# Patient Record
Sex: MALE | Race: WHITE | NOT HISPANIC OR LATINO | Employment: OTHER | URBAN - METROPOLITAN AREA
[De-identification: names, ages, dates, MRNs, and addresses within clinical notes are randomized per-mention and may not be internally consistent; named-entity substitution may affect disease eponyms.]

---

## 2017-01-11 DIAGNOSIS — K21.9 GASTROESOPHAGEAL REFLUX DISEASE WITHOUT ESOPHAGITIS: ICD-10-CM

## 2017-01-12 RX ORDER — RANITIDINE 300 MG/1
300 TABLET ORAL DAILY
Qty: 30 TAB | Refills: 3 | Status: SHIPPED | OUTPATIENT
Start: 2017-01-12 | End: 2017-02-13

## 2017-02-13 ENCOUNTER — APPOINTMENT (OUTPATIENT)
Dept: RADIOLOGY | Facility: MEDICAL CENTER | Age: 62
End: 2017-02-13
Attending: EMERGENCY MEDICINE
Payer: MEDICAID

## 2017-02-13 ENCOUNTER — RESOLUTE PROFESSIONAL BILLING HOSPITAL PROF FEE (OUTPATIENT)
Dept: HOSPITALIST | Facility: MEDICAL CENTER | Age: 62
End: 2017-02-13
Payer: MEDICAID

## 2017-02-13 ENCOUNTER — HOSPITAL ENCOUNTER (OUTPATIENT)
Facility: MEDICAL CENTER | Age: 62
End: 2017-02-15
Attending: EMERGENCY MEDICINE | Admitting: HOSPITALIST
Payer: MEDICAID

## 2017-02-13 DIAGNOSIS — R41.0 DELIRIUM: ICD-10-CM

## 2017-02-13 PROBLEM — R41.82 ALTERED MENTAL STATUS: Status: ACTIVE | Noted: 2017-02-13

## 2017-02-13 PROBLEM — E87.6 HYPOKALEMIA: Status: ACTIVE | Noted: 2017-02-13

## 2017-02-13 LAB
ALBUMIN SERPL BCP-MCNC: 4.3 G/DL (ref 3.2–4.9)
ALBUMIN/GLOB SERPL: 1.7 G/DL
ALP SERPL-CCNC: 68 U/L (ref 30–99)
ALT SERPL-CCNC: 28 U/L (ref 2–50)
AMPHET UR QL SCN: NEGATIVE
ANION GAP SERPL CALC-SCNC: 9 MMOL/L (ref 0–11.9)
APPEARANCE UR: CLEAR
AST SERPL-CCNC: 20 U/L (ref 12–45)
BARBITURATES UR QL SCN: NEGATIVE
BASOPHILS # BLD AUTO: 0.9 % (ref 0–1.8)
BASOPHILS # BLD: 0.06 K/UL (ref 0–0.12)
BENZODIAZ UR QL SCN: POSITIVE
BILIRUB SERPL-MCNC: 0.5 MG/DL (ref 0.1–1.5)
BILIRUB UR QL STRIP.AUTO: NEGATIVE
BUN SERPL-MCNC: 12 MG/DL (ref 8–22)
BZE UR QL SCN: NEGATIVE
CALCIUM SERPL-MCNC: 9.2 MG/DL (ref 8.5–10.5)
CANNABINOIDS UR QL SCN: NEGATIVE
CHLORIDE SERPL-SCNC: 107 MMOL/L (ref 96–112)
CO2 SERPL-SCNC: 25 MMOL/L (ref 20–33)
COLOR UR: YELLOW
CREAT SERPL-MCNC: 0.98 MG/DL (ref 0.5–1.4)
EOSINOPHIL # BLD AUTO: 0.13 K/UL (ref 0–0.51)
EOSINOPHIL NFR BLD: 1.9 % (ref 0–6.9)
ERYTHROCYTE [DISTWIDTH] IN BLOOD BY AUTOMATED COUNT: 38.6 FL (ref 35.9–50)
ETHANOL BLD-MCNC: 0 G/DL
GFR SERPL CREATININE-BSD FRML MDRD: >60 ML/MIN/1.73 M 2
GLOBULIN SER CALC-MCNC: 2.6 G/DL (ref 1.9–3.5)
GLUCOSE BLD-MCNC: 172 MG/DL (ref 65–99)
GLUCOSE BLD-MCNC: 216 MG/DL (ref 65–99)
GLUCOSE BLD-MCNC: 217 MG/DL (ref 65–99)
GLUCOSE SERPL-MCNC: 173 MG/DL (ref 65–99)
GLUCOSE UR STRIP.AUTO-MCNC: NEGATIVE MG/DL
HCT VFR BLD AUTO: 41.7 % (ref 42–52)
HGB BLD-MCNC: 14.2 G/DL (ref 14–18)
IMM GRANULOCYTES # BLD AUTO: 0.05 K/UL (ref 0–0.11)
IMM GRANULOCYTES NFR BLD AUTO: 0.7 % (ref 0–0.9)
INR PPP: 0.94 (ref 0.87–1.13)
KETONES UR STRIP.AUTO-MCNC: NEGATIVE MG/DL
LEUKOCYTE ESTERASE UR QL STRIP.AUTO: NEGATIVE
LIPASE SERPL-CCNC: 29 U/L (ref 11–82)
LYMPHOCYTES # BLD AUTO: 1.79 K/UL (ref 1–4.8)
LYMPHOCYTES NFR BLD: 26.2 % (ref 22–41)
MCH RBC QN AUTO: 28.1 PG (ref 27–33)
MCHC RBC AUTO-ENTMCNC: 34.1 G/DL (ref 33.7–35.3)
MCV RBC AUTO: 82.6 FL (ref 81.4–97.8)
MDMA UR QL SCN: NEGATIVE
METHADONE UR QL SCN: NEGATIVE
MICRO URNS: NORMAL
MONOCYTES # BLD AUTO: 0.44 K/UL (ref 0–0.85)
MONOCYTES NFR BLD AUTO: 6.4 % (ref 0–13.4)
NEUTROPHILS # BLD AUTO: 4.37 K/UL (ref 1.82–7.42)
NEUTROPHILS NFR BLD: 63.9 % (ref 44–72)
NITRITE UR QL STRIP.AUTO: NEGATIVE
NRBC # BLD AUTO: 0 K/UL
NRBC BLD AUTO-RTO: 0 /100 WBC
OPIATES UR QL SCN: NEGATIVE
OXYCODONE UR QL SCN: NEGATIVE
PCP UR QL SCN: NEGATIVE
PH UR STRIP.AUTO: 6.5 [PH]
PLATELET # BLD AUTO: 232 K/UL (ref 164–446)
PMV BLD AUTO: 10.2 FL (ref 9–12.9)
POTASSIUM SERPL-SCNC: 3.4 MMOL/L (ref 3.6–5.5)
PROPOXYPH UR QL SCN: NEGATIVE
PROT SERPL-MCNC: 6.9 G/DL (ref 6–8.2)
PROT UR QL STRIP: NEGATIVE MG/DL
PROTHROMBIN TIME: 12.9 SEC (ref 12–14.6)
RBC # BLD AUTO: 5.05 M/UL (ref 4.7–6.1)
RBC UR QL AUTO: NEGATIVE
SODIUM SERPL-SCNC: 141 MMOL/L (ref 135–145)
SP GR UR STRIP.AUTO: 1.02
WBC # BLD AUTO: 6.8 K/UL (ref 4.8–10.8)

## 2017-02-13 PROCEDURE — 700111 HCHG RX REV CODE 636 W/ 250 OVERRIDE (IP): Performed by: HOSPITALIST

## 2017-02-13 PROCEDURE — 94760 N-INVAS EAR/PLS OXIMETRY 1: CPT

## 2017-02-13 PROCEDURE — 70450 CT HEAD/BRAIN W/O DYE: CPT

## 2017-02-13 PROCEDURE — A9270 NON-COVERED ITEM OR SERVICE: HCPCS | Performed by: HOSPITALIST

## 2017-02-13 PROCEDURE — 99285 EMERGENCY DEPT VISIT HI MDM: CPT

## 2017-02-13 PROCEDURE — 302172 SOFT BED BELT: Performed by: EMERGENCY MEDICINE

## 2017-02-13 PROCEDURE — G0378 HOSPITAL OBSERVATION PER HR: HCPCS

## 2017-02-13 PROCEDURE — 83690 ASSAY OF LIPASE: CPT

## 2017-02-13 PROCEDURE — 71010 DX-CHEST-PORTABLE (1 VIEW): CPT

## 2017-02-13 PROCEDURE — 80053 COMPREHEN METABOLIC PANEL: CPT

## 2017-02-13 PROCEDURE — 82962 GLUCOSE BLOOD TEST: CPT

## 2017-02-13 PROCEDURE — 96374 THER/PROPH/DIAG INJ IV PUSH: CPT

## 2017-02-13 PROCEDURE — 81003 URINALYSIS AUTO W/O SCOPE: CPT

## 2017-02-13 PROCEDURE — 99220 PR INITIAL OBSERVATION CARE,LEVL III: CPT | Performed by: HOSPITALIST

## 2017-02-13 PROCEDURE — 85025 COMPLETE CBC W/AUTO DIFF WBC: CPT

## 2017-02-13 PROCEDURE — 700111 HCHG RX REV CODE 636 W/ 250 OVERRIDE (IP): Performed by: EMERGENCY MEDICINE

## 2017-02-13 PROCEDURE — 700102 HCHG RX REV CODE 250 W/ 637 OVERRIDE(OP): Performed by: HOSPITALIST

## 2017-02-13 PROCEDURE — 85610 PROTHROMBIN TIME: CPT

## 2017-02-13 PROCEDURE — 93005 ELECTROCARDIOGRAM TRACING: CPT | Performed by: EMERGENCY MEDICINE

## 2017-02-13 PROCEDURE — 36415 COLL VENOUS BLD VENIPUNCTURE: CPT

## 2017-02-13 PROCEDURE — 80307 DRUG TEST PRSMV CHEM ANLYZR: CPT

## 2017-02-13 RX ORDER — DOCUSATE SODIUM 100 MG/1
100 CAPSULE, LIQUID FILLED ORAL 2 TIMES DAILY
Status: DISCONTINUED | OUTPATIENT
Start: 2017-02-13 | End: 2017-02-15 | Stop reason: HOSPADM

## 2017-02-13 RX ORDER — LORAZEPAM 2 MG/ML
0.5 INJECTION INTRAMUSCULAR ONCE
Status: COMPLETED | OUTPATIENT
Start: 2017-02-13 | End: 2017-02-13

## 2017-02-13 RX ORDER — RANITIDINE 150 MG/1
300 TABLET ORAL 2 TIMES DAILY
Status: CANCELLED | OUTPATIENT
Start: 2017-02-13

## 2017-02-13 RX ORDER — RANITIDINE 150 MG/1
300 TABLET ORAL 2 TIMES DAILY
COMMUNITY
End: 2017-02-21 | Stop reason: SDUPTHER

## 2017-02-13 RX ORDER — ATORVASTATIN CALCIUM 40 MG/1
40 TABLET, FILM COATED ORAL DAILY
COMMUNITY
End: 2017-04-28

## 2017-02-13 RX ORDER — AMOXICILLIN 250 MG
1 CAPSULE ORAL NIGHTLY
Status: DISCONTINUED | OUTPATIENT
Start: 2017-02-13 | End: 2017-02-15 | Stop reason: HOSPADM

## 2017-02-13 RX ORDER — FAMOTIDINE 20 MG/1
20 TABLET, FILM COATED ORAL 2 TIMES DAILY
Status: DISCONTINUED | OUTPATIENT
Start: 2017-02-13 | End: 2017-02-15 | Stop reason: HOSPADM

## 2017-02-13 RX ORDER — ALBUTEROL SULFATE 90 UG/1
2 AEROSOL, METERED RESPIRATORY (INHALATION) EVERY 4 HOURS PRN
Status: DISCONTINUED | OUTPATIENT
Start: 2017-02-13 | End: 2017-02-15 | Stop reason: HOSPADM

## 2017-02-13 RX ORDER — AMOXICILLIN 250 MG
1 CAPSULE ORAL
Status: DISCONTINUED | OUTPATIENT
Start: 2017-02-13 | End: 2017-02-15 | Stop reason: HOSPADM

## 2017-02-13 RX ORDER — FLUTICASONE PROPIONATE 110 UG/1
2 AEROSOL, METERED RESPIRATORY (INHALATION) 2 TIMES DAILY
COMMUNITY
End: 2019-12-31

## 2017-02-13 RX ORDER — TIOTROPIUM BROMIDE 18 UG/1
1 CAPSULE ORAL; RESPIRATORY (INHALATION) DAILY
Status: DISCONTINUED | OUTPATIENT
Start: 2017-02-13 | End: 2017-02-15 | Stop reason: HOSPADM

## 2017-02-13 RX ORDER — TRIHEXYPHENIDYL HYDROCHLORIDE 2 MG/1
1 TABLET ORAL 3 TIMES DAILY
Status: DISCONTINUED | OUTPATIENT
Start: 2017-02-13 | End: 2017-02-13

## 2017-02-13 RX ORDER — TIOTROPIUM BROMIDE 18 UG/1
18 CAPSULE ORAL; RESPIRATORY (INHALATION) DAILY
COMMUNITY
End: 2019-12-31

## 2017-02-13 RX ORDER — ENEMA 19; 7 G/133ML; G/133ML
1 ENEMA RECTAL
Status: DISCONTINUED | OUTPATIENT
Start: 2017-02-13 | End: 2017-02-15 | Stop reason: HOSPADM

## 2017-02-13 RX ORDER — HEPARIN SODIUM 5000 [USP'U]/ML
5000 INJECTION, SOLUTION INTRAVENOUS; SUBCUTANEOUS EVERY 8 HOURS
Status: DISCONTINUED | OUTPATIENT
Start: 2017-02-13 | End: 2017-02-15 | Stop reason: HOSPADM

## 2017-02-13 RX ORDER — POTASSIUM CHLORIDE 750 MG/1
40 TABLET, FILM COATED, EXTENDED RELEASE ORAL ONCE
Status: COMPLETED | OUTPATIENT
Start: 2017-02-13 | End: 2017-02-13

## 2017-02-13 RX ORDER — ACETAMINOPHEN 160 MG
2000 TABLET,DISINTEGRATING ORAL DAILY
COMMUNITY
End: 2017-04-28

## 2017-02-13 RX ORDER — BISACODYL 10 MG
10 SUPPOSITORY, RECTAL RECTAL
Status: DISCONTINUED | OUTPATIENT
Start: 2017-02-13 | End: 2017-02-15 | Stop reason: HOSPADM

## 2017-02-13 RX ORDER — ATORVASTATIN CALCIUM 20 MG/1
20 TABLET, FILM COATED ORAL DAILY
COMMUNITY
End: 2017-04-28

## 2017-02-13 RX ORDER — KETOCONAZOLE 20 MG/ML
5 SHAMPOO TOPICAL
COMMUNITY
End: 2018-05-22 | Stop reason: SDUPTHER

## 2017-02-13 RX ORDER — BENZTROPINE MESYLATE 2 MG/1
2 TABLET ORAL 3 TIMES DAILY
Status: DISCONTINUED | OUTPATIENT
Start: 2017-02-13 | End: 2017-02-15 | Stop reason: HOSPADM

## 2017-02-13 RX ORDER — LACTULOSE 20 G/30ML
30 SOLUTION ORAL
Status: DISCONTINUED | OUTPATIENT
Start: 2017-02-13 | End: 2017-02-15 | Stop reason: HOSPADM

## 2017-02-13 RX ORDER — BENZTROPINE MESYLATE 2 MG/1
2 TABLET ORAL 3 TIMES DAILY
COMMUNITY
End: 2017-07-20

## 2017-02-13 RX ORDER — ACETAMINOPHEN 325 MG/1
650 TABLET ORAL EVERY 6 HOURS PRN
Status: DISCONTINUED | OUTPATIENT
Start: 2017-02-13 | End: 2017-02-15 | Stop reason: HOSPADM

## 2017-02-13 RX ORDER — SERTRALINE HYDROCHLORIDE 100 MG/1
100 TABLET, FILM COATED ORAL DAILY
Status: DISCONTINUED | OUTPATIENT
Start: 2017-02-13 | End: 2017-02-15 | Stop reason: HOSPADM

## 2017-02-13 RX ORDER — ARIPIPRAZOLE 20 MG/1
20 TABLET ORAL DAILY
Status: ON HOLD | COMMUNITY
End: 2017-02-15

## 2017-02-13 RX ORDER — ATORVASTATIN CALCIUM 40 MG/1
60 TABLET, FILM COATED ORAL DAILY
Status: DISCONTINUED | OUTPATIENT
Start: 2017-02-13 | End: 2017-02-15 | Stop reason: HOSPADM

## 2017-02-13 RX ORDER — LISINOPRIL 20 MG/1
20 TABLET ORAL DAILY
Status: DISCONTINUED | OUTPATIENT
Start: 2017-02-13 | End: 2017-02-15 | Stop reason: HOSPADM

## 2017-02-13 RX ORDER — AMLODIPINE BESYLATE 5 MG/1
10 TABLET ORAL DAILY
Status: DISCONTINUED | OUTPATIENT
Start: 2017-02-13 | End: 2017-02-15 | Stop reason: HOSPADM

## 2017-02-13 RX ORDER — SERTRALINE HYDROCHLORIDE 100 MG/1
100 TABLET, FILM COATED ORAL DAILY
COMMUNITY
End: 2017-07-20

## 2017-02-13 RX ORDER — QUETIAPINE FUMARATE 100 MG/1
200 TABLET, FILM COATED ORAL EVERY EVENING
Status: DISCONTINUED | OUTPATIENT
Start: 2017-02-13 | End: 2017-02-15 | Stop reason: HOSPADM

## 2017-02-13 RX ORDER — FLUTICASONE PROPIONATE 110 UG/1
2 AEROSOL, METERED RESPIRATORY (INHALATION) EVERY 12 HOURS
Status: DISCONTINUED | OUTPATIENT
Start: 2017-02-13 | End: 2017-02-15 | Stop reason: HOSPADM

## 2017-02-13 RX ORDER — TEMAZEPAM 15 MG/1
30 CAPSULE ORAL
Status: ON HOLD | COMMUNITY
End: 2017-02-15

## 2017-02-13 RX ADMIN — LISINOPRIL 20 MG: 20 TABLET ORAL at 11:43

## 2017-02-13 RX ADMIN — FAMOTIDINE 20 MG: 20 TABLET, FILM COATED ORAL at 20:30

## 2017-02-13 RX ADMIN — ACETAMINOPHEN 650 MG: 325 TABLET, FILM COATED ORAL at 11:49

## 2017-02-13 RX ADMIN — LORAZEPAM 0.5 MG: 2 INJECTION INTRAMUSCULAR; INTRAVENOUS at 03:52

## 2017-02-13 RX ADMIN — ASPIRIN 81 MG: 81 TABLET ORAL at 11:43

## 2017-02-13 RX ADMIN — QUETIAPINE FUMARATE 200 MG: 100 TABLET, FILM COATED ORAL at 20:29

## 2017-02-13 RX ADMIN — POTASSIUM CHLORIDE 40 MEQ: 750 TABLET, FILM COATED, EXTENDED RELEASE ORAL at 11:49

## 2017-02-13 RX ADMIN — SERTRALINE 100 MG: 100 TABLET, FILM COATED ORAL at 11:43

## 2017-02-13 RX ADMIN — HEPARIN SODIUM 5000 UNITS: 5000 INJECTION, SOLUTION INTRAVENOUS; SUBCUTANEOUS at 20:30

## 2017-02-13 RX ADMIN — FLUTICASONE PROPIONATE 220 MCG: 110 AEROSOL, METERED RESPIRATORY (INHALATION) at 21:00

## 2017-02-13 RX ADMIN — AMLODIPINE BESYLATE 10 MG: 5 TABLET ORAL at 11:45

## 2017-02-13 RX ADMIN — METFORMIN HYDROCHLORIDE 850 MG: 850 TABLET, FILM COATED ORAL at 17:32

## 2017-02-13 RX ADMIN — BENZTROPINE MESYLATE 2 MG: 2 TABLET ORAL at 20:30

## 2017-02-13 RX ADMIN — HEPARIN SODIUM 5000 UNITS: 5000 INJECTION, SOLUTION INTRAVENOUS; SUBCUTANEOUS at 15:43

## 2017-02-13 RX ADMIN — ATORVASTATIN CALCIUM 60 MG: 40 TABLET, FILM COATED ORAL at 11:44

## 2017-02-13 RX ADMIN — BENZTROPINE MESYLATE 2 MG: 2 TABLET ORAL at 15:47

## 2017-02-13 RX ADMIN — FAMOTIDINE 20 MG: 20 TABLET, FILM COATED ORAL at 11:44

## 2017-02-13 ASSESSMENT — PAIN SCALES - GENERAL
PAINLEVEL_OUTOF10: 3
PAINLEVEL_OUTOF10: 0
PAINLEVEL_OUTOF10: 2

## 2017-02-13 ASSESSMENT — COPD QUESTIONNAIRES
DURING THE PAST 4 WEEKS HOW MUCH DID YOU FEEL SHORT OF BREATH: NONE/LITTLE OF THE TIME
DO YOU EVER COUGH UP ANY MUCUS OR PHLEGM?: NO/ONLY WITH OCCASIONAL COLDS OR INFECTIONS
HAVE YOU SMOKED AT LEAST 100 CIGARETTES IN YOUR ENTIRE LIFE: YES
COPD SCREENING SCORE: 5

## 2017-02-13 ASSESSMENT — LIFESTYLE VARIABLES
EVER_SMOKED: YES
DO YOU DRINK ALCOHOL: NO

## 2017-02-13 NOTE — ED NOTES
"Pt found walking in the hallway, stating \"I thought it was time to go\".  Pt redirected back to rm w/o issue.  Pt remains A&Ox3 w/ intermittent aloc.  Report called, awaiting transport.   "

## 2017-02-13 NOTE — IP AVS SNAPSHOT
ThinkVine Access Code: Activation code not generated  Current ThinkVine Status: Active    Eachbabyhart  A secure, online tool to manage your health information     [employer]’s ThinkVine® is a secure, online tool that connects you to your personalized health information from the privacy of your home -- day or night - making it very easy for you to manage your healthcare. Once the activation process is completed, you can even access your medical information using the ThinkVine claritza, which is available for free in the Apple Claritza store or Google Play store.     ThinkVine provides the following levels of access (as shown below):   My Chart Features   [facility] Primary Care Doctor [facility]  Specialists [facility]  Urgent  Care Non-[facility]  Primary Care  Doctor   Email your healthcare team securely and privately 24/7 X X X X   Manage appointments: schedule your next appointment; view details of past/upcoming appointments X      Request prescription refills. X      View recent personal medical records, including lab and immunizations X X X X   View health record, including health history, allergies, medications X X X X   Read reports about your outpatient visits, procedures, consult and ER notes X X X X   See your discharge summary, which is a recap of your hospital and/or ER visit that includes your diagnosis, lab results, and care plan. X X       How to register for ThinkVine:  1. Go to  [email or web address].  2. Click on the Sign Up Now box, which takes you to the New Member Sign Up page. You will need to provide the following information:  a. Enter your ThinkVine Access Code exactly as it appears at the top of this page. (You will not need to use this code after you’ve completed the sign-up process. If you do not sign up before the expiration date, you must request a new code.)   b. Enter your date of birth.   c. Enter your home email address.   d. Click Submit, and follow the next screen’s instructions.  3. Create a ThinkVine ID. This will  be your Directworks login ID and cannot be changed, so think of one that is secure and easy to remember.  4. Create a Directworks password. You can change your password at any time.  5. Enter your Password Reset Question and Answer. This can be used at a later time if you forget your password.   6. Enter your e-mail address. This allows you to receive e-mail notifications when new information is available in Directworks.  7. Click Sign Up. You can now view your health information.    For assistance activating your Directworks account, call [phone]

## 2017-02-13 NOTE — ED NOTES
"Med rec updated and complete  Allergies reviewed  Pt states \"No antibiotics in the last 30 days\".  Pt had medication list at bedside, went over list and returned list back to pt.  "

## 2017-02-13 NOTE — ED NOTES
Patient to ED via EMS for alerted LOC. Found by security at [facility] stumbling and confused. Upon EMS arrival patient was oriented to self and location only. He had some difficulty following direction and seemed to have loss of balance.     On arrival to ED patient was oriented to self and date, but not situation. He cannot tell RN why he was transport. He told EMS he arrived to [facility] at 1 pm and 4 pm and told RN 7 pm. He does not know time of day or how long he was in the [facility]. Sometimes has inappropriate answers to RN questions Denies ETOH or drugs. States he lives in a motel and has no family.   PERRL, ONEAL,  equal, no facial droop or arm drift. Some slurred speech - pt has not teeth and states this is his normal voice. Denies ETOH - no smell of alcohol. Hx of diabetes FSBS 200.

## 2017-02-13 NOTE — IP AVS SNAPSHOT
" <p align=\"LEFT\"><IMG SRC=\"//[email or web address]$/[email or web address]\" alt=\"Image\" WIDTH=\"50%\" HEIGHT=\"200\" BORDER=\"\"></p>                   Name:Nikolas Rico  Medical Record Number:[id number]  CSN: [id number]    YOB: 1955   Age: 61 y.o.  Sex: male  HT:1.727 m (5' 8\") WT: 88.3 kg (194 lb 10.7 oz)          Admit Date: 2/13/2017     Discharge Date:   Today's Date: 2/15/2017  Attending Doctor:  Franco Peter M.D.                  Allergies:  Nkda          Your appointments     Feb 21, 2017 10:30 AM   Established Patient with Chepe Rod M.D.   [facility]    [address] [place] [id number]   [phone]           You will be receiving a confirmation call a few days before your appointment from our automated call confirmation system.                 Medication List      Take these Medications        Instructions    amlodipine 10 MG Tabs   Commonly known as:  NORVASC    Take 1 Tab by mouth every day.   Dose:  10 mg       ASPIRIN LOW DOSE 81 MG EC tablet   Generic drug:  aspirin    TAKE 1 TABLET ORALLY ONCE DAILY       * atorvastatin 20 MG Tabs   Commonly known as:  LIPITOR    Take 20 mg by mouth every day. Pt also has an RX for 40MG Total of 60MG QDAY   Dose:  20 mg       * atorvastatin 40 MG Tabs   Commonly known as:  LIPITOR    Take 40 mg by mouth every day. Pt also has an RX for 20MG Total of 60MG QDAY   Dose:  40 mg       benztropine 2 MG Tabs   Commonly known as:  COGENTIN    Take 2 mg by mouth 3 times a day.   Dose:  2 mg       fluticasone 110 MCG/ACT Aero   Commonly known as:  FLOVENT HFA    Inhale 2 Puffs by mouth 2 times a day.   Dose:  2 Puff       ketoconazole 2 % shampoo   Commonly known as:  NIZORAL    Apply 5 mL to affected area(s) 1 time daily as needed for Itching.   Dose:  5 mL       lisinopril 20 MG Tabs   Commonly known as:  PRINIVIL    Take 1 Tab by mouth every day.   Dose:  20 mg       metformin 850 MG Tabs   Commonly known as:  GLUCOPHAGE    Take 1 " Tab by mouth 2 times a day, with meals.   Dose:  850 mg       NOVOLOG MIX 70/30 (70-30) 100 UNIT/ML injection   Generic drug:  insulin aspart protamine-insulin aspart    INJECT 35 UNITS SUBCUTANEOUSLY AS DIRECTED BEFORE MEALS. INREASE 2 UNITS SUBCUTANEOUSLY AS DIRECTED FOR GOAL . STOP IF BLOOD SUGAR <60       quetiapine 100 MG Tabs   Commonly known as:  SEROQUEL    Take 2 Tabs by mouth every evening.   Dose:  200 mg       ranitidine 150 MG Tabs   Commonly known as:  ZANTAC    Take 300 mg by mouth 2 times a day.   Dose:  300 mg       sertraline 100 MG Tabs   Commonly known as:  ZOLOFT    Take 100 mg by mouth every day.   Dose:  100 mg       tiotropium 18 MCG Caps   Commonly known as:  SPIRIVA    Inhale 18 mcg by mouth every day.   Dose:  18 mcg       VENTOLIN  (90 BASE) MCG/ACT Aers inhalation aerosol   Generic drug:  albuterol    INHALE 2 PUFFS BY MOUTH EVERY 8 HOURS ASNEEDED FOR SHORTNESS OF BREATH       Vitamin D3 2000 UNIT Caps    Take 2,000 Units by mouth every day.   Dose:  2000 Units       * Notice:  This list has 2 medication(s) that are the same as other medications prescribed for you. Read the directions carefully, and ask your doctor or other care provider to review them with you.

## 2017-02-13 NOTE — ED NOTES
Patient has not made multiple unsafe attempts at ambulation. Soft bed belt applied. Pt demonstrated ability to remove belt.  Call light inreach.

## 2017-02-13 NOTE — H&P
HOSPITAL MEDICINE HISTORY/ PHYSICAL    Date & Time note created:    2/13/2017   7:36 AM       Patient ID:   Name: Nikolas Rico. YOB: 1955. Age: 61 y.o. male. MRN: [id number]    Admitting Attending:  Fidel Baugh     PCP : Chepe Rod M.D.        Chief Complaint:       Altered mental status    History of Present Illness:    Jacky is a 61 y.o. male w/h/o hepatitis C, bipolar, diabetes, hypertension, COPD, GERD, CKD 3 who presents with altered mental status. Patient was found wandering in [facility]. He was brought to the ER and then became fully alert and oriented. Then soon afterwards he became altered and was found wandering around in his ER room. He then became alert and oriented again and has been waxing and waning. He says sometimes he sleeps walks. He says this usually happens when he takes his Ambien but he last took his Ambien one and a half months ago. He has since discontinued this.  He does have a history of bipolar but he says he has been taking all his medications. His mother wonders if he took any drugs and has been wondering this for about the past 3 days.    Review of Systems:    Has right heel pain, occasional lightheadedness  Please see HPI, all other systems were reviewed and are negative (AMA/CMS criteria)              Past Medical/ Family / Social history (PFSH):   Past Medical History   Diagnosis Date   • Hepatitis C    • Psychiatric disorder      bipolar   • Diabetes    • Hypertension    • Hyperlipidemia    • COPD    • GERD (gastroesophageal reflux disease)    • CKD (chronic kidney disease) stage 3, GFR 30-59 ml/min      Past Surgical History   Procedure Laterality Date   • Other orthopedic surgery     • Other abdominal surgery       Current Outpatient Medications:  No current facility-administered medications on file prior to encounter.     Current Outpatient Prescriptions on File Prior to Encounter   Medication Sig Dispense Refill   • lisinopril  "(PRINIVIL) 20 MG Tab Take 1 Tab by mouth every day. 30 Tab 11   • ketoconazole (NIZORAL) 2 % shampoo Apply  as per directions 1 Bottle 5   • ASPIRIN LOW DOSE 81 MG EC tablet TAKE 1 TABLET ORALLY ONCE DAILY 30 Tab 5   • atorvastatin (LIPITOR) 20 MG Tab TAKE 1 TABLET ORALLY ONCE DAILY 30 Tab 5   • VENTOLIN  (90 BASE) MCG/ACT Aero Soln inhalation aerosol INHALE 2 PUFFS BY MOUTH EVERY 8 HOURS ASNEEDED FOR SHORTNESS OF BREATH 1 Inhaler 7   • TRUETEST TEST strip TEST BLOOD GLUCOSE 3 TIMES DAILY 100 Strip 11   • ULTICARE INSULIN SYRINGE 29G X 1/2\" 0.5 ML Misc USE AS DIRECTED BY PHYSICIAN 90 Each 5   • amlodipine (NORVASC) 10 MG Tab Take 1 Tab by mouth every day. 30 Tab 11   • NOVOLOG MIX 70/30 (70-30) 100 UNIT/ML injection INJECT 35 UNITS SUBCUTANEOUSLY AS DIRECTED BEFORE MEALS. INREASE 2 UNITS SUBCUTANEOUSLY AS DIRECTED FOR GOAL . STOP IF BLOOD SUGAR <60 20 mL 11   • atorvastatin (LIPITOR) 40 MG Tab TAKE 1 TABLET ORALLY ONCE DAILY (TAKE WITH 20MG TABLET FOR 60MG) 30 Tab 11   • FLOVENT  MCG/ACT Aerosol INHALE 2 PUFFS TWICE DAILY 1 Inhaler 11   • glucose blood (TRUETEST TEST) strip TEST BLOOD GLUCOSE 3 TIMES DAILY 100 Strip 8   • metformin (GLUCOPHAGE) 850 MG Tab Take 1 Tab by mouth 2 times a day, with meals. 60 Tab 5   • tiotropium (SPIRIVA HANDIHALER) 18 MCG Cap INHALE CONTENTS OF 1 CAPSULE BY MOUTHDAILY 30 Cap 6   • TechLite Lancets MISC Use as directed 100 Each 3   • tiotropium (SPIRIVA) 18 MCG CAPS Inhale 1 Cap by mouth every day. 1 Cap 3   • clotrimazole (LOTRIMIN) 1 % CREA Apply Bid to area Between Toes x 2 weeks 1 Tube 2   • Lancets MISC Lancets order: Lancets for { meter. Sig: use once Daily and prn ssx high or low sugar. 100 Each 3   • glucose blood (PRECISION XTRA TEST STRIPS) strip 1 Strip by Other route as needed. Test tid 100 Strip 3   • Misc. Devices MISC TrueTrack Glucometer, Dx : DM 1 Each 0   • aspirin (ASA) 81 MG CHEW Take 1 Tab by mouth every day. 30 Each 3   • trihexyphenidyl " "(ARTANE) 2 MG TABS Take 1 mg by mouth 3 times a day.     • quetiapine (SEROQUEL) 100 MG TABS Take 2 Tabs by mouth every evening. 60 Each 5     Medication Allergy/Sensitivities:  Allergies   Allergen Reactions   • Nkda [No Known Drug Allergy]      Family History:  Family History   Problem Relation Age of Onset   • Cancer Father      tee garcia      Social History:  Social History   Substance Use Topics   • Smoking status: Former Smoker     Types: Cigarettes     Quit date: 03/06/2013   • Smokeless tobacco: Never Used   • Alcohol Use: 0.0 oz/week     0 Standard drinks or equivalent per week      Comment: sober since HCV diagnosis     #################################################################  Physical Exam:   Vitals/ General Appearance:   Weight/BMI: Body mass index is 30.29 kg/(m^2).  Blood pressure 123/81, pulse 88, temperature 36 °C (96.8 °F), resp. rate 21, height 1.651 m (5' 5\"), weight 82.555 kg (182 lb), SpO2 97 %.   Filed Vitals:    02/13/17 0355 02/13/17 0431 02/13/17 0456 02/13/17 0501   BP:       Pulse: 86  88    Temp:       Resp: 24 21     Height:       Weight:       SpO2: 90%  92% 97%    Oxygen Therapy:  Pulse Oximetry: 97 %, O2 Delivery: None (Room Air)    Constitutional:  well developed, well nourished  HENMT: Normocephalic, atraumatic, b/l ears normal, nose normal  Eyes:  EOMI, conjunctiva normal, no discharge  Neck: no tracheal deviation, supple  Cardiovascular: normal heart rate, normal rhythm, no murmurs, no rubs or gallops; no cyanosis, clubbing or edema  Lungs: Respiratory effort is normal, normal breath sounds, breath sounds clear to auscultation b/l, no rales, rhonchi or wheezing  Abdomen: soft, non-tender, no guarding or rebound  Skin: warm, dry, no erythema, no rash  Neurologic: Alert and mostly oriented,   MSK: Slight right heel tenderness to palpation  Psychiatric: Some anxiety or depression    #################################################################  Lab Data Review:  "   Objective  Recent Results (from the past 24 hour(s))   Blood Alcohol    Collection Time: 02/13/17  2:36 AM   Result Value Ref Range    Diagnostic Alcohol 0.00 0.00 g/dL   CBC WITH DIFFERENTIAL    Collection Time: 02/13/17  2:36 AM   Result Value Ref Range    WBC 6.8 4.8 - 10.8 K/uL    RBC 5.05 4.70 - 6.10 M/uL    Hemoglobin 14.2 14.0 - 18.0 g/dL    Hematocrit 41.7 (L) 42.0 - 52.0 %    MCV 82.6 81.4 - 97.8 fL    MCH 28.1 27.0 - 33.0 pg    MCHC 34.1 33.7 - 35.3 g/dL    RDW 38.6 35.9 - 50.0 fL    Platelet Count 232 164 - 446 K/uL    MPV 10.2 9.0 - 12.9 fL    Neutrophils-Polys 63.90 44.00 - 72.00 %    Lymphocytes 26.20 22.00 - 41.00 %    Monocytes 6.40 0.00 - 13.40 %    Eosinophils 1.90 0.00 - 6.90 %    Basophils 0.90 0.00 - 1.80 %    Immature Granulocytes 0.70 0.00 - 0.90 %    Nucleated RBC 0.00 /100 WBC    Neutrophils (Absolute) 4.37 1.82 - 7.42 K/uL    Lymphs (Absolute) 1.79 1.00 - 4.80 K/uL    Monos (Absolute) 0.44 0.00 - 0.85 K/uL    Eos (Absolute) 0.13 0.00 - 0.51 K/uL    Baso (Absolute) 0.06 0.00 - 0.12 K/uL    Immature Granulocytes (abs) 0.05 0.00 - 0.11 K/uL    NRBC (Absolute) 0.00 K/uL   COMP METABOLIC PANEL    Collection Time: 02/13/17  2:36 AM   Result Value Ref Range    Sodium 141 135 - 145 mmol/L    Potassium 3.4 (L) 3.6 - 5.5 mmol/L    Chloride 107 96 - 112 mmol/L    Co2 25 20 - 33 mmol/L    Anion Gap 9.0 0.0 - 11.9    Glucose 173 (H) 65 - 99 mg/dL    Bun 12 8 - 22 mg/dL    Creatinine 0.98 0.50 - 1.40 mg/dL    Calcium 9.2 8.5 - 10.5 mg/dL    AST(SGOT) 20 12 - 45 U/L    ALT(SGPT) 28 2 - 50 U/L    Alkaline Phosphatase 68 30 - 99 U/L    Total Bilirubin 0.5 0.1 - 1.5 mg/dL    Albumin 4.3 3.2 - 4.9 g/dL    Total Protein 6.9 6.0 - 8.2 g/dL    Globulin 2.6 1.9 - 3.5 g/dL    A-G Ratio 1.7 g/dL   LIPASE    Collection Time: 02/13/17  2:36 AM   Result Value Ref Range    Lipase 29 11 - 82 U/L   PROTHROMBIN TIME    Collection Time: 02/13/17  2:36 AM   Result Value Ref Range    PT 12.9 12.0 - 14.6 sec    INR 0.94  0.87 - 1.13   ESTIMATED GFR    Collection Time: 17  2:36 AM   Result Value Ref Range    GFR If African American >60 >60 mL/min/1.73 m 2    GFR If Non African American >60 >60 mL/min/1.73 m 2   ACCU-CHEK GLUCOSE    Collection Time: 17  3:09 AM   Result Value Ref Range    Glucose - Accu-Ck 172 (H) 65 - 99 mg/dL   EKG (NOW)    Collection Time: 17  3:32 AM   Result Value Ref Range    Report       [facility]    Test Date:  2017  Pt Name:    AKUA DEXTER            Department: ER  MRN:        [id number]                      Room:       [facility]  Gender:     M                            Technician: [id number]  :        1955                   Requested By:VENESSA MUSE  Order #:    [id number]                    Reading MD:    Measurements  Intervals                                Axis  Rate:       87                           P:          62  NJ:         172                          QRS:        112  QRSD:       80                           T:          20  QT:         360  QTc:        433    Interpretive Statements  SINUS RHYTHM  CONSIDER RIGHT VENTRICULAR HYPERTROPHY  BASELINE WANDER IN LEAD(S) V3  Compared to ECG 2016 12:22:21  Left anterior fascicular block no longer present     Urinalysis    Collection Time: 17  6:44 AM   Result Value Ref Range    Color Yellow     Character Clear     Specific Gravity 1.016 <1.035    Ph 6.5 5.0-8.0    Glucose Negative Negative mg/dL    Ketones Negative Negative mg/dL    Protein Negative Negative mg/dL    Bilirubin Negative Negative    Nitrite Negative Negative    Leukocyte Esterase Negative Negative    Occult Blood Negative Negative    Micro Urine Req see below        (click the triangle to expand results)  My interpretation of lab results:   Potassium 3.4  Imaging/Procedures Review:    CT-HEAD W/O   Final Result      1.  No acute intracranial abnormality.   2.  Moderate atrophy.               INTERPRETING  LOCATION:  [address] [place] [address] [place], [address]      DX-CHEST-PORTABLE (1 VIEW)   Final Result      No acute cardiopulmonary abnormality.        EKG:   per my independant read:  QTc:433, HR: 87, Normal Sinus Rhythm, no ST/T changes    Assessment and Plan:      1. Delirium/altered mental status  - Waxing and waning  - We'll monitor carefully in the hospital  - Hold temazepam  - Patient blames sleepwalking  - Patient has not been taking Ambien for the past one and half months  2. Bipolar disorder  - Patient says he has been taking of his medications  - Continue benztropine, sertraline, trihexyphenidyl, quetiapine  3. Diabetes type 2 with hyperglycemia  - Continue oral metformin  4. Suspicion of drug use  - Patient's mother suspects  - U tox pending  5. Right heel pain  - PTOT  6. Prophylaxis: sc heparin  7. Code: DNR per patient

## 2017-02-13 NOTE — IP AVS SNAPSHOT
2/15/2017          Nikolas Rico  [address]  [place] [address]    Dear Nikolas:    [facility] wants to ensure your discharge home is safe and you or your loved ones have had all your questions answered regarding your care after you leave the hospital.    You may receive a telephone call within two days of your discharge.  This call is to make certain you understand your discharge instructions as well as ensure we provided you with the best care possible during your stay with us.     The call will only last approximately 3-5 minutes and will be done by a nurse.    Once again, we want to ensure your discharge home is safe and that you have a clear understanding of any next steps in your care.  If you have any questions or concerns, please do not hesitate to contact us, we are here for you.  Thank you for choosing [facility] for your healthcare needs.    Sincerely,    Jethro Mendoza    [facility]

## 2017-02-13 NOTE — IP AVS SNAPSHOT
" Home Care Instructions                                                                                                                  Name:Nikolas Rico  Medical Record Number:[id number]  CSN: [id number]    YOB: 1955   Age: 61 y.o.  Sex: male  HT:1.727 m (5' 8\") WT: 88.3 kg (194 lb 10.7 oz)          Admit Date: 2/13/2017     Discharge Date:   Today's Date: 2/15/2017  Attending Doctor:  Franco Peter M.D.                  Allergies:  Nkda            Discharge Instructions       Discharge Instructions    Discharged to home by car with self. Discharged via walking, hospital escort: Refused.  Special equipment needed: Not Applicable    Be sure to schedule a follow-up appointment with your primary care doctor or any specialists as instructed.     Discharge Plan:   Influenza Vaccine Indication: Not indicated: Previously immunized this influenza season and > 8 years of age    I understand that a diet low in cholesterol, fat, and sodium is recommended for good health. Unless I have been given specific instructions below for another diet, I accept this instruction as my diet prescription.   Other diet: Diabetic diet    Special Instructions: None    · Is patient discharged on Warfarin / Coumadin?   No     · Is patient Post Blood Transfusion?  No        Your appointments     Feb 21, 2017 10:30 AM   Established Patient with Chepe Rod M.D.   [facility] ([facility])    [address] [place] [id number]   [phone]           You will be receiving a confirmation call a few days before your appointment from our automated call confirmation system.                 Discharge Medication Instructions:    Below are the medications your physician expects you to take upon discharge:    Review all your home medications and newly ordered medications with your doctor and/or pharmacist. Follow medication instructions as directed by your doctor and/or pharmacist.    Please keep your medication " list with you and share with your physician.               Medication List      CONTINUE taking these medications        Instructions    amlodipine 10 MG Tabs   Last time this was given:  10 mg on 2/15/2017  8:06 AM   Commonly known as:  NORVASC    Take 1 Tab by mouth every day.   Dose:  10 mg       ASPIRIN LOW DOSE 81 MG EC tablet   Last time this was given:  81 mg on 2/15/2017  8:05 AM   Generic drug:  aspirin    TAKE 1 TABLET ORALLY ONCE DAILY       * atorvastatin 20 MG Tabs   Last time this was given:  60 mg on 2/15/2017  8:05 AM   Commonly known as:  LIPITOR    Take 20 mg by mouth every day. Pt also has an RX for 40MG Total of 60MG QDAY   Dose:  20 mg       * atorvastatin 40 MG Tabs   Last time this was given:  60 mg on 2/15/2017  8:05 AM   Commonly known as:  LIPITOR    Take 40 mg by mouth every day. Pt also has an RX for 20MG Total of 60MG QDAY   Dose:  40 mg       benztropine 2 MG Tabs   Last time this was given:  2 mg on 2/15/2017  1:18 PM   Commonly known as:  COGENTIN    Take 2 mg by mouth 3 times a day.   Dose:  2 mg       fluticasone 110 MCG/ACT Aero   Last time this was given:  220 mcg on 2/15/2017  8:05 AM   Commonly known as:  FLOVENT HFA    Inhale 2 Puffs by mouth 2 times a day.   Dose:  2 Puff       ketoconazole 2 % shampoo   Commonly known as:  NIZORAL    Apply 5 mL to affected area(s) 1 time daily as needed for Itching.   Dose:  5 mL       lisinopril 20 MG Tabs   Last time this was given:  20 mg on 2/15/2017  8:05 AM   Commonly known as:  PRINIVIL    Take 1 Tab by mouth every day.   Dose:  20 mg       metformin 850 MG Tabs   Last time this was given:  850 mg on 2/15/2017  8:05 AM   Commonly known as:  GLUCOPHAGE    Take 1 Tab by mouth 2 times a day, with meals.   Dose:  850 mg       NOVOLOG MIX 70/30 (70-30) 100 UNIT/ML injection   Generic drug:  insulin aspart protamine-insulin aspart    INJECT 35 UNITS SUBCUTANEOUSLY AS DIRECTED BEFORE MEALS. INREASE 2 UNITS SUBCUTANEOUSLY AS DIRECTED FOR GOAL  . STOP IF BLOOD SUGAR <60       quetiapine 100 MG Tabs   Last time this was given:  200 mg on 2/14/2017  8:17 PM   Commonly known as:  SEROQUEL    Take 2 Tabs by mouth every evening.   Dose:  200 mg       ranitidine 150 MG Tabs   Commonly known as:  ZANTAC    Take 300 mg by mouth 2 times a day.   Dose:  300 mg       sertraline 100 MG Tabs   Last time this was given:  100 mg on 2/15/2017  8:05 AM   Commonly known as:  ZOLOFT    Take 100 mg by mouth every day.   Dose:  100 mg       tiotropium 18 MCG Caps   Last time this was given:  1 Cap on 2/15/2017  8:05 AM   Commonly known as:  SPIRIVA    Inhale 18 mcg by mouth every day.   Dose:  18 mcg       VENTOLIN  (90 BASE) MCG/ACT Aers inhalation aerosol   Generic drug:  albuterol    INHALE 2 PUFFS BY MOUTH EVERY 8 HOURS ASNEEDED FOR SHORTNESS OF BREATH       Vitamin D3 2000 UNIT Caps    Take 2,000 Units by mouth every day.   Dose:  2000 Units       * Notice:  This list has 2 medication(s) that are the same as other medications prescribed for you. Read the directions carefully, and ask your doctor or other care provider to review them with you.      STOP taking these medications     aripiprazole 20 MG tablet   Commonly known as:  ABILIFY       temazepam 15 MG Caps   Commonly known as:  RESTORIL               Instructions           Diet / Nutrition:    Follow any diet instructions given to you by your doctor or the dietician, including how much salt (sodium) you are allowed each day.    If you are overweight, talk to your doctor about a weight reduction plan.    Activity:    Remain physically active following your doctor's instructions about exercise and activity.    Rest often.     Any time you become even a little tired or short of breath, SIT DOWN and rest.    Worsening Symptoms:    Report any of the following signs and symptoms to the doctor's office immediately:    *Pain of jaw, arm, or neck  *Chest pain not relieved by medication                                *Dizziness or loss of consciousness  *Difficulty breathing even when at rest   *More tired than usual                                       *Bleeding drainage or swelling of surgical site  *Swelling of feet, ankles, legs or stomach                 *Fever (>100ºF)  *Pink or blood tinged sputum  *Weight gain (3lbs/day or 5lbs /week)           *Shock from internal defibrillator (if applicable)  *Palpitations or irregular heartbeats                *Cool and/or numb extremities    Stroke Awareness    Common Risk Factors for Stroke include:    Age  Atrial Fibrillation  Carotid Artery Stenosis  Diabetes Mellitus  Excessive alcohol consumption  High blood pressure  Overweight   Physical inactivity  Smoking    Warning signs and symptoms of a stroke include:    *Sudden numbness or weakness of the face, arm or leg (especially on one side of the body).  *Sudden confusion, trouble speaking or understanding.  *Sudden trouble seeing in one or both eyes.  *Sudden trouble walking, dizziness, loss of balance or coordination.Sudden severe headache with no known cause.    It is very important to get treatment quickly when a stroke occurs. If you experience any of the above warning signs, call 911 immediately.                   Disclaimer         Quit Smoking / Tobacco Use:    I understand the use of any tobacco products increases my chance of suffering from future heart disease or stroke and could cause other illnesses which may shorten my life. Quitting the use of tobacco products is the single most important thing I can do to improve my health. For further information on smoking / tobacco cessation call a Toll Free Quit Line at 1-837.163.3240 (*[employer] [place]) or 1-638.583.6396 (American [employer] Association) or you can access the web based program at www.lungusa.org.    [place] Tobacco Users Help Line:  (375) 959-6753       Toll Free: 1-470.814.7421  Quit Tobacco Program [facility]  ([phone] 809-4131    Crisis Hotline:    [place] Crisis Hotline:  2-838-VEGDRTV or 1-426.562.8915    [place] Crisis Hotline:    1-823.712.2933 or 352-076-4446    Discharge Survey:   Thank you for choosing [facility]. We hope we did everything we could to make your hospital stay a pleasant one. You may be receiving a phone survey and we would appreciate your time and participation in answering the questions. Your input is very valuable to us in our efforts to improve our service to our patients and their families.        My signature on this form indicates that:    1. I have reviewed and understand the above information.  2. My questions regarding this information have been answered to my satisfaction.  3. I have formulated a plan with my discharge nurse to obtain my prescribed medications for home.                  Disclaimer         __________________________________                     __________       ________                       Patient Signature                                                 Date                    Time

## 2017-02-13 NOTE — ED NOTES
"Spoke to Patients mother. She has been updated.   She states patient does have hx of bipolar. She does not know if he has been taking medications. She states he has hx of drug abuse - she does not know if he has any recent use. He has told her that his remains sober. He states over last 3 to 4 days she noticed he seem \"off\" and it was difficulty to converse with him.  "

## 2017-02-13 NOTE — ED NOTES
Report received, assuming care.  Pt noted standing up in rm, stating that he needed to put in underwear on.  Pt sat on chair in rm, put on his underwear and amb back to bed, stand by assist. Pt easily redirected.  Pt A&Ox3.  Pt awaiting a bed assignment, pt updated.

## 2017-02-13 NOTE — PROGRESS NOTES
Pt admitted to hospital for delirium, aaox3, pleasant, diminished ST memory Lungs CTA, medicated for rt foot and back pain. VSS, family aware of admission. Will continue to monitor.

## 2017-02-13 NOTE — ED PROVIDER NOTES
"ED Provider Note    Scribed for Richi Cavanaugh M.D. by Nyla Delacruz. 2/13/2017  3:11 AM    Primary care provider: Chepe Rod M.D.  Means of arrival: EMS  History obtained from: patient   History limited by: ALOC    CHIEF COMPLAINT  Chief Complaint   Patient presents with   • ALOC     HPI  Nikolas Rico is a 61 y.o. male who presents to the Emergency Department by EMS for altered level of consciousness. Per EMS, the patient was found wandering around in the [facility] earlier this evening. The patient believes he may have been sleep walking because he took a sleeping pill earlier.  The patient is oriented to place and date.  He denies any alcohol or drug use.  The patient denies falling or losing consciousness. He denies any headache, nausea, or vomiting.  Further HPI  is unobtainable as noted above.    REVIEW OF SYSTEMS   Pertinent negatives include no falling, lose of consciousness, headache, nausea or vomiting.  See HPI for further details. Further review of systems is unobtainable as noted above. C     PAST MEDICAL HISTORY   has a past medical history of Hepatitis C; Psychiatric disorder; Diabetes; Hypertension; Hyperlipidemia; COPD; GERD (gastroesophageal reflux disease); and CKD (chronic kidney disease) stage 3, GFR 30-59 ml/min.    SURGICAL HISTORY   has past surgical history that includes other orthopedic surgery and other abdominal surgery.    SOCIAL HISTORY  Social History   Substance Use Topics   • Smoking status: Former Smoker     Types: Cigarettes     Quit date: 03/06/2013   • Smokeless tobacco: Never Used   • Alcohol Use: 0.0 oz/week     0 Standard drinks or equivalent per week      Comment: sober since HCV diagnosis      History   Drug Use   • Yes   • Special: Methamphetamines     Comment: meth use.  Last use \"a couple weeks ago\"     FAMILY HISTORY  Family History   Problem Relation Age of Onset   • Cancer Father      blader ca     CURRENT " "MEDICATIONS  No current facility-administered medications on file prior to encounter.     Current Outpatient Prescriptions on File Prior to Encounter   Medication Sig Dispense Refill   • lisinopril (PRINIVIL) 20 MG Tab Take 1 Tab by mouth every day. 30 Tab 11   • ketoconazole (NIZORAL) 2 % shampoo Apply  as per directions 1 Bottle 5   • ASPIRIN LOW DOSE 81 MG EC tablet TAKE 1 TABLET ORALLY ONCE DAILY 30 Tab 5   • atorvastatin (LIPITOR) 20 MG Tab TAKE 1 TABLET ORALLY ONCE DAILY 30 Tab 5   • VENTOLIN  (90 BASE) MCG/ACT Aero Soln inhalation aerosol INHALE 2 PUFFS BY MOUTH EVERY 8 HOURS ASNEEDED FOR SHORTNESS OF BREATH 1 Inhaler 7   • TRUETEST TEST strip TEST BLOOD GLUCOSE 3 TIMES DAILY 100 Strip 11   • ULTICARE INSULIN SYRINGE 29G X 1/2\" 0.5 ML Misc USE AS DIRECTED BY PHYSICIAN 90 Each 5   • amlodipine (NORVASC) 10 MG Tab Take 1 Tab by mouth every day. 30 Tab 11   • NOVOLOG MIX 70/30 (70-30) 100 UNIT/ML injection INJECT 35 UNITS SUBCUTANEOUSLY AS DIRECTED BEFORE MEALS. INREASE 2 UNITS SUBCUTANEOUSLY AS DIRECTED FOR GOAL . STOP IF BLOOD SUGAR <60 20 mL 11   • atorvastatin (LIPITOR) 40 MG Tab TAKE 1 TABLET ORALLY ONCE DAILY (TAKE WITH 20MG TABLET FOR 60MG) 30 Tab 11   • FLOVENT  MCG/ACT Aerosol INHALE 2 PUFFS TWICE DAILY 1 Inhaler 11   • glucose blood (TRUETEST TEST) strip TEST BLOOD GLUCOSE 3 TIMES DAILY 100 Strip 8   • metformin (GLUCOPHAGE) 850 MG Tab Take 1 Tab by mouth 2 times a day, with meals. 60 Tab 5   • tiotropium (SPIRIVA HANDIHALER) 18 MCG Cap INHALE CONTENTS OF 1 CAPSULE BY MOUTHDAILY 30 Cap 6   • TechLite Lancets MISC Use as directed 100 Each 3   • tiotropium (SPIRIVA) 18 MCG CAPS Inhale 1 Cap by mouth every day. 1 Cap 3   • clotrimazole (LOTRIMIN) 1 % CREA Apply Bid to area Between Toes x 2 weeks 1 Tube 2   • Lancets MISC Lancets order: Lancets for { meter. Sig: use once Daily and prn ssx high or low sugar. 100 Each 3   • glucose blood (PRECISION XTRA TEST STRIPS) strip 1 Strip by Other " "route as needed. Test tid 100 Strip 3   • Misc. Devices MISC TrueTrack Glucometer, Dx : DM 1 Each 0   • aspirin (ASA) 81 MG CHEW Take 1 Tab by mouth every day. 30 Each 3   • trihexyphenidyl (ARTANE) 2 MG TABS Take 1 mg by mouth 3 times a day.     • quetiapine (SEROQUEL) 100 MG TABS Take 2 Tabs by mouth every evening. 60 Each 5       ALLERGIES  Allergies   Allergen Reactions   • Nkda [No Known Drug Allergy]      PHYSICAL EXAM  VITAL SIGNS: /81 mmHg  Pulse 92  Temp(Src) 36 °C (96.8 °F)  Resp 16  Ht 1.651 m (5' 5\")  Wt 82.555 kg (182 lb)  BMI 30.29 kg/m2    Constitutional: Well developed, Well nourished, no distress, Non-toxic appearance.   HENT: Normocephalic, Atraumatic, Bilateral external ears normal, Oropharynx moist, No oral exudates.   Eyes: PERRLA, EOMI, Conjunctiva normal, No discharge.   Neck: No tenderness, Supple, No stridor.   Lymphatic: No lymphadenopathy noted.   Cardiovascular: Normal heart rate, Normal rhythm.   Thorax & Lungs: Clear to auscultation bilaterally, No respiratory distress, No wheezing, No crackles.   Abdomen: Soft, No tenderness, No masses, No pulsatile masses.   Skin: Warm, Dry, No erythema, No rash.  Old dermatitis on scalp   Extremities:, No edema No cyanosis.   Musculoskeletal: No tenderness to palpation or major deformities noted.  Intact distal pulses  Neurologic: Awake, alert. Moves all extremities spontaneously.  Psychiatric: Affect normal, Judgment normal, Mood normal.     LABS  Labs Reviewed   CBC WITH DIFFERENTIAL - Abnormal; Notable for the following:     Hematocrit 41.7 (*)     All other components within normal limits    Narrative:     Indicate which anticoagulants the patient is on:->NONE   COMP METABOLIC PANEL - Abnormal; Notable for the following:     Potassium 3.4 (*)     Glucose 173 (*)     All other components within normal limits    Narrative:     Indicate which anticoagulants the patient is on:->NONE   ACCU-CHEK GLUCOSE - Abnormal; Notable for the " "following:     Glucose - Accu-Ck 172 (*)     All other components within normal limits   DIAGNOSTIC ALCOHOL    Narrative:     Indicate which anticoagulants the patient is on:->NONE   LIPASE    Narrative:     Indicate which anticoagulants the patient is on:->NONE   PROTHROMBIN TIME    Narrative:     Indicate which anticoagulants the patient is on:->NONE   ESTIMATED GFR    Narrative:     Indicate which anticoagulants the patient is on:->NONE   URINE DRUG SCREEN   URINALYSIS   All labs reviewed by me.    EKG  12 lead EKG interpreted by me to show normal sinus rhythm at a rate of 87. Normal P waves, Normal QRS, Normal ST-T waves,  Normal axis. Normal intervals.  Overall clinical impression: Non specific flat T waves inferiorly otherwise nonspecific     RADIOLOGY  CT-HEAD W/O   Final Result      1.  No acute intracranial abnormality.   2.  Moderate atrophy.               INTERPRETING LOCATION:  [address] [place] [address], [facility], [id number]      DX-CHEST-PORTABLE (1 VIEW)   Final Result      No acute cardiopulmonary abnormality.        The radiologist's interpretation of all radiological studies have been reviewed by me.    COURSE & MEDICAL DECISION MAKING  Pertinent Labs & Imaging studies reviewed. (See chart for details)    I reviewed the patient's medical records    3:11 AM - Patient seen and examined at bedside. Ordered chest xray, CT head, urine drug screen, blood alcohol, CBC with differential, CMP, lipase, urinalysis, PTT, EKG to evaluate his symptoms. The differential diagnoses include but are not limited to: dementia, delirium, medication, alcohol, stroke    3:47 AM Patient was found trying to \"order food\" from the an ultrasound machine in the hallway.  The patient is places in restraints.     Decision Making:  Patient comes with altered mental status, waxing and waning mental status consistent with delirium. Workup here is unremarkable, I do not know if it's any medication or sleeping pill, discussed the case with the " hospitalist for admission to hospital.    DISPOSITION:  Patient will be admitted to [place] in guarded condition.      FINAL IMPRESSION  1. Delirium          I, Nyla Delacruz (Scribe), am scribing for, and in the presence of, Richi Cavanaugh M.D..    Electronically signed by: Nyla Delacruz (Scribe), 2/13/2017    I, Richi Cavanaugh M.D. personally performed the services described in this documentation, as scribed by Nyla Delacruz in my presence, and it is both accurate and complete.    The note accurately reflects work and decisions made by me.  Richi Cavanaugh  2/13/2017  5:56 AM

## 2017-02-13 NOTE — ED NOTES
Patient not able to void after several attempted. Straight cath for 200 ml urine - sample sent to Lab

## 2017-02-13 NOTE — ED NOTES
PT repeatedly sits up up and attempt to move to end of bed. He is easily redirected. Bed belt remains in place and pt can demonstrate how to remove device.

## 2017-02-14 ENCOUNTER — APPOINTMENT (OUTPATIENT)
Dept: RADIOLOGY | Facility: MEDICAL CENTER | Age: 62
End: 2017-02-14
Attending: INTERNAL MEDICINE
Payer: MEDICAID

## 2017-02-14 PROBLEM — G93.40 ENCEPHALOPATHY ACUTE: Status: ACTIVE | Noted: 2017-02-13

## 2017-02-14 LAB
ALBUMIN SERPL BCP-MCNC: 4 G/DL (ref 3.2–4.9)
BASOPHILS # BLD AUTO: 1.2 % (ref 0–1.8)
BASOPHILS # BLD: 0.07 K/UL (ref 0–0.12)
BUN SERPL-MCNC: 19 MG/DL (ref 8–22)
CALCIUM SERPL-MCNC: 9.3 MG/DL (ref 8.5–10.5)
CHLORIDE SERPL-SCNC: 109 MMOL/L (ref 96–112)
CO2 SERPL-SCNC: 23 MMOL/L (ref 20–33)
CREAT SERPL-MCNC: 1.05 MG/DL (ref 0.5–1.4)
EOSINOPHIL # BLD AUTO: 0.18 K/UL (ref 0–0.51)
EOSINOPHIL NFR BLD: 3.1 % (ref 0–6.9)
ERYTHROCYTE [DISTWIDTH] IN BLOOD BY AUTOMATED COUNT: 39.7 FL (ref 35.9–50)
GFR SERPL CREATININE-BSD FRML MDRD: >60 ML/MIN/1.73 M 2
GLUCOSE BLD-MCNC: 136 MG/DL (ref 65–99)
GLUCOSE SERPL-MCNC: 152 MG/DL (ref 65–99)
HCT VFR BLD AUTO: 39.2 % (ref 42–52)
HGB BLD-MCNC: 13.7 G/DL (ref 14–18)
IMM GRANULOCYTES # BLD AUTO: 0.04 K/UL (ref 0–0.11)
IMM GRANULOCYTES NFR BLD AUTO: 0.7 % (ref 0–0.9)
LYMPHOCYTES # BLD AUTO: 2 K/UL (ref 1–4.8)
LYMPHOCYTES NFR BLD: 35 % (ref 22–41)
MCH RBC QN AUTO: 28.9 PG (ref 27–33)
MCHC RBC AUTO-ENTMCNC: 34.9 G/DL (ref 33.7–35.3)
MCV RBC AUTO: 82.7 FL (ref 81.4–97.8)
MONOCYTES # BLD AUTO: 0.36 K/UL (ref 0–0.85)
MONOCYTES NFR BLD AUTO: 6.3 % (ref 0–13.4)
NEUTROPHILS # BLD AUTO: 3.07 K/UL (ref 1.82–7.42)
NEUTROPHILS NFR BLD: 53.7 % (ref 44–72)
NRBC # BLD AUTO: 0 K/UL
NRBC BLD AUTO-RTO: 0 /100 WBC
PHOSPHATE SERPL-MCNC: 3.3 MG/DL (ref 2.5–4.5)
PLATELET # BLD AUTO: 218 K/UL (ref 164–446)
PMV BLD AUTO: 9.4 FL (ref 9–12.9)
POTASSIUM SERPL-SCNC: 3.6 MMOL/L (ref 3.6–5.5)
RBC # BLD AUTO: 4.74 M/UL (ref 4.7–6.1)
SODIUM SERPL-SCNC: 139 MMOL/L (ref 135–145)
WBC # BLD AUTO: 5.7 K/UL (ref 4.8–10.8)

## 2017-02-14 PROCEDURE — 700102 HCHG RX REV CODE 250 W/ 637 OVERRIDE(OP): Performed by: INTERNAL MEDICINE

## 2017-02-14 PROCEDURE — 95951 EEG: CPT | Mod: 52

## 2017-02-14 PROCEDURE — A9270 NON-COVERED ITEM OR SERVICE: HCPCS | Performed by: HOSPITALIST

## 2017-02-14 PROCEDURE — 82962 GLUCOSE BLOOD TEST: CPT

## 2017-02-14 PROCEDURE — 700111 HCHG RX REV CODE 636 W/ 250 OVERRIDE (IP): Performed by: HOSPITALIST

## 2017-02-14 PROCEDURE — 80069 RENAL FUNCTION PANEL: CPT

## 2017-02-14 PROCEDURE — G0378 HOSPITAL OBSERVATION PER HR: HCPCS

## 2017-02-14 PROCEDURE — 99226 PR SUBSEQUENT OBSERVATION CARE,LEVEL III: CPT | Performed by: INTERNAL MEDICINE

## 2017-02-14 PROCEDURE — 36415 COLL VENOUS BLD VENIPUNCTURE: CPT

## 2017-02-14 PROCEDURE — 70551 MRI BRAIN STEM W/O DYE: CPT

## 2017-02-14 PROCEDURE — 85025 COMPLETE CBC W/AUTO DIFF WBC: CPT

## 2017-02-14 PROCEDURE — 700102 HCHG RX REV CODE 250 W/ 637 OVERRIDE(OP): Performed by: HOSPITALIST

## 2017-02-14 PROCEDURE — 700112 HCHG RX REV CODE 229: Performed by: HOSPITALIST

## 2017-02-14 RX ORDER — DEXTROSE MONOHYDRATE 25 G/50ML
25 INJECTION, SOLUTION INTRAVENOUS
Status: DISCONTINUED | OUTPATIENT
Start: 2017-02-14 | End: 2017-02-15 | Stop reason: HOSPADM

## 2017-02-14 RX ADMIN — AMLODIPINE BESYLATE 10 MG: 5 TABLET ORAL at 08:40

## 2017-02-14 RX ADMIN — BENZTROPINE MESYLATE 2 MG: 2 TABLET ORAL at 14:13

## 2017-02-14 RX ADMIN — INSULIN HUMAN 35 UNITS: 100 INJECTION, SUSPENSION SUBCUTANEOUS at 18:45

## 2017-02-14 RX ADMIN — HEPARIN SODIUM 5000 UNITS: 5000 INJECTION, SOLUTION INTRAVENOUS; SUBCUTANEOUS at 14:13

## 2017-02-14 RX ADMIN — ATORVASTATIN CALCIUM 60 MG: 40 TABLET, FILM COATED ORAL at 08:40

## 2017-02-14 RX ADMIN — HEPARIN SODIUM 5000 UNITS: 5000 INJECTION, SOLUTION INTRAVENOUS; SUBCUTANEOUS at 05:20

## 2017-02-14 RX ADMIN — SERTRALINE 100 MG: 100 TABLET, FILM COATED ORAL at 08:39

## 2017-02-14 RX ADMIN — METFORMIN HYDROCHLORIDE 850 MG: 850 TABLET, FILM COATED ORAL at 07:30

## 2017-02-14 RX ADMIN — ASPIRIN 81 MG: 81 TABLET ORAL at 08:40

## 2017-02-14 RX ADMIN — FAMOTIDINE 20 MG: 20 TABLET, FILM COATED ORAL at 20:18

## 2017-02-14 RX ADMIN — FLUTICASONE PROPIONATE 220 MCG: 110 AEROSOL, METERED RESPIRATORY (INHALATION) at 20:18

## 2017-02-14 RX ADMIN — FAMOTIDINE 20 MG: 20 TABLET, FILM COATED ORAL at 08:39

## 2017-02-14 RX ADMIN — BENZTROPINE MESYLATE 2 MG: 2 TABLET ORAL at 05:18

## 2017-02-14 RX ADMIN — STANDARDIZED SENNA CONCENTRATE AND DOCUSATE SODIUM 1 TABLET: 8.6; 5 TABLET, FILM COATED ORAL at 20:18

## 2017-02-14 RX ADMIN — METFORMIN HYDROCHLORIDE 850 MG: 850 TABLET, FILM COATED ORAL at 18:02

## 2017-02-14 RX ADMIN — FLUTICASONE PROPIONATE 220 MCG: 110 AEROSOL, METERED RESPIRATORY (INHALATION) at 08:39

## 2017-02-14 RX ADMIN — BENZTROPINE MESYLATE 2 MG: 2 TABLET ORAL at 20:17

## 2017-02-14 RX ADMIN — TIOTROPIUM BROMIDE 1 CAPSULE: 18 CAPSULE ORAL; RESPIRATORY (INHALATION) at 08:37

## 2017-02-14 RX ADMIN — QUETIAPINE FUMARATE 200 MG: 100 TABLET, FILM COATED ORAL at 20:17

## 2017-02-14 RX ADMIN — LISINOPRIL 20 MG: 20 TABLET ORAL at 08:40

## 2017-02-14 RX ADMIN — HEPARIN SODIUM 5000 UNITS: 5000 INJECTION, SOLUTION INTRAVENOUS; SUBCUTANEOUS at 20:18

## 2017-02-14 RX ADMIN — DOCUSATE SODIUM 100 MG: 100 CAPSULE ORAL at 20:18

## 2017-02-14 ASSESSMENT — PAIN SCALES - GENERAL
PAINLEVEL_OUTOF10: 0

## 2017-02-14 NOTE — PROGRESS NOTES
Hospital Medicine Progress Note, Adult, Complex               Author: Dian Muñoz Date & Time created: 2/14/2017  2:18 PM     CC: Altered Mental Status    ID: 61 year old  male with history of methamphetamine abuse, DM II, Hepatitis C and HTN admitted 2/13 for altered mental status after found wandering [place] [facility] without recollection of events for 24 hours. Denies EtOH or other substance abuse at the time.     Interval History:  2/14/17: Patient is AAOx3. Denies any pain, numbness or weakness. Eating and voiding well. MRI without contrast was WNL with white matter changes.    Review of Systems:  Review of Systems   Constitutional: Negative for fever, chills and weight loss.   HENT: Positive for congestion.    Eyes: Negative for blurred vision, double vision and photophobia.   Respiratory: Positive for cough. Negative for hemoptysis, shortness of breath and wheezing.    Cardiovascular: Negative for chest pain, palpitations, orthopnea and leg swelling.   Gastrointestinal: Negative for nausea, vomiting, abdominal pain, diarrhea and constipation.   Genitourinary: Negative for dysuria.   Musculoskeletal: Negative for myalgias and falls.   Skin: Negative.    Neurological: Negative for dizziness, tingling, tremors, sensory change, speech change, focal weakness, seizures, loss of consciousness, weakness and headaches.   Psychiatric/Behavioral: Negative for depression and memory loss.       Physical Exam:  Physical Exam   Constitutional: He is oriented to person, place, and time. He is cooperative. No distress.   HENT:   Head: Normocephalic and atraumatic.   Nose: Nose normal.   Mouth/Throat: Oropharynx is clear and moist.   Eyes: Conjunctivae and EOM are normal. Pupils are equal, round, and reactive to light. No scleral icterus.   Neck: Normal range of motion. Neck supple. No tracheal deviation present. No thyromegaly present.   Cardiovascular: Normal rate, regular rhythm, normal heart sounds  and intact distal pulses.  Exam reveals no gallop and no friction rub.    No murmur heard.  Pulmonary/Chest: Effort normal. No respiratory distress. He has rales.   Abdominal: Soft. Normal appearance and bowel sounds are normal. He exhibits no distension. There is no tenderness.   Musculoskeletal: Normal range of motion. He exhibits no edema or tenderness.   Lymphadenopathy:     He has no cervical adenopathy.   Neurological: He is alert and oriented to person, place, and time. He has normal strength and normal reflexes. He displays no tremor and normal reflexes. No cranial nerve deficit. He exhibits normal muscle tone. He displays a negative Romberg sign. He displays no seizure activity. Coordination normal.   Skin: Skin is warm and dry. No cyanosis. Nails show no clubbing.   Psychiatric: He has a normal mood and affect. His behavior is normal. His speech is slurred (lack of dentition).       Labs:        Invalid input(s): [id number]      Recent Labs      17   SODIUM  141  139   POTASSIUM  3.4*  3.6   CHLORIDE  107  109   CO2  25  23   BUN  12  19   CREATININE  0.98  1.05   PHOSPHORUS   --   3.3   CALCIUM  9.2  9.3     Recent Labs      17   ALTSGPT  28   --    ASTSGOT  20   --    ALKPHOSPHAT  68   --    TBILIRUBIN  0.5   --    LIPASE  29   --    GLUCOSE  173*  152*     Recent Labs      17   RBC  5.05  4.74   HEMOGLOBIN  14.2  13.7*   HEMATOCRIT  41.7*  39.2*   PLATELETCT  232  218   PROTHROMBTM  12.9   --    INR  0.94   --      Recent Labs      17   WBC  6.8  5.7   NEUTSPOLYS  63.90  53.70   LYMPHOCYTES  26.20  35.00   MONOCYTES  6.40  6.30   EOSINOPHILS  1.90  3.10   BASOPHILS  0.90  1.20   ASTSGOT  20   --    ALTSGPT  28   --    ALKPHOSPHAT  68   --    TBILIRUBIN  0.5   --            Hemodynamics:  Temp (24hrs), Av.9 °C (98.4 °F), Min:36.6 °C (97.8 °F), Max:37.4 °C (99.3 °F)  Temperature:  36.9 °C (98.5 °F)  Pulse  Av.8  Min: 81  Max: 99   Blood Pressure: 141/83 mmHg     Respiratory:    Respiration: 18, Pulse Oximetry: 94 %           Fluids:    Intake/Output Summary (Last 24 hours) at 17 1418  Last data filed at 17 0130   Gross per 24 hour   Intake    200 ml   Output      4 ml   Net    196 ml        GI/Nutrition:  Orders Placed This Encounter   Procedures   • Diet Order     Standing Status: Standing      Number of Occurrences: 1      Standing Expiration Date:      Order Specific Question:  Diet:     Answer:  Diabetic [3]     Medical Decision Making, by Problem:  Active Hospital Problems    Diagnosis   • Hypokalemia [E87.6]: possibly due to uncontrolled diabetes; insulin can worsen hypokalemia  -K+ levels as of : 3.6, no need to delay insulin   -Continue to monitor potassium levels  -Possible ECG to watch for cardiac arrhythmias if levels worsen   • Delirium [R41.0]: possibly due to medication side effect (Temazepam in elderly) or alcohol withdrawal  -Mini-Mental Exam revealed normal score  -Hold Temazepam  -Urine drug & toxicology screen still pending   • Altered mental status [R41.82]: possibly due to medication side effect (Abilify started 3-4 weeks ago) but would expect persistent AMS; could be due to seizures such as nonconvulsive status epilepticus  -Fluctuating AMS  -MRI of brain w/o contrast WNL  -Future EEG needed to exclude possibility of seizure disorder or confirm diagnosis of metabolic encephalopathies or infectious encephalitis       Core Measures

## 2017-02-14 NOTE — PROGRESS NOTES
Hospital Medicine Progress Note, Adult, Complex               Author: Franco Peter Date & Time created: 2/14/2017  8:53 AM     Interval History:  61/M with h/o hepatitis C, bipolar disorder, diabetes, hypertension, COPD, GERD, CKD 3, admitted with altered mental status after he was found wandering in [facility], but had fluctuating mentation in ED.  BAL 0. CBC unimpressive, with BMP only remarkable for mildly low K of 3.4. Head CT only showed moderate atrophy with NAD. UA clean. Pt blames sleep walking. Tamazepam held, and continued on his bipolar meds.     2/14/2017 - no overnight events. Remains hemodynamically stable and afebrile. Saturating well on RA. Labs unimpressive. UDS positive for BZP.     > Seen and examined. Reports starting Abilify 2-3 wks ago, reports prior incident of similar confusion when starting ambien. No CP, SOB, weakness, numbness.  A&Ox4, Confusion resolved.       Review of Systems:  ROS   Pertinent positives/negatives as mentioned above.     A complete review of systems was done. All other systems were negative.       Physical Exam:  Physical Exam   Constitutional: He appears well-developed. No distress.   HENT:   Head: Normocephalic and atraumatic.   Mouth/Throat: Oropharynx is clear and moist. No oropharyngeal exudate.   Eyes: EOM are normal. Pupils are equal, round, and reactive to light. Right eye exhibits no discharge. Left eye exhibits no discharge. No scleral icterus.   Neck: Normal range of motion. Neck supple. No thyromegaly present.   Cardiovascular: Normal rate and regular rhythm.  Exam reveals no gallop and no friction rub.    No murmur heard.  Pulmonary/Chest: Effort normal and breath sounds normal. He has no wheezes. He has no rales. He exhibits no tenderness.   Abdominal: Soft. Bowel sounds are normal. There is no tenderness. There is no rebound and no guarding.   Musculoskeletal: Normal range of motion. He exhibits no edema or tenderness.   Lymphadenopathy:     He has  no cervical adenopathy.   Neurological: He is alert.   Skin: Skin is warm and dry. No rash noted. He is not diaphoretic. No erythema.   Psychiatric: He has a normal mood and affect. His behavior is normal. Thought content normal.   Vitals reviewed.      Labs:        Invalid input(s): [id number]      Recent Labs      17   SODIUM  141   POTASSIUM  3.4*   CHLORIDE  107   CO2  25   BUN  12   CREATININE  0.98   CALCIUM  9.2     Recent Labs      17   023   ALTSGPT  28   ASTSGOT  20   ALKPHOSPHAT  68   TBILIRUBIN  0.5   LIPASE  29   GLUCOSE  173*     Recent Labs      17   RBC  5.05   HEMOGLOBIN  14.2   HEMATOCRIT  41.7*   PLATELETCT  232   PROTHROMBTM  12.9   INR  0.94     Recent Labs      17   WBC  6.8   NEUTSPOLYS  63.90   LYMPHOCYTES  26.20   MONOCYTES  6.40   EOSINOPHILS  1.90   BASOPHILS  0.90   ASTSGOT  20   ALTSGPT  28   ALKPHOSPHAT  68   TBILIRUBIN  0.5           Hemodynamics:  Temp (24hrs), Av.7 °C (98 °F), Min:36 °C (96.8 °F), Max:36.9 °C (98.4 °F)  Temperature: 36.9 °C (98.4 °F)  Pulse  Av.3  Min: 84  Max: 92Heart Rate (Monitored): 86  Blood Pressure: 152/83 mmHg, NIBP: 128/81 mmHg     Respiratory:    Respiration: 19, Pulse Oximetry: 100 %           Fluids:    Intake/Output Summary (Last 24 hours) at 17  Last data filed at 17 1800   Gross per 24 hour   Intake    240 ml   Output      3 ml   Net    237 ml     Weight: 88.3 kg (194 lb 10.7 oz)  GI/Nutrition:  Orders Placed This Encounter   Procedures   • Diet Order     Standing Status: Standing      Number of Occurrences: 1      Standing Expiration Date:      Order Specific Question:  Diet:     Answer:  Diabetic [3]     Medical Decision Making, by Problem:    Intermittent encephalopathy  - could be from abilify as just started on new medication.   - will get brain MRI to r/o organic brain pathology. EEG to r/o NCS given fluctuating nature of MS changes.   - check UDS.   - hold BZP, abilify  for now. Continue neurochecks. PT/OT eval.     Mild hypokalemia  - replaced.     Chronic hepatitis C    Bipolar disorder    Type 2 diabetes mellitus   - continue metformin, novolog, and SSI. Accuchecks AC and HS.     Hypertension  - continue norvasc.    COPD  - NIAE. Continue home inhalers, spiriva. Continue RT protocol.    GERD  - continue famotidine.     CKD 3  - stable. Monitor.       Dispo - monitor on neurology.     Labs reviewed, Medications reviewed and Radiology images reviewed  Grier catheter: No Grier      DVT Prophylaxis: Heparin    Ulcer prophylaxis: Yes    Assessed for rehab: Patient was assess for and/or received rehabilitation services during this hospitalization

## 2017-02-14 NOTE — PROGRESS NOTES
Assumed care of Pt at 1900. Assessment completed. Pt A&O x 4 with periods of confusion but able to re-orientate easily. Pt has lap belt in place and pt is able to demonstrate how to unclasp belt. Pt denies any pain, numbness, or tingling at this time. Bed alarm on, bed in lowest position. Call light in place. Hourly rounding in practice.

## 2017-02-14 NOTE — RESPIRATORY CARE
COPD EDUCATION by COPD CLINICAL EDUCATOR  2/14/2017 at 6:29 AM by Suzette Kaur     Patient reviewed by COPD education team. Patient does not qualify for COPD program.

## 2017-02-14 NOTE — PROGRESS NOTES
VSS, NAD. Pt is A&O today, reports no pain. Bed in the low and locked position. Bed alarm on. AMS diagnostics today. Lap belt in place, pt is aware of how to unclasp. Bed in the low and locked position, pt denies pain. Will continue to monitor.

## 2017-02-14 NOTE — CARE PLAN
Problem: Safety  Goal: Will remain free from falls  Intervention: Implement fall precautions  Bed alarm on. Bed in lowest position. Call light in place. Hourly rounding in practice.      Problem: Pain Management  Goal: Pain level will decrease to patient’s comfort goal  Intervention: Follow pain managment plan developed in collaboration with patient and Interdisciplinary Team  Pain assessed at beginning of shift. No pain reported at this time.

## 2017-02-15 VITALS
SYSTOLIC BLOOD PRESSURE: 128 MMHG | BODY MASS INDEX: 29.5 KG/M2 | OXYGEN SATURATION: 92 % | DIASTOLIC BLOOD PRESSURE: 80 MMHG | WEIGHT: 194.67 LBS | TEMPERATURE: 98.4 F | RESPIRATION RATE: 16 BRPM | HEART RATE: 88 BPM | HEIGHT: 68 IN

## 2017-02-15 PROBLEM — E87.6 HYPOKALEMIA: Status: RESOLVED | Noted: 2017-02-13 | Resolved: 2017-02-15

## 2017-02-15 PROBLEM — G93.40 ENCEPHALOPATHY ACUTE: Status: RESOLVED | Noted: 2017-02-13 | Resolved: 2017-02-15

## 2017-02-15 LAB
GLUCOSE BLD-MCNC: 88 MG/DL (ref 65–99)
GLUCOSE BLD-MCNC: 94 MG/DL (ref 65–99)
GLUCOSE BLD-MCNC: 96 MG/DL (ref 65–99)

## 2017-02-15 PROCEDURE — G0378 HOSPITAL OBSERVATION PER HR: HCPCS

## 2017-02-15 PROCEDURE — G8987 SELF CARE CURRENT STATUS: HCPCS | Mod: CI

## 2017-02-15 PROCEDURE — A9270 NON-COVERED ITEM OR SERVICE: HCPCS | Performed by: HOSPITALIST

## 2017-02-15 PROCEDURE — 99217 PR OBSERVATION CARE DISCHARGE: CPT | Performed by: INTERNAL MEDICINE

## 2017-02-15 PROCEDURE — 700111 HCHG RX REV CODE 636 W/ 250 OVERRIDE (IP): Performed by: HOSPITALIST

## 2017-02-15 PROCEDURE — 82962 GLUCOSE BLOOD TEST: CPT

## 2017-02-15 PROCEDURE — G8989 SELF CARE D/C STATUS: HCPCS | Mod: CI

## 2017-02-15 PROCEDURE — 94760 N-INVAS EAR/PLS OXIMETRY 1: CPT

## 2017-02-15 PROCEDURE — 700102 HCHG RX REV CODE 250 W/ 637 OVERRIDE(OP): Performed by: HOSPITALIST

## 2017-02-15 PROCEDURE — G8988 SELF CARE GOAL STATUS: HCPCS | Mod: CI

## 2017-02-15 PROCEDURE — 700112 HCHG RX REV CODE 229: Performed by: HOSPITALIST

## 2017-02-15 PROCEDURE — 97165 OT EVAL LOW COMPLEX 30 MIN: CPT

## 2017-02-15 RX ADMIN — LISINOPRIL 20 MG: 20 TABLET ORAL at 08:05

## 2017-02-15 RX ADMIN — TIOTROPIUM BROMIDE 1 CAPSULE: 18 CAPSULE ORAL; RESPIRATORY (INHALATION) at 08:05

## 2017-02-15 RX ADMIN — FAMOTIDINE 20 MG: 20 TABLET, FILM COATED ORAL at 08:05

## 2017-02-15 RX ADMIN — HEPARIN SODIUM 5000 UNITS: 5000 INJECTION, SOLUTION INTRAVENOUS; SUBCUTANEOUS at 06:10

## 2017-02-15 RX ADMIN — METFORMIN HYDROCHLORIDE 850 MG: 850 TABLET, FILM COATED ORAL at 08:05

## 2017-02-15 RX ADMIN — FLUTICASONE PROPIONATE 220 MCG: 110 AEROSOL, METERED RESPIRATORY (INHALATION) at 08:05

## 2017-02-15 RX ADMIN — ASPIRIN 81 MG: 81 TABLET ORAL at 08:05

## 2017-02-15 RX ADMIN — ATORVASTATIN CALCIUM 60 MG: 40 TABLET, FILM COATED ORAL at 08:05

## 2017-02-15 RX ADMIN — AMLODIPINE BESYLATE 10 MG: 5 TABLET ORAL at 08:06

## 2017-02-15 RX ADMIN — DOCUSATE SODIUM 100 MG: 100 CAPSULE ORAL at 08:05

## 2017-02-15 RX ADMIN — SERTRALINE 100 MG: 100 TABLET, FILM COATED ORAL at 08:05

## 2017-02-15 RX ADMIN — BENZTROPINE MESYLATE 2 MG: 2 TABLET ORAL at 13:18

## 2017-02-15 RX ADMIN — BENZTROPINE MESYLATE 2 MG: 2 TABLET ORAL at 06:10

## 2017-02-15 RX ADMIN — HEPARIN SODIUM 5000 UNITS: 5000 INJECTION, SOLUTION INTRAVENOUS; SUBCUTANEOUS at 13:18

## 2017-02-15 ASSESSMENT — COPD QUESTIONNAIRES
COPD SCREENING SCORE: 5
DURING THE PAST 4 WEEKS HOW MUCH DID YOU FEEL SHORT OF BREATH: NONE/LITTLE OF THE TIME
HAVE YOU SMOKED AT LEAST 100 CIGARETTES IN YOUR ENTIRE LIFE: YES
DO YOU EVER COUGH UP ANY MUCUS OR PHLEGM?: NO/ONLY WITH OCCASIONAL COLDS OR INFECTIONS

## 2017-02-15 ASSESSMENT — ACTIVITIES OF DAILY LIVING (ADL): TOILETING: INDEPENDENT

## 2017-02-15 ASSESSMENT — PAIN SCALES - GENERAL
PAINLEVEL_OUTOF10: 0
PAINLEVEL_OUTOF10: 0

## 2017-02-15 ASSESSMENT — LIFESTYLE VARIABLES: EVER_SMOKED: YES

## 2017-02-15 NOTE — PROGRESS NOTES
Assumed care of Pt at 1900. Assessment completed. Pt A&O x4. Pt denies any pain, numbness, or tingling at this time. Lap belt on. Pt demonstrates understanding of how to unclasp lap belt. Pt on diabetic diet. Pt standby assist. Bed alarm on, bed in lowest position. Call light in place. Hourly rounding in practice.

## 2017-02-15 NOTE — EEG PROGRESS NOTE
EEG 02/14/2017 8:16 PM    Mild cortical dysfunction, more over right frontal  Non-specific abnormality    Of note, unremarkable EEG does not completely exclude the diagnosis  of seizures since seizure is an episodic phenomena.  Clinical correlation may help     If clinical suspicion of seizure remains high.  Prolonged outpatient EEG   monitoring may be of help.    Christiano Malik M.D.  NEUROLOGIST  [facility]  8:17 PM02/14/2017

## 2017-02-15 NOTE — DISCHARGE SUMMARY
HOSPITAL MEDICINE DISCHARGE SUMMARY    Name: Nikolas Rico  MRN: [id number]  : 1955  Admit Date: 2017  Discharge Date: 2/15/2017  Attending Provider: Franco Peter M.D.  Primary Care Physician: Chepe Rod M.D.    DISCHARGE DIAGNOSES:   Active Problems:    Bipolar 1 disorder, manic, moderate (CMS-[facility]) POA: Yes    Chronic HCV infection POA: Yes    COPD (chronic obstructive pulmonary disease) (CMS-[facility]) POA: Yes    Dyslipidemia POA: Yes    Diabetes type 2, controlled (CMS-[facility]) POA: Yes    Essential hypertension POA: Yes    CKD (chronic kidney disease) stage 3, GFR 30-59 ml/min (Chronic) POA: Yes  Resolved Problems:    Intermittent encephalopathy, likely medication induced (abilify) POA: Yes    Hypokalemia POA: Yes       SUMMARY OF EVENTS LEADING TO ADMISSION:  This is a 61-year-old male with    history of chronic hepatitis C, bipolar disorder, diabetes mellitus,    hypertension, chronic obstructive pulmonary disease, gastroesophageal reflux    disease, and chronic kidney disease stage III who was admitted with altered    mentation after he was found wandering in the [facility].     For further details of history of present illness, past medical, social,    family histories, allergies and medications, please refer to admission H and P   by Dr. Fidel Baugh on 2017.     HOSPITAL COURSE:  The patient was admitted to the hospitalist service after    being initially evaluated in the emergency department where he was noted to    have fluctuating mentation in the emergency department.  His blood alcohol    level was 0.  His initial blood workup showed unimpressive CBC, and BMP only    remarkable for a mildly low potassium of 3.4, which was replaced.  Head CT    only showed moderate atrophy with no acute pathology noted.  His urinalysis    was clean.  His urine drug screen was only positive for benzodiazepines.     Initially, the patient blamed this on sleep  walking, although he    subsequently admitted that he gets these fluctuating confusions with taking    temazepam.  He was also noted to be started on Abilify 2-3 weeks ago, and    reports prior incident on similar confusion when starting new medications.     His Abilify was held.  He was continued on the rest of his home medications.     Further workup with brain MRI only showed chronic microangiopathic white    matter ischemic changes.  EEG showed nonspecific mild degree of cortical    dysfunction maybe with more emphasis over the right frontal.  His mentation    was monitored, and he maintained his baseline alert state.  He did not have    any seizures here in the hospital.     He remained hemodynamically stable and afebrile.  It was felt that his    encephalopathy was due to the medications, and he was counseled to continue    holding this on discharge.  Follow up appointments were setup for him.  With    his clinical improvement, he was deemed ready to be discharged from the    hospital, as he did not have any further inpatient needs.  Patient felt    comfortable going home.  The discharge plan was discussed with the patient and   he was agreeable to it.     The patient was subsequently sent home in improved and stable condition.     FOLLOW-UP ISSUES:   1. Intermittent encephalopathy - likely due to abilify/restoril. Advised to stop taking   those and follow-up with PCP in 1-2 weeks. He will also follow-up with neurology   for the non-specific findings on EEG.      CHANGES IN MANAGEMENT OF CHRONIC CONDITION: As above.     PENDING TESTS: none    FOLLOW-UP TESTS ORDERED POST DISCHARGE: none    DISCHARGE MEDICATIONS:   Medication Sig   ranitidine (ZANTAC) 150 MG Tab Take 300 mg by mouth 2 times a day.   sertraline (ZOLOFT) 100 MG Tab Take 100 mg by mouth every day.   benztropine (COGENTIN) 2 MG Tab Take 2 mg by mouth 3 times a day.   atorvastatin (LIPITOR) 20 MG Tab Take 20 mg by mouth every day. Pt also has an RX  for 40MG  Total of 60MG QDAY   atorvastatin (LIPITOR) 40 MG Tab Take 40 mg by mouth every day. Pt also has an RX for 20MG  Total of 60MG QDAY   ketoconazole (NIZORAL) 2 % shampoo Apply 5 mL to affected area(s) 1 time daily as needed for Itching.   fluticasone (FLOVENT HFA) 110 MCG/ACT Aerosol Inhale 2 Puffs by mouth 2 times a day.   tiotropium (SPIRIVA) 18 MCG Cap Inhale 18 mcg by mouth every day.   Cholecalciferol (VITAMIN D3) 2000 UNIT Cap Take 2,000 Units by mouth every day.   lisinopril (PRINIVIL) 20 MG Tab Take 1 Tab by mouth every day.   ASPIRIN LOW DOSE 81 MG EC tablet TAKE 1 TABLET ORALLY ONCE DAILY   VENTOLIN  (90 BASE) MCG/ACT Aero Soln inhalation aerosol INHALE 2 PUFFS BY MOUTH EVERY 8 HOURS ASNEEDED FOR SHORTNESS OF BREATH   amlodipine (NORVASC) 10 MG Tab Take 1 Tab by mouth every day.   NOVOLOG MIX 70/30 (70-30) 100 UNIT/ML injection INJECT 35 UNITS SUBCUTANEOUSLY AS DIRECTED BEFORE MEALS. INREASE 2 UNITS SUBCUTANEOUSLY AS DIRECTED FOR GOAL . STOP IF BLOOD SUGAR <60   metformin (GLUCOPHAGE) 850 MG Tab Take 1 Tab by mouth 2 times a day, with meals.   quetiapine (SEROQUEL) 100 MG TABS Take 2 Tabs by mouth every evening.          FOLLOW-UP APPOINTMENTS:   1. PCP in 1-2 weeks.       For further details on discharge medications, patient education, diet, and activity, please   refer to electronic copy of discharge instructions.       TIME SPENT: 40 minutes, with greater than 50% of the time spent on face-to-face encounter,   addressing medical issues, coordination of care, counseling, discharge planning, medication   reconciliation, and documentation.

## 2017-02-15 NOTE — DISCHARGE PLANNING
Pt was discussed during IDT rounds. He is anticipated to DC home today with no needs. SW remains available for any needs that may arise.

## 2017-02-15 NOTE — DISCHARGE INSTRUCTIONS
Discharge Instructions    Discharged to home by car with self. Discharged via walking, hospital escort: Refused.  Special equipment needed: Not Applicable    Be sure to schedule a follow-up appointment with your primary care doctor or any specialists as instructed.     Discharge Plan:   Influenza Vaccine Indication: Not indicated: Previously immunized this influenza season and > 8 years of age    I understand that a diet low in cholesterol, fat, and sodium is recommended for good health. Unless I have been given specific instructions below for another diet, I accept this instruction as my diet prescription.   Other diet: Diabetic diet    Special Instructions: None    · Is patient discharged on Warfarin / Coumadin?   No     · Is patient Post Blood Transfusion?  No

## 2017-02-15 NOTE — PROGRESS NOTES
Hospital Medicine Progress Note, Adult, Complex               Author: Dian Muñoz Date & Time created: 2/15/2017  2:31 PM     CC: Altered Mental Status    ID: 61 year old  male with history of Bipolar disorder, methamphetamine abuse, DM II, Hepatitis C and HTN and COPD admitted 2/13 for altered mental status after found wandering [place] [facility] without recollection of events for 24 hours. Previous episode when of confusion when taking Ambien. Temazepam and Abilify held but bipolar medications continued. Denies EtOH or other substance abuse at the time. MRI of brain w/o contrast WNL. EEG revealed non-specific mild cortical dysfunction.        Interval History:  2/14/17: Patient is AAOx3. Denies any pain, numbness or weakness. Eating and voiding well. MRI without contrast was WNL with white matter changes.  2/15/17: Patient reports doing well today with no changes in mentation or significant changes overnight. Plans for discharge in place. Education about outpatient follow-up with neurologist given the EEG results.    Review of Systems:  Review of Systems   Constitutional: Negative for fever and chills.   HENT: Negative for congestion and sore throat.    Eyes: Negative for blurred vision and double vision.   Respiratory: Negative for cough, hemoptysis, sputum production, shortness of breath, wheezing and stridor.    Cardiovascular: Negative for chest pain, palpitations, claudication and PND.   Gastrointestinal: Negative for nausea, vomiting, abdominal pain and diarrhea.   Genitourinary: Negative for dysuria and urgency.   Musculoskeletal: Negative for myalgias, falls and neck pain.   Skin: Negative.    Neurological: Positive for speech change (lack of dentition). Negative for dizziness, tingling, tremors, sensory change, focal weakness, seizures, loss of consciousness, weakness and headaches.   Psychiatric/Behavioral: Negative for depression, suicidal ideas, hallucinations and memory loss. The  patient is not nervous/anxious.        Physical Exam:  Physical Exam   Constitutional: He is oriented to person, place, and time. He appears well-developed. No distress.   HENT:   Head: Normocephalic and atraumatic.   Right Ear: External ear normal.   Left Ear: External ear normal.   Mouth/Throat: Oropharynx is clear and moist.   Eyes: Conjunctivae and EOM are normal. Pupils are equal, round, and reactive to light. No scleral icterus.   Neck: Normal range of motion. Neck supple. No thyromegaly present.   Cardiovascular: Normal rate, regular rhythm, normal heart sounds and intact distal pulses.  Exam reveals no gallop and no friction rub.    No murmur heard.  Pulmonary/Chest: Effort normal and breath sounds normal. No respiratory distress. He has no wheezes. He has no rales. He exhibits no tenderness.   Abdominal: Soft. Bowel sounds are normal. He exhibits no distension. There is no tenderness.   Musculoskeletal: Normal range of motion. He exhibits no edema or tenderness.   Lymphadenopathy:     He has no cervical adenopathy.   Neurological: He is oriented to person, place, and time. He has normal reflexes. He displays normal reflexes. No cranial nerve deficit. He exhibits normal muscle tone. Coordination normal.   Skin: Skin is warm and dry.   Psychiatric: He has a normal mood and affect. His behavior is normal.       Labs:        Invalid input(s): [id number]      Recent Labs      02/13/17 0236 02/14/17 0219   SODIUM  141  139   POTASSIUM  3.4*  3.6   CHLORIDE  107  109   CO2  25  23   BUN  12  19   CREATININE  0.98  1.05   PHOSPHORUS   --   3.3   CALCIUM  9.2  9.3     Recent Labs      02/13/17 0236 02/14/17 0219   ALTSGPT  28   --    ASTSGOT  20   --    ALKPHOSPHAT  68   --    TBILIRUBIN  0.5   --    LIPASE  29   --    GLUCOSE  173*  152*     Recent Labs      02/13/17 0236 02/14/17 0219   RBC  5.05  4.74   HEMOGLOBIN  14.2  13.7*   HEMATOCRIT  41.7*  39.2*   PLATELETCT  232  218   PROTHROMBTM   12.9   --    INR  0.94   --      Recent Labs      17   0236  17   0219   WBC  6.8  5.7   NEUTSPOLYS  63.90  53.70   LYMPHOCYTES  26.20  35.00   MONOCYTES  6.40  6.30   EOSINOPHILS  1.90  3.10   BASOPHILS  0.90  1.20   ASTSGOT  20   --    ALTSGPT  28   --    ALKPHOSPHAT  68   --    TBILIRUBIN  0.5   --            Hemodynamics:  Temp (24hrs), Av.9 °C (98.4 °F), Min:36.7 °C (98.1 °F), Max:37.1 °C (98.8 °F)  Temperature: 36.9 °C (98.4 °F)  Pulse  Av.9  Min: 81  Max: 99   Blood Pressure: 128/80 mmHg     Respiratory:    Respiration: 16, Pulse Oximetry: 92 %           Fluids:    Intake/Output Summary (Last 24 hours) at 02/15/17 1431  Last data filed at 02/15/17 0000   Gross per 24 hour   Intake    300 ml   Output      0 ml   Net    300 ml        GI/Nutrition:  Orders Placed This Encounter   Procedures   • Diet Order     Standing Status: Standing      Number of Occurrences: 1      Standing Expiration Date:      Order Specific Question:  Diet:     Answer:  Diabetic [3]     Medical Decision Making, by Problem:  Active Hospital Problems    Diagnosis   • Intermittent encephalopathy: possibly due to medication side effect (Abilify started 3-4 weeks ago) but would expect persistent AMS; h/o confusion with Ambien. I think this could also be due to seizures such as nonconvulsive status epilepticus. Would need more EEG monitoring during outpatient F/U.  -Fluctuating AMS now resolved  -UDS significant for benzodiazepines only, no other drugs found  -Hold BZP and Abilify. Continue with bipolar medications  -MRI of brain w/o contrast WNL  -EEG ordered to exclude possibility of seizure disorder or confirm diagnosis of metabolic encephalopathies or infectious encephalitis; it revealed non-specific mild cortical dysfunction  -Plan for discharge and education about outpatient follow-up in 1-2 weeks with neurologist to discuss possible need for EEG monitoring if suspicious about seizure disorder.   • Hypokalemia, mild:  resolved   • Diabetes type 2  -Continue Metformin  -Administer Novolog   -Educate about diet & exercise for glucose control   • Dyslipidemia   -Continue on Lipitor   • COPD (chronic obstructive pulmonary disease)  -No exacerbations during hospital stay.  -Continue inhalers, Spiriva  -Continue RT protocol        Bipolar Disorder  -Continue with Seroquel (quetiapine)   -Patient takes Zoloft which could send him into manic or hypomanic episode if w/o mood stabilizer. Educate about importance of Seroquel medication compliance with Zoloft.               Core Measures

## 2017-02-15 NOTE — PROGRESS NOTES
Pt BS 94. Pt has scheduled insulin 70/30 35 units due. [employer]es. Holding 7 am scheduled insulin.

## 2017-02-15 NOTE — PROGRESS NOTES
Assumed care of pt. Able to make needs known. A/Ox4. PIV in left AC 20g, saline locked and flushing well. Diabetic diet, accu checks AC and HS. Up self, bed alarm in place, instructed to call for assistance. Plan is to discharge home today. Call light in reach, bed in low position, will continue hourly rounding.

## 2017-02-15 NOTE — PROCEDURES
DATE OF SERVICE: 2/14/2017    The patient is a 61-year-old.  The patient is with intermittent change of   mental status, hepatitis C, bipolar, diabetes, hypertension, COPD.2    The EEG was done with a [employer] digital EEG system.    Twenty One electrodes were applied based on the International 10-20 system.    The EEG was reviewed with technique of montage Reformatting including referential and bipolar montage.     The EEG background consists of posterior dominant alpha rhythm 8-9 Hz.  There   are short runs of delta bursts around couple of seconds over the frontal   region, especially in the right frontal, but there are no definite   epileptiform discharges.  These delta bursts are also most of them less than 5   seconds.  Stage I sleep was obtained. Activation procedure did not   produce any new abnormality.    INTERPRETATION:  This EEG denotes of a nonspecific mild degree cortical   dysfunction maybe with more emphasis over the right frontal.       Of note, unremarkable EEG does not completely exclude the diagnosis  of seizures since seizure is an episodic phenomena.  Clinical correlation may help     If clinical suspicion of seizure remains high.  Prolonged outpatient EEG   monitoring may be of help.         ____________________________________     MD MUNDO MEDINA / REENA    DD:  02/14/2017 20:14:52  DT:  02/14/2017 20:33:47    D#:  [id number]  Job#:  [id number]

## 2017-02-15 NOTE — CARE PLAN
Problem: Safety  Goal: Will remain free from falls  Intervention: Implement fall precautions  Bed alarm on. Bed in lowest position. Call light in place. Hourly rounding in practice.      Problem: Medication  Goal: Compliance with prescribed medication will improve  Intervention: Educate patient and significant other/support system medication rationale and regimen  Pt educated on prescribed medications. All questions answered at this time.

## 2017-02-15 NOTE — THERAPY
"Occupational Therapy Evaluation completed.   Functional Status:  Pt appears to be near his baseline functionally, performed ADLs with supervision/MI, noted slight impulsivity likely baseline and slight slureed speech noted again pt verabalized this is his baseline. Pt denies any speech and cognitive deficits at this time. Pt does not demonstrate the need for acute skilled services at this time.   Plan of Care: Patient with no further skilled OT needs in the acute care setting at this time  Discharge Recommendations:  Equipment: No Equipment Needed. Post-acute therapy recommended after discharged home.    See \"Rehab Therapy-Acute\" Patient Summary Report for complete documentation.    "

## 2017-02-15 NOTE — CARE PLAN
Problem: Communication  Goal: The ability to communicate needs accurately and effectively will improve  Intervention: [place] patient and significant other/support system to call light to alert staff of needs   Patient A/Ox4, able to make needs known       Problem: Safety  Goal: Will remain free from falls  Intervention: Assess risk factors for falls  Bed alarm in place, instructed to call for assistance

## 2017-02-16 ENCOUNTER — PATIENT OUTREACH (OUTPATIENT)
Dept: HEALTH INFORMATION MANAGEMENT | Facility: OTHER | Age: 62
End: 2017-02-16

## 2017-02-16 LAB — EKG IMPRESSION: NORMAL

## 2017-02-21 ENCOUNTER — OFFICE VISIT (OUTPATIENT)
Dept: MEDICAL GROUP | Facility: MEDICAL CENTER | Age: 62
End: 2017-02-21
Attending: FAMILY MEDICINE
Payer: MEDICAID

## 2017-02-21 VITALS
HEART RATE: 100 BPM | WEIGHT: 193 LBS | BODY MASS INDEX: 29.25 KG/M2 | RESPIRATION RATE: 20 BRPM | DIASTOLIC BLOOD PRESSURE: 89 MMHG | OXYGEN SATURATION: 94 % | TEMPERATURE: 96.2 F | HEIGHT: 68 IN | SYSTOLIC BLOOD PRESSURE: 145 MMHG

## 2017-02-21 DIAGNOSIS — I10 ESSENTIAL HYPERTENSION: ICD-10-CM

## 2017-02-21 DIAGNOSIS — R41.0 DISORIENTATION: ICD-10-CM

## 2017-02-21 DIAGNOSIS — F31.60 BIPOLAR AFFECTIVE DISORDER, CURRENT EPISODE MIXED, CURRENT EPISODE SEVERITY UNSPECIFIED (HCC): ICD-10-CM

## 2017-02-21 DIAGNOSIS — G47.30 SLEEP APNEA, UNSPECIFIED TYPE: ICD-10-CM

## 2017-02-21 DIAGNOSIS — R00.0 TACHYCARDIA: ICD-10-CM

## 2017-02-21 DIAGNOSIS — Z09 HOSPITAL DISCHARGE FOLLOW-UP: ICD-10-CM

## 2017-02-21 PROCEDURE — 93005 ELECTROCARDIOGRAM TRACING: CPT | Performed by: FAMILY MEDICINE

## 2017-02-21 PROCEDURE — 99214 OFFICE O/P EST MOD 30 MIN: CPT | Performed by: FAMILY MEDICINE

## 2017-02-21 PROCEDURE — 99213 OFFICE O/P EST LOW 20 MIN: CPT | Performed by: FAMILY MEDICINE

## 2017-02-21 RX ORDER — RANITIDINE 150 MG/1
300 TABLET ORAL
Qty: 30 TAB | Refills: 1
Start: 2017-02-21 | End: 2019-12-31

## 2017-02-21 NOTE — PROGRESS NOTES
Chief Complaint:   Chief Complaint   Patient presents with   • Follow-Up       HPI:Established patient   Nikolas Rico is a 61 y.o. male who presents for follow up after hospital discharge   Discussed the following today :     Disorientation  Patient was admitted recently last week for change in mental status and disorientation, patient said he does not remember anything. He only remembers that he was at home and he slept and then he walk up in the emergency room, he was told that he was found wandering in the security called ambulance took him to the hospital. Patient reports that he has been having other different episodes of disorientation and confusion like this, today he seemed very alert and awake and oriented to place, person and time. Reviewed his hospital records, denies any concerns at this time. Has been taking his medication as directed    Hospital discharge follow-up reviewed records and hospital discharge summary, reviewed patient medication and adjusted appropriately, also updated his medication in Epic  Bipolar affective disorder, current episode mixed, current episode severity unspecified (CMS-[facility])   Patient has been taking his medication as directed, denies any depression or uncontrolled symptoms of his bipolar disorder, he said he has an appointment to follow-up with his characteristic in couple of weeks.      Essential hypertension   Blood pressure today is mildly elevated , his blood pressure today is 145/89, he said he has been taking his medication as directed. He is on lisinopril and amlodipine. Denies headache or chest pain or shortness of breath or palpitations.    Sleep apnea, unspecified type  Patient has history of sleep apnea, he is on C Pap machine at nighttime, denies side effects, denies problems with fatigue or increased sleepiness during the daytime    Tachycardia   Today, I noticed that the patient heart rate is around 100, he denies palpitations or chest pain or  dizziness or lightheadedness, it is also noticed possible that patient's pulse rate has been on the high side. Every time he comes to the clinic. EKG of the hospital reviewed was within normal limits. Today, I did an EKG at the office for further evaluation. No recent thyroid function check. Patient denies alcohol use or other illicit drugs. Medications other than his medications on the list     Insulin dependent diabetes mellitus (CMS-HCC)   Continues to take his insulin he is on 35 units twice a day, denies hypoglycemia attacks.          Past medical history, family history, social history and medications reviewed and updated in the record. today  Current medications, problem list and allergies reviewed in EPIC today   Health maintenance topics are reviewed and updated.    Patient Active Problem List    Diagnosis Date Noted   • CKD (chronic kidney disease) stage 3, GFR 30-59 ml/min      Priority: Low   • Essential hypertension 07/21/2016     Priority: Low   • Diabetes type 2, controlled (CMS-HCC) 01/26/2016     Priority: Low   • Dyslipidemia 04/23/2015     Priority: Low   • COPD (chronic obstructive pulmonary disease) (CMS-HCC) 12/19/2014     Priority: Low   • Chronic HCV infection 12/04/2012     Priority: Low   • Bipolar 1 disorder, manic, moderate (CMS-HCC) 11/22/2011     Priority: Low   • Disorientation 02/21/2017   • Left anterior fascicular block 11/10/2016   • Muscle mass of leg 08/11/2016   • Insulin dependent diabetes mellitus (CMS-HCC) 05/24/2016   • Emphysema of lung (CMS-HCC) 02/18/2016   • Type II or unspecified type diabetes mellitus without mention of complication, not stated as uncontrolled 06/25/2015   • Chronic respiratory failure, unspecified whether with hypoxia or hypercapnia (CMS-HCC) 06/25/2015   • RAD (reactive airway disease) 02/05/2015   • Elbow fracture, left 03/12/2013   • Psoriasis 10/23/2012   • Chronic foot pain 09/11/2012   • Lung disease 11/22/2011   • DIABETES MELLITUS 11/22/2011  "  • Ronnie filter in place 11/22/2011   • History of DVT (deep vein thrombosis) 11/22/2011     Family History   Problem Relation Age of Onset   • Cancer Father      blader ca     Social History     Social History   • Marital Status: Single     Spouse Name: N/A   • Number of Children: N/A   • Years of Education: N/A     Occupational History   • Not on file.     Social History Main Topics   • Smoking status: Former Smoker     Types: Cigarettes     Quit date: 03/06/2013   • Smokeless tobacco: Never Used   • Alcohol Use: 0.0 oz/week     0 Standard drinks or equivalent per week      Comment: sober since HCV diagnosis   • Drug Use: Yes     Special: Methamphetamines      Comment: meth use.  Last use \"a couple weeks ago\", occasional marijuana use   • Sexual Activity:     Partners: Female     Other Topics Concern   • Not on file     Social History Narrative     Current Outpatient Prescriptions   Medication Sig Dispense Refill   • ranitidine (ZANTAC) 150 MG Tab Take 2 Tabs by mouth 1 time daily as needed for Heartburn. 30 Tab 1   • sertraline (ZOLOFT) 100 MG Tab Take 100 mg by mouth every day.     • benztropine (COGENTIN) 2 MG Tab Take 2 mg by mouth 3 times a day.     • atorvastatin (LIPITOR) 20 MG Tab Take 20 mg by mouth every day. Pt also has an RX for 40MG  Total of 60MG QDAY     • atorvastatin (LIPITOR) 40 MG Tab Take 40 mg by mouth every day. Pt also has an RX for 20MG  Total of 60MG QDAY     • ketoconazole (NIZORAL) 2 % shampoo Apply 5 mL to affected area(s) 1 time daily as needed for Itching.     • fluticasone (FLOVENT HFA) 110 MCG/ACT Aerosol Inhale 2 Puffs by mouth 2 times a day.     • tiotropium (SPIRIVA) 18 MCG Cap Inhale 18 mcg by mouth every day.     • Cholecalciferol (VITAMIN D3) 2000 UNIT Cap Take 2,000 Units by mouth every day.     • lisinopril (PRINIVIL) 20 MG Tab Take 1 Tab by mouth every day. 30 Tab 11   • ASPIRIN LOW DOSE 81 MG EC tablet TAKE 1 TABLET ORALLY ONCE DAILY 30 Tab 5   • VENTOLIN  (90 " "BASE) MCG/ACT Aero Soln inhalation aerosol INHALE 2 PUFFS BY MOUTH EVERY 8 HOURS ASNEEDED FOR SHORTNESS OF BREATH 1 Inhaler 7   • amlodipine (NORVASC) 10 MG Tab Take 1 Tab by mouth every day. 30 Tab 11   • NOVOLOG MIX 70/30 (70-30) 100 UNIT/ML injection INJECT 35 UNITS SUBCUTANEOUSLY AS DIRECTED BEFORE MEALS. INREASE 2 UNITS SUBCUTANEOUSLY AS DIRECTED FOR GOAL . STOP IF BLOOD SUGAR <60 20 mL 11   • metformin (GLUCOPHAGE) 850 MG Tab Take 1 Tab by mouth 2 times a day, with meals. 60 Tab 5   • quetiapine (SEROQUEL) 100 MG TABS Take 2 Tabs by mouth every evening. 60 Each 5     No current facility-administered medications for this visit.           Review Of Systems  As documented in HPI above  PHYSICAL EXAMINATION:    /89 mmHg  Pulse 100  Temp(Src) 35.7 °C (96.2 °F)  Resp 20  Ht 1.727 m (5' 8\")  Wt 87.544 kg (193 lb)  BMI 29.35 kg/m2  SpO2 94%  Gen.: Well-developed, well-nourished, Obese, no apparent distress, pleasant and cooperative with the examination  HEENT: Normocephalic/atraumatic,   Neck: No JVD or bruits, no adenopathy  Cor: Regular rate and rhythm without murmur gallop or rub  Lungs: Clear to auscultation with equal breath sounds bilaterally. No wheezes, rhonchi.    Extremities: No cyanosis, clubbing or edema  Psychiatry : Patient is pleasant, not depressed, awake, alert, oriented to time, person and place.          ASSESSMENT/Plan:        1. Disorientation     ? Etiology, I will send patient for further evaluation by neurology to rule out possible early dementia. Because of the recurrent repeated episodes of mental status changes and disorientation and confusion. Status post hospital discharge      REFERRAL TO NEUROLOGY   2. Hospital discharge follow-up    Stable clinically, patient advised to continue his medications as directed, reviewed hospital records today      REFERRAL TO NEUROLOGY   3. Bipolar affective disorder, current episode mixed, current episode severity unspecified (CMS-HCC)   " no suicidal ideation, advised to continue all medications as prescribed by psychiatrist except for benzodiazepines to prevent further confusion state. Follow-up with his psychiatrist as directed in 2 weeks    4. Essential hypertension   mild elevation of blood pressure today. Advised to continue medication and come back and follow-up with me in 2 weeks for further assessment. Patient is asymptomatic, no red flags    5. Sleep apnea, unspecified type   Continue C Pap machine, use as directed, condition is well controlled on the use of C Pap machine    6. Tachycardia     Advised to check thyroid function, EKG at the office interpreted by me today, sinus tachycardia. Patient is asymptomatic   I will recheck on the patient. After doing his thyroid function test in 2 weeks, if he still with sinus tachycardia. I will send patient for further evaluation by cardiology  TSH    FREE THYROXINE    TRIIDOTHYRONINE    EKG - Clinic Performed   7. Insulin dependent diabetes mellitus (CMS-HCC)   continue insulin and metformin as directed, diet control. Recheck A1c            MICROALBUMIN CREAT RATIO URINE    HEMOGLOBIN A1C (For A1C Every 6 Months Topic)         Please note that this dictation was created using voice recognition software. I have made every reasonable attempt to correct obvious errors but there may be errors of grammar and content that I may have overlooked prior to finalization of this note.

## 2017-02-21 NOTE — MR AVS SNAPSHOT
"        Nikolas Rico   2017 10:30 AM   Office Visit   MRN: [id number]    [facility]   Dept Phone:  [phone]    Description:  Male : 1955   Provider:  Chepe Rod M.D.           Reason for Visit     Follow-Up           Allergies as of 2017     Allergen Noted Reactions    Nkda [No Known Drug Allergy] 2008         You were diagnosed with     Disorientation   [360552]       Hospital discharge follow-up   [789212]       Bipolar affective disorder, current episode mixed, current episode severity unspecified (CMS-HCC)   [6142620]       Essential hypertension   [2818690]       Sleep apnea, unspecified type   [2982618]       Tachycardia   [547305]       Insulin dependent diabetes mellitus (CMS-HCC)   [711328]         Vital Signs     Blood Pressure Pulse Temperature Respirations Height Weight    145/89 mmHg 100 35.7 °C (96.2 °F) 20 1.727 m (5' 8\") 87.544 kg (193 lb)    Body Mass Index Oxygen Saturation Smoking Status             29.35 kg/m2 94% Former Smoker         Basic Information     Date Of Birth Sex Race Ethnicity Preferred Language    1955 Male White Non- English      Your appointments     Mar 07, 2017  9:50 AM   Established Patient with Chepe Rod M.D.   [facility] ([facility])    [address] [place] [id number] [phone]           You will be receiving a confirmation call a few days before your appointment from our automated call confirmation system.              Problem List              ICD-10-CM Priority Class Noted - Resolved    Lung disease J98.4   2011 - Present    DIABETES MELLITUS    2011 - Present    Bipolar 1 disorder, manic, moderate (CMS-HCC) F31.12 Low  2011 - Present    Ronnie filter in place Z95.828   2011 - Present    History of DVT (deep vein thrombosis) Z86.718   2011 - Present    Chronic foot pain M79.673, G89.29   2012 - Present    Psoriasis L40.9   " 10/23/2012 - Present    Chronic HCV infection B19.20 Low  12/4/2012 - Present    Elbow fracture, left S42.402A   3/12/2013 - Present    COPD (chronic obstructive pulmonary disease) (CMS-HCC) J44.9 Low  12/19/2014 - Present    RAD (reactive airway disease) J45.909   2/5/2015 - Present    Dyslipidemia E78.5 Low  4/23/2015 - Present    Type II or unspecified type diabetes mellitus without mention of complication, not stated as uncontrolled E11.9   6/25/2015 - Present    Chronic respiratory failure, unspecified whether with hypoxia or hypercapnia (CMS-HCC) J96.10   6/25/2015 - Present    Diabetes type 2, controlled (CMS-HCC) E11.9 Low  1/26/2016 - Present    Emphysema of lung (CMS-HCC) J43.9   2/18/2016 - Present    Insulin dependent diabetes mellitus (CMS-HCC) E11.9, Z79.4   5/24/2016 - Present    Essential hypertension I10 Low  7/21/2016 - Present    Muscle mass of leg R29.898   8/11/2016 - Present    Left anterior fascicular block I44.4   11/10/2016 - Present    CKD (chronic kidney disease) stage 3, GFR 30-59 ml/min (Chronic) N18.3 Low  Unknown - Present    Disorientation R41.0   2/21/2017 - Present      Health Maintenance        Date Due Completion Dates    IMM DTaP/Tdap/Td Vaccine (1 - Tdap) 12/17/1973 ---    COLONOSCOPY 12/17/2004 ---    IMM ZOSTER VACCINE 12/17/2014 ---    DIABETES MONOFILAMENT / LE EXAM 12/19/2015 12/19/2014    URINE ACR / MICROALBUMIN 3/16/2016 3/16/2015, 3/5/2013, 10/16/2012    A1C SCREENING 12/8/2016 6/8/2016, 3/17/2016, 9/22/2015, 4/30/2015, 8/26/2014, 4/14/2014, 11/18/2013, 9/13/2013, 5/13/2013, 1/23/2013, 10/16/2012, 4/17/2012, 12/13/2011    FASTING LIPID PROFILE 6/8/2017 6/8/2016, 3/17/2016, 4/30/2015, 8/26/2014, 11/18/2013, 9/13/2013, 10/16/2012, 6/1/2012, 12/13/2011, 1/18/2008    RETINAL SCREENING 6/14/2017 6/14/2016    SERUM CREATININE 2/14/2018 2/14/2017, 2/13/2017, 11/28/2016, 11/3/2016, 8/23/2016, 3/17/2016, 3/17/2016, 5/30/2015, 4/30/2015, 11/5/2014, 8/26/2014, 4/14/2014,  11/18/2013, 5/13/2013, 3/14/2013, 3/12/2013, 3/5/2013, 1/23/2013, 10/25/2012, 10/16/2012, 7/10/2012, 6/1/2012, 1/4/2012, 12/13/2011, 1/17/2008            Current Immunizations     Influenza TIV (IM) 9/10/2016, 10/23/2012    Pneumococcal polysaccharide vaccine (PPSV-23) 12/11/2011      Below and/or attached are the medications your provider expects you to take. Review all of your home medications and newly ordered medications with your provider and/or pharmacist. Follow medication instructions as directed by your provider and/or pharmacist. Please keep your medication list with you and share with your provider. Update the information when medications are discontinued, doses are changed, or new medications (including over-the-counter products) are added; and carry medication information at all times in the event of emergency situations     Allergies:  NKDA - (reactions not documented)               Medications  Valid as of: February 21, 2017 - 11:02 AM    Generic Name Brand Name Tablet Size Instructions for use    Albuterol Sulfate (Aero Soln) VENTOLIN  (90 BASE) MCG/ACT INHALE 2 PUFFS BY MOUTH EVERY 8 HOURS ASNEEDED FOR SHORTNESS OF BREATH        AmLODIPine Besylate (Tab) NORVASC 10 MG Take 1 Tab by mouth every day.        Aspirin (Tablet Delayed Response) ASPIRIN LOW DOSE 81 MG TAKE 1 TABLET ORALLY ONCE DAILY        Atorvastatin Calcium (Tab) LIPITOR 20 MG Take 20 mg by mouth every day. Pt also has an RX for 40MG  Total of 60MG QDAY        Atorvastatin Calcium (Tab) LIPITOR 40 MG Take 40 mg by mouth every day. Pt also has an RX for 20MG  Total of 60MG QDAY        Benztropine Mesylate (Tab) COGENTIN 2 MG Take 2 mg by mouth 3 times a day.        Cholecalciferol (Cap) Vitamin D3 2000 UNIT Take 2,000 Units by mouth every day.        Fluticasone Propionate HFA (Aerosol) FLOVENT  MCG/ACT Inhale 2 Puffs by mouth 2 times a day.        Insulin Aspart Prot & Aspart (Suspension) NOVOLOG MIX 70/30 (70-30) 100  UNIT/ML INJECT 35 UNITS SUBCUTANEOUSLY AS DIRECTED BEFORE MEALS. INREASE 2 UNITS SUBCUTANEOUSLY AS DIRECTED FOR GOAL . STOP IF BLOOD SUGAR <60        Ketoconazole (Shampoo) NIZORAL 2 % Apply 5 mL to affected area(s) 1 time daily as needed for Itching.        Lisinopril (Tab) PRINIVIL 20 MG Take 1 Tab by mouth every day.        MetFORMIN HCl (Tab) GLUCOPHAGE 850 MG Take 1 Tab by mouth 2 times a day, with meals.        QUEtiapine Fumarate (Tab) SEROQUEL 100 MG Take 2 Tabs by mouth every evening.        RaNITidine HCl (Tab) ZANTAC 150 MG Take 2 Tabs by mouth 1 time daily as needed for Heartburn.        Sertraline HCl (Tab) ZOLOFT 100 MG Take 100 mg by mouth every day.        Tiotropium Bromide Monohydrate (Cap) SPIRIVA 18 MCG Inhale 18 mcg by mouth every day.        .                 Medicines prescribed today [facility]:     [facility] [address] [address]    Phone: [phone] Fax: [phone]    Open 24 Hours?: No    [facility] - [address] [address]    Phone: [phone] Fax: [phone]    Open 24 Hours?: No      Medication refill instructions:       If your prescription bottle indicates you have medication refills left, it is not necessary to call your provider’s office. Please contact your pharmacy and they will refill your medication.    If your prescription bottle indicates you do not have any refills left, you may request refills at any time through one of the following ways: The online [employer] system (except Urgent Care), by calling your provider’s office, or by asking your pharmacy to contact your provider’s office with a refill request. Medication refills are processed only during regular business hours and may not be available until the next business day. Your provider may request additional information or to have a follow-up visit with you prior to refilling your medication.   *Please Note:  Medication refills are assigned a new Rx number when refilled electronically. Your pharmacy may indicate that no refills were authorized even though a new prescription for the same medication is available at the pharmacy. Please request the medicine by name with the pharmacy before contacting your provider for a refill.        Your To Do List     Future Labs/Procedures Complete By Expires    FREE THYROXINE  As directed 2/21/2018    HEMOGLOBIN A1C (For A1C Every 6 Months Topic)  As directed 2/21/2018    Comments:    HEMOGLOBIN A1C   [unfilled]    MICROALBUMIN CREAT RATIO URINE  As directed 2/21/2018    TRIIDOTHYRONINE  As directed 2/21/2018    TSH  As directed 2/21/2018      Referral     A referral request has been sent to our patient care coordination department. Please allow 3-5 business days for us to process this request and contact you either by phone or mail. If you do not hear from us by the 5th business day, please call us at [phone].        Other Notes About Your Plan     Fall risk done 6/25/15           [employer] Access Code: Activation code not generated  Current [employer] Status: Active

## 2017-03-04 ENCOUNTER — HOSPITAL ENCOUNTER (OUTPATIENT)
Dept: LAB | Facility: MEDICAL CENTER | Age: 62
End: 2017-03-04
Attending: FAMILY MEDICINE
Payer: MEDICAID

## 2017-03-04 DIAGNOSIS — R00.0 TACHYCARDIA: ICD-10-CM

## 2017-03-04 LAB
CREAT UR-MCNC: 185.8 MG/DL
EST. AVERAGE GLUCOSE BLD GHB EST-MCNC: 148 MG/DL
HBA1C MFR BLD: 6.8 % (ref 0–5.6)
MICROALBUMIN UR-MCNC: 1.9 MG/DL
MICROALBUMIN/CREAT UR: 10 MG/G (ref 0–30)
T3 SERPL-MCNC: 92.2 NG/DL (ref 60–181)
T4 FREE SERPL-MCNC: 0.66 NG/DL (ref 0.53–1.43)
TSH SERPL DL<=0.005 MIU/L-ACNC: 2.07 UIU/ML (ref 0.3–3.7)

## 2017-03-04 PROCEDURE — 84480 ASSAY TRIIODOTHYRONINE (T3): CPT

## 2017-03-04 PROCEDURE — 84443 ASSAY THYROID STIM HORMONE: CPT

## 2017-03-04 PROCEDURE — 83036 HEMOGLOBIN GLYCOSYLATED A1C: CPT

## 2017-03-04 PROCEDURE — 84439 ASSAY OF FREE THYROXINE: CPT

## 2017-03-04 PROCEDURE — 82043 UR ALBUMIN QUANTITATIVE: CPT

## 2017-03-04 PROCEDURE — 36415 COLL VENOUS BLD VENIPUNCTURE: CPT

## 2017-03-04 PROCEDURE — 82570 ASSAY OF URINE CREATININE: CPT

## 2017-03-07 ENCOUNTER — OFFICE VISIT (OUTPATIENT)
Dept: MEDICAL GROUP | Facility: MEDICAL CENTER | Age: 62
End: 2017-03-07
Attending: FAMILY MEDICINE
Payer: MEDICAID

## 2017-03-07 VITALS
DIASTOLIC BLOOD PRESSURE: 80 MMHG | TEMPERATURE: 98 F | WEIGHT: 200.4 LBS | HEART RATE: 112 BPM | HEIGHT: 68 IN | BODY MASS INDEX: 30.37 KG/M2 | OXYGEN SATURATION: 94 % | RESPIRATION RATE: 20 BRPM | SYSTOLIC BLOOD PRESSURE: 141 MMHG

## 2017-03-07 DIAGNOSIS — Z12.11 COLON CANCER SCREENING: ICD-10-CM

## 2017-03-07 DIAGNOSIS — R00.0 TACHYCARDIA: ICD-10-CM

## 2017-03-07 PROCEDURE — 99213 OFFICE O/P EST LOW 20 MIN: CPT | Performed by: FAMILY MEDICINE

## 2017-03-07 PROCEDURE — 99212 OFFICE O/P EST SF 10 MIN: CPT | Performed by: FAMILY MEDICINE

## 2017-03-07 RX ORDER — METOPROLOL SUCCINATE 25 MG/1
25 TABLET, EXTENDED RELEASE ORAL DAILY
Qty: 30 TAB | Refills: 1 | Status: SHIPPED | OUTPATIENT
Start: 2017-03-07 | End: 2017-05-03 | Stop reason: SDUPTHER

## 2017-03-07 NOTE — PROGRESS NOTES
Chief Complaint:   Chief Complaint   Patient presents with   • Follow-Up       HPI:established patient   Nikolas Rico is a 61 y.o. male who presents for follow-up on medical problems as directed.  Discussed the following concerns as follow :  Tachycardia  Patient has this persistent elevation of his pulse rate, with symptoms that the patient describes as sometimes he feels anxious all over his body, mild heart racing without chest pain or shortness of breath. Thyroid function reviewed with the patient today within normal limits. No indication of hyperthyroidism. Patient denies any lightheadedness or dizziness    Insulin dependent diabetes mellitus (CMS-HCC)  Continues to take his insulin and medication as directed. A1c at 6.8. No history of hypoglycemia, low blood sugar level     Colon cancer screening     Patient is due for colonoscopy, he said he is scheduled for June 2017. Denies any change in bowel movements or blood in stool, or unintentional weight loss.          Past medical history, family history, social history and medications reviewed and updated in the record. today  Current medications, problem list and allergies reviewed in EPIC today   Health maintenance topics are reviewed and updated.    Patient Active Problem List    Diagnosis Date Noted   • CKD (chronic kidney disease) stage 3, GFR 30-59 ml/min      Priority: Low   • Essential hypertension 07/21/2016     Priority: Low   • Diabetes type 2, controlled (CMS-HCC) 01/26/2016     Priority: Low   • Dyslipidemia 04/23/2015     Priority: Low   • COPD (chronic obstructive pulmonary disease) (CMS-HCC) 12/19/2014     Priority: Low   • Chronic HCV infection 12/04/2012     Priority: Low   • Bipolar 1 disorder, manic, moderate (CMS-HCC) 11/22/2011     Priority: Low   • Tachycardia 03/07/2017   • Disorientation 02/21/2017   • Left anterior fascicular block 11/10/2016   • Muscle mass of leg 08/11/2016   • Insulin dependent diabetes mellitus (CMS-HCC)  "05/24/2016   • Emphysema of lung (CMS-HCC) 02/18/2016   • Type II or unspecified type diabetes mellitus without mention of complication, not stated as uncontrolled 06/25/2015   • Chronic respiratory failure, unspecified whether with hypoxia or hypercapnia (CMS-HCC) 06/25/2015   • RAD (reactive airway disease) 02/05/2015   • Elbow fracture, left 03/12/2013   • Psoriasis 10/23/2012   • Chronic foot pain 09/11/2012   • Lung disease 11/22/2011   • DIABETES MELLITUS 11/22/2011   • Ronnie filter in place 11/22/2011   • History of DVT (deep vein thrombosis) 11/22/2011     Family History   Problem Relation Age of Onset   • Cancer Father      blader ca     Social History     Social History   • Marital Status: Single     Spouse Name: N/A   • Number of Children: N/A   • Years of Education: N/A     Occupational History   • Not on file.     Social History Main Topics   • Smoking status: Former Smoker     Types: Cigarettes     Quit date: 03/06/2013   • Smokeless tobacco: Never Used   • Alcohol Use: 0.0 oz/week     0 Standard drinks or equivalent per week      Comment: sober since HCV diagnosis   • Drug Use: Yes     Special: Methamphetamines      Comment: meth use.  Last use \"a couple weeks ago\", occasional marijuana use   • Sexual Activity:     Partners: Female     Other Topics Concern   • Not on file     Social History Narrative     Current Outpatient Prescriptions   Medication Sig Dispense Refill   • metoprolol SR (TOPROL XL) 25 MG TABLET SR 24 HR Take 1 Tab by mouth every day. 30 Tab 1   • ranitidine (ZANTAC) 150 MG Tab Take 2 Tabs by mouth 1 time daily as needed for Heartburn. 30 Tab 1   • sertraline (ZOLOFT) 100 MG Tab Take 100 mg by mouth every day.     • benztropine (COGENTIN) 2 MG Tab Take 2 mg by mouth 3 times a day.     • atorvastatin (LIPITOR) 20 MG Tab Take 20 mg by mouth every day. Pt also has an RX for 40MG  Total of 60MG QDAY     • atorvastatin (LIPITOR) 40 MG Tab Take 40 mg by mouth every day. Pt also has an " "RX for 20MG  Total of 60MG QDAY     • ketoconazole (NIZORAL) 2 % shampoo Apply 5 mL to affected area(s) 1 time daily as needed for Itching.     • fluticasone (FLOVENT HFA) 110 MCG/ACT Aerosol Inhale 2 Puffs by mouth 2 times a day.     • tiotropium (SPIRIVA) 18 MCG Cap Inhale 18 mcg by mouth every day.     • Cholecalciferol (VITAMIN D3) 2000 UNIT Cap Take 2,000 Units by mouth every day.     • lisinopril (PRINIVIL) 20 MG Tab Take 1 Tab by mouth every day. 30 Tab 11   • ASPIRIN LOW DOSE 81 MG EC tablet TAKE 1 TABLET ORALLY ONCE DAILY 30 Tab 5   • VENTOLIN  (90 BASE) MCG/ACT Aero Soln inhalation aerosol INHALE 2 PUFFS BY MOUTH EVERY 8 HOURS ASNEEDED FOR SHORTNESS OF BREATH 1 Inhaler 7   • amlodipine (NORVASC) 10 MG Tab Take 1 Tab by mouth every day. 30 Tab 11   • NOVOLOG MIX 70/30 (70-30) 100 UNIT/ML injection INJECT 35 UNITS SUBCUTANEOUSLY AS DIRECTED BEFORE MEALS. INREASE 2 UNITS SUBCUTANEOUSLY AS DIRECTED FOR GOAL . STOP IF BLOOD SUGAR <60 20 mL 11   • metformin (GLUCOPHAGE) 850 MG Tab Take 1 Tab by mouth 2 times a day, with meals. 60 Tab 5   • quetiapine (SEROQUEL) 100 MG TABS Take 2 Tabs by mouth every evening. 60 Each 5     No current facility-administered medications for this visit.           Review Of Systems  As documented in HPI above  PHYSICAL EXAMINATION:    /80 mmHg  Pulse 112  Temp(Src) 36.7 °C (98 °F)  Resp 20  Ht 1.727 m (5' 8\")  Wt 90.901 kg (200 lb 6.4 oz)  BMI 30.48 kg/m2  SpO2 94%  Gen.: Well-developed, well-nourished, no apparent distress, pleasant and cooperative with the examination  HEENT: Normocephalic/atraumatic,  Neck: No JVD or bruits, no adenopathy  Cor: Regular rate and tachycardic without murmur gallop or rub  Lungs: Clear to auscultation with equal breath sounds bilaterally. No wheezes, rhonchi.  Abdomen: Soft nontender without hepatosplenomegaly or masses appreciated, normoactive bowel sounds  Extremities: No cyanosis, clubbing or edema  CT exam:  Monofilament " testing with a 10 gram force: sensation intact: intact bilaterally  Visual Inspection: Feet without maceration, ulcers, fissures.  Pedal pulses: intact bilaterally    ASSESSMENT/Plan:      1. Tachycardia    ? Etiology, I will start patient on metoprolol 25 mg daily and advised to follow-up with cardiology for further assistance, advised to do an echocardiogram. Thyroid function test within normal limits, may be related to anxiety      No red flags at this time. Patient advised if he developed any dizziness or seizure, palpitations or fainting or syncope to report to emergency room            metoprolol SR (TOPROL XL) 25 MG TABLET SR 24 HR    REFERRAL TO CARDIOLOGY    ECHOCARDIOGRAM COMP W/O CONT   2. Insulin dependent diabetes mellitus (CMS-[facility])   A1c at goal 6.8, advised to continue same regimen. No side effects              Diabetic Monofilament Lower Extremity Exam   3. Colon cancer screening  OCCULT BLOOD FECES IMMUNOASSAY         Please note that this dictation was created using voice recognition software. I have made every reasonable attempt to correct obvious errors but there may be errors of grammar and content that I may have overlooked prior to finalization of this note.

## 2017-03-07 NOTE — MR AVS SNAPSHOT
"        Nikolas Rico   3/7/2017 9:50 AM   Office Visit   MRN: [id number]    [facility]   Dept Phone:  [phone]    Description:  Male : 1955   Provider:  Chepe Rod M.D.           Reason for Visit     Follow-Up           Allergies as of 3/7/2017     Allergen Noted Reactions    Nkda [No Known Drug Allergy] 2008         You were diagnosed with     Tachycardia   [236401]       Insulin dependent diabetes mellitus (CMS-[facility])   [149423]       Colon cancer screening   [698520]         Vital Signs     Blood Pressure Pulse Temperature Respirations Height Weight    141/80 mmHg 112 36.7 °C (98 °F) 20 1.727 m (5' 8\") 90.901 kg (200 lb 6.4 oz)    Body Mass Index Oxygen Saturation Smoking Status             30.48 kg/m2 94% Former Smoker         Basic Information     Date Of Birth Sex Race Ethnicity Preferred Language    1955 Male White Non- English      Your appointments     Mar 10, 2017  9:00 AM   US ABDOMEN FASTING (30 MINUTES) with [address] US 2   [facility] - ULTR[facility]OUND - [address])    [address]  [place] [address]   [phone]           NPO 8 hours.  For  Abdomen, NPO 2 hours, schedule Abdomen Complete and include \" Abdomen\" in Appt Notes.              Problem List              ICD-10-CM Priority Class Noted - Resolved    Lung disease J98.4   2011 - Present    DIABETES MELLITUS    2011 - Present    Bipolar 1 disorder, manic, moderate (CMS-HCC) F31.12 Low  2011 - Present    Ronnie filter in place Z95.828   2011 - Present    History of DVT (deep vein thrombosis) Z86.718   2011 - Present    Chronic foot pain M79.673, G89.29   2012 - Present    Psoriasis L40.9   10/23/2012 - Present    Chronic HCV infection B19.20 Low  2012 - Present    Elbow fracture, left S42.402A   3/12/2013 - Present    COPD (chronic obstructive pulmonary disease) (CMS-HCC) J44.9 Low  2014 - Present   " RAD (reactive airway disease) J45.909   2/5/2015 - Present    Dyslipidemia E78.5 Low  4/23/2015 - Present    Type II or unspecified type diabetes mellitus without mention of complication, not stated as uncontrolled E11.9   6/25/2015 - Present    Chronic respiratory failure, unspecified whether with hypoxia or hypercapnia (CMS-HCC) J96.10   6/25/2015 - Present    Diabetes type 2, controlled (CMS-HCC) E11.9 Low  1/26/2016 - Present    Emphysema of lung (CMS-HCC) J43.9   2/18/2016 - Present    Insulin dependent diabetes mellitus (CMS-HCC) E11.9, Z79.4   5/24/2016 - Present    Essential hypertension I10 Low  7/21/2016 - Present    Muscle mass of leg R29.898   8/11/2016 - Present    Left anterior fascicular block I44.4   11/10/2016 - Present    CKD (chronic kidney disease) stage 3, GFR 30-59 ml/min (Chronic) N18.3 Low  Unknown - Present    Disorientation R41.0   2/21/2017 - Present    Tachycardia R00.0   3/7/2017 - Present      Health Maintenance        Date Due Completion Dates    IMM DTaP/Tdap/Td Vaccine (1 - Tdap) 12/17/1973 ---    COLONOSCOPY 12/17/2004 ---    IMM ZOSTER VACCINE 12/17/2014 ---    DIABETES MONOFILAMENT / LE EXAM 12/19/2015 12/19/2014    FASTING LIPID PROFILE 6/8/2017 6/8/2016, 3/17/2016, 4/30/2015, 8/26/2014, 11/18/2013, 9/13/2013, 10/16/2012, 6/1/2012, 12/13/2011, 1/18/2008    RETINAL SCREENING 6/14/2017 6/14/2016    A1C SCREENING 9/4/2017 3/4/2017, 6/8/2016, 3/17/2016, 9/22/2015, 4/30/2015, 8/26/2014, 4/14/2014, 11/18/2013, 9/13/2013, 5/13/2013, 1/23/2013, 10/16/2012, 4/17/2012, 12/13/2011    SERUM CREATININE 2/14/2018 2/14/2017, 2/13/2017, 11/28/2016, 11/3/2016, 8/23/2016, 3/17/2016, 3/17/2016, 5/30/2015, 4/30/2015, 11/5/2014, 8/26/2014, 4/14/2014, 11/18/2013, 5/13/2013, 3/14/2013, 3/12/2013, 3/5/2013, 1/23/2013, 10/25/2012, 10/16/2012, 7/10/2012, 6/1/2012, 1/4/2012, 12/13/2011, 1/17/2008    URINE ACR / MICROALBUMIN 3/4/2018 3/4/2017, 3/16/2015, 3/5/2013, 10/16/2012            Current Immunizations      Influenza TIV (IM) 9/10/2016, 10/23/2012    Pneumococcal polysaccharide vaccine (PPSV-23) 12/11/2011      Below and/or attached are the medications your provider expects you to take. Review all of your home medications and newly ordered medications with your provider and/or pharmacist. Follow medication instructions as directed by your provider and/or pharmacist. Please keep your medication list with you and share with your provider. Update the information when medications are discontinued, doses are changed, or new medications (including over-the-counter products) are added; and carry medication information at all times in the event of emergency situations     Allergies:  NKDA - (reactions not documented)               Medications  Valid as of: March 07, 2017 - 10:21 AM    Generic Name Brand Name Tablet Size Instructions for use    Albuterol Sulfate (Aero Soln) VENTOLIN  (90 BASE) MCG/ACT INHALE 2 PUFFS BY MOUTH EVERY 8 HOURS ASNEEDED FOR SHORTNESS OF BREATH        AmLODIPine Besylate (Tab) NORVASC 10 MG Take 1 Tab by mouth every day.        Aspirin (Tablet Delayed Response) ASPIRIN LOW DOSE 81 MG TAKE 1 TABLET ORALLY ONCE DAILY        Atorvastatin Calcium (Tab) LIPITOR 20 MG Take 20 mg by mouth every day. Pt also has an RX for 40MG  Total of 60MG QDAY        Atorvastatin Calcium (Tab) LIPITOR 40 MG Take 40 mg by mouth every day. Pt also has an RX for 20MG  Total of 60MG QDAY        Benztropine Mesylate (Tab) COGENTIN 2 MG Take 2 mg by mouth 3 times a day.        Cholecalciferol (Cap) Vitamin D3 2000 UNIT Take 2,000 Units by mouth every day.        Fluticasone Propionate HFA (Aerosol) FLOVENT  MCG/ACT Inhale 2 Puffs by mouth 2 times a day.        Insulin Aspart Prot & Aspart (Suspension) NOVOLOG MIX 70/30 (70-30) 100 UNIT/ML INJECT 35 UNITS SUBCUTANEOUSLY AS DIRECTED BEFORE MEALS. INREASE 2 UNITS SUBCUTANEOUSLY AS DIRECTED FOR GOAL . STOP IF BLOOD SUGAR <60        Ketoconazole (Shampoo)  NIZORAL 2 % Apply 5 mL to affected area(s) 1 time daily as needed for Itching.        Lisinopril (Tab) PRINIVIL 20 MG Take 1 Tab by mouth every day.        MetFORMIN HCl (Tab) GLUCOPHAGE 850 MG Take 1 Tab by mouth 2 times a day, with meals.        Metoprolol Succinate (TABLET SR 24 HR) TOPROL XL 25 MG Take 1 Tab by mouth every day.        QUEtiapine Fumarate (Tab) SEROQUEL 100 MG Take 2 Tabs by mouth every evening.        RaNITidine HCl (Tab) ZANTAC 150 MG Take 2 Tabs by mouth 1 time daily as needed for Heartburn.        Sertraline HCl (Tab) ZOLOFT 100 MG Take 100 mg by mouth every day.        Tiotropium Bromide Monohydrate (Cap) SPIRIVA 18 MCG Inhale 18 mcg by mouth every day.        .                 Medicines prescribed today were sent to:     [id number] [address]    Phone: [phone] Fax: [phone]    Open 24 Hours?: [facility] - [address] [address]    Phone: [phone] Fax: [phone]    Open 24 Hours?: No      Medication refill instructions:       If your prescription bottle indicates you have medication refills left, it is not necessary to call your provider’s office. Please contact your pharmacy and they will refill your medication.    If your prescription bottle indicates you do not have any refills left, you may request refills at any time through one of the following ways: The online [employer] system (except Urgent Care), by calling your provider’s office, or by asking your pharmacy to contact your provider’s office with a refill request. Medication refills are processed only during regular business hours and may not be available until the next business day. Your provider may request additional information or to have a follow-up visit with you prior to refilling your medication.   *Please Note: Medication refills are assigned a new Rx number when refilled electronically. Your  pharmacy may indicate that no refills were authorized even though a new prescription for the same medication is available at the pharmacy. Please request the medicine by name with the pharmacy before contacting your provider for a refill.        Your To Do List     Future Labs/Procedures Complete By Expires    ECHOCARDIOGRAM COMP W/O CONT  As directed 3/7/2018    Scheduling Instructions:    Tachycardia, for further assessment of cardiac function    OCCULT BLOOD FECES IMMUNOASSAY  As directed 3/7/2018      Referral     A referral request has been sent to our patient care coordination department. Please allow 3-5 business days for us to process this request and contact you either by phone or mail. If you do not hear from us by the 5th business day, please call us at [phone].        Other Notes About Your Plan     Fall risk done 6/25/15           [employer] Access Code: [email or web address]  Expires: 4/6/2017 10:21 AM    [employer]  A secure, online tool to manage your health information     [employer]® is a secure, online tool that connects you to your personalized health information from the privacy of your home -- day or night - making it very easy for you to manage your healthcare. Once the activation process is completed, you can even access your medical information using the Fook[employer]Z claritza, which is available for free in the Apple Claritza store or Google Play store.     [employer] provides the following levels of access (as shown below):   My Chart Features   [facility] Primary [facility] Doctor [facility]  Specialists [facility]  Urgent  Care Non-[facility]  Primary Care  Doctor   Email your healthcare team securely and privately 24/7 X X X    Manage appointments: schedule your next appointment; view details of past/upcoming appointments X      Request prescription refills. X      View recent personal medical records, including lab and immunizations X X X X   View health record, including health history, allergies, medications X X X  X   Read reports about your outpatient visits, procedures, consult and ER notes X X X X   See your discharge summary, which is a recap of your hospital and/or ER visit that includes your diagnosis, lab results, and care plan. X X       How to register for [employer]:  1. Go to  [email or web address].  2. Click on the Sign Up Now box, which takes you to the New Member Sign Up page. You will need to provide the following information:  a. Enter your [employer] Access Code exactly as it appears at the top of this page. (You will not need to use this code after you’ve completed the sign-up process. If you do not sign up before the expiration date, you must request a new code.)   b. Enter your date of birth.   c. Enter your home email address.   d. Click Submit, and follow the next screen’s instructions.  3. Create a [employer] ID. This will be your [employer] login ID and cannot be changed, so think of one that is secure and easy to remember.  4. Create a [employer] password. You can change your password at any time.  5. Enter your Password Reset Question and Answer. This can be used at a later time if you forget your password.   6. Enter your e-mail address. This allows you to receive e-mail notifications when new information is available in [employer].  7. Click Sign Up. You can now view your health information.    For assistance activating your [employer] account, call [phone]

## 2017-03-10 ENCOUNTER — APPOINTMENT (OUTPATIENT)
Dept: RADIOLOGY | Facility: MEDICAL CENTER | Age: 62
End: 2017-03-10
Attending: PHYSICIAN ASSISTANT
Payer: MEDICAID

## 2017-03-19 ENCOUNTER — HOSPITAL ENCOUNTER (OUTPATIENT)
Facility: MEDICAL CENTER | Age: 62
End: 2017-03-19
Attending: FAMILY MEDICINE
Payer: MEDICAID

## 2017-03-19 PROCEDURE — 82274 ASSAY TEST FOR BLOOD FECAL: CPT

## 2017-03-22 DIAGNOSIS — Z12.11 COLON CANCER SCREENING: ICD-10-CM

## 2017-03-23 LAB — HEMOCCULT STL QL IA: NEGATIVE

## 2017-03-24 ENCOUNTER — TELEPHONE (OUTPATIENT)
Dept: MEDICAL GROUP | Facility: MEDICAL CENTER | Age: 62
End: 2017-03-24

## 2017-03-24 NOTE — TELEPHONE ENCOUNTER
----- Message from Chepe Rod M.D. sent at 3/24/2017  1:55 PM PDT -----  Please let patient know the stool occult blood result is negative .  thanks

## 2017-03-31 ENCOUNTER — HOSPITAL ENCOUNTER (OUTPATIENT)
Dept: CARDIOLOGY | Facility: MEDICAL CENTER | Age: 62
End: 2017-03-31
Attending: FAMILY MEDICINE
Payer: MEDICAID

## 2017-03-31 ENCOUNTER — OFFICE VISIT (OUTPATIENT)
Dept: MEDICAL GROUP | Facility: MEDICAL CENTER | Age: 62
End: 2017-03-31
Attending: FAMILY MEDICINE
Payer: MEDICAID

## 2017-03-31 VITALS
TEMPERATURE: 97.2 F | OXYGEN SATURATION: 97 % | HEIGHT: 66 IN | RESPIRATION RATE: 22 BRPM | WEIGHT: 199 LBS | DIASTOLIC BLOOD PRESSURE: 90 MMHG | BODY MASS INDEX: 31.98 KG/M2 | SYSTOLIC BLOOD PRESSURE: 160 MMHG | HEART RATE: 100 BPM

## 2017-03-31 DIAGNOSIS — F41.9 ANXIETY: ICD-10-CM

## 2017-03-31 DIAGNOSIS — R00.0 TACHYCARDIA: ICD-10-CM

## 2017-03-31 DIAGNOSIS — I10 ESSENTIAL HYPERTENSION: ICD-10-CM

## 2017-03-31 LAB
LV EJECT FRACT  99904: 70
LV EJECT FRACT MOD 2C 99903: 63.58
LV EJECT FRACT MOD 4C 99902: 70.14
LV EJECT FRACT MOD BP 99901: 68.49

## 2017-03-31 PROCEDURE — 99213 OFFICE O/P EST LOW 20 MIN: CPT | Performed by: FAMILY MEDICINE

## 2017-03-31 PROCEDURE — 93306 TTE W/DOPPLER COMPLETE: CPT

## 2017-03-31 PROCEDURE — 93306 TTE W/DOPPLER COMPLETE: CPT | Mod: 26 | Performed by: INTERNAL MEDICINE

## 2017-03-31 RX ORDER — TEMAZEPAM 30 MG/1
CAPSULE ORAL
COMMUNITY
Start: 2017-03-08 | End: 2021-02-01

## 2017-03-31 RX ORDER — QUETIAPINE FUMARATE 200 MG/1
TABLET, FILM COATED ORAL
COMMUNITY
Start: 2017-03-11 | End: 2017-07-20

## 2017-03-31 RX ORDER — DIVALPROEX SODIUM 500 MG/1
TABLET, DELAYED RELEASE ORAL
COMMUNITY
Start: 2017-03-27 | End: 2017-07-20

## 2017-03-31 RX ORDER — DIVALPROEX SODIUM 500 MG/1
TABLET, DELAYED RELEASE ORAL
COMMUNITY
Start: 2017-03-27 | End: 2017-04-28

## 2017-03-31 RX ORDER — CLONIDINE HYDROCHLORIDE 0.1 MG/1
0.1 TABLET ORAL 2 TIMES DAILY
Qty: 60 TAB | Refills: 1 | Status: SHIPPED | OUTPATIENT
Start: 2017-03-31 | End: 2017-10-02 | Stop reason: SDUPTHER

## 2017-03-31 NOTE — PROGRESS NOTES
Chief Complaint:   Chief Complaint   Patient presents with   • Anxiety       HPI: Established patient  Nikolas Rico is a 61 y.o.male who presents for evaluation of his anxiety     Anxiety  Patient is here today to report that for the past 2 weeks and a half. He has been feeling more anxious, fidgety and irritable and unable to stay in one place for a long time because of his anxiety, he had recent adjustment of his medications by psychiatrist, patient continues to take Seroquel, Zoloft, Contigen 10, and recently was started by neurology on Depakote for mood stabilization and possible seizure disorder, he is also on temazepam 30 mg at nighttime. Denies any intentions to hurt self or others or any soaks of risky behavior. Today, his blood pressure was a little bit on the high side and his pulse rate is chronically elevated. Denies palpitations or chest pain or discomfort. Patient said he has an appointment to follow-up with his psychiatrist next week.     Essential hypertension   Blood pressure today is around 160/90, with mild elevation of his respiratory rate and pulse rate at 100, denies chest pain or headache or shortness of breath or lower extremity edema. Patient is anxious today    Reviewed records from neurology for evaluation of possible seizure disorders and confusion episodes.      Past medical history, family history, social history and medications reviewed and updated in the record. Today  Current medications, problem list and allergies reviewed in EPIC today  Health maintenance topics are reviewed and updated.    Patient Active Problem List    Diagnosis Date Noted   • CKD (chronic kidney disease) stage 3, GFR 30-59 ml/min      Priority: Low   • Essential hypertension 07/21/2016     Priority: Low   • Diabetes type 2, controlled (CMS-HCC) 01/26/2016     Priority: Low   • Dyslipidemia 04/23/2015     Priority: Low   • COPD (chronic obstructive pulmonary disease) (CMS-HCC) 12/19/2014      "Priority: Low   • Chronic HCV infection 12/04/2012     Priority: Low   • Bipolar 1 disorder, manic, moderate (CMS-HCC) 11/22/2011     Priority: Low   • Tachycardia 03/07/2017   • Disorientation 02/21/2017   • Left anterior fascicular block 11/10/2016   • Muscle mass of leg 08/11/2016   • Insulin dependent diabetes mellitus (CMS-HCC) 05/24/2016   • Emphysema of lung (CMS-HCC) 02/18/2016   • Type II or unspecified type diabetes mellitus without mention of complication, not stated as uncontrolled 06/25/2015   • Chronic respiratory failure, unspecified whether with hypoxia or hypercapnia (CMS-HCC) 06/25/2015   • RAD (reactive airway disease) 02/05/2015   • Elbow fracture, left 03/12/2013   • Psoriasis 10/23/2012   • Chronic foot pain 09/11/2012   • Lung disease 11/22/2011   • DIABETES MELLITUS 11/22/2011   • Ronnie filter in place 11/22/2011   • History of DVT (deep vein thrombosis) 11/22/2011     Family History   Problem Relation Age of Onset   • Cancer Father      blader ca     Social History     Social History   • Marital Status: Single     Spouse Name: N/A   • Number of Children: N/A   • Years of Education: N/A     Occupational History   • Not on file.     Social History Main Topics   • Smoking status: Former Smoker     Types: Cigarettes     Quit date: 03/06/2013   • Smokeless tobacco: Never Used   • Alcohol Use: 0.0 oz/week     0 Standard drinks or equivalent per week      Comment: sober since HCV diagnosis   • Drug Use: Yes     Special: Methamphetamines      Comment: meth use.  Last use \"a couple weeks ago\", occasional marijuana use   • Sexual Activity:     Partners: Female     Other Topics Concern   • Not on file     Social History Narrative       Current Outpatient Prescriptions   Medication Sig Dispense Refill   • divalproex (DEPAKOTE) 500 MG Tablet Delayed Response Take  by mouth.     • clonidine (CATAPRES) 0.1 MG Tab Take 1 Tab by mouth 2 times a day. 60 Tab 1   • temazepam (RESTORIL) 30 MG capsule      • " "quetiapine (SEROQUEL) 200 MG Tab      • divalproex (DEPAKOTE) 500 MG Tablet Delayed Response      • ULTICARE INSULIN SYRINGE 29G X 1/2\" 0.5 ML Misc USE AS DIRECTED BY PHYSICIAN 90 Each 5   • metoprolol SR (TOPROL XL) 25 MG TABLET SR 24 HR Take 1 Tab by mouth every day. 30 Tab 1   • ranitidine (ZANTAC) 150 MG Tab Take 2 Tabs by mouth 1 time daily as needed for Heartburn. 30 Tab 1   • sertraline (ZOLOFT) 100 MG Tab Take 100 mg by mouth every day.     • benztropine (COGENTIN) 2 MG Tab Take 2 mg by mouth 3 times a day.     • atorvastatin (LIPITOR) 20 MG Tab Take 20 mg by mouth every day. Pt also has an RX for 40MG  Total of 60MG QDAY     • atorvastatin (LIPITOR) 40 MG Tab Take 40 mg by mouth every day. Pt also has an RX for 20MG  Total of 60MG QDAY     • ketoconazole (NIZORAL) 2 % shampoo Apply 5 mL to affected area(s) 1 time daily as needed for Itching.     • fluticasone (FLOVENT HFA) 110 MCG/ACT Aerosol Inhale 2 Puffs by mouth 2 times a day.     • tiotropium (SPIRIVA) 18 MCG Cap Inhale 18 mcg by mouth every day.     • Cholecalciferol (VITAMIN D3) 2000 UNIT Cap Take 2,000 Units by mouth every day.     • lisinopril (PRINIVIL) 20 MG Tab Take 1 Tab by mouth every day. 30 Tab 11   • ASPIRIN LOW DOSE 81 MG EC tablet TAKE 1 TABLET ORALLY ONCE DAILY 30 Tab 5   • VENTOLIN  (90 BASE) MCG/ACT Aero Soln inhalation aerosol INHALE 2 PUFFS BY MOUTH EVERY 8 HOURS ASNEEDED FOR SHORTNESS OF BREATH 1 Inhaler 7   • amlodipine (NORVASC) 10 MG Tab Take 1 Tab by mouth every day. 30 Tab 11   • NOVOLOG MIX 70/30 (70-30) 100 UNIT/ML injection INJECT 35 UNITS SUBCUTANEOUSLY AS DIRECTED BEFORE MEALS. INREASE 2 UNITS SUBCUTANEOUSLY AS DIRECTED FOR GOAL . STOP IF BLOOD SUGAR <60 20 mL 11   • metformin (GLUCOPHAGE) 850 MG Tab Take 1 Tab by mouth 2 times a day, with meals. 60 Tab 5   • quetiapine (SEROQUEL) 100 MG TABS Take 2 Tabs by mouth every evening. 60 Each 5     No current facility-administered medications for this visit. " "        Review Of Systems  As documented in HPI above  PHYSICAL EXAMINATION:    /90 mmHg  Pulse 100  Temp(Src) 36.2 °C (97.2 °F)  Resp 22  Ht 1.676 m (5' 6\")  Wt 90.266 kg (199 lb)  BMI 32.13 kg/m2  SpO2 97%  Gen.: Well-developed, well-nourished, no apparent distress, pleasant and cooperative with the examination  HEENT: Normocephalic/atraumatic,   Neck: No JVD or bruits, no adenopathy  Cor: Regular rate and rhythm without murmur gallop or rub  Lungs: Clear to auscultation with equal breath sounds bilaterally. No wheezes, rhonchi.    Extremities: No cyanosis, clubbing or edema          ASSESSMENT/Plan:       1. Anxiety     Patient is on multiple medications for treatment of his anxiety, I advised him to continue to take the Seroquel and temazepam as directed, which can help to control his anxiety symptoms also to continue the Zoloft. Advised him to follow-up with a psychiatrist soon, no red flags at this time. I will also add clonidine to control his blood pressure and can help with his anxiety symptoms too  clonidine (CATAPRES) 0.1 MG Tab   2. Essential hypertension   elevated blood pressure today, no red flags, advised to start clonidine 0.1 mg twice a day along with his other medications for blood pressure and to follow-up with me in one week to recheck his blood pressure. Red flag symptoms. Discussed with the patient advised if he develops any of them to report to emergency room, voices understanding.    clonidine (CATAPRES) 0.1 MG Tab         Please note that this dictation was created using voice recognition software. I have made every reasonable attempt to correct obvious errors but there may be errors of grammar and content that I may have overlooked prior to finalization of this note.       "

## 2017-03-31 NOTE — MR AVS SNAPSHOT
"        Nikolas Rico   3/31/2017 12:50 PM   Office Visit   MRN: [id number]    [facility]   Dept Phone:  [phone]    Description:  Male : 1955   Provider:  Chepe Rod M.D.           Reason for Visit     Anxiety           Allergies as of 3/31/2017     Allergen Noted Reactions    Nkda [No Known Drug Allergy] 2008         You were diagnosed with     Anxiety   [911204]       Essential hypertension   [5330010]         Vital Signs     Blood Pressure Pulse Temperature Respirations Height Weight    160/90 mmHg 100 36.2 °C (97.2 °F) 22 1.676 m (5' 6\") 90.266 kg (199 lb)    Body Mass Index Oxygen Saturation Smoking Status             32.13 kg/m2 97% Former Smoker         Basic Information     Date Of Birth Sex Race Ethnicity Preferred Language    1955 Male White Non- English      Your appointments     Mar 31, 2017  3:15 PM   ECHO with ECHO [facility] [place] EXAM 9   ECHOCARDIOLOGY [facility])    [address] [place] [id number] [phone]           No prep            2017 10:30 AM   Established Patient with Chepe Rod M.D.   [facility] ([facility])    [address] [place] [id number] [phone]           You will be receiving a confirmation call a few days before your appointment from our automated call confirmation system.              Problem List              ICD-10-CM Priority Class Noted - Resolved    Lung disease J98.4   2011 - Present    DIABETES MELLITUS    2011 - Present    Bipolar 1 disorder, manic, moderate (CMS-HCC) F31.12 Low  2011 - Present    Ronnie filter in place Z95.828   2011 - Present    History of DVT (deep vein thrombosis) Z86.718   2011 - Present    Chronic foot pain M79.673, G89.29   2012 - Present    Psoriasis L40.9   10/23/2012 - Present    Chronic HCV infection B19.20 Low  2012 - Present    Elbow fracture, left S42.402A   3/12/2013 - Present    COPD " (chronic obstructive pulmonary disease) (CMS-HCC) J44.9 Low  12/19/2014 - Present    RAD (reactive airway disease) J45.909   2/5/2015 - Present    Dyslipidemia E78.5 Low  4/23/2015 - Present    Type II or unspecified type diabetes mellitus without mention of complication, not stated as uncontrolled E11.9   6/25/2015 - Present    Chronic respiratory failure, unspecified whether with hypoxia or hypercapnia (CMS-HCC) J96.10   6/25/2015 - Present    Diabetes type 2, controlled (CMS-HCC) E11.9 Low  1/26/2016 - Present    Emphysema of lung (CMS-HCC) J43.9   2/18/2016 - Present    Insulin dependent diabetes mellitus (CMS-HCC) E11.9, Z79.4   5/24/2016 - Present    Essential hypertension I10 Low  7/21/2016 - Present    Muscle mass of leg R29.898   8/11/2016 - Present    Left anterior fascicular block I44.4   11/10/2016 - Present    CKD (chronic kidney disease) stage 3, GFR 30-59 ml/min (Chronic) N18.3 Low  Unknown - Present    Disorientation R41.0   2/21/2017 - Present    Tachycardia R00.0   3/7/2017 - Present      Health Maintenance        Date Due Completion Dates    IMM DTaP/Tdap/Td Vaccine (1 - Tdap) 12/17/1973 ---    COLONOSCOPY 12/17/2004 ---    IMM ZOSTER VACCINE 12/17/2014 ---    FASTING LIPID PROFILE 6/8/2017 6/8/2016, 3/17/2016, 4/30/2015, 8/26/2014, 11/18/2013, 9/13/2013, 10/16/2012, 6/1/2012, 12/13/2011, 1/18/2008    RETINAL SCREENING 6/14/2017 6/14/2016    A1C SCREENING 9/4/2017 3/4/2017, 6/8/2016, 3/17/2016, 9/22/2015, 4/30/2015, 8/26/2014, 4/14/2014, 11/18/2013, 9/13/2013, 5/13/2013, 1/23/2013, 10/16/2012, 4/17/2012, 12/13/2011    SERUM CREATININE 2/14/2018 2/14/2017, 2/13/2017, 11/28/2016, 11/3/2016, 8/23/2016, 3/17/2016, 3/17/2016, 5/30/2015, 4/30/2015, 11/5/2014, 8/26/2014, 4/14/2014, 11/18/2013, 5/13/2013, 3/14/2013, 3/12/2013, 3/5/2013, 1/23/2013, 10/25/2012, 10/16/2012, 7/10/2012, 6/1/2012, 1/4/2012, 12/13/2011, 1/17/2008    URINE ACR / MICROALBUMIN 3/4/2018 3/4/2017, 3/16/2015, 3/5/2013, 10/16/2012     DIABETES MONOFILAMENT / LE EXAM 3/7/2018 3/7/2017, 12/19/2014            Current Immunizations     Influenza TIV (IM) 9/10/2016, 10/23/2012    Pneumococcal polysaccharide vaccine (PPSV-23) 12/11/2011      Below and/or attached are the medications your provider expects you to take. Review all of your home medications and newly ordered medications with your provider and/or pharmacist. Follow medication instructions as directed by your provider and/or pharmacist. Please keep your medication list with you and share with your provider. Update the information when medications are discontinued, doses are changed, or new medications (including over-the-counter products) are added; and carry medication information at all times in the event of emergency situations     Allergies:  NKDA - (reactions not documented)               Medications  Valid as of: March 31, 2017 -  1:10 PM    Generic Name Brand Name Tablet Size Instructions for use    Albuterol Sulfate (Aero Soln) VENTOLIN  (90 BASE) MCG/ACT INHALE 2 PUFFS BY MOUTH EVERY 8 HOURS ASNEEDED FOR SHORTNESS OF BREATH        AmLODIPine Besylate (Tab) NORVASC 10 MG Take 1 Tab by mouth every day.        Aspirin (Tablet Delayed Response) ASPIRIN LOW DOSE 81 MG TAKE 1 TABLET ORALLY ONCE DAILY        Atorvastatin Calcium (Tab) LIPITOR 20 MG Take 20 mg by mouth every day. Pt also has an RX for 40MG  Total of 60MG QDAY        Atorvastatin Calcium (Tab) LIPITOR 40 MG Take 40 mg by mouth every day. Pt also has an RX for 20MG  Total of 60MG QDAY        Benztropine Mesylate (Tab) COGENTIN 2 MG Take 2 mg by mouth 3 times a day.        Cholecalciferol (Cap) Vitamin D3 2000 UNIT Take 2,000 Units by mouth every day.        CloNIDine HCl (Tab) CATAPRES 0.1 MG Take 1 Tab by mouth 2 times a day.        Divalproex Sodium (Tablet Delayed Response) DEPAKOTE 500 MG         Divalproex Sodium (Tablet Delayed Response) DEPAKOTE 500 MG Take  by mouth.        Fluticasone Propionate HFA (Aerosol)  "FLOVENT  MCG/ACT Inhale 2 Puffs by mouth 2 times a day.        Insulin Aspart Prot & Aspart (Suspension) NOVOLOG MIX 70/30 (70-30) 100 UNIT/ML INJECT 35 UNITS SUBCUTANEOUSLY AS DIRECTED BEFORE MEALS. INREASE 2 UNITS SUBCUTANEOUSLY AS DIRECTED FOR GOAL . STOP IF BLOOD SUGAR <60        Insulin Syringe-Needle U-100 (Misc) ULTICARE INSULIN SYRINGE 29G X 1/2\" 0.5 ML USE AS DIRECTED BY PHYSICIAN        Ketoconazole (Shampoo) NIZORAL 2 % Apply 5 mL to affected area(s) 1 time daily as needed for Itching.        Lisinopril (Tab) PRINIVIL 20 MG Take 1 Tab by mouth every day.        MetFORMIN HCl (Tab) GLUCOPHAGE 850 MG Take 1 Tab by mouth 2 times a day, with meals.        Metoprolol Succinate (TABLET SR 24 HR) TOPROL XL 25 MG Take 1 Tab by mouth every day.        QUEtiapine Fumarate (Tab) SEROQUEL 100 MG Take 2 Tabs by mouth every evening.        QUEtiapine Fumarate (Tab) SEROQUEL 200 MG         RaNITidine HCl (Tab) ZANTAC 150 MG Take 2 Tabs by mouth 1 time daily as needed for Heartburn.        Sertraline HCl (Tab) ZOLOFT 100 MG Take 100 mg by mouth every day.        Temazepam (Cap) RESTORIL 30 MG         Tiotropium Bromide Monohydrate (Cap) SPIRIVA 18 MCG Inhale 18 mcg by mouth every day.        .                 Medicines prescribed today were sent to:     [id number] - [address] [address]    Phone: [phone] Fax: [phone]    Open 24 Hours?: No    [facility] - [address]    [address] [place] [id number]    Phone: [phone] Fax: [phone]    Open 24 Hours?: No      Medication refill instructions:       If your prescription bottle indicates you have medication refills left, it is not necessary to call your provider’s office. Please contact your pharmacy and they will refill your medication.    If your prescription bottle indicates you do not have any refills left, you may request refills at any time through one of the " following ways: The online AlwaysFashion system (except Urgent Care), by calling your provider’s office, or by asking your pharmacy to contact your provider’s office with a refill request. Medication refills are processed only during regular business hours and may not be available until the next business day. Your provider may request additional information or to have a follow-up visit with you prior to refilling your medication.   *Please Note: Medication refills are assigned a new Rx number when refilled electronically. Your pharmacy may indicate that no refills were authorized even though a new prescription for the same medication is available at the pharmacy. Please request the medicine by name with the pharmacy before contacting your provider for a refill.        Other Notes About Your Plan     Fall risk done 6/25/15           AlwaysFashion Access Code: [email or web address]  Expires: 4/6/2017 11:21 AM    AlwaysFashion  A secure, online tool to manage your health information     [employer]’s AlwaysFashion® is a secure, online tool that connects you to your personalized health information from the privacy of your home -- day or night - making it very easy for you to manage your healthcare. Once the activation process is completed, you can even access your medical information using the AlwaysFashion claritza, which is available for free in the [employer] Claritza store or Google Play store.     AlwaysFashion provides the following levels of access (as shown below):   My Chart Features   [facility] Doctor [facility]  Urgent  Care Non-[facility]  Primary Care  Doctor   Email your healthcare team securely and privately 24/7 X X X    Manage appointments: schedule your next appointment; view details of past/upcoming appointments X      Request prescription refills. X      View recent personal medical records, including lab and immunizations X X X X   View health record, including health history, allergies, medications X X X X   Read reports about  your outpatient visits, procedures, consult and ER notes X X X X   See your discharge summary, which is a recap of your hospital and/or ER visit that includes your diagnosis, lab results, and care plan. X X       How to register for Meet You:  1. Go to  [email or web address].  2. Click on the Sign Up Now box, which takes you to the New Member Sign Up page. You will need to provide the following information:  a. Enter your Meet You Access Code exactly as it appears at the top of this page. (You will not need to use this code after you’ve completed the sign-up process. If you do not sign up before the expiration date, you must request a new code.)   b. Enter your date of birth.   c. Enter your home email address.   d. Click Submit, and follow the next screen’s instructions.  3. Create a Meet You ID. This will be your Meet You login ID and cannot be changed, so think of one that is secure and easy to remember.  4. Create a Meet You password. You can change your password at any time.  5. Enter your Password Reset Question and Answer. This can be used at a later time if you forget your password.   6. Enter your e-mail address. This allows you to receive e-mail notifications when new information is available in Meet You.  7. Click Sign Up. You can now view your health information.    For assistance activating your Meet You account, call [phone]

## 2017-04-12 ENCOUNTER — HOSPITAL ENCOUNTER (OUTPATIENT)
Dept: LAB | Facility: MEDICAL CENTER | Age: 62
End: 2017-04-12
Attending: PHYSICIAN ASSISTANT
Payer: MEDICAID

## 2017-04-12 LAB
ALBUMIN SERPL BCP-MCNC: 4.1 G/DL (ref 3.2–4.9)
ALBUMIN/GLOB SERPL: 1.3 G/DL
ALP SERPL-CCNC: 77 U/L (ref 30–99)
ALT SERPL-CCNC: 25 U/L (ref 2–50)
ANION GAP SERPL CALC-SCNC: 8 MMOL/L (ref 0–11.9)
AST SERPL-CCNC: 19 U/L (ref 12–45)
BASOPHILS # BLD AUTO: 1.1 % (ref 0–1.8)
BASOPHILS # BLD: 0.08 K/UL (ref 0–0.12)
BILIRUB SERPL-MCNC: 0.4 MG/DL (ref 0.1–1.5)
BUN SERPL-MCNC: 25 MG/DL (ref 8–22)
CALCIUM SERPL-MCNC: 9.3 MG/DL (ref 8.5–10.5)
CHLORIDE SERPL-SCNC: 105 MMOL/L (ref 96–112)
CO2 SERPL-SCNC: 28 MMOL/L (ref 20–33)
CREAT SERPL-MCNC: 1.29 MG/DL (ref 0.5–1.4)
EOSINOPHIL # BLD AUTO: 0.21 K/UL (ref 0–0.51)
EOSINOPHIL NFR BLD: 2.9 % (ref 0–6.9)
ERYTHROCYTE [DISTWIDTH] IN BLOOD BY AUTOMATED COUNT: 40.9 FL (ref 35.9–50)
GFR SERPL CREATININE-BSD FRML MDRD: 56 ML/MIN/1.73 M 2
GLOBULIN SER CALC-MCNC: 3.1 G/DL (ref 1.9–3.5)
GLUCOSE SERPL-MCNC: 132 MG/DL (ref 65–99)
HCT VFR BLD AUTO: 45.8 % (ref 42–52)
HGB BLD-MCNC: 15.1 G/DL (ref 14–18)
IMM GRANULOCYTES # BLD AUTO: 0.14 K/UL (ref 0–0.11)
IMM GRANULOCYTES NFR BLD AUTO: 1.9 % (ref 0–0.9)
LYMPHOCYTES # BLD AUTO: 2.08 K/UL (ref 1–4.8)
LYMPHOCYTES NFR BLD: 28.6 % (ref 22–41)
MCH RBC QN AUTO: 28.6 PG (ref 27–33)
MCHC RBC AUTO-ENTMCNC: 33 G/DL (ref 33.7–35.3)
MCV RBC AUTO: 86.7 FL (ref 81.4–97.8)
MONOCYTES # BLD AUTO: 0.57 K/UL (ref 0–0.85)
MONOCYTES NFR BLD AUTO: 7.8 % (ref 0–13.4)
NEUTROPHILS # BLD AUTO: 4.2 K/UL (ref 1.82–7.42)
NEUTROPHILS NFR BLD: 57.7 % (ref 44–72)
NRBC # BLD AUTO: 0 K/UL
NRBC BLD AUTO-RTO: 0 /100 WBC
PLATELET # BLD AUTO: 293 K/UL (ref 164–446)
PMV BLD AUTO: 11.1 FL (ref 9–12.9)
POTASSIUM SERPL-SCNC: 3.3 MMOL/L (ref 3.6–5.5)
PROT SERPL-MCNC: 7.2 G/DL (ref 6–8.2)
RBC # BLD AUTO: 5.28 M/UL (ref 4.7–6.1)
SODIUM SERPL-SCNC: 141 MMOL/L (ref 135–145)
WBC # BLD AUTO: 7.3 K/UL (ref 4.8–10.8)

## 2017-04-12 PROCEDURE — 84450 TRANSFERASE (AST) (SGOT): CPT

## 2017-04-12 PROCEDURE — 82977 ASSAY OF GGT: CPT

## 2017-04-12 PROCEDURE — 85025 COMPLETE CBC W/AUTO DIFF WBC: CPT

## 2017-04-12 PROCEDURE — 84460 ALANINE AMINO (ALT) (SGPT): CPT | Mod: 59

## 2017-04-12 PROCEDURE — 84520 ASSAY OF UREA NITROGEN: CPT

## 2017-04-12 PROCEDURE — 80053 COMPREHEN METABOLIC PANEL: CPT

## 2017-04-12 PROCEDURE — 83883 ASSAY NEPHELOMETRY NOT SPEC: CPT

## 2017-04-12 PROCEDURE — 36415 COLL VENOUS BLD VENIPUNCTURE: CPT

## 2017-04-12 PROCEDURE — 82105 ALPHA-FETOPROTEIN SERUM: CPT

## 2017-04-12 PROCEDURE — 80307 DRUG TEST PRSMV CHEM ANLYZR: CPT

## 2017-04-13 LAB
AFP-TM SERPL-MCNC: 4 NG/ML (ref 0–9)
AMPHETAMINES UR QL: NEGATIVE NG/ML
BARBITURATES UR QL: NEGATIVE NG/ML
BENZODIAZ UR QL: POSITIVE NG/ML
CANNABINOIDS UR QL SCN: NEGATIVE NG/ML
COCAINE UR QL: NEGATIVE NG/ML
DRUG SCREEN COMMENT UR-IMP: NORMAL
MDMA CTO UR SCN-MCNC: NEGATIVE NG/ML
METHADONE UR QL: NEGATIVE NG/ML
OPIATES UR QL: NEGATIVE NG/ML
OXYCODONE CTO UR SCN-MCNC: NEGATIVE NG/ML
PCP UR QL SCN: NEGATIVE NG/ML
PROPOXYPH UR QL: NEGATIVE NG/ML

## 2017-04-14 ENCOUNTER — OFFICE VISIT (OUTPATIENT)
Dept: MEDICAL GROUP | Facility: MEDICAL CENTER | Age: 62
End: 2017-04-14
Attending: FAMILY MEDICINE
Payer: MEDICAID

## 2017-04-14 VITALS
HEART RATE: 100 BPM | OXYGEN SATURATION: 94 % | RESPIRATION RATE: 16 BRPM | HEIGHT: 66 IN | TEMPERATURE: 97.5 F | SYSTOLIC BLOOD PRESSURE: 150 MMHG | BODY MASS INDEX: 32.62 KG/M2 | WEIGHT: 203 LBS | DIASTOLIC BLOOD PRESSURE: 98 MMHG

## 2017-04-14 DIAGNOSIS — F25.9 SCHIZOAFFECTIVE DISORDER, UNSPECIFIED TYPE (HCC): ICD-10-CM

## 2017-04-14 DIAGNOSIS — I10 ESSENTIAL HYPERTENSION: ICD-10-CM

## 2017-04-14 PROCEDURE — 99213 OFFICE O/P EST LOW 20 MIN: CPT | Performed by: FAMILY MEDICINE

## 2017-04-14 RX ORDER — LISINOPRIL 40 MG/1
40 TABLET ORAL DAILY
Qty: 30 TAB | Refills: 6 | Status: SHIPPED | OUTPATIENT
Start: 2017-04-14 | End: 2017-12-27 | Stop reason: SDUPTHER

## 2017-04-14 ASSESSMENT — PAIN SCALES - GENERAL: PAINLEVEL: 2=MINIMAL-SLIGHT

## 2017-04-14 NOTE — MR AVS SNAPSHOT
"        Nikolas Rico   2017 10:30 AM   Office Visit   MRN: [id number]    Department:  [facility]   Dept Phone:  [phone]    Description:  Male : 1955   Provider:  Chepe Rod M.D.           Reason for Visit     Follow-Up           Allergies as of 2017     Allergen Noted Reactions    Nkda [No Known Drug Allergy] 2008         You were diagnosed with     Essential hypertension   [4996061]       Schizoaffective disorder, unspecified type (CMS-[facility])   [0003602]         Vital Signs     Blood Pressure Pulse Temperature Respirations Height Weight    150/98 mmHg 100 36.4 °C (97.5 °F) 16 1.676 m (5' 5.98\") 92.08 kg (203 lb)    Body Mass Index Oxygen Saturation Smoking Status             32.78 kg/m2 94% Former Smoker         Basic Information     Date Of Birth Sex Race Ethnicity Preferred Language    1955 Male White Non- English      Your appointments     2017  8:00 AM   US ABDOMEN FASTING (30 MINUTES) with [address]   [facility] ULTR[facility] - [address]    [address]  [place] [id number] [phone]           NPO 8 hours.  For  Abdomen, NPO 2 hours, schedule Abdomen Complete and include \" Abdomen\" in Appt Notes.            2017  9:50 AM   Established Patient with Chepe Rod M.D.   [facility])    [address] [place] [id number] [phone]           You will be receiving a confirmation call a few days before your appointment from our automated call confirmation system.              Problem List              ICD-10-CM Priority Class Noted - Resolved    Bipolar 1 disorder, manic, moderate (CMS-[facility]) F31.12 Low  2011 - Present    Ronnie filter in place Z95.828   2011 - Present    History of DVT (deep vein thrombosis) Z86.718   2011 - Present    Chronic foot pain M79.673, G89.29   2012 - Present    Psoriasis L40.9   10/23/2012 - Present   " Chronic HCV infection B19.20 Medium  12/4/2012 - Present    Elbow fracture, left S42.402A   3/12/2013 - Present    COPD (chronic obstructive pulmonary disease) (CMS-HCC) J44.9 Medium  12/19/2014 - Present    Dyslipidemia E78.5 Low  4/23/2015 - Present    Type II or unspecified type diabetes mellitus without mention of complication, not stated as uncontrolled E11.9   6/25/2015 - Present    Chronic respiratory failure, unspecified whether with hypoxia or hypercapnia (CMS-HCC) J96.10   6/25/2015 - Present    Diabetes type 2, controlled (CMS-HCC) E11.9 Medium  1/26/2016 - Present    Emphysema of lung (CMS-HCC) J43.9   2/18/2016 - Present    Insulin dependent diabetes mellitus (CMS-HCC) E11.9, Z79.4   5/24/2016 - Present    Essential hypertension I10 High  7/21/2016 - Present    Muscle mass of leg R29.898   8/11/2016 - Present    Left anterior fascicular block I44.4 High  11/10/2016 - Present    CKD (chronic kidney disease) stage 3, GFR 30-59 ml/min (Chronic) N18.3 Low  Unknown - Present    Disorientation R41.0   2/21/2017 - Present    Tachycardia R00.0 High  3/7/2017 - Present    Schizoaffective disorder (CMS-HCC) F25.9 High  4/14/2017 - Present      Health Maintenance        Date Due Completion Dates    IMM DTaP/Tdap/Td Vaccine (1 - Tdap) 12/17/1973 ---    COLONOSCOPY 12/17/2004 ---    IMM ZOSTER VACCINE 12/17/2014 ---    FASTING LIPID PROFILE 6/8/2017 6/8/2016, 3/17/2016, 4/30/2015, 8/26/2014, 11/18/2013, 9/13/2013, 10/16/2012, 6/1/2012, 12/13/2011, 1/18/2008    RETINAL SCREENING 6/14/2017 6/14/2016    A1C SCREENING 9/4/2017 3/4/2017, 6/8/2016, 3/17/2016, 9/22/2015, 4/30/2015, 8/26/2014, 4/14/2014, 11/18/2013, 9/13/2013, 5/13/2013, 1/23/2013, 10/16/2012, 4/17/2012, 12/13/2011    URINE ACR / MICROALBUMIN 3/4/2018 3/4/2017, 3/16/2015, 3/5/2013, 10/16/2012    DIABETES MONOFILAMENT / LE EXAM 3/7/2018 3/7/2017, 12/19/2014    SERUM CREATININE 4/12/2018 4/12/2017, 2/14/2017, 2/13/2017, 11/28/2016, 11/3/2016, 8/23/2016,  3/17/2016, 3/17/2016, 5/30/2015, 4/30/2015, 11/5/2014, 8/26/2014, 4/14/2014, 11/18/2013, 5/13/2013, 3/14/2013, 3/12/2013, 3/5/2013, 1/23/2013, 10/25/2012, 10/16/2012, 7/10/2012, 6/1/2012, 1/4/2012, 12/13/2011, 1/17/2008            Current Immunizations     Influenza TIV (IM) 9/10/2016, 10/23/2012    Pneumococcal polysaccharide vaccine (PPSV-23) 12/11/2011      Below and/or attached are the medications your provider expects you to take. Review all of your home medications and newly ordered medications with your provider and/or pharmacist. Follow medication instructions as directed by your provider and/or pharmacist. Please keep your medication list with you and share with your provider. Update the information when medications are discontinued, doses are changed, or new medications (including over-the-counter products) are added; and carry medication information at all times in the event of emergency situations     Allergies:  NKDA - (reactions not documented)               Medications  Valid as of: April 14, 2017 - 10:59 AM    Generic Name Brand Name Tablet Size Instructions for use    Albuterol Sulfate (Aero Soln) VENTOLIN  (90 BASE) MCG/ACT INHALE 2 PUFFS BY MOUTH EVERY 8 HOURS ASNEEDED FOR SHORTNESS OF BREATH        AmLODIPine Besylate (Tab) NORVASC 10 MG Take 1 Tab by mouth every day.        Aspirin (Tablet Delayed Response) ASPIRIN LOW DOSE 81 MG TAKE 1 TABLET ORALLY ONCE DAILY        Atorvastatin Calcium (Tab) LIPITOR 20 MG Take 20 mg by mouth every day. Pt also has an RX for 40MG  Total of 60MG QDAY        Atorvastatin Calcium (Tab) LIPITOR 40 MG Take 40 mg by mouth every day. Pt also has an RX for 20MG  Total of 60MG QDAY        Atorvastatin Calcium (Tab) LIPITOR 40 MG TAKE 1 TABLET ORALLY ONCE DAILY (TAKE WITH 20MG TABLET FOR 60MG)        Atorvastatin Calcium (Tab) LIPITOR 20 MG TAKE 1 TABLET ORALLY ONCE DAILY        Benztropine Mesylate (Tab) COGENTIN 2 MG Take 2 mg by mouth 3 times a day.         "Cholecalciferol (Cap) Vitamin D3 2000 UNIT Take 2,000 Units by mouth every day.        Cholecalciferol (Cap) D3 SUPER STRENGTH 2000 UNITS TAKE 1 CAPSULE ORALLY ONCE DAILY        CloNIDine HCl (Tab) CATAPRES 0.1 MG Take 1 Tab by mouth 2 times a day.        Divalproex Sodium (Tablet Delayed Response) DEPAKOTE 500 MG         Divalproex Sodium (Tablet Delayed Response) DEPAKOTE 500 MG Take  by mouth.        Fluticasone Propionate HFA (Aerosol) FLOVENT  MCG/ACT Inhale 2 Puffs by mouth 2 times a day.        Insulin Aspart Prot & Aspart (Suspension) NOVOLOG MIX 70/30 (70-30) 100 UNIT/ML INJECT 35 UNITS SUBCUTANEOUSLY AS DIRECTED BEFORE MEALS .FOR GOAL . STOP IF BLOOD SUGAR <60        Insulin Syringe-Needle U-100 (Misc) ULTICARE INSULIN SYRINGE 29G X 1/2\" 0.5 ML USE AS DIRECTED BY PHYSICIAN        Ketoconazole (Shampoo) NIZORAL 2 % Apply 5 mL to affected area(s) 1 time daily as needed for Itching.        Lisinopril (Tab) PRINIVIL, ZESTRIL 40 MG Take 1 Tab by mouth every day.        MetFORMIN HCl (Tab) GLUCOPHAGE 850 MG Take 1 Tab by mouth 2 times a day, with meals.        Metoprolol Succinate (TABLET SR 24 HR) TOPROL XL 25 MG Take 1 Tab by mouth every day.        QUEtiapine Fumarate (Tab) SEROQUEL 100 MG Take 2 Tabs by mouth every evening.        QUEtiapine Fumarate (Tab) SEROQUEL 200 MG         RaNITidine HCl (Tab) ZANTAC 150 MG Take 2 Tabs by mouth 1 time daily as needed for Heartburn.        Sertraline HCl (Tab) ZOLOFT 100 MG Take 100 mg by mouth every day.        Temazepam (Cap) RESTORIL 30 MG         Tiotropium Bromide Monohydrate (Cap) SPIRIVA 18 MCG Inhale 18 mcg by mouth every day.        .                 Medicines prescribed today were sent to:     [id number] [place] - [address] [address]    Phone: [phone] Fax: [phone]    Open 24 Hours?: [facility] [place] [address]    [address] [place] [address]    Phone: [phone] " Fax: [phone]    Open 24 Hours?: No      Medication refill instructions:       If your prescription bottle indicates you have medication refills left, it is not necessary to call your provider’s office. Please contact your pharmacy and they will refill your medication.    If your prescription bottle indicates you do not have any refills left, you may request refills at any time through one of the following ways: The online [facility] system (except Urgent Care), by calling your provider’s office, or by asking your pharmacy to contact your provider’s office with a refill request. Medication refills are processed only during regular business hours and may not be available until the next business day. Your provider may request additional information or to have a follow-up visit with you prior to refilling your medication.   *Please Note: Medication refills are assigned a new Rx number when refilled electronically. Your pharmacy may indicate that no refills were authorized even though a new prescription for the same medication is available at the pharmacy. Please request the medicine by name with the pharmacy before contacting your provider for a refill.        Other Notes About Your Plan     Fall risk done 6/25/15           [facility] Access Code: Activation code not generated  Current [facility] Status: Active

## 2017-04-14 NOTE — PROGRESS NOTES
Chief Complaint:   Chief Complaint   Patient presents with   • Follow-Up       HPI established patient  Nikolas Rico is a 61 y.o. male who presents for follow-up on blood pressure and anxiety         Essential hypertension   Blood pressure today still elevated 150/98, denies headache or chest pain or shortness of breath or lower extremity swelling, has been taking all his medications as directed. As per patient, on clonidine, amlodipine, lisinopril and metoprolol.    Schizoaffective disorder, unspecified type (CMS-HCC)   Patient said his anxiety, still not well controlled, recently, so his psychiatry after my last visit. Medications were adjusted, he said he was given a new diagnosis of schizoaffective affective disorder. No suicidal ideation or intentions to hurt self or others. Patient reports that he has been taking his medications as directed.        Past medical history, family history, social history and medications reviewed and updated in the record. Today   Current medications, problem list and allergies reviewed in EPIC today   Health maintenance topics are reviewed and updated.    Patient Active Problem List    Diagnosis Date Noted   • Schizoaffective disorder (CMS-HCC) 04/14/2017     Priority: High   • Tachycardia 03/07/2017     Priority: High   • Left anterior fascicular block 11/10/2016     Priority: High   • Essential hypertension 07/21/2016     Priority: High   • Diabetes type 2, controlled (CMS-HCC) 01/26/2016     Priority: Medium   • COPD (chronic obstructive pulmonary disease) (CMS-HCC) 12/19/2014     Priority: Medium   • Chronic HCV infection 12/04/2012     Priority: Medium   • CKD (chronic kidney disease) stage 3, GFR 30-59 ml/min      Priority: Low   • Dyslipidemia 04/23/2015     Priority: Low   • Bipolar 1 disorder, manic, moderate (CMS-HCC) 11/22/2011     Priority: Low   • Disorientation 02/21/2017   • Muscle mass of leg 08/11/2016   • Insulin dependent diabetes mellitus (CMS-HCC)  "05/24/2016   • Emphysema of lung (CMS-HCC) 02/18/2016   • Type II or unspecified type diabetes mellitus without mention of complication, not stated as uncontrolled 06/25/2015   • Chronic respiratory failure, unspecified whether with hypoxia or hypercapnia (CMS-HCC) 06/25/2015   • Elbow fracture, left 03/12/2013   • Psoriasis 10/23/2012   • Chronic foot pain 09/11/2012   • Brew[place] filter in place 11/22/2011   • History of DVT (deep vein thrombosis) 11/22/2011     Family History   Problem Relation Age of Onset   • Cancer Father      blader ca     Social History     Social History   • Marital Status: Single     Spouse Name: N/A   • Number of Children: N/A   • Years of Education: N/A     Occupational History   • Not on file.     Social History Main Topics   • Smoking status: Former Smoker     Types: Cigarettes     Quit date: 03/06/2013   • Smokeless tobacco: Never Used   • Alcohol Use: 0.0 oz/week     0 Standard drinks or equivalent per week      Comment: sober since HCV diagnosis   • Drug Use: Yes     Special: Methamphetamines      Comment: meth use.  Last use \"a couple weeks ago\", occasional marijuana use   • Sexual Activity:     Partners: Female     Other Topics Concern   • Not on file     Social History Narrative       Current Outpatient Prescriptions   Medication Sig Dispense Refill   • lisinopril (PRINIVIL, ZESTRIL) 40 MG tablet Take 1 Tab by mouth every day. 30 Tab 6   • atorvastatin (LIPITOR) 40 MG Tab TAKE 1 TABLET ORALLY ONCE DAILY (TAKE WITH 20MG TABLET FOR 60MG) 30 Tab 5   • D3 SUPER STRENGTH 2000 UNITS Cap TAKE 1 CAPSULE ORALLY ONCE DAILY 30 Cap 5   • atorvastatin (LIPITOR) 20 MG Tab TAKE 1 TABLET ORALLY ONCE DAILY 30 Tab 5   • NOVOLOG MIX 70/30 (70-30) 100 UNIT/ML injection INJECT 35 UNITS SUBCUTANEOUSLY AS DIRECTED BEFORE MEALS .FOR GOAL . STOP IF BLOOD SUGAR <60 20 mL 5   • ASPIRIN LOW DOSE 81 MG EC tablet TAKE 1 TABLET ORALLY ONCE DAILY 30 Tab 5   • temazepam (RESTORIL) 30 MG capsule      • " "quetiapine (SEROQUEL) 200 MG Tab      • divalproex (DEPAKOTE) 500 MG Tablet Delayed Response      • divalproex (DEPAKOTE) 500 MG Tablet Delayed Response Take  by mouth.     • clonidine (CATAPRES) 0.1 MG Tab Take 1 Tab by mouth 2 times a day. 60 Tab 1   • ULTICARE INSULIN SYRINGE 29G X 1/2\" 0.5 ML Misc USE AS DIRECTED BY PHYSICIAN 90 Each 5   • metoprolol SR (TOPROL XL) 25 MG TABLET SR 24 HR Take 1 Tab by mouth every day. 30 Tab 1   • ranitidine (ZANTAC) 150 MG Tab Take 2 Tabs by mouth 1 time daily as needed for Heartburn. 30 Tab 1   • sertraline (ZOLOFT) 100 MG Tab Take 100 mg by mouth every day.     • benztropine (COGENTIN) 2 MG Tab Take 2 mg by mouth 3 times a day.     • atorvastatin (LIPITOR) 20 MG Tab Take 20 mg by mouth every day. Pt also has an RX for 40MG  Total of 60MG QDAY     • atorvastatin (LIPITOR) 40 MG Tab Take 40 mg by mouth every day. Pt also has an RX for 20MG  Total of 60MG QDAY     • ketoconazole (NIZORAL) 2 % shampoo Apply 5 mL to affected area(s) 1 time daily as needed for Itching.     • fluticasone (FLOVENT HFA) 110 MCG/ACT Aerosol Inhale 2 Puffs by mouth 2 times a day.     • tiotropium (SPIRIVA) 18 MCG Cap Inhale 18 mcg by mouth every day.     • Cholecalciferol (VITAMIN D3) 2000 UNIT Cap Take 2,000 Units by mouth every day.     • VENTOLIN  (90 BASE) MCG/ACT Aero Soln inhalation aerosol INHALE 2 PUFFS BY MOUTH EVERY 8 HOURS ASNEEDED FOR SHORTNESS OF BREATH 1 Inhaler 7   • amlodipine (NORVASC) 10 MG Tab Take 1 Tab by mouth every day. 30 Tab 11   • metformin (GLUCOPHAGE) 850 MG Tab Take 1 Tab by mouth 2 times a day, with meals. 60 Tab 5   • quetiapine (SEROQUEL) 100 MG TABS Take 2 Tabs by mouth every evening. 60 Each 5     No current facility-administered medications for this visit.         Review Of Systems  As documented in HPI above  PHYSICAL EXAMINATION:    /98 mmHg  Pulse 100  Temp(Src) 36.4 °C (97.5 °F)  Resp 16  Ht 1.676 m (5' 5.98\")  Wt 92.08 kg (203 lb)  BMI 32.78 " kg/m2  SpO2 94%       Gen.: Well-developed, well-nourished, no apparent distress, pleasant and cooperative with the examination  HEENT: Normocephalic/atraumatic,  Neck: No JVD or bruits, no adenopathy  Cor: Regular rate and rhythm without murmur gallop or rub  Lungs: Clear to auscultation with equal breath sounds bilaterally. No wheezes, rhonchi.    Extremities: No cyanosis, clubbing or edema          ASSESSMENT/Plan:      1. Essential hypertension  , still uncontrolled blood pressure. I will increase his lisinopril to 40 mg daily, patient to continue his other blood pressure medications. The metoprolol, amlodipine and clonidine, follow-up with me in 2 weeks to recheck blood pressure. No red flags at this time    lisinopril (PRINIVIL, ZESTRIL) 40 MG tablet   2. Schizoaffective disorder, unspecified type (CMS-HCC)   follow-up with psychiatry as directed for better control of his anxiety symptoms, continue medications as directed per psychiatry, no suicidal ideation at this time.          Please note that this dictation was created using voice recognition software. I have made every reasonable attempt to correct obvious errors but there may be errors of grammar and content that I may have overlooked prior to finalization of this note.

## 2017-04-16 LAB
A2 MACROGLOB SERPL-MCNC: 124 MG/DL (ref 131–293)
ALT SERPL-CCNC: 29 U/L (ref 5–50)
ANNOTATION COMMENT IMP: ABNORMAL
AST SERPL-CCNC: 24 U/L (ref 9–50)
BUN SERPL-SCNC: 25 MG/DL (ref 7–20)
CIRRHOMETER PT SCORE Q4850: 0
FIBROSIS STAGE SERPL QL: ABNORMAL
GGT SERPL-CCNC: 23 U/L (ref 7–51)
INFLAMETER META CLASS Q4853: ABNORMAL
INFLAMETER PT SCORE Q4852: 0.16
LIVER FIBR SCORE SERPL CALC.FIBROMETER: 0.2
PATHOLOGY STUDY: ABNORMAL
PROTHROM ACT/NOR PPP: 97 % (ref 90–120)

## 2017-04-18 DIAGNOSIS — Z79.4 TYPE 2 DIABETES MELLITUS WITHOUT COMPLICATION, WITH LONG-TERM CURRENT USE OF INSULIN (HCC): ICD-10-CM

## 2017-04-18 DIAGNOSIS — E11.9 TYPE 2 DIABETES MELLITUS WITHOUT COMPLICATION, WITH LONG-TERM CURRENT USE OF INSULIN (HCC): ICD-10-CM

## 2017-04-24 ENCOUNTER — HOSPITAL ENCOUNTER (OUTPATIENT)
Dept: RADIOLOGY | Facility: MEDICAL CENTER | Age: 62
End: 2017-04-24
Attending: PHYSICIAN ASSISTANT
Payer: MEDICAID

## 2017-04-24 DIAGNOSIS — B18.2 CHRONIC HEPATITIS C WITH HEPATIC COMA (HCC): ICD-10-CM

## 2017-04-24 PROCEDURE — 76705 ECHO EXAM OF ABDOMEN: CPT

## 2017-04-28 ENCOUNTER — OFFICE VISIT (OUTPATIENT)
Dept: MEDICAL GROUP | Facility: MEDICAL CENTER | Age: 62
End: 2017-04-28
Attending: FAMILY MEDICINE
Payer: MEDICAID

## 2017-04-28 VITALS
HEIGHT: 66 IN | DIASTOLIC BLOOD PRESSURE: 70 MMHG | OXYGEN SATURATION: 91 % | SYSTOLIC BLOOD PRESSURE: 126 MMHG | HEART RATE: 84 BPM | BODY MASS INDEX: 31.98 KG/M2 | RESPIRATION RATE: 20 BRPM | TEMPERATURE: 97.3 F | WEIGHT: 199 LBS

## 2017-04-28 DIAGNOSIS — G47.30 SLEEP APNEA, UNSPECIFIED TYPE: ICD-10-CM

## 2017-04-28 DIAGNOSIS — I10 ESSENTIAL HYPERTENSION: ICD-10-CM

## 2017-04-28 DIAGNOSIS — R41.0 EPISODIC CONFUSION: ICD-10-CM

## 2017-04-28 PROCEDURE — 99214 OFFICE O/P EST MOD 30 MIN: CPT | Performed by: FAMILY MEDICINE

## 2017-04-28 RX ORDER — INSULIN ASPART 100 [IU]/ML
10 INJECTION, SUSPENSION SUBCUTANEOUS 2 TIMES DAILY
Qty: 20 ML | Refills: 5 | Status: SHIPPED | OUTPATIENT
Start: 2017-04-28 | End: 2018-05-31 | Stop reason: SDUPTHER

## 2017-04-28 ASSESSMENT — PAIN SCALES - GENERAL: PAINLEVEL: 4=SLIGHT-MODERATE PAIN

## 2017-04-28 NOTE — MR AVS SNAPSHOT
"        Nikolas Rico   2017 9:50 AM   Office Visit   MRN: [id number]    [facility]   Dept Phone:  [phone]    Description:  Male : 1955   Provider:  Chepe Rod M.D.           Reason for Visit     Follow-Up           Allergies as of 2017     Allergen Noted Reactions    Nkda [No Known Drug Allergy] 2008         You were diagnosed with     Essential hypertension   [8714982]       Insulin dependent diabetes mellitus (CMS-HCC)   [052189]       Episodic confusion   [452659]       Sleep apnea, unspecified type   [8774718]         Vital Signs     Blood Pressure Pulse Temperature Respirations Height Weight    126/70 mmHg 84 36.3 °C (97.3 °F) 20 1.676 m (5' 5.98\") 90.266 kg (199 lb)    Body Mass Index Oxygen Saturation Smoking Status             32.13 kg/m2 91% Former Smoker         Basic Information     Date Of Birth Sex Race Ethnicity Preferred Language    1955 Male White Non- English      Problem List              ICD-10-CM Priority Class Noted - Resolved    Bipolar 1 disorder, manic, moderate (CMS-HCC) F31.12 Low  2011 - Present    Napoleon filter in place Z95.828   2011 - Present    History of DVT (deep vein thrombosis) Z86.718   2011 - Present    Chronic foot pain M79.673, G89.29   2012 - Present    Psoriasis L40.9   10/23/2012 - Present    Chronic HCV infection B19.20 Medium  2012 - Present    Elbow fracture, left S42.402A   3/12/2013 - Present    COPD (chronic obstructive pulmonary disease) (CMS-HCC) J44.9 Medium  2014 - Present    Dyslipidemia E78.5 Low  2015 - Present    Type II or unspecified type diabetes mellitus without mention of complication, not stated as uncontrolled E11.9   2015 - Present    Chronic respiratory failure, unspecified whether with hypoxia or hypercapnia (CMS-HCC) J96.10   2015 - Present    Diabetes type 2, controlled (CMS-HCC) E11.9 Medium  2016 - Present   " Emphysema of lung (CMS-HCC) J43.9   2/18/2016 - Present    Insulin dependent diabetes mellitus (CMS-HCC) E11.9, Z79.4   5/24/2016 - Present    Essential hypertension I10 High  7/21/2016 - Present    Muscle mass of leg R29.898   8/11/2016 - Present    Left anterior fascicular block I44.4 High  11/10/2016 - Present    CKD (chronic kidney disease) stage 3, GFR 30-59 ml/min (Chronic) N18.3 Low  Unknown - Present    Disorientation R41.0   2/21/2017 - Present    Tachycardia R00.0 High  3/7/2017 - Present    Schizoaffective disorder (CMS-HCC) F25.9 High  4/14/2017 - Present    Episodic confusion R41.0   4/28/2017 - Present    Sleep apnea G47.30   4/28/2017 - Present      Health Maintenance        Date Due Completion Dates    IMM DTaP/Tdap/Td Vaccine (1 - Tdap) 12/17/1973 ---    COLONOSCOPY 12/17/2004 ---    IMM ZOSTER VACCINE 12/17/2014 ---    FASTING LIPID PROFILE 6/8/2017 6/8/2016, 3/17/2016, 4/30/2015, 8/26/2014, 11/18/2013, 9/13/2013, 10/16/2012, 6/1/2012, 12/13/2011, 1/18/2008    RETINAL SCREENING 6/14/2017 6/14/2016    A1C SCREENING 9/4/2017 3/4/2017, 6/8/2016, 3/17/2016, 9/22/2015, 4/30/2015, 8/26/2014, 4/14/2014, 11/18/2013, 9/13/2013, 5/13/2013, 1/23/2013, 10/16/2012, 4/17/2012, 12/13/2011    URINE ACR / MICROALBUMIN 3/4/2018 3/4/2017, 3/16/2015, 3/5/2013, 10/16/2012    DIABETES MONOFILAMENT / LE EXAM 3/7/2018 3/7/2017, 12/19/2014    SERUM CREATININE 4/12/2018 4/12/2017, 2/14/2017, 2/13/2017, 11/28/2016, 11/3/2016, 8/23/2016, 3/17/2016, 3/17/2016, 5/30/2015, 4/30/2015, 11/5/2014, 8/26/2014, 4/14/2014, 11/18/2013, 5/13/2013, 3/14/2013, 3/12/2013, 3/5/2013, 1/23/2013, 10/25/2012, 10/16/2012, 7/10/2012, 6/1/2012, 1/4/2012, 12/13/2011, 1/17/2008            Current Immunizations     Influenza TIV (IM) 9/10/2016, 10/23/2012    Pneumococcal polysaccharide vaccine (PPSV-23) 12/11/2011      Below and/or attached are the medications your provider expects you to take. Review all of your home medications and newly ordered  "medications with your provider and/or pharmacist. Follow medication instructions as directed by your provider and/or pharmacist. Please keep your medication list with you and share with your provider. Update the information when medications are discontinued, doses are changed, or new medications (including over-the-counter products) are added; and carry medication information at all times in the event of emergency situations     Allergies:  NKDA - (reactions not documented)               Medications  Valid as of: April 28, 2017 - 10:09 AM    Generic Name Brand Name Tablet Size Instructions for use    Albuterol Sulfate (Aero Soln) VENTOLIN  (90 BASE) MCG/ACT INHALE 2 PUFFS BY MOUTH EVERY 8 HOURS ASNEEDED FOR SHORTNESS OF BREATH        AmLODIPine Besylate (Tab) NORVASC 10 MG Take 1 Tab by mouth every day.        Aspirin (Tablet Delayed Response) ASPIRIN LOW DOSE 81 MG TAKE 1 TABLET ORALLY ONCE DAILY        Atorvastatin Calcium (Tab) LIPITOR 40 MG TAKE 1 TABLET ORALLY ONCE DAILY (TAKE WITH 20MG TABLET FOR 60MG)        Atorvastatin Calcium (Tab) LIPITOR 20 MG TAKE 1 TABLET ORALLY ONCE DAILY        Benztropine Mesylate (Tab) COGENTIN 2 MG Take 2 mg by mouth 3 times a day.        Cholecalciferol (Cap) D3 SUPER STRENGTH 2000 UNITS TAKE 1 CAPSULE ORALLY ONCE DAILY        CloNIDine HCl (Tab) CATAPRES 0.1 MG Take 1 Tab by mouth 2 times a day.        Divalproex Sodium (Tablet Delayed Response) DEPAKOTE 500 MG         Fluticasone Propionate HFA (Aerosol) FLOVENT  MCG/ACT Inhale 2 Puffs by mouth 2 times a day.        Insulin Aspart Prot & Aspart (Suspension) NOVOLOG 70/30 (70-30) 100 UNIT/ML Inject 10 Units as instructed 2 times a day.        Insulin Syringe-Needle U-100 (Misc) ULTICARE INSULIN SYRINGE 29G X 1/2\" 0.5 ML USE AS DIRECTED BY PHYSICIAN        Ketoconazole (Shampoo) NIZORAL 2 % Apply 5 mL to affected area(s) 1 time daily as needed for Itching.        Lisinopril (Tab) PRINIVIL, ZESTRIL 40 MG Take 1 Tab " by mouth every day.        MetFORMIN HCl (Tab) GLUCOPHAGE 850 MG Take 1 Tab by mouth 2 times a day, with meals.        Metoprolol Succinate (TABLET SR 24 HR) TOPROL XL 25 MG Take 1 Tab by mouth every day.        QUEtiapine Fumarate (Tab) SEROQUEL 200 MG         RaNITidine HCl (Tab) ZANTAC 150 MG Take 2 Tabs by mouth 1 time daily as needed for Heartburn.        Sertraline HCl (Tab) ZOLOFT 100 MG Take 100 mg by mouth every day.        Temazepam (Cap) RESTORIL 30 MG         Tiotropium Bromide Monohydrate (Cap) SPIRIVA 18 MCG Inhale 18 mcg by mouth every day.        .                 Medicines prescribed today [facility]     [facility] [address] [address]    Phone: [phone] Fax: [phone]    Open 24 Hours?: [facility] - [address] [address]    Phone: [phone] Fax: [phone]    Open 24 Hours?: No      Medication refill instructions:       If your prescription bottle indicates you have medication refills left, it is not necessary to call your provider’s office. Please contact your pharmacy and they will refill your medication.    If your prescription bottle indicates you do not have any refills left, you may request refills at any time through one of the following ways: The online [employer] system (except Urgent Care), by calling your provider’s office, or by asking your pharmacy to contact your provider’s office with a refill request. Medication refills are processed only during regular business hours and may not be available until the next business day. Your provider may request additional information or to have a follow-up visit with you prior to refilling your medication.   *Please Note: Medication refills are assigned a new Rx number when refilled electronically. Your pharmacy may indicate that no refills were authorized even though a new prescription for the same medication is available at the  pharmacy. Please request the medicine by name with the pharmacy before contacting your provider for a refill.        Other Notes About Your Plan     Fall risk done 6/25/15           MyChart Access Code: Activation code not generated  Current MyChart Status: Active

## 2017-04-28 NOTE — PROGRESS NOTES
Chief Complaint:   Chief Complaint   Patient presents with   • Follow-Up       HPI:Established patient   Nikolas Rico is a 61 y.o. male who presents for follow-up on his blood pressure after seeing him last week and adjusting of his medications. Discussed following concerns is following today:         Essential hypertension   Patient medication were adjusted last week by increasing his lisinopril to 40 mg daily along with the Norvasc 10 mg daily and metoprolol, blood pressure today within normal limits, 126/70, with a pulse rate at 84, patient said he feels definitely much better, denies cough, headache or chest pain or shortness of breath or lower extremity swelling. Denies palpitations.     Insulin dependent diabetes mellitus (CMS-[facility])   Continues to take insulin 30 units as per patient history, as needed, today explained to the patient that the type of insulin he is taking should be on standing dose twice a day. He is on the mixed 70/30 insulin, he also takes metformin 850 mg twice a day, A1c at 6.8. Patient denies hypoglycemia attacks. Discussed with the patient. Adjustment of medication to 10 units twice a day instead of 30 units as needed.     Episodic confusion   Patient denies any recent episodes of confusion and altered mental status. He established care with  neurology has an appointment to follow-up with his neurologist in June, continues to take Depakote as per recommendation from neurologist, 500 mg    Sleep apnea, unspecified type   Patient with history of sleep apnea on C Pap machine at night, denies any symptoms suggestive of uncontrollable sleep apnea, he said he feels fine and everything is okay. He has an appointment to follow-up with pulmonology in July. No concerns at this time      Past medical history, family history, social history and medications reviewed and updated in the record. Today   Current medications, problem list and allergies reviewed in EPIC today   Health  maintenance topics are reviewed and updated.    Patient Active Problem List    Diagnosis Date Noted   • Schizoaffective disorder (CMS-HCC) 04/14/2017     Priority: High   • Tachycardia 03/07/2017     Priority: High   • Left anterior fascicular block 11/10/2016     Priority: High   • Essential hypertension 07/21/2016     Priority: High   • Diabetes type 2, controlled (CMS-HCC) 01/26/2016     Priority: Medium   • COPD (chronic obstructive pulmonary disease) (CMS-HCC) 12/19/2014     Priority: Medium   • Chronic HCV infection 12/04/2012     Priority: Medium   • CKD (chronic kidney disease) stage 3, GFR 30-59 ml/min      Priority: Low   • Dyslipidemia 04/23/2015     Priority: Low   • Bipolar 1 disorder, manic, moderate (CMS-HCC) 11/22/2011     Priority: Low   • Episodic confusion 04/28/2017   • Sleep apnea 04/28/2017   • Disorientation 02/21/2017   • Muscle mass of leg 08/11/2016   • Insulin dependent diabetes mellitus (CMS-HCC) 05/24/2016   • Emphysema of lung (CMS-HCC) 02/18/2016   • Type II or unspecified type diabetes mellitus without mention of complication, not stated as uncontrolled 06/25/2015   • Chronic respiratory failure, unspecified whether with hypoxia or hypercapnia (CMS-HCC) 06/25/2015   • Elbow fracture, left 03/12/2013   • Psoriasis 10/23/2012   • Chronic foot pain 09/11/2012   • [place] filter in place 11/22/2011   • History of DVT (deep vein thrombosis) 11/22/2011     Family History   Problem Relation Age of Onset   • Cancer Father      blader ca     Social History     Social History   • Marital Status: Single     Spouse Name: N/A   • Number of Children: N/A   • Years of Education: N/A     Occupational History   • Not on file.     Social History Main Topics   • Smoking status: Former Smoker     Types: Cigarettes     Quit date: 03/06/2013   • Smokeless tobacco: Never Used   • Alcohol Use: 0.0 oz/week     0 Standard drinks or equivalent per week      Comment: sober since HCV diagnosis   • Drug Use:  "Yes     Special: Methamphetamines      Comment: meth use.  Last use \"a couple weeks ago\", occasional marijuana use   • Sexual Activity:     Partners: Female     Other Topics Concern   • Not on file     Social History Narrative     Current Outpatient Prescriptions   Medication Sig Dispense Refill   • insulin aspart protamine-insulin aspart (NOVOLOG MIX 70/30) (70-30) 100 UNIT/ML injection Inject 10 Units as instructed 2 times a day. 20 mL 5   • metformin (GLUCOPHAGE) 850 MG Tab Take 1 Tab by mouth 2 times a day, with meals. 60 Tab 5   • lisinopril (PRINIVIL, ZESTRIL) 40 MG tablet Take 1 Tab by mouth every day. 30 Tab 6   • atorvastatin (LIPITOR) 40 MG Tab TAKE 1 TABLET ORALLY ONCE DAILY (TAKE WITH 20MG TABLET FOR 60MG) 30 Tab 5   • D3 SUPER STRENGTH 2000 UNITS Cap TAKE 1 CAPSULE ORALLY ONCE DAILY 30 Cap 5   • atorvastatin (LIPITOR) 20 MG Tab TAKE 1 TABLET ORALLY ONCE DAILY 30 Tab 5   • ASPIRIN LOW DOSE 81 MG EC tablet TAKE 1 TABLET ORALLY ONCE DAILY 30 Tab 5   • temazepam (RESTORIL) 30 MG capsule      • quetiapine (SEROQUEL) 200 MG Tab      • divalproex (DEPAKOTE) 500 MG Tablet Delayed Response      • clonidine (CATAPRES) 0.1 MG Tab Take 1 Tab by mouth 2 times a day. 60 Tab 1   • ULTICARE INSULIN SYRINGE 29G X 1/2\" 0.5 ML Misc USE AS DIRECTED BY PHYSICIAN 90 Each 5   • metoprolol SR (TOPROL XL) 25 MG TABLET SR 24 HR Take 1 Tab by mouth every day. 30 Tab 1   • ranitidine (ZANTAC) 150 MG Tab Take 2 Tabs by mouth 1 time daily as needed for Heartburn. 30 Tab 1   • sertraline (ZOLOFT) 100 MG Tab Take 100 mg by mouth every day.     • benztropine (COGENTIN) 2 MG Tab Take 2 mg by mouth 3 times a day.     • ketoconazole (NIZORAL) 2 % shampoo Apply 5 mL to affected area(s) 1 time daily as needed for Itching.     • fluticasone (FLOVENT HFA) 110 MCG/ACT Aerosol Inhale 2 Puffs by mouth 2 times a day.     • tiotropium (SPIRIVA) 18 MCG Cap Inhale 18 mcg by mouth every day.     • VENTOLIN  (90 BASE) MCG/ACT Aero Soln " "inhalation aerosol INHALE 2 PUFFS BY MOUTH EVERY 8 HOURS ASNEEDED FOR SHORTNESS OF BREATH 1 Inhaler 7   • amlodipine (NORVASC) 10 MG Tab Take 1 Tab by mouth every day. 30 Tab 11     No current facility-administered medications for this visit.           Review Of Systems  As documented in HPI above  PHYSICAL EXAMINATION:    /70 mmHg  Pulse 84  Temp(Src) 36.3 °C (97.3 °F)  Resp 20  Ht 1.676 m (5' 5.98\")  Wt 90.266 kg (199 lb)  BMI 32.13 kg/m2  SpO2 91%  Gen.: Well-developed, well-nourished, no apparent distress, pleasant and cooperative with the examination  HEENT: Normocephalic/atraumatic, sinuses nontender with palpation, TMs clear, nares patent with pink mucosa and clear rhinorrhea, oropharynx clear  Neck: No JVD or bruits, no adenopathy      Extremities: No cyanosis, clubbing or edema          ASSESSMENT/Plan:    1. Essential hypertension  , well-controlled on medications now. Advised to continue Norvasc, lisinopril and metoprolol as directed    2. Insulin dependent diabetes mellitus (CMS-[facility])  . Advised to take 10 units of his insulin twice a day along with metformin, diet control and weight loss and healthy lifestyle modifications discussed  \  insulin aspart protamine-insulin aspart (NOVOLOG MIX 70/30) (70-30) 100 UNIT/ML injection   3. Episodic confusion  , well-controlled on Depakote, advised to follow-up with neurology as directed    4. Sleep apnea, unspecified type   patient is on Z-Moisés at night, symptoms are well-controlled on C Pap. Continue Z-Moisés as directed and follow-up with pulmonology in July.        Please note that this dictation was created using voice recognition software. I have made every reasonable attempt to correct obvious errors but there may be errors of grammar and content that I may have overlooked prior to finalization of this note.       Follow-up in 3-4 months or as needed  "

## 2017-05-03 DIAGNOSIS — R00.0 TACHYCARDIA: ICD-10-CM

## 2017-05-04 RX ORDER — METOPROLOL SUCCINATE 25 MG/1
25 TABLET, EXTENDED RELEASE ORAL DAILY
Qty: 30 TAB | Refills: 1 | Status: SHIPPED | OUTPATIENT
Start: 2017-05-04 | End: 2017-07-20 | Stop reason: SDUPTHER

## 2017-05-13 ENCOUNTER — HOSPITAL ENCOUNTER (OUTPATIENT)
Dept: LAB | Facility: MEDICAL CENTER | Age: 62
End: 2017-05-13
Attending: PHYSICIAN ASSISTANT
Payer: MEDICAID

## 2017-05-13 PROCEDURE — 87522 HEPATITIS C REVRS TRNSCRPJ: CPT

## 2017-05-13 PROCEDURE — 36415 COLL VENOUS BLD VENIPUNCTURE: CPT

## 2017-05-18 LAB
HCV RNA SERPL NAA+PROBE-ACNC: NORMAL IU/ML
HCV RNA SERPL NAA+PROBE-LOG IU: 6.72 LOG10 IU/ML
TEST INFO  93644: NORMAL

## 2017-05-25 ENCOUNTER — HOSPITAL ENCOUNTER (OUTPATIENT)
Dept: LAB | Facility: MEDICAL CENTER | Age: 62
End: 2017-05-25
Attending: PHYSICIAN ASSISTANT
Payer: MEDICAID

## 2017-05-25 PROCEDURE — 36415 COLL VENOUS BLD VENIPUNCTURE: CPT

## 2017-05-25 PROCEDURE — 87902 NFCT AGT GNTYP ALYS HEP C: CPT | Mod: 91

## 2017-06-01 LAB — MISCELLANEOUS LAB RESULT MISCLAB: NORMAL

## 2017-06-09 DIAGNOSIS — Z79.4 TYPE 2 DIABETES MELLITUS WITHOUT COMPLICATION, WITH LONG-TERM CURRENT USE OF INSULIN (HCC): ICD-10-CM

## 2017-06-09 DIAGNOSIS — E11.9 TYPE 2 DIABETES MELLITUS WITHOUT COMPLICATION, WITH LONG-TERM CURRENT USE OF INSULIN (HCC): ICD-10-CM

## 2017-07-20 ENCOUNTER — OFFICE VISIT (OUTPATIENT)
Dept: MEDICAL GROUP | Facility: MEDICAL CENTER | Age: 62
End: 2017-07-20
Attending: FAMILY MEDICINE
Payer: MEDICAID

## 2017-07-20 VITALS
RESPIRATION RATE: 18 BRPM | HEIGHT: 66 IN | WEIGHT: 195 LBS | DIASTOLIC BLOOD PRESSURE: 79 MMHG | BODY MASS INDEX: 31.34 KG/M2 | OXYGEN SATURATION: 96 % | HEART RATE: 88 BPM | TEMPERATURE: 97.7 F | SYSTOLIC BLOOD PRESSURE: 125 MMHG

## 2017-07-20 DIAGNOSIS — R79.89 ABNORMAL THYROID BLOOD TEST: ICD-10-CM

## 2017-07-20 DIAGNOSIS — R00.0 TACHYCARDIA: ICD-10-CM

## 2017-07-20 DIAGNOSIS — I10 ESSENTIAL HYPERTENSION: ICD-10-CM

## 2017-07-20 DIAGNOSIS — F25.9 SCHIZOAFFECTIVE DISORDER, UNSPECIFIED TYPE (HCC): ICD-10-CM

## 2017-07-20 DIAGNOSIS — E78.5 DYSLIPIDEMIA: ICD-10-CM

## 2017-07-20 PROCEDURE — 99212 OFFICE O/P EST SF 10 MIN: CPT | Performed by: FAMILY MEDICINE

## 2017-07-20 PROCEDURE — 99214 OFFICE O/P EST MOD 30 MIN: CPT | Performed by: FAMILY MEDICINE

## 2017-07-20 RX ORDER — ELBASVIR AND GRAZOPREVIR 50; 100 MG/1; MG/1
TABLET, FILM COATED ORAL
COMMUNITY
Start: 2017-07-19 | End: 2019-12-31

## 2017-07-20 RX ORDER — ZIPRASIDONE HYDROCHLORIDE 80 MG/1
80 CAPSULE ORAL 2 TIMES DAILY
Qty: 60 CAP | Refills: 0 | Status: SHIPPED | DISCHARGE
Start: 2017-07-20 | End: 2019-12-31

## 2017-07-20 RX ORDER — METOPROLOL SUCCINATE 25 MG/1
25 TABLET, EXTENDED RELEASE ORAL DAILY
Qty: 30 TAB | Refills: 1 | Status: SHIPPED | OUTPATIENT
Start: 2017-07-20 | End: 2017-10-05 | Stop reason: SDUPTHER

## 2017-07-20 NOTE — MR AVS SNAPSHOT
"        Nikolas Rico   2017 12:50 PM   Office Visit   MRN: [id number]    [facility]   Dept Phone:  [phone]    Description:  Male : 1955   Provider:  Chepe Rod M.D.           Reason for Visit     Follow-Up           Allergies as of 2017     Allergen Noted Reactions    Nkda [No Known Drug Allergy] 2008         You were diagnosed with     Abnormal thyroid blood test   [896470]       Schizoaffective disorder, unspecified type   [7297938]       Essential hypertension   [8157445]       Dyslipidemia   [232573]         Vital Signs     Blood Pressure Pulse Temperature Respirations Height Weight    125/79 mmHg 88 36.5 °C (97.7 °F) 18 1.676 m (5' 6\") 88.451 kg (195 lb)    Body Mass Index Oxygen Saturation Smoking Status             31.49 kg/m2 96% Former Smoker         Basic Information     Date Of Birth Sex Race Ethnicity Preferred Language    1955 Male White Non- English      Problem List              ICD-10-CM Priority Class Noted - Resolved    Bipolar 1 disorder, manic, moderate (CMS-Former[facility]) F31.12 Low  2011 - Present    Ronnie filter in place Z95.828   2011 - Present    History of DVT (deep vein thrombosis) Z86.718   2011 - Present    Chronic foot pain M79.673, G89.29   2012 - Present    Psoriasis L40.9   10/23/2012 - Present    Chronic HCV infection B19.20 Medium  2012 - Present    Elbow fracture, left S42.402A   3/12/2013 - Present    COPD (chronic obstructive pulmonary disease) (CMS-Former[facility]) J44.9 Medium  2014 - Present    Dyslipidemia E78.5 Low  2015 - Present    Type II or unspecified type diabetes mellitus without mention of complication, not stated as uncontrolled E11.9   2015 - Present    Chronic respiratory failure, unspecified whether with hypoxia or hypercapnia (CMS-[facility]) J96.10   2015 - Present    Diabetes type 2, controlled (CMS-[facility]) E11.9 Medium  2016 - Present    Emphysema of " lung (CMS-HCC) J43.9   2/18/2016 - Present    Insulin dependent diabetes mellitus (CMS-HCC) E11.9, Z79.4   5/24/2016 - Present    Essential hypertension I10 High  7/21/2016 - Present    Muscle mass of leg R29.898   8/11/2016 - Present    Left anterior fascicular block I44.4 High  11/10/2016 - Present    CKD (chronic kidney disease) stage 3, GFR 30-59 ml/min (Chronic) N18.3 Low  Unknown - Present    Disorientation R41.0   2/21/2017 - Present    Tachycardia R00.0 High  3/7/2017 - Present    Schizoaffective disorder (CMS-HCC) F25.9 High  4/14/2017 - Present    Episodic confusion R41.0   4/28/2017 - Present    Sleep apnea G47.30   4/28/2017 - Present      Health Maintenance        Date Due Completion Dates    IMM DTaP/Tdap/Td Vaccine (1 - Tdap) 12/17/1973 ---    IMM ZOSTER VACCINE 12/17/2014 ---    FASTING LIPID PROFILE 6/8/2017 6/8/2016, 3/17/2016, 4/30/2015, 8/26/2014, 11/18/2013, 9/13/2013, 10/16/2012, 6/1/2012, 12/13/2011, 1/18/2008    RETINAL SCREENING 6/14/2017 6/14/2016    IMM INFLUENZA (1) 9/1/2017 9/10/2016, 10/23/2012    A1C SCREENING 9/4/2017 3/4/2017, 6/8/2016, 3/17/2016, 9/22/2015, 4/30/2015, 8/26/2014, 4/14/2014, 11/18/2013, 9/13/2013, 5/13/2013, 1/23/2013, 10/16/2012, 4/17/2012, 12/13/2011    URINE ACR / MICROALBUMIN 3/4/2018 3/4/2017, 3/16/2015, 3/5/2013, 10/16/2012    DIABETES MONOFILAMENT / LE EXAM 3/7/2018 3/7/2017, 12/19/2014    COLON CANCER SCREENING ANNUAL FIT 3/19/2018 3/19/2017, 2/28/2016, 2/1/2016, 10/25/2012    SERUM CREATININE 4/12/2018 4/12/2017, 2/14/2017, 2/13/2017, 11/28/2016, 11/3/2016, 8/23/2016, 3/17/2016, 3/17/2016, 5/30/2015, 4/30/2015, 11/5/2014, 8/26/2014, 4/14/2014, 11/18/2013, 5/13/2013, 3/14/2013, 3/12/2013, 3/5/2013, 1/23/2013, 10/25/2012, 10/16/2012, 7/10/2012, 6/1/2012, 1/4/2012, 12/13/2011, 1/17/2008            Current Immunizations     Influenza TIV (IM) 9/10/2016, 10/23/2012    Pneumococcal polysaccharide vaccine (PPSV-23) 12/11/2011      Below and/or attached are the  "medications your provider expects you to take. Review all of your home medications and newly ordered medications with your provider and/or pharmacist. Follow medication instructions as directed by your provider and/or pharmacist. Please keep your medication list with you and share with your provider. Update the information when medications are discontinued, doses are changed, or new medications (including over-the-counter products) are added; and carry medication information at all times in the event of emergency situations     Allergies:  NKDA - (reactions not documented)               Medications  Valid as of: July 20, 2017 -  1:26 PM    Generic Name Brand Name Tablet Size Instructions for use    Albuterol Sulfate (Aero Soln) VENTOLIN  (90 BASE) MCG/ACT INHALE 2 PUFFS BY MOUTH EVERY 8 HOURS ASNEEDED FOR SHORTNESS OF BREATH        AmLODIPine Besylate (Tab) NORVASC 10 MG Take 1 Tab by mouth every day.        Aspirin (Tablet Delayed Response) ASPIRIN LOW DOSE 81 MG TAKE 1 TABLET ORALLY ONCE DAILY        Atorvastatin Calcium (Tab) LIPITOR 40 MG TAKE 1 TABLET ORALLY ONCE DAILY (TAKE WITH 20MG TABLET FOR 60MG)        Atorvastatin Calcium (Tab) LIPITOR 20 MG TAKE 1 TABLET ORALLY ONCE DAILY        Cholecalciferol (Cap) D3 SUPER STRENGTH 2000 UNITS TAKE 1 CAPSULE ORALLY ONCE DAILY        CloNIDine HCl (Tab) CATAPRES 0.1 MG Take 1 Tab by mouth 2 times a day.        Elbasvir-Grazoprevir (Tab) ZEPATIER  MG         Fluticasone Propionate HFA (Aerosol) FLOVENT  MCG/ACT Inhale 2 Puffs by mouth 2 times a day.        Insulin Aspart Prot & Aspart (Suspension) NOVOLOG 70/30 (70-30) 100 UNIT/ML Inject 10 Units as instructed 2 times a day.        Insulin Syringe-Needle U-100 (Misc) ULTICARE INSULIN SYRINGE 29G X 1/2\" 0.5 ML USE AS DIRECTED BY PHYSICIAN        Ketoconazole (Shampoo) NIZORAL 2 % Apply 5 mL to affected area(s) 1 time daily as needed for Itching.        Lisinopril (Tab) PRINIVIL, ZESTRIL 40 MG Take 1 " Tab by mouth every day.        MetFORMIN HCl (Tab) GLUCOPHAGE 850 MG Take 1 Tab by mouth 2 times a day, with meals.        Metoprolol Succinate (TABLET SR 24 HR) TOPROL XL 25 MG Take 1 Tab by mouth every day.        RaNITidine HCl (Tab) ZANTAC 150 MG Take 2 Tabs by mouth 1 time daily as needed for Heartburn.        Temazepam (Cap) RESTORIL 30 MG         Tiotropium Bromide Monohydrate (Cap) SPIRIVA 18 MCG Inhale 18 mcg by mouth every day.        Ziprasidone HCl (Cap) GEODON 80 MG Take 1 Cap by mouth 2 Times a Day.        .                 Medicines prescribed today were sent to:     [id number] - [facility] [address] [address]    Phone: [phone] Fax: [phone]    Open 24 Hours?: [facility] - [address]    [address] [place] [id number]    Phone: [phone] Fax: [phone]    Open 24 Hours?: No      Medication refill instructions:       If your prescription bottle indicates you have medication refills left, it is not necessary to call your provider’s office. Please contact your pharmacy and they will refill your medication.    If your prescription bottle indicates you do not have any refills left, you may request refills at any time through one of the following ways: The online [email or web address] system (except Urgent Care), by calling your provider’s office, or by asking your pharmacy to contact your provider’s office with a refill request. Medication refills are processed only during regular business hours and may not be available until the next business day. Your provider may request additional information or to have a follow-up visit with you prior to refilling your medication.   *Please Note: Medication refills are assigned a new Rx number when refilled electronically. Your pharmacy may indicate that no refills were authorized even though a new prescription for the same medication is available at the pharmacy. Please request the medicine by  name with the pharmacy before contacting your provider for a refill.        Your To Do List     Future Labs/Procedures Complete By Expires    FREE THYROXINE  As directed 7/20/2018    LIPID PROFILE  As directed 7/20/2018    TRIIDOTHYRONINE  As directed 7/20/2018    TSH  As directed 7/20/2018      Referral     A referral request has been sent to our patient care coordination department. Please allow 3-5 business days for us to process this request and contact you either by phone or mail. If you do not hear from us by the 5th business day, please call us at [phone].        Other Notes About Your Plan     Fall risk done 6/25/15           [email or web address] Access Code: Activation code not generated  Current [employer] Status: Active

## 2017-07-20 NOTE — PROGRESS NOTES
Chief Complaint:   Chief Complaint   Patient presents with   • Follow-Up       HPI:Established patient   Nikolas Rico is a 61 y.o. male who presents for routine follow-up of medical problems, discussed with patient the following concerns as follow:    Abnormal thyroid blood test   Patient gave me lab work results to review from his psychiatrist office, it shows elevated TSH level was normal, free T4, patient admits that he has been feeling tired all the time, no other symptoms suggestive of hypothyroidism like constipation. His psychiatric medications were adjusted recently where he was taken off Seroquel and Zoloft and Cogentin, he is on Geodon and temazepam only at this time, as per his psychiatrist recommendation     Schizoaffective disorder, unspecified type   Patient reports today that he has a new psychiatrist, Dr. Reilly , he said his medications were adjusted and he is on Geodon and temazepam only at this time, denies visual or adductor hallucination at this time, he denies depression or intentions to hurt self or others. He said his symptoms is well controlled. He has been on the new medications for at least one month now.     Essential hypertension  Blood pressure today within normal limits, 125/79, he continues to take all his medications as directed. Denies side effects of medications. Denies headache or chest pain or shortness of breath or lower extremity edema    Dyslipidemia  Patient is diabetic and due for his last check, he stopped taking statins atorvastatin, aspirin, recommendation from his gastroenterologist, since he has been treated for hepatitis C and he said he was told that it will interact with his liver function and the medication he is taking at this time for treatment of hepatitis C.            Past medical history, family history, social history and medications reviewed and updated in the record. Today   Current medications, problem list and allergies reviewed in [facility] today    Health maintenance topics are reviewed and updated. Today     Patient Active Problem List    Diagnosis Date Noted   • Schizoaffective disorder (CMS-HCC) 04/14/2017     Priority: High   • Tachycardia 03/07/2017     Priority: High   • Left anterior fascicular block 11/10/2016     Priority: High   • Essential hypertension 07/21/2016     Priority: High   • Diabetes type 2, controlled (CMS-HCC) 01/26/2016     Priority: Medium   • COPD (chronic obstructive pulmonary disease) (CMS-HCC) 12/19/2014     Priority: Medium   • Chronic HCV infection 12/04/2012     Priority: Medium   • CKD (chronic kidney disease) stage 3, GFR 30-59 ml/min      Priority: Low   • Dyslipidemia 04/23/2015     Priority: Low   • Bipolar 1 disorder, manic, moderate (CMS-HCC) 11/22/2011     Priority: Low   • Episodic confusion 04/28/2017   • Sleep apnea 04/28/2017   • Disorientation 02/21/2017   • Muscle mass of leg 08/11/2016   • Insulin dependent diabetes mellitus (CMS-HCC) 05/24/2016   • Emphysema of lung (CMS-HCC) 02/18/2016   • Type II or unspecified type diabetes mellitus without mention of complication, not stated as uncontrolled 06/25/2015   • Chronic respiratory failure, unspecified whether with hypoxia or hypercapnia (CMS-HCC) 06/25/2015   • Elbow fracture, left 03/12/2013   • Psoriasis 10/23/2012   • Chronic foot pain 09/11/2012   • Ronnie filter in place 11/22/2011   • History of DVT (deep vein thrombosis) 11/22/2011     Family History   Problem Relation Age of Onset   • Cancer Father      blader ca     Social History     Social History   • Marital Status: Single     Spouse Name: N/A   • Number of Children: N/A   • Years of Education: N/A     Occupational History   • Not on file.     Social History Main Topics   • Smoking status: Former Smoker     Types: Cigarettes     Quit date: 03/06/2013   • Smokeless tobacco: Never Used   • Alcohol Use: 0.0 oz/week     0 Standard drinks or equivalent per week      Comment: sober since HCV diagnosis  "  • Drug Use: Yes     Special: Methamphetamines      Comment: meth use.  Last use \"a couple weeks ago\", occasional marijuana use   • Sexual Activity:     Partners: Female     Other Topics Concern   • Not on file     Social History Narrative         Current Outpatient Prescriptions   Medication Sig Dispense Refill   • ziprasidone (GEODON) 80 MG Cap Take 1 Cap by mouth 2 Times a Day. 60 Cap 0   • metoprolol SR (TOPROL XL) 25 MG TABLET SR 24 HR Take 1 Tab by mouth every day. 30 Tab 1   • ZEPATIER  MG Tab      • metformin (GLUCOPHAGE) 850 MG Tab Take 1 Tab by mouth 2 times a day, with meals. 60 Tab 5   • insulin aspart protamine-insulin aspart (NOVOLOG MIX 70/30) (70-30) 100 UNIT/ML injection Inject 10 Units as instructed 2 times a day. 20 mL 5   • lisinopril (PRINIVIL, ZESTRIL) 40 MG tablet Take 1 Tab by mouth every day. 30 Tab 6   • atorvastatin (LIPITOR) 40 MG Tab TAKE 1 TABLET ORALLY ONCE DAILY (TAKE WITH 20MG TABLET FOR 60MG) 30 Tab 5   • D3 SUPER STRENGTH 2000 UNITS Cap TAKE 1 CAPSULE ORALLY ONCE DAILY 30 Cap 5   • atorvastatin (LIPITOR) 20 MG Tab TAKE 1 TABLET ORALLY ONCE DAILY 30 Tab 5   • ASPIRIN LOW DOSE 81 MG EC tablet TAKE 1 TABLET ORALLY ONCE DAILY 30 Tab 5   • temazepam (RESTORIL) 30 MG capsule      • clonidine (CATAPRES) 0.1 MG Tab Take 1 Tab by mouth 2 times a day. 60 Tab 1   • ULTICARE INSULIN SYRINGE 29G X 1/2\" 0.5 ML Misc USE AS DIRECTED BY PHYSICIAN 90 Each 5   • ranitidine (ZANTAC) 150 MG Tab Take 2 Tabs by mouth 1 time daily as needed for Heartburn. 30 Tab 1   • ketoconazole (NIZORAL) 2 % shampoo Apply 5 mL to affected area(s) 1 time daily as needed for Itching.     • fluticasone (FLOVENT HFA) 110 MCG/ACT Aerosol Inhale 2 Puffs by mouth 2 times a day.     • tiotropium (SPIRIVA) 18 MCG Cap Inhale 18 mcg by mouth every day.     • VENTOLIN  (90 BASE) MCG/ACT Aero Soln inhalation aerosol INHALE 2 PUFFS BY MOUTH EVERY 8 HOURS ASNEEDED FOR SHORTNESS OF BREATH 1 Inhaler 7   • amlodipine " "(NORVASC) 10 MG Tab Take 1 Tab by mouth every day. 30 Tab 11     No current facility-administered medications for this visit.       Review Of Systems  As documented in HPI above  PHYSICAL EXAMINATION:    /79 mmHg  Pulse 88  Temp(Src) 36.5 °C (97.7 °F)  Resp 18  Ht 1.676 m (5' 6\")  Wt 88.451 kg (195 lb)  BMI 31.49 kg/m2  SpO2 96%  Gen.: Well-developed, well-nourished, obese, no apparent distress, pleasant and cooperative with the examination  HEENT: Normocephalic/atraumatic,     Neck: No JVD or bruits, no adenopathy  Cor: Regular rate and rhythm without murmur gallop or rub  Lungs: Clear to auscultation with equal breath sounds bilaterally. No wheezes, rhonchi.    Extremities: No cyanosis, clubbing or edema        ASSESSMENT/Plan:  1. Abnormal thyroid blood test   abnormal thyroid function test from psychiatrist's office, advised to repeat and recheck thyroid function before starting any medication, possibly related to side effects from his previous psychiatric medications.    TSH    FREE THYROXINE    TRIIDOTHYRONINE   2. Schizoaffective disorder, unspecified type    Advised to continue follow-up with psychiatrist, medications updated today. According to patient history. Patient denies red flags like auditory hallucinations or intentions to hurt self or others    ziprasidone (GEODON) 80 MG Cap   3. Essential hypertension  , well-controlled on medications. Advised to continue same regimen on lisinopril 40 mg daily, I will recheck his kidney function test next visit    4. Dyslipidemia   advised to check lipid profile, diet control discussed.      LIPID PROFILE         Please note that this dictation was created using voice recognition software. I have made every reasonable attempt to correct obvious errors but there may be errors of grammar and content that I may have overlooked prior to finalization of this note.       "

## 2017-08-08 ENCOUNTER — HOSPITAL ENCOUNTER (OUTPATIENT)
Dept: LAB | Facility: MEDICAL CENTER | Age: 62
End: 2017-08-08
Attending: FAMILY MEDICINE
Payer: MEDICAID

## 2017-08-08 LAB
CHOLEST SERPL-MCNC: 256 MG/DL (ref 100–199)
HDLC SERPL-MCNC: 40 MG/DL
LDLC SERPL CALC-MCNC: 176 MG/DL
T3 SERPL-MCNC: 156.4 NG/DL (ref 60–181)
T4 FREE SERPL-MCNC: 1.02 NG/DL (ref 0.53–1.43)
TRIGL SERPL-MCNC: 202 MG/DL (ref 0–149)
TSH SERPL DL<=0.005 MIU/L-ACNC: 4.61 UIU/ML (ref 0.3–3.7)

## 2017-08-08 PROCEDURE — 84439 ASSAY OF FREE THYROXINE: CPT

## 2017-08-08 PROCEDURE — 84443 ASSAY THYROID STIM HORMONE: CPT

## 2017-08-08 PROCEDURE — 80061 LIPID PANEL: CPT

## 2017-08-08 PROCEDURE — 36415 COLL VENOUS BLD VENIPUNCTURE: CPT

## 2017-08-08 PROCEDURE — 84480 ASSAY TRIIODOTHYRONINE (T3): CPT

## 2017-08-11 DIAGNOSIS — E78.5 HYPERLIPIDEMIA, UNSPECIFIED HYPERLIPIDEMIA TYPE: ICD-10-CM

## 2017-08-11 DIAGNOSIS — R79.89 TSH ELEVATION: ICD-10-CM

## 2017-08-11 RX ORDER — ATORVASTATIN CALCIUM 40 MG/1
40 TABLET, FILM COATED ORAL DAILY
Qty: 30 TAB | Refills: 5 | Status: SHIPPED | OUTPATIENT
Start: 2017-08-11 | End: 2019-11-15

## 2017-08-16 ENCOUNTER — HOSPITAL ENCOUNTER (OUTPATIENT)
Dept: LAB | Facility: MEDICAL CENTER | Age: 62
End: 2017-08-16
Attending: PHYSICIAN ASSISTANT
Payer: MEDICAID

## 2017-08-16 LAB
ALBUMIN SERPL BCP-MCNC: 3.9 G/DL (ref 3.2–4.9)
ALBUMIN/GLOB SERPL: 1.4 G/DL
ALP SERPL-CCNC: 63 U/L (ref 30–99)
ALT SERPL-CCNC: 12 U/L (ref 2–50)
ANION GAP SERPL CALC-SCNC: 8 MMOL/L (ref 0–11.9)
AST SERPL-CCNC: 14 U/L (ref 12–45)
BASOPHILS # BLD AUTO: 1.1 % (ref 0–1.8)
BASOPHILS # BLD: 0.08 K/UL (ref 0–0.12)
BILIRUB SERPL-MCNC: 0.6 MG/DL (ref 0.1–1.5)
BUN SERPL-MCNC: 8 MG/DL (ref 8–22)
CALCIUM SERPL-MCNC: 9 MG/DL (ref 8.5–10.5)
CHLORIDE SERPL-SCNC: 108 MMOL/L (ref 96–112)
CO2 SERPL-SCNC: 26 MMOL/L (ref 20–33)
CREAT SERPL-MCNC: 0.93 MG/DL (ref 0.5–1.4)
EOSINOPHIL # BLD AUTO: 0.13 K/UL (ref 0–0.51)
EOSINOPHIL NFR BLD: 1.8 % (ref 0–6.9)
ERYTHROCYTE [DISTWIDTH] IN BLOOD BY AUTOMATED COUNT: 39.3 FL (ref 35.9–50)
GFR SERPL CREATININE-BSD FRML MDRD: >60 ML/MIN/1.73 M 2
GLOBULIN SER CALC-MCNC: 2.8 G/DL (ref 1.9–3.5)
GLUCOSE SERPL-MCNC: 145 MG/DL (ref 65–99)
HCT VFR BLD AUTO: 44 % (ref 42–52)
HGB BLD-MCNC: 14.9 G/DL (ref 14–18)
IMM GRANULOCYTES # BLD AUTO: 0.07 K/UL (ref 0–0.11)
IMM GRANULOCYTES NFR BLD AUTO: 1 % (ref 0–0.9)
LYMPHOCYTES # BLD AUTO: 2.08 K/UL (ref 1–4.8)
LYMPHOCYTES NFR BLD: 29.3 % (ref 22–41)
MCH RBC QN AUTO: 28 PG (ref 27–33)
MCHC RBC AUTO-ENTMCNC: 33.9 G/DL (ref 33.7–35.3)
MCV RBC AUTO: 82.6 FL (ref 81.4–97.8)
MONOCYTES # BLD AUTO: 0.45 K/UL (ref 0–0.85)
MONOCYTES NFR BLD AUTO: 6.3 % (ref 0–13.4)
NEUTROPHILS # BLD AUTO: 4.3 K/UL (ref 1.82–7.42)
NEUTROPHILS NFR BLD: 60.5 % (ref 44–72)
NRBC # BLD AUTO: 0 K/UL
NRBC BLD AUTO-RTO: 0 /100 WBC
PLATELET # BLD AUTO: 269 K/UL (ref 164–446)
PMV BLD AUTO: 10.8 FL (ref 9–12.9)
POTASSIUM SERPL-SCNC: 3.6 MMOL/L (ref 3.6–5.5)
PROT SERPL-MCNC: 6.7 G/DL (ref 6–8.2)
RBC # BLD AUTO: 5.33 M/UL (ref 4.7–6.1)
SODIUM SERPL-SCNC: 142 MMOL/L (ref 135–145)
WBC # BLD AUTO: 7.1 K/UL (ref 4.8–10.8)

## 2017-08-16 PROCEDURE — 87522 HEPATITIS C REVRS TRNSCRPJ: CPT

## 2017-08-16 PROCEDURE — 85025 COMPLETE CBC W/AUTO DIFF WBC: CPT

## 2017-08-16 PROCEDURE — 36415 COLL VENOUS BLD VENIPUNCTURE: CPT

## 2017-08-16 PROCEDURE — 80053 COMPREHEN METABOLIC PANEL: CPT

## 2017-08-18 DIAGNOSIS — Z76.0 PRESCRIPTION REFILL: ICD-10-CM

## 2017-08-18 LAB
HCV RNA SERPL NAA+PROBE-ACNC: NORMAL IU/ML
TEST INFO  93644: NORMAL

## 2017-08-18 RX ORDER — AMLODIPINE BESYLATE 10 MG/1
10 TABLET ORAL DAILY
Qty: 30 TAB | Refills: 11 | Status: SHIPPED | OUTPATIENT
Start: 2017-08-18 | End: 2018-09-23 | Stop reason: SDUPTHER

## 2017-08-24 ENCOUNTER — OFFICE VISIT (OUTPATIENT)
Dept: MEDICAL GROUP | Facility: MEDICAL CENTER | Age: 62
End: 2017-08-24
Attending: FAMILY MEDICINE
Payer: MEDICAID

## 2017-08-24 VITALS
WEIGHT: 194 LBS | RESPIRATION RATE: 18 BRPM | SYSTOLIC BLOOD PRESSURE: 110 MMHG | BODY MASS INDEX: 31.18 KG/M2 | HEART RATE: 88 BPM | TEMPERATURE: 97.5 F | HEIGHT: 66 IN | DIASTOLIC BLOOD PRESSURE: 74 MMHG | OXYGEN SATURATION: 93 %

## 2017-08-24 DIAGNOSIS — B18.2 CHRONIC HEPATITIS C WITHOUT HEPATIC COMA (HCC): ICD-10-CM

## 2017-08-24 DIAGNOSIS — R06.01 ORTHOPNEA: ICD-10-CM

## 2017-08-24 DIAGNOSIS — E78.5 HYPERLIPIDEMIA, UNSPECIFIED HYPERLIPIDEMIA TYPE: ICD-10-CM

## 2017-08-24 DIAGNOSIS — R79.89 TSH ELEVATION: ICD-10-CM

## 2017-08-24 PROCEDURE — 99214 OFFICE O/P EST MOD 30 MIN: CPT | Performed by: FAMILY MEDICINE

## 2017-08-24 PROCEDURE — 99213 OFFICE O/P EST LOW 20 MIN: CPT | Performed by: FAMILY MEDICINE

## 2017-08-24 RX ORDER — LEVOTHYROXINE SODIUM 0.03 MG/1
25 TABLET ORAL
Qty: 30 TAB | Refills: 4 | Status: SHIPPED | OUTPATIENT
Start: 2017-08-24 | End: 2018-02-10 | Stop reason: SDUPTHER

## 2017-08-24 NOTE — MR AVS SNAPSHOT
"        Nikolas Rico   2017 10:10 AM   Office Visit   MRN: [id number]    [facility]   Dept Phone:  [phone]    Description:  Male : 1955   Provider:  Chepe Rod M.D.           Reason for Visit     Follow-Up           Allergies as of 2017     Allergen Noted Reactions    Nkda [No Known Drug Allergy] 2008         You were diagnosed with     Orthopnea   [786.02.ICD-9-CM]       Hyperlipidemia, unspecified hyperlipidemia type   [1295252]       TSH elevation   [796803]       Chronic hepatitis C without hepatic coma (CMS-HCC)   [622928]         Vital Signs     Blood Pressure Pulse Temperature Respirations Height Weight    110/74 mmHg 88 36.4 °C (97.5 °F) 18 1.676 m (5' 6\") 87.998 kg (194 lb)    Body Mass Index Oxygen Saturation Smoking Status             31.33 kg/m2 93% Former Smoker         Basic Information     Date Of Birth Sex Race Ethnicity Preferred Language    1955 Male White Non- English      Problem List              ICD-10-CM Priority Class Noted - Resolved    Bipolar 1 disorder, manic, moderate (CMS-HCC) F31.12 Low  2011 - Present    [place] filter in place Z95.828   2011 - Present    History of DVT (deep vein thrombosis) Z86.718   2011 - Present    Chronic foot pain M79.673, G89.29   2012 - Present    Psoriasis L40.9   10/23/2012 - Present    Chronic HCV infection B19.20 Medium  2012 - Present    Elbow fracture, left S42.402A   3/12/2013 - Present    COPD (chronic obstructive pulmonary disease) (CMS-[facility]) J44.9 Medium  2014 - Present    Dyslipidemia E78.5 Low  2015 - Present    Type II or unspecified type diabetes mellitus without mention of complication, not stated as uncontrolled E11.9   2015 - Present    Chronic respiratory failure, unspecified whether with hypoxia or hypercapnia (CMS-HCC) J96.10   2015 - Present    Diabetes type 2, controlled (CMS-HCC) E11.9 Medium  2016 - " Present    Emphysema of lung (CMS-HCC) J43.9   2/18/2016 - Present    Insulin dependent diabetes mellitus (CMS-HCC) E11.9, Z79.4   5/24/2016 - Present    Essential hypertension I10 High  7/21/2016 - Present    Muscle mass of leg R29.898   8/11/2016 - Present    Left anterior fascicular block I44.4 High  11/10/2016 - Present    CKD (chronic kidney disease) stage 3, GFR 30-59 ml/min (Chronic) N18.3 Low  Unknown - Present    Disorientation R41.0   2/21/2017 - Present    Tachycardia R00.0 High  3/7/2017 - Present    Schizoaffective disorder (CMS-HCC) F25.9 High  4/14/2017 - Present    Episodic confusion R41.0   4/28/2017 - Present    Sleep apnea G47.30   4/28/2017 - Present    Hyperlipidemia E78.5   8/11/2017 - Present      Health Maintenance        Date Due Completion Dates    IMM DTaP/Tdap/Td Vaccine (1 - Tdap) 12/17/1973 ---    IMM ZOSTER VACCINE 12/17/2014 ---    RETINAL SCREENING 6/14/2017 6/14/2016    IMM INFLUENZA (1) 9/1/2017 9/10/2016, 10/23/2012    A1C SCREENING 9/4/2017 3/4/2017, 6/8/2016, 3/17/2016, 9/22/2015, 4/30/2015, 8/26/2014, 4/14/2014, 11/18/2013, 9/13/2013, 5/13/2013, 1/23/2013, 10/16/2012, 4/17/2012, 12/13/2011    URINE ACR / MICROALBUMIN 3/4/2018 3/4/2017, 3/16/2015, 3/5/2013, 10/16/2012    DIABETES MONOFILAMENT / LE EXAM 3/7/2018 3/7/2017, 12/19/2014    COLON CANCER SCREENING ANNUAL FIT 3/19/2018 3/19/2017, 2/28/2016, 2/1/2016, 10/25/2012    FASTING LIPID PROFILE 8/8/2018 8/8/2017, 6/8/2016, 3/17/2016, 4/30/2015, 8/26/2014, 11/18/2013, 9/13/2013, 10/16/2012, 6/1/2012, 12/13/2011, 1/18/2008    SERUM CREATININE 8/16/2018 8/16/2017, 4/12/2017, 2/14/2017, 2/13/2017, 11/28/2016, 11/3/2016, 8/23/2016, 3/17/2016, 3/17/2016, 5/30/2015, 4/30/2015, 11/5/2014, 8/26/2014, 4/14/2014, 11/18/2013, 5/13/2013, 3/14/2013, 3/12/2013, 3/5/2013, 1/23/2013, 10/25/2012, 10/16/2012, 7/10/2012, 6/1/2012, 1/4/2012, 12/13/2011, 1/17/2008            Current Immunizations     Influenza TIV (IM) 9/10/2016, 10/23/2012     "Pneumococcal polysaccharide vaccine (PPSV-23) 12/11/2011      Below and/or attached are the medications your provider expects you to take. Review all of your home medications and newly ordered medications with your provider and/or pharmacist. Follow medication instructions as directed by your provider and/or pharmacist. Please keep your medication list with you and share with your provider. Update the information when medications are discontinued, doses are changed, or new medications (including over-the-counter products) are added; and carry medication information at all times in the event of emergency situations     Allergies:  NKDA - (reactions not documented)               Medications  Valid as of: August 24, 2017 - 10:36 AM    Generic Name Brand Name Tablet Size Instructions for use    Albuterol Sulfate (Aero Soln) VENTOLIN  (90 Base) MCG/ACT INHALE 2 PUFFS BY MOUTH EVERY 8 HOURS ASNEEDED FOR SHORTNESS OF BREATH        AmLODIPine Besylate (Tab) NORVASC 10 MG Take 1 Tab by mouth every day.        Aspirin (Tablet Delayed Response) ASPIRIN LOW DOSE 81 MG TAKE 1 TABLET ORALLY ONCE DAILY        Atorvastatin Calcium (Tab) LIPITOR 40 MG Take 1 Tab by mouth every day.        Cholecalciferol (Cap) D3 SUPER STRENGTH 2000 units TAKE 1 CAPSULE ORALLY ONCE DAILY        CloNIDine HCl (Tab) CATAPRES 0.1 MG Take 1 Tab by mouth 2 times a day.        Elbasvir-Grazoprevir (Tab) ZEPATIER  MG         Fluticasone Propionate HFA (Aerosol) FLOVENT  MCG/ACT Inhale 2 Puffs by mouth 2 times a day.        Insulin Aspart Prot & Aspart (Suspension) NOVOLOG 70/30 (70-30) 100 UNIT/ML Inject 10 Units as instructed 2 times a day.        Insulin Syringe-Needle U-100 (Misc) ULTICARE INSULIN SYRINGE 29G X 1/2\" 0.5 ML USE AS DIRECTED BY PHYSICIAN        Ketoconazole (Shampoo) NIZORAL 2 % Apply 5 mL to affected area(s) 1 time daily as needed for Itching.        Levothyroxine Sodium (Tab) SYNTHROID 25 MCG Take 1 Tab by mouth Every " morning on an empty stomach.        Lisinopril (Tab) PRINIVIL, ZESTRIL 40 MG Take 1 Tab by mouth every day.        MetFORMIN HCl (Tab) GLUCOPHAGE 850 MG Take 1 Tab by mouth 2 times a day, with meals.        Metoprolol Succinate (TABLET SR 24 HR) TOPROL XL 25 MG Take 1 Tab by mouth every day.        RaNITidine HCl (Tab) ZANTAC 150 MG Take 2 Tabs by mouth 1 time daily as needed for Heartburn.        Temazepam (Cap) RESTORIL 30 MG         Tiotropium Bromide Monohydrate (Cap) SPIRIVA 18 MCG Inhale 18 mcg by mouth every day.        Ziprasidone HCl (Cap) GEODON 80 MG Take 1 Cap by mouth 2 Times a Day.        .                 Medicines prescribed today were sent to:     [id number] - [place], [place] - [address] [address]    Phone: [phone] Fax: [phone]    Open 24 Hours?: No      Medication refill instructions:       If your prescription bottle indicates you have medication refills left, it is not necessary to call your provider’s office. Please contact your pharmacy and they will refill your medication.    If your prescription bottle indicates you do not have any refills left, you may request refills at any time through one of the following ways: The online [employer] system (except Urgent Care), by calling your provider’s office, or by asking your pharmacy to contact your provider’s office with a refill request. Medication refills are processed only during regular business hours and may not be available until the next business day. Your provider may request additional information or to have a follow-up visit with you prior to refilling your medication.   *Please Note: Medication refills are assigned a new Rx number when refilled electronically. Your pharmacy may indicate that no refills were authorized even though a new prescription for the same medication is available at the pharmacy. Please request the medicine by name with the pharmacy before contacting your provider for  a refill.        Your To Do List     Future Labs/Procedures Complete By Expires    DX-CHEST-2 VIEWS  As directed 8/24/2018      Other Notes About Your Plan     Fall risk done 6/25/15           MyChart Access Code: Activation code not generated  Current [facility] Status: Active

## 2017-08-25 ENCOUNTER — HOSPITAL ENCOUNTER (OUTPATIENT)
Dept: RADIOLOGY | Facility: MEDICAL CENTER | Age: 62
End: 2017-08-25
Attending: FAMILY MEDICINE
Payer: MEDICAID

## 2017-08-25 DIAGNOSIS — R06.01 ORTHOPNEA: ICD-10-CM

## 2017-08-25 PROCEDURE — 71020 DX-CHEST-2 VIEWS: CPT

## 2017-08-25 NOTE — PROGRESS NOTES
Chief Complaint:   Chief Complaint   Patient presents with   • Follow-Up       HPI:Established patient   Nikolas Rico is a 61 y.o. male who presents for Follow-up and concerns about recent extubation of shortness of breath and orthopnea     Orthopnea  Patient reports that for the past couple of weeks, he has been feeling more shortness of breath while lying down, he said he feels really congested with a lot of secretions that accumulated in his chest when he lies down flat, he improved only when he sits up. Denies chest pain or palpitations, he said the shortness of breath while lying down associated with cough, no lower extremity edema. Reviewed patient's recent echocardiogram done in March 2017, no significant abnormality, with mild diastolic dysfunction, grade 1    Hyperlipidemia, unspecified hyperlipidemia type   Reviewed with the patient his lab work results, there is elevated LDL level and uncontrolled hyperlipidemia, patient said he is off of his statins because of hepatitis C treatment and he is unable  To restart the medication before a month. As per GI recommendation. Advised only to control his diet.  TSH elevation  There is elevation of his TSH, around 4.5, patient also reports symptoms of fatigability, tiredness, low energy, constipation on and off dry skin. Discussed with patient starting low-dose treatment with Synthroid and he is willing to start it because of his symptoms that are suggestive of hypothyroidism. Thyroid function tests shows elevated TSH with normal level thyroxine. Relayed and discussed with patient today    Chronic hepatitis C without hepatic coma (CMS-HCC)     Under treatment of hepatitis C through gastroenterology office. Denies side effects. At this time.          Past medical history, family history, social history and medications reviewed and updated in the record. today   Current medications, problem list and allergies reviewed in [facility] today   [facility]  topics are reviewed and updated.    Patient Active Problem List    Diagnosis Date Noted   • Schizoaffective disorder (CMS-HCC) 04/14/2017     Priority: High   • Tachycardia 03/07/2017     Priority: High   • Left anterior fascicular block 11/10/2016     Priority: High   • Essential hypertension 07/21/2016     Priority: High   • Diabetes type 2, controlled (CMS-HCC) 01/26/2016     Priority: Medium   • COPD (chronic obstructive pulmonary disease) (CMS-HCC) 12/19/2014     Priority: Medium   • Chronic HCV infection 12/04/2012     Priority: Medium   • CKD (chronic kidney disease) stage 3, GFR 30-59 ml/min      Priority: Low   • Dyslipidemia 04/23/2015     Priority: Low   • Bipolar 1 disorder, manic, moderate (CMS-HCC) 11/22/2011     Priority: Low   • Hyperlipidemia 08/11/2017   • Episodic confusion 04/28/2017   • Sleep apnea 04/28/2017   • Disorientation 02/21/2017   • Muscle mass of leg 08/11/2016   • Insulin dependent diabetes mellitus (CMS-HCC) 05/24/2016   • Emphysema of lung (CMS-HCC) 02/18/2016   • Type II or unspecified type diabetes mellitus without mention of complication, not stated as uncontrolled 06/25/2015   • Chronic respiratory failure, unspecified whether with hypoxia or hypercapnia (CMS-HCC) 06/25/2015   • Elbow fracture, left 03/12/2013   • Psoriasis 10/23/2012   • Chronic foot pain 09/11/2012   • [place] filter in place 11/22/2011   • History of DVT (deep vein thrombosis) 11/22/2011     Family History   Problem Relation Age of Onset   • Cancer Father      blader ca     Social History     Social History   • Marital Status: Single     Spouse Name: N/A   • Number of Children: N/A   • Years of Education: N/A     Occupational History   • Not on file.     Social History Main Topics   • Smoking status: Former Smoker     Types: Cigarettes     Quit date: 03/06/2013   • Smokeless tobacco: Never Used   • Alcohol Use: 0.0 oz/week     0 Standard drinks or equivalent per week      Comment: sober since HCV  "diagnosis   • Drug Use: Yes     Special: Methamphetamines      Comment: meth use.  Last use \"a couple weeks ago\", occasional marijuana use   • Sexual Activity:     Partners: Female     Other Topics Concern   • Not on file     Social History Narrative       Current Outpatient Prescriptions   Medication Sig Dispense Refill   • levothyroxine (SYNTHROID) 25 MCG Tab Take 1 Tab by mouth Every morning on an empty stomach. 30 Tab 4   • amlodipine (NORVASC) 10 MG Tab Take 1 Tab by mouth every day. 30 Tab 11   • atorvastatin (LIPITOR) 40 MG Tab Take 1 Tab by mouth every day. 30 Tab 5   • metoprolol SR (TOPROL XL) 25 MG TABLET SR 24 HR Take 1 Tab by mouth every day. 30 Tab 1   • ZEPATIER  MG Tab      • ziprasidone (GEODON) 80 MG Cap Take 1 Cap by mouth 2 Times a Day. 60 Cap 0   • metformin (GLUCOPHAGE) 850 MG Tab Take 1 Tab by mouth 2 times a day, with meals. 60 Tab 5   • insulin aspart protamine-insulin aspart (NOVOLOG MIX 70/30) (70-30) 100 UNIT/ML injection Inject 10 Units as instructed 2 times a day. 20 mL 5   • lisinopril (PRINIVIL, ZESTRIL) 40 MG tablet Take 1 Tab by mouth every day. 30 Tab 6   • D3 SUPER STRENGTH 2000 UNITS Cap TAKE 1 CAPSULE ORALLY ONCE DAILY 30 Cap 5   • ASPIRIN LOW DOSE 81 MG EC tablet TAKE 1 TABLET ORALLY ONCE DAILY 30 Tab 5   • temazepam (RESTORIL) 30 MG capsule      • clonidine (CATAPRES) 0.1 MG Tab Take 1 Tab by mouth 2 times a day. 60 Tab 1   • ULTICARE INSULIN SYRINGE 29G X 1/2\" 0.5 ML Misc USE AS DIRECTED BY PHYSICIAN 90 Each 5   • ranitidine (ZANTAC) 150 MG Tab Take 2 Tabs by mouth 1 time daily as needed for Heartburn. 30 Tab 1   • ketoconazole (NIZORAL) 2 % shampoo Apply 5 mL to affected area(s) 1 time daily as needed for Itching.     • fluticasone (FLOVENT HFA) 110 MCG/ACT Aerosol Inhale 2 Puffs by mouth 2 times a day.     • tiotropium (SPIRIVA) 18 MCG Cap Inhale 18 mcg by mouth every day.     • VENTOLIN  (90 BASE) MCG/ACT Aero Soln inhalation aerosol INHALE 2 PUFFS BY MOUTH " "EVERY 8 HOURS ASNEEDED FOR SHORTNESS OF BREATH 1 Inhaler 7     No current facility-administered medications for this visit.         Review Of Systems  As documented in HPI above  PHYSICAL EXAMINATION:    /74 mmHg  Pulse 88  Temp(Src) 36.4 °C (97.5 °F)  Resp 18  Ht 1.676 m (5' 6\")  Wt 87.998 kg (194 lb)  BMI 31.33 kg/m2  SpO2 93%  Gen.: Well-developed, well-nourished, no apparent distress, pleasant and cooperative with the examination  HEENT: Normocephalic/atraumatic, sinuses nontender with palpation, TMs clear, nares patent with pink mucosa and clear rhinorrhea, oropharynx clear  Neck: No JVD or bruits, no adenopathy  Cor: Regular rate and rhythm without murmur gallop or rub  LungsEvidence of bilateral basal decrease air entry without crepitations or wheezing  Abdomen: Soft nontender without hepatosplenomegaly or masses appreciated, normoactive bowel sounds  Extremities: No cyanosis, clubbing or edema          ASSESSMENT/Plan:      1. Orthopnea  Echocardiogram done in March 2017 reviewed. No significant red flags, I will do a chest x-ray to rule out pathology, follow-up with me in one week, continue use of inhalers and avoid passive and active smoking. No red flags at this time. Reviewed if he develops any red flags like chest pain or palpitations. Over worsening of symptoms to report to emergency room.  DX-CHEST-2 VIEWS   2. Hyperlipidemia, unspecified hyperlipidemia type  Off of his statins for treatment with hepatitis C. Advised diet control, uncontrolled condition    3. TSH elevation  , I'll start him on low-dose Synthroid 25 µg daily, recheck thyroid function in 6 weeks, patient is symptomatic with suggestive hypothyroidism    levothyroxine (SYNTHROID) 25 MCG Tab   4. Chronic hepatitis C without hepatic coma (CMS-HCC)  . Continue follow-up with [facility] as directed. No red flags at this time. He is under treatment for hep C          Please note that this dictation was created using voice " recognition software. I have made every reasonable attempt to correct obvious errors but there may be errors of grammar and content that I may have overlooked prior to finalization of this note.

## 2017-08-30 ENCOUNTER — HOSPITAL ENCOUNTER (OUTPATIENT)
Dept: RADIOLOGY | Facility: MEDICAL CENTER | Age: 62
End: 2017-08-30
Attending: FAMILY MEDICINE
Payer: MEDICAID

## 2017-08-30 ENCOUNTER — TELEPHONE (OUTPATIENT)
Dept: MEDICAL GROUP | Facility: MEDICAL CENTER | Age: 62
End: 2017-08-30

## 2017-08-30 ENCOUNTER — OFFICE VISIT (OUTPATIENT)
Dept: MEDICAL GROUP | Facility: MEDICAL CENTER | Age: 62
End: 2017-08-30
Attending: FAMILY MEDICINE
Payer: MEDICAID

## 2017-08-30 VITALS
BODY MASS INDEX: 31.05 KG/M2 | OXYGEN SATURATION: 94 % | DIASTOLIC BLOOD PRESSURE: 72 MMHG | SYSTOLIC BLOOD PRESSURE: 118 MMHG | HEART RATE: 107 BPM | TEMPERATURE: 96.8 F | HEIGHT: 66 IN | RESPIRATION RATE: 22 BRPM | WEIGHT: 193.2 LBS

## 2017-08-30 DIAGNOSIS — R05.9 COUGH: ICD-10-CM

## 2017-08-30 DIAGNOSIS — Z86.711 HX PULMONARY EMBOLISM: ICD-10-CM

## 2017-08-30 DIAGNOSIS — Z95.828 PRESENCE OF IVC FILTER: ICD-10-CM

## 2017-08-30 DIAGNOSIS — R06.02 SOB (SHORTNESS OF BREATH): ICD-10-CM

## 2017-08-30 PROCEDURE — 700117 HCHG RX CONTRAST REV CODE 255: Performed by: FAMILY MEDICINE

## 2017-08-30 PROCEDURE — 99214 OFFICE O/P EST MOD 30 MIN: CPT | Performed by: FAMILY MEDICINE

## 2017-08-30 PROCEDURE — 99212 OFFICE O/P EST SF 10 MIN: CPT | Performed by: FAMILY MEDICINE

## 2017-08-30 PROCEDURE — 71275 CT ANGIOGRAPHY CHEST: CPT

## 2017-08-30 RX ORDER — METHYLPREDNISOLONE 4 MG/1
4 TABLET ORAL DAILY
Qty: 1 KIT | Refills: 0 | Status: SHIPPED | OUTPATIENT
Start: 2017-08-30 | End: 2018-10-18

## 2017-08-30 RX ADMIN — IOHEXOL 85 ML: 350 INJECTION, SOLUTION INTRAVENOUS at 15:51

## 2017-08-30 NOTE — PROGRESS NOTES
Chief Complaint:   Chief Complaint   Patient presents with   • Results       HPI:Established patient   Nikolas Rico is a 61 y.o. male who presents for Follow-up on results and to discuss shortness of breath    SOB (shortness of breath) patient chest x-ray within normal limits, he still complains of shortness of breath more than usual, associated with cough. Today, he is a little bit tachypneic with respiratory rate around 22-24, oxygen saturation around 94%, which is his baseline, with also mild tachycardia. Patient denies chest pain or discomfort. Denies palpitations.   Cough   He reports that his cough has been getting worse with production that is clear in color, and teeth associated with shortness of breath.    Presence of IVC filter  Patient has this IVC filter inserted in 2011 after having a PE 5 hospitalization at [facility] related to cardiac and pulmonary failure. Patient I IVC filter was placed at that time because he just felt that he did not want to be on anticoagulation treatment for long-term. Now, he said he has some concerns about the IVC filter, especially that he has been reading about it and very concerned about complications that can happen. And he would like to be removed.     Hx pulmonary embolism     Patient with history of pulmonary embolism during hospitalization back in 2011 IVC filter was placed to prevent blood clots. Since he did not want to be on anticoagulant therapy, requesting IVC filter to be removed now and he wants to be evaluated and he is willing to start anti-cognition therapy for prevention of clots. Denies chest pain or discomfort at this time.        Past medical history, family history, social history and medications reviewed and updated in the record. today  Current medications, problem list and allergies reviewed in [facility] today   Health maintenance topics are reviewed and updated.    Patient Active Problem List    Diagnosis Date Noted   •  "Schizoaffective disorder (CMS-HCC) 04/14/2017     Priority: High   • Tachycardia 03/07/2017     Priority: High   • Left anterior fascicular block 11/10/2016     Priority: High   • Essential hypertension 07/21/2016     Priority: High   • Diabetes type 2, controlled (CMS-HCC) 01/26/2016     Priority: Medium   • COPD (chronic obstructive pulmonary disease) (CMS-HCC) 12/19/2014     Priority: Medium   • Chronic HCV infection 12/04/2012     Priority: Medium   • CKD (chronic kidney disease) stage 3, GFR 30-59 ml/min      Priority: Low   • Dyslipidemia 04/23/2015     Priority: Low   • Bipolar 1 disorder, manic, moderate (CMS-HCC) 11/22/2011     Priority: Low   • Hx pulmonary embolism 08/30/2017   • Hyperlipidemia 08/11/2017   • Episodic confusion 04/28/2017   • Sleep apnea 04/28/2017   • Disorientation 02/21/2017   • Muscle mass of leg 08/11/2016   • Insulin dependent diabetes mellitus (CMS-HCC) 05/24/2016   • Emphysema of lung (CMS-HCC) 02/18/2016   • Type II or unspecified type diabetes mellitus without mention of complication, not stated as uncontrolled 06/25/2015   • Chronic respiratory failure, unspecified whether with hypoxia or hypercapnia (CMS-HCC) 06/25/2015   • Elbow fracture, left 03/12/2013   • Psoriasis 10/23/2012   • Chronic foot pain 09/11/2012   • Ronnie filter in place 11/22/2011   • History of DVT (deep vein thrombosis) 11/22/2011     Family History   Problem Relation Age of Onset   • Cancer Father      blader ca     Social History     Social History   • Marital status: Single     Spouse name: N/A   • Number of children: N/A   • Years of education: N/A     Occupational History   • Not on file.     Social History Main Topics   • Smoking status: Former Smoker     Types: Cigarettes     Quit date: 3/6/2013   • Smokeless tobacco: Never Used   • Alcohol use 0.0 oz/week      Comment: sober since HCV diagnosis   • Drug use:      Types: Methamphetamines      Comment: meth use.  Last use \"a couple weeks ago\", " "occasional marijuana use   • Sexual activity: Not Currently     Partners: Female     Other Topics Concern   • Not on file     Social History Narrative   • No narrative on file     Current Outpatient Prescriptions   Medication Sig Dispense Refill   • MethylPREDNISolone (MEDROL DOSEPAK) 4 MG Tablet Therapy Pack Take 1 Tab by mouth every day. 1 Kit 0   • levothyroxine (SYNTHROID) 25 MCG Tab Take 1 Tab by mouth Every morning on an empty stomach. 30 Tab 4   • amlodipine (NORVASC) 10 MG Tab Take 1 Tab by mouth every day. 30 Tab 11   • atorvastatin (LIPITOR) 40 MG Tab Take 1 Tab by mouth every day. 30 Tab 5   • metoprolol SR (TOPROL XL) 25 MG TABLET SR 24 HR Take 1 Tab by mouth every day. 30 Tab 1   • ZEPATIER  MG Tab      • ziprasidone (GEODON) 80 MG Cap Take 1 Cap by mouth 2 Times a Day. 60 Cap 0   • metformin (GLUCOPHAGE) 850 MG Tab Take 1 Tab by mouth 2 times a day, with meals. 60 Tab 5   • insulin aspart protamine-insulin aspart (NOVOLOG MIX 70/30) (70-30) 100 UNIT/ML injection Inject 10 Units as instructed 2 times a day. 20 mL 5   • lisinopril (PRINIVIL, ZESTRIL) 40 MG tablet Take 1 Tab by mouth every day. 30 Tab 6   • D3 SUPER STRENGTH 2000 UNITS Cap TAKE 1 CAPSULE ORALLY ONCE DAILY 30 Cap 5   • ASPIRIN LOW DOSE 81 MG EC tablet TAKE 1 TABLET ORALLY ONCE DAILY 30 Tab 5   • temazepam (RESTORIL) 30 MG capsule      • clonidine (CATAPRES) 0.1 MG Tab Take 1 Tab by mouth 2 times a day. 60 Tab 1   • ULTICARE INSULIN SYRINGE 29G X 1/2\" 0.5 ML Misc USE AS DIRECTED BY PHYSICIAN 90 Each 5   • ranitidine (ZANTAC) 150 MG Tab Take 2 Tabs by mouth 1 time daily as needed for Heartburn. 30 Tab 1   • ketoconazole (NIZORAL) 2 % shampoo Apply 5 mL to affected area(s) 1 time daily as needed for Itching.     • fluticasone (FLOVENT HFA) 110 MCG/ACT Aerosol Inhale 2 Puffs by mouth 2 times a day.     • tiotropium (SPIRIVA) 18 MCG Cap Inhale 18 mcg by mouth every day.     • VENTOLIN  (90 BASE) MCG/ACT Aero Soln inhalation aerosol " "INHALE 2 PUFFS BY MOUTH EVERY 8 HOURS ASNEEDED FOR SHORTNESS OF BREATH 1 Inhaler 7     No current facility-administered medications for this visit.            Review Of Systems  As documented in HPI above  PHYSICAL EXAMINATION:    /72   Pulse (!) 107   Temp 36 °C (96.8 °F)   Resp (!) 22   Ht 1.676 m (5' 6\")   Wt 87.6 kg (193 lb 3.2 oz)   SpO2 94%   BMI 31.18 kg/m²   Gen.: Well-developed, well-nourished, no apparent distress, pleasant and cooperative with the examination  HEENT: Normocephalic/atraumatic,     Neck: No JVD or bruits, no adenopathy  Cor: Regular rate and rhythm without murmur gallop or rub  Lungs:Lungs with bilateral scattered wheezing and rhonchi, mild decreased air entry at the bases. Without crepitations.     Extremities: No cyanosis, clubbing or edema            ASSESSMENT/Plan:  1. SOB (shortness of breath)  , Likely related to COPD exacerbation, advised to use prednisone tapering dose, yet I'm also worried because of his tachypnea and tachycardia. Today, I will send patient to do a stat CT to rule out PE.      CT-CTA CHEST PULMONARY ARTERY W/ RECONS    CANCELED: CT-CHEST (THORAX) WITH   2. Cough  , Likely related to COPD AS a patient, advised to use prednisone tapering dose Medrol Dosepak as directed, follow up with me in one week        CT-CTA CHEST PULMONARY ARTERY W/ RECONS    CANCELED: CT-CHEST (THORAX) WITH   3. Presence of IVC filter  Patient referred to interventional radiology for further management of his IVC filter, he is requesting removal of IVC filter. I will also send him to see vascular medicine for further evaluation and follow-up, questionable if he is a candidate for long-term use anticoagulation,. Follow-up with vascular medicine as directed    REFERRAL TO VASCULAR MEDICINE Reason for Referral? Coagulation Disorders    REFERRAL TO INTERVENTIONAL RADIOLOGY   4. Hx pulmonary embolism  REFERRAL TO VASCULAR MEDICINE Reason for Referral? Coagulation Disorders    REFERRAL " TO INTERVENTIONAL RADIOLOGY       Please note that this dictation was created using voice recognition software. I have made every reasonable attempt to correct obvious errors but there may be errors of grammar and content that I may have overlooked prior to finalization of this note.     Addendum note:    Radiology called, CT shows no evidence of PE. Patient was called and reassured.

## 2017-09-08 ENCOUNTER — TELEPHONE (OUTPATIENT)
Dept: MEDICAL GROUP | Facility: MEDICAL CENTER | Age: 62
End: 2017-09-08

## 2017-09-08 ENCOUNTER — OFFICE VISIT (OUTPATIENT)
Dept: MEDICAL GROUP | Facility: MEDICAL CENTER | Age: 62
End: 2017-09-08
Attending: FAMILY MEDICINE
Payer: MEDICAID

## 2017-09-08 VITALS
TEMPERATURE: 96.9 F | HEIGHT: 66 IN | HEART RATE: 74 BPM | DIASTOLIC BLOOD PRESSURE: 62 MMHG | BODY MASS INDEX: 31.18 KG/M2 | OXYGEN SATURATION: 95 % | SYSTOLIC BLOOD PRESSURE: 115 MMHG | WEIGHT: 194 LBS | RESPIRATION RATE: 18 BRPM

## 2017-09-08 DIAGNOSIS — J44.1 COPD EXACERBATION (HCC): ICD-10-CM

## 2017-09-08 PROCEDURE — 99212 OFFICE O/P EST SF 10 MIN: CPT | Performed by: FAMILY MEDICINE

## 2017-09-08 NOTE — PROGRESS NOTES
Chief Complaint:   Chief Complaint   Patient presents with   • Follow-Up       HPI:Established patient  Nikolas Rico is a 61 y.o. male who presents for         COPD extubation. Follow-up      Patient was seen on the 30th complaining of shortness of breath and he was tachypneic, I sent the patient to do a CAT scan to rule out PE, which came as negative for PE, patient was treated for COPD exacerbation, today he said he completed the course of Medrol Dosepak and he feels much better. He denies any shortness of breath or cough or chest pain or discomfort.    Also today, I relayed and discussed with the patient the complication with interventional radiology that they will not do removal of the IVC filter because it has been there for a long time and has not recommended to remove it at this time.  Past medical history, family history, social history and medications reviewed and updated in the record.   Current medications, problem list and allergies reviewed in EP[facility] today  Health maintenance topics are reviewed and updated. Today    Patient Active Problem List    Diagnosis Date Noted   • Schizoaffective disorder (CMS-HCC) 04/14/2017     Priority: High   • Tachycardia 03/07/2017     Priority: High   • Left anterior fascicular block 11/10/2016     Priority: High   • Essential hypertension 07/21/2016     Priority: High   • Diabetes type 2, controlled (CMS-HCC) 01/26/2016     Priority: Medium   • COPD (chronic obstructive pulmonary disease) (CMS-HCC) 12/19/2014     Priority: Medium   • Chronic HCV infection 12/04/2012     Priority: Medium   • CKD (chronic kidney disease) stage 3, GFR 30-59 ml/min      Priority: Low   • Dyslipidemia 04/23/2015     Priority: Low   • Bipolar 1 disorder, manic, moderate (CMS-HCC) 11/22/2011     Priority: Low   • Hx pulmonary embolism 08/30/2017   • Hyperlipidemia 08/11/2017   • Episodic confusion 04/28/2017   • Sleep apnea 04/28/2017   • Disorientation 02/21/2017   • Muscle mass of  "leg 08/11/2016   • Insulin dependent diabetes mellitus (CMS-HCC) 05/24/2016   • Emphysema of lung (CMS-HCC) 02/18/2016   • Type II or unspecified type diabetes mellitus without mention of complication, not stated as uncontrolled 06/25/2015   • Chronic respiratory failure, unspecified whether with hypoxia or hypercapnia (CMS-HCC) 06/25/2015   • Elbow fracture, left 03/12/2013   • Psoriasis 10/23/2012   • Chronic foot pain 09/11/2012   • Ronnie filter in place 11/22/2011   • History of DVT (deep vein thrombosis) 11/22/2011     Family History   Problem Relation Age of Onset   • Cancer Father      blader ca     Social History     Social History   • Marital status: Single     Spouse name: N/A   • Number of children: N/A   • Years of education: N/A     Occupational History   • Not on file.     Social History Main Topics   • Smoking status: Former Smoker     Types: Cigarettes     Quit date: 3/6/2013   • Smokeless tobacco: Never Used   • Alcohol use 0.0 oz/week      Comment: sober since HCV diagnosis   • Drug use:      Types: Methamphetamines      Comment: meth use.  Last use \"a couple weeks ago\", occasional marijuana use   • Sexual activity: Not Currently     Partners: Female     Other Topics Concern   • Not on file     Social History Narrative   • No narrative on file       Current Outpatient Prescriptions   Medication Sig Dispense Refill   • MethylPREDNISolone (MEDROL DOSEPAK) 4 MG Tablet Therapy Pack Take 1 Tab by mouth every day. 1 Kit 0   • levothyroxine (SYNTHROID) 25 MCG Tab Take 1 Tab by mouth Every morning on an empty stomach. 30 Tab 4   • amlodipine (NORVASC) 10 MG Tab Take 1 Tab by mouth every day. 30 Tab 11   • atorvastatin (LIPITOR) 40 MG Tab Take 1 Tab by mouth every day. 30 Tab 5   • metoprolol SR (TOPROL XL) 25 MG TABLET SR 24 HR Take 1 Tab by mouth every day. 30 Tab 1   • ZEPATIER  MG Tab      • ziprasidone (GEODON) 80 MG Cap Take 1 Cap by mouth 2 Times a Day. 60 Cap 0   • metformin (GLUCOPHAGE) " "850 MG Tab Take 1 Tab by mouth 2 times a day, with meals. 60 Tab 5   • insulin aspart protamine-insulin aspart (NOVOLOG MIX 70/30) (70-30) 100 UNIT/ML injection Inject 10 Units as instructed 2 times a day. 20 mL 5   • lisinopril (PRINIVIL, ZESTRIL) 40 MG tablet Take 1 Tab by mouth every day. 30 Tab 6   • D3 SUPER STRENGTH 2000 UNITS Cap TAKE 1 CAPSULE ORALLY ONCE DAILY 30 Cap 5   • ASPIRIN LOW DOSE 81 MG EC tablet TAKE 1 TABLET ORALLY ONCE DAILY 30 Tab 5   • temazepam (RESTORIL) 30 MG capsule      • clonidine (CATAPRES) 0.1 MG Tab Take 1 Tab by mouth 2 times a day. 60 Tab 1   • ULTICARE INSULIN SYRINGE 29G X 1/2\" 0.5 ML Misc USE AS DIRECTED BY PHYSICIAN 90 Each 5   • ranitidine (ZANTAC) 150 MG Tab Take 2 Tabs by mouth 1 time daily as needed for Heartburn. 30 Tab 1   • ketoconazole (NIZORAL) 2 % shampoo Apply 5 mL to affected area(s) 1 time daily as needed for Itching.     • fluticasone (FLOVENT HFA) 110 MCG/ACT Aerosol Inhale 2 Puffs by mouth 2 times a day.     • tiotropium (SPIRIVA) 18 MCG Cap Inhale 18 mcg by mouth every day.     • VENTOLIN  (90 BASE) MCG/ACT Aero Soln inhalation aerosol INHALE 2 PUFFS BY MOUTH EVERY 8 HOURS ASNEEDED FOR SHORTNESS OF BREATH 1 Inhaler 7     No current facility-administered medications for this visit.          Review Of Systems  As documented in HPI above  PHYSICAL EXAMINATION:  Physical Exam          /62   Pulse 74   Temp 36.1 °C (96.9 °F)   Resp 18   Ht 1.676 m (5' 6\")   Wt 88 kg (194 lb)   SpO2 95%   BMI 31.31 kg/m²   Gen.: Well-developed, well-nourished, no apparent distress, pleasant and cooperative with the examination  HEENT: Normocephalic/atraumatic,  Neck: No JVD or bruits, no adenopathy  Cor: Regular rate and rhythm without murmur gallop or rub  Lungs: Clear to auscultation with equal breath sounds bilaterally. No wheezes, rhonchi.    Extremities: No cyanosis, clubbing or edema          ASSESSMENT/Plan:  1. COPD exacerbation (CMS-HCC)  , Improved on " Medrol Dosepak, advised to continue all other inhalers as directed and avoid passive and active smoking, no evidence of PE as per CT findings discussed with the patient today.        Please note that this dictation was created using voice recognition software. I have made every reasonable attempt to correct obvious errors but there may be errors of grammar and content that I may have overlooked prior to finalization of this note.

## 2017-09-09 NOTE — TELEPHONE ENCOUNTER
Please disregard this patient. Referral to vascular medicine, there is no indication at this time, I came to find out that patient had IVC filter placed in 2011, and was requesting for the IVC to be removed, and to start medical treatment for anticoagulation therapy, at this point IVC filter couldn't be removed because it's been there for a long time. Patient will continue without anticoagulation therapy. Thank you

## 2017-09-09 NOTE — TELEPHONE ENCOUNTER
Thank you for getting back to me, I will discuss it with the patient and let to know if we still need to refer him. Thank you

## 2017-09-19 ENCOUNTER — TELEPHONE (OUTPATIENT)
Dept: MEDICAL GROUP | Facility: MEDICAL CENTER | Age: 62
End: 2017-09-19

## 2017-09-19 NOTE — TELEPHONE ENCOUNTER
Pt needs a new script for new meter,lancets and test strips. [employer] no longer covered under [employer]  Thank you

## 2017-09-26 RX ORDER — RANITIDINE 300 MG/1
300 TABLET ORAL
Qty: 90 TAB | Refills: 0 | Status: SHIPPED | OUTPATIENT
Start: 2017-09-26 | End: 2017-11-27 | Stop reason: SDUPTHER

## 2017-11-16 ENCOUNTER — HOSPITAL ENCOUNTER (OUTPATIENT)
Dept: LAB | Facility: MEDICAL CENTER | Age: 62
End: 2017-11-16
Attending: INTERNAL MEDICINE
Payer: MEDICAID

## 2017-11-16 LAB
25(OH)D3 SERPL-MCNC: 30 NG/ML (ref 30–100)
ALBUMIN SERPL BCP-MCNC: 4.5 G/DL (ref 3.2–4.9)
APPEARANCE UR: CLEAR
BACTERIA #/AREA URNS HPF: NEGATIVE /HPF
BASOPHILS # BLD AUTO: 1.4 % (ref 0–1.8)
BASOPHILS # BLD: 0.1 K/UL (ref 0–0.12)
BILIRUB UR QL STRIP.AUTO: NEGATIVE
BUN SERPL-MCNC: 15 MG/DL (ref 8–22)
CALCIUM SERPL-MCNC: 10 MG/DL (ref 8.5–10.5)
CHLORIDE SERPL-SCNC: 107 MMOL/L (ref 96–112)
CHOLEST SERPL-MCNC: 219 MG/DL (ref 100–199)
CO2 SERPL-SCNC: 26 MMOL/L (ref 20–33)
COLOR UR: YELLOW
CREAT SERPL-MCNC: 1.21 MG/DL (ref 0.5–1.4)
CREAT UR-MCNC: 150.5 MG/DL
EOSINOPHIL # BLD AUTO: 0.12 K/UL (ref 0–0.51)
EOSINOPHIL NFR BLD: 1.7 % (ref 0–6.9)
EPI CELLS #/AREA URNS HPF: ABNORMAL /HPF
ERYTHROCYTE [DISTWIDTH] IN BLOOD BY AUTOMATED COUNT: 42.7 FL (ref 35.9–50)
EST. AVERAGE GLUCOSE BLD GHB EST-MCNC: 160 MG/DL
FERRITIN SERPL-MCNC: 33.7 NG/ML (ref 22–322)
GFR SERPL CREATININE-BSD FRML MDRD: >60 ML/MIN/1.73 M 2
GLUCOSE SERPL-MCNC: 183 MG/DL (ref 65–99)
GLUCOSE UR STRIP.AUTO-MCNC: NEGATIVE MG/DL
HBA1C MFR BLD: 7.2 % (ref 0–5.6)
HCT VFR BLD AUTO: 48.2 % (ref 42–52)
HDLC SERPL-MCNC: 34 MG/DL
HGB BLD-MCNC: 16.1 G/DL (ref 14–18)
HYALINE CASTS #/AREA URNS LPF: ABNORMAL /LPF
IMM GRANULOCYTES # BLD AUTO: 0.05 K/UL (ref 0–0.11)
IMM GRANULOCYTES NFR BLD AUTO: 0.7 % (ref 0–0.9)
IRON SATN MFR SERPL: 12 % (ref 15–55)
IRON SERPL-MCNC: 54 UG/DL (ref 50–180)
KETONES UR STRIP.AUTO-MCNC: NEGATIVE MG/DL
LDLC SERPL CALC-MCNC: 117 MG/DL
LEUKOCYTE ESTERASE UR QL STRIP.AUTO: ABNORMAL
LYMPHOCYTES # BLD AUTO: 2.22 K/UL (ref 1–4.8)
LYMPHOCYTES NFR BLD: 31.9 % (ref 22–41)
MAGNESIUM SERPL-MCNC: 2 MG/DL (ref 1.5–2.5)
MCH RBC QN AUTO: 29 PG (ref 27–33)
MCHC RBC AUTO-ENTMCNC: 33.4 G/DL (ref 33.7–35.3)
MCV RBC AUTO: 86.7 FL (ref 81.4–97.8)
MICRO URNS: ABNORMAL
MONOCYTES # BLD AUTO: 0.42 K/UL (ref 0–0.85)
MONOCYTES NFR BLD AUTO: 6 % (ref 0–13.4)
NEUTROPHILS # BLD AUTO: 4.04 K/UL (ref 1.82–7.42)
NEUTROPHILS NFR BLD: 58.3 % (ref 44–72)
NITRITE UR QL STRIP.AUTO: NEGATIVE
NRBC # BLD AUTO: 0 K/UL
NRBC BLD AUTO-RTO: 0 /100 WBC
PH UR STRIP.AUTO: 6.5 [PH]
PHOSPHATE SERPL-MCNC: 3.6 MG/DL (ref 2.5–4.5)
PLATELET # BLD AUTO: 307 K/UL (ref 164–446)
PMV BLD AUTO: 10.2 FL (ref 9–12.9)
POTASSIUM SERPL-SCNC: 3.4 MMOL/L (ref 3.6–5.5)
PROT UR QL STRIP: NEGATIVE MG/DL
PROT UR-MCNC: 12.1 MG/DL (ref 0–15)
PROT/CREAT UR: 80 MG/G (ref 15–68)
PTH-INTACT SERPL-MCNC: 23.6 PG/ML (ref 14–72)
RBC # BLD AUTO: 5.56 M/UL (ref 4.7–6.1)
RBC # URNS HPF: ABNORMAL /HPF
RBC UR QL AUTO: NEGATIVE
RENAL EPI CELLS #/AREA URNS HPF: ABNORMAL /HPF
SODIUM SERPL-SCNC: 141 MMOL/L (ref 135–145)
SP GR UR STRIP.AUTO: 1.02
TIBC SERPL-MCNC: 454 UG/DL (ref 250–450)
TRIGL SERPL-MCNC: 339 MG/DL (ref 0–149)
URATE SERPL-MCNC: 6.2 MG/DL (ref 2.5–8.3)
UROBILINOGEN UR STRIP.AUTO-MCNC: 0.2 MG/DL
WBC # BLD AUTO: 7 K/UL (ref 4.8–10.8)
WBC #/AREA URNS HPF: ABNORMAL /HPF

## 2017-11-16 PROCEDURE — 80069 RENAL FUNCTION PANEL: CPT

## 2017-11-16 PROCEDURE — 84550 ASSAY OF BLOOD/URIC ACID: CPT

## 2017-11-16 PROCEDURE — 83036 HEMOGLOBIN GLYCOSYLATED A1C: CPT

## 2017-11-16 PROCEDURE — 80061 LIPID PANEL: CPT

## 2017-11-16 PROCEDURE — 85025 COMPLETE CBC W/AUTO DIFF WBC: CPT

## 2017-11-16 PROCEDURE — 83540 ASSAY OF IRON: CPT

## 2017-11-16 PROCEDURE — 84156 ASSAY OF PROTEIN URINE: CPT

## 2017-11-16 PROCEDURE — 83550 IRON BINDING TEST: CPT

## 2017-11-16 PROCEDURE — 83970 ASSAY OF PARATHORMONE: CPT

## 2017-11-16 PROCEDURE — 83735 ASSAY OF MAGNESIUM: CPT

## 2017-11-16 PROCEDURE — 82728 ASSAY OF FERRITIN: CPT

## 2017-11-16 PROCEDURE — 82570 ASSAY OF URINE CREATININE: CPT

## 2017-11-16 PROCEDURE — 82306 VITAMIN D 25 HYDROXY: CPT

## 2017-11-16 PROCEDURE — 36415 COLL VENOUS BLD VENIPUNCTURE: CPT

## 2017-11-16 PROCEDURE — 81001 URINALYSIS AUTO W/SCOPE: CPT

## 2017-11-28 ENCOUNTER — APPOINTMENT (OUTPATIENT)
Dept: VASCULAR LAB | Facility: MEDICAL CENTER | Age: 62
End: 2017-11-28
Payer: MEDICAID

## 2018-01-08 ENCOUNTER — HOSPITAL ENCOUNTER (OUTPATIENT)
Dept: LAB | Facility: MEDICAL CENTER | Age: 63
End: 2018-01-08
Attending: PHYSICIAN ASSISTANT
Payer: MEDICAID

## 2018-01-08 LAB
ALBUMIN SERPL BCP-MCNC: 4.6 G/DL (ref 3.2–4.9)
ALP SERPL-CCNC: 68 U/L (ref 30–99)
ALT SERPL-CCNC: 12 U/L (ref 2–50)
AST SERPL-CCNC: 15 U/L (ref 12–45)
BILIRUB CONJ SERPL-MCNC: <0.1 MG/DL (ref 0.1–0.5)
BILIRUB INDIRECT SERPL-MCNC: NORMAL MG/DL (ref 0–1)
BILIRUB SERPL-MCNC: 0.4 MG/DL (ref 0.1–1.5)
PROT SERPL-MCNC: 7.1 G/DL (ref 6–8.2)

## 2018-01-08 PROCEDURE — 87522 HEPATITIS C REVRS TRNSCRPJ: CPT

## 2018-01-08 PROCEDURE — 36415 COLL VENOUS BLD VENIPUNCTURE: CPT

## 2018-01-08 PROCEDURE — 80076 HEPATIC FUNCTION PANEL: CPT

## 2018-01-13 LAB
HCV RNA SERPL NAA+PROBE-ACNC: NORMAL IU/ML
TEST INFO  93644: NORMAL

## 2018-01-18 ENCOUNTER — OFFICE VISIT (OUTPATIENT)
Dept: MEDICAL GROUP | Facility: MEDICAL CENTER | Age: 63
End: 2018-01-18
Attending: FAMILY MEDICINE
Payer: MEDICAID

## 2018-01-18 VITALS
HEIGHT: 66 IN | DIASTOLIC BLOOD PRESSURE: 81 MMHG | TEMPERATURE: 96.7 F | OXYGEN SATURATION: 96 % | RESPIRATION RATE: 18 BRPM | SYSTOLIC BLOOD PRESSURE: 145 MMHG | HEART RATE: 100 BPM | WEIGHT: 183 LBS | BODY MASS INDEX: 29.41 KG/M2

## 2018-01-18 DIAGNOSIS — Z79.4 CONTROLLED TYPE 2 DIABETES MELLITUS WITHOUT COMPLICATION, WITH LONG-TERM CURRENT USE OF INSULIN (HCC): ICD-10-CM

## 2018-01-18 DIAGNOSIS — E11.9 CONTROLLED TYPE 2 DIABETES MELLITUS WITHOUT COMPLICATION, WITH LONG-TERM CURRENT USE OF INSULIN (HCC): ICD-10-CM

## 2018-01-18 DIAGNOSIS — E78.5 HYPERLIPIDEMIA, UNSPECIFIED HYPERLIPIDEMIA TYPE: ICD-10-CM

## 2018-01-18 DIAGNOSIS — I10 ESSENTIAL HYPERTENSION: ICD-10-CM

## 2018-01-18 DIAGNOSIS — E04.1 THYROID NODULE: ICD-10-CM

## 2018-01-18 PROCEDURE — 99214 OFFICE O/P EST MOD 30 MIN: CPT | Performed by: FAMILY MEDICINE

## 2018-01-18 PROCEDURE — 99212 OFFICE O/P EST SF 10 MIN: CPT | Performed by: FAMILY MEDICINE

## 2018-01-18 ASSESSMENT — PATIENT HEALTH QUESTIONNAIRE - PHQ9: CLINICAL INTERPRETATION OF PHQ2 SCORE: 0

## 2018-01-18 NOTE — PROGRESS NOTES
Chief Complaint:   Chief Complaint   Patient presents with   • Follow-Up       HPI:Established patient   Nikolas Rico is a 62 y.o. female who presents for follow-up. Discussed the following concerns     BMI 29.0-29.9,adult patient has been taking care of her. His lifestyle, controlled his calories in diet, has lost more than 5 pounds since last visit and his BMI dropped to 29. Overweight from obesity     Controlled type 2 diabetes mellitus   Patient said he has been taking 20 units twice a day to control his blood sugar better. No hypoglycemia episodes, has been continuously taking his metformin as directed. Denies side effects    Essential hypertension  Blood pressure today, mildly elevated systolic, 145/81. He said he has been taking his medication as directed without side effect. Denies headache or chest pain or shortness of breath or lower extremity edema    Hyperlipidemia, unspecified hyperlipidemia type   Continues to take his statins without side effects. Reviewed with the patient his lab work results. Last LDL level was couple of months ago, 117    Thyroid nodule     This is an incidental finding on the CAT scan of the chest. The patient did for evaluation of shortness of breath, he denies symptoms suggestive of hypo-or hyperthyroidism. Takes low-dose of Synthroid for treatment of hyperthyroidism.        Past medical history, family history, social history and medications reviewed and updated in the record. Today   Current medications, problem list and allergies reviewed in EPIC today   Health maintenance topics are reviewed and updated. Today     Patient Active Problem List    Diagnosis Date Noted   • Schizoaffective disorder (CMS-HCC) 04/14/2017     Priority: High   • Tachycardia 03/07/2017     Priority: High   • Left anterior fascicular block 11/10/2016     Priority: High   • Essential hypertension 07/21/2016     Priority: High   • Diabetes type 2, controlled (CMS-HCC) 01/26/2016      "Priority: Medium   • COPD (chronic obstructive pulmonary disease) (CMS-HCC) 12/19/2014     Priority: Medium   • Chronic HCV infection 12/04/2012     Priority: Medium   • CKD (chronic kidney disease) stage 3, GFR 30-59 ml/min      Priority: Low   • Dyslipidemia 04/23/2015     Priority: Low   • Bipolar 1 disorder, manic, moderate (CMS-HCC) 11/22/2011     Priority: Low   • Hx pulmonary embolism 08/30/2017   • Hyperlipidemia 08/11/2017   • Episodic confusion 04/28/2017   • Sleep apnea 04/28/2017   • Disorientation 02/21/2017   • Muscle mass of leg 08/11/2016   • Insulin dependent diabetes mellitus (CMS-HCC) 05/24/2016   • Emphysema of lung (CMS-HCC) 02/18/2016   • Type II or unspecified type diabetes mellitus without mention of complication, not stated as uncontrolled 06/25/2015   • Chronic respiratory failure, unspecified whether with hypoxia or hypercapnia (CMS-HCC) 06/25/2015   • Elbow fracture, left 03/12/2013   • Psoriasis 10/23/2012   • Chronic foot pain 09/11/2012   • Ronnie filter in place 11/22/2011   • History of DVT (deep vein thrombosis) 11/22/2011     Family History   Problem Relation Age of Onset   • Cancer Father      blader ca     Social History     Social History   • Marital status: Single     Spouse name: N/A   • Number of children: N/A   • Years of education: N/A     Occupational History   • Not on file.     Social History Main Topics   • Smoking status: Former Smoker     Types: Cigarettes     Quit date: 3/6/2013   • Smokeless tobacco: Never Used   • Alcohol use 0.0 oz/week      Comment: sober since HCV diagnosis   • Drug use:      Types: Methamphetamines      Comment: meth use.  Last use \"a couple weeks ago\", occasional marijuana use   • Sexual activity: Not Currently     Partners: Female     Other Topics Concern   • Not on file     Social History Narrative   • No narrative on file       Current Outpatient Prescriptions   Medication Sig Dispense Refill   • clonidine (CATAPRES) 0.1 MG Tab TAKE 1 " "TABLET ORALLY 2 TIMES DAILY 60 Tab 2   • D3 SUPER STRENGTH 2000 units Cap TAKE 1 CAPSULE ORALLY ONCE DAILY 30 Cap 2   • lisinopril (PRINIVIL, ZESTRIL) 40 MG tablet TAKE ONE TABLET BY MOUTH ONE TIME DAILY 30 Tab 5   • ASPIRIN LOW DOSE 81 MG EC tablet TAKE 1 TABLET ORALLY ONCE DAILY 30 Tab 10   • ranitidine (ZANTAC) 300 MG tablet TAKE ONE TABLET BY MOUTH ONE TIME DAILY 30 Tab 0   • ULTICARE INSULIN SYRINGE 29G X 1/2\" 0.5 ML Misc USE AS DIRECTED BY PHYSICIAN 90 Each 4   • metoprolol SR (TOPROL XL) 25 MG TABLET SR 24 HR TAKE ONE TABLET BY MOUTH ONE TIME DAILY 30 Tab 3   • Misc. Devices Misc Check FS 2 times daily and as needed 60 Application 5   • MethylPREDNISolone (MEDROL DOSEPAK) 4 MG Tablet Therapy Pack Take 1 Tab by mouth every day. 1 Kit 0   • levothyroxine (SYNTHROID) 25 MCG Tab Take 1 Tab by mouth Every morning on an empty stomach. 30 Tab 4   • amlodipine (NORVASC) 10 MG Tab Take 1 Tab by mouth every day. 30 Tab 11   • atorvastatin (LIPITOR) 40 MG Tab Take 1 Tab by mouth every day. 30 Tab 5   • ZEPATIER  MG Tab      • ziprasidone (GEODON) 80 MG Cap Take 1 Cap by mouth 2 Times a Day. 60 Cap 0   • metformin (GLUCOPHAGE) 850 MG Tab Take 1 Tab by mouth 2 times a day, with meals. 60 Tab 5   • insulin aspart protamine-insulin aspart (NOVOLOG MIX 70/30) (70-30) 100 UNIT/ML injection Inject 10 Units as instructed 2 times a day. 20 mL 5   • temazepam (RESTORIL) 30 MG capsule      • ranitidine (ZANTAC) 150 MG Tab Take 2 Tabs by mouth 1 time daily as needed for Heartburn. 30 Tab 1   • ketoconazole (NIZORAL) 2 % shampoo Apply 5 mL to affected area(s) 1 time daily as needed for Itching.     • fluticasone (FLOVENT HFA) 110 MCG/ACT Aerosol Inhale 2 Puffs by mouth 2 times a day.     • tiotropium (SPIRIVA) 18 MCG Cap Inhale 18 mcg by mouth every day.     • VENTOLIN  (90 BASE) MCG/ACT Aero Soln inhalation aerosol INHALE 2 PUFFS BY MOUTH EVERY 8 HOURS ASNEEDED FOR SHORTNESS OF BREATH 1 Inhaler 7     No current " "facility-administered medications for this visit.          Review Of Systems  As documented in HPI above  PHYSICAL EXAMINATION:    /81   Pulse 100   Temp 35.9 °C (96.7 °F)   Resp 18   Ht 1.676 m (5' 6\")   Wt 83 kg (183 lb)   SpO2 96%   BMI 29.54 kg/m²   Gen.: Well-developed, well-nourished, no apparent distress, pleasant and cooperative with the examination  HEENT: Normocephalic/atraumatic, sinuses nontender with palpation, TMs clear, nares patent with pink mucosa and clear rhinorrhea, oropharynx clear  Neck: No JVD or bruits, no adenopathy, no evidence of thyromegaly or thyroid nodule  Cor: Regular rate and rhythm without murmur gallop or rub  Lungs: Clear to auscultation with equal breath sounds bilaterally. No wheezes, rhonchi.  Abdomen: Soft nontender without hepatosplenomegaly or masses appreciated, normoactive bowel sounds  Extremities: No cyanosis, clubbing or edema        ASSESSMENT/Plan:      1. BMI 29.0-29.9,adult  . Continue healthy weight loss, continue exercise and diet restrictions. Patient encouraged to keep up the good job    2. Controlled type 2 diabetes mellitus without complication, with long-term current use of insulin (CMS-formerly [facility])  . Continue insulin and metformin. We'll recheck an A1c next visit. No concerns at this time    3. Essential hypertension  . We'll keep monitoring blood pressure goal at 150/90    4. Hyperlipidemia, unspecified hyperlipidemia type  . Continue atorvastatin as directed.    5. Thyroid nodule  Asymptomatic with incidental finding of thyroid nodule on the CAT scan. Will do ultrasound of the thyroid for further evaluation and check thyroid function    US-SOFT TISSUES OF HEAD - NECK    TSH    FREE THYROXINE     Please note that this dictation was created using voice recognition software. I have made every reasonable attempt to correct obvious errors but there may be errors of grammar and content that I may have overlooked prior to finalization of this note.       "

## 2018-01-25 ENCOUNTER — TELEPHONE (OUTPATIENT)
Dept: VASCULAR LAB | Facility: MEDICAL CENTER | Age: 63
End: 2018-01-25

## 2018-01-25 NOTE — TELEPHONE ENCOUNTER
Patient referred for evaluation and management in vascular care  Patient canceled appointment for initial evaluation in December and has not rescheduled  Unfortunately, we will be unable to participate in care until or unless patient makes an appointment to be seen for initial evaluation in our office  Pending further patient contact, we will defer all further vascular care to PCP    Michael J. Bloch, MD  Vascular Care    CC: AISHWARYA Rod

## 2018-01-30 ENCOUNTER — HOSPITAL ENCOUNTER (OUTPATIENT)
Dept: RADIOLOGY | Facility: MEDICAL CENTER | Age: 63
End: 2018-01-30
Attending: FAMILY MEDICINE
Payer: MEDICAID

## 2018-01-30 DIAGNOSIS — E04.1 THYROID NODULE: ICD-10-CM

## 2018-01-30 PROCEDURE — 76536 US EXAM OF HEAD AND NECK: CPT

## 2018-02-10 DIAGNOSIS — R79.89 TSH ELEVATION: ICD-10-CM

## 2018-02-13 RX ORDER — LEVOTHYROXINE SODIUM 0.03 MG/1
TABLET ORAL
Qty: 30 TAB | Refills: 3 | Status: SHIPPED | OUTPATIENT
Start: 2018-02-13 | End: 2018-06-18 | Stop reason: SDUPTHER

## 2018-02-15 RX ORDER — SYRINGE AND NEEDLE,INSULIN,1ML 29 G X1/2"
SYRINGE, EMPTY DISPOSABLE MISCELLANEOUS
Qty: 90 EACH | Refills: 2 | Status: SHIPPED | OUTPATIENT
Start: 2018-02-15 | End: 2018-06-19 | Stop reason: SDUPTHER

## 2018-02-27 DIAGNOSIS — E04.1 THYROID NODULE: ICD-10-CM

## 2018-03-06 ENCOUNTER — HOSPITAL ENCOUNTER (OUTPATIENT)
Dept: RADIOLOGY | Facility: MEDICAL CENTER | Age: 63
End: 2018-03-06
Attending: FAMILY MEDICINE
Payer: MEDICAID

## 2018-03-06 DIAGNOSIS — E04.1 THYROID NODULE: ICD-10-CM

## 2018-03-06 PROCEDURE — 88112 CYTOPATH CELL ENHANCE TECH: CPT

## 2018-03-06 PROCEDURE — 10022 IR-NEEDLE BX-THYROID: CPT

## 2018-03-06 NOTE — PROGRESS NOTES
"Patient given [facility] \"Preventing the Spread of Infection\" brochure upon arrival.     US guided right thyroid nodule fine needle aspiration done by Dr. Castro; right anterior aspect of neck access site; 1 jar of cytolyt obtained and sent to pathology lab; pt tolerated the procedure well; pt hemodynamically stable pre/intra/post procedure; all questions and concerns answered prior to being d/c; patient provided with appropriate education for procedure; pt d/c home.     "

## 2018-03-23 DIAGNOSIS — E11.9 TYPE 2 DIABETES MELLITUS WITHOUT COMPLICATION, WITH LONG-TERM CURRENT USE OF INSULIN (HCC): ICD-10-CM

## 2018-03-23 DIAGNOSIS — Z79.4 TYPE 2 DIABETES MELLITUS WITHOUT COMPLICATION, WITH LONG-TERM CURRENT USE OF INSULIN (HCC): ICD-10-CM

## 2018-03-23 RX ORDER — RANITIDINE 300 MG/1
TABLET ORAL
Qty: 30 TAB | Refills: 1 | Status: SHIPPED | OUTPATIENT
Start: 2018-03-23 | End: 2018-06-18 | Stop reason: SDUPTHER

## 2018-04-19 ENCOUNTER — OFFICE VISIT (OUTPATIENT)
Dept: MEDICAL GROUP | Facility: MEDICAL CENTER | Age: 63
End: 2018-04-19
Attending: FAMILY MEDICINE
Payer: MEDICAID

## 2018-04-19 VITALS
SYSTOLIC BLOOD PRESSURE: 120 MMHG | DIASTOLIC BLOOD PRESSURE: 78 MMHG | TEMPERATURE: 98.5 F | BODY MASS INDEX: 29.73 KG/M2 | RESPIRATION RATE: 18 BRPM | HEART RATE: 87 BPM | WEIGHT: 185 LBS | HEIGHT: 66 IN | OXYGEN SATURATION: 93 %

## 2018-04-19 DIAGNOSIS — E04.1 THYROID NODULE: ICD-10-CM

## 2018-04-19 DIAGNOSIS — E11.9 TYPE 2 DIABETES MELLITUS WITHOUT COMPLICATION, WITH LONG-TERM CURRENT USE OF INSULIN (HCC): ICD-10-CM

## 2018-04-19 DIAGNOSIS — N63.20 MASS OF LEFT BREAST: ICD-10-CM

## 2018-04-19 DIAGNOSIS — Z79.4 TYPE 2 DIABETES MELLITUS WITHOUT COMPLICATION, WITH LONG-TERM CURRENT USE OF INSULIN (HCC): ICD-10-CM

## 2018-04-19 PROCEDURE — 99214 OFFICE O/P EST MOD 30 MIN: CPT | Performed by: FAMILY MEDICINE

## 2018-04-19 PROCEDURE — 99213 OFFICE O/P EST LOW 20 MIN: CPT | Performed by: FAMILY MEDICINE

## 2018-04-19 NOTE — PROGRESS NOTES
Chief Complaint:   Chief Complaint   Patient presents with   • Other     patient has a lump on the left side of his chest.       HPI:Established patient   Nikolas Rico is a 62 y.o. female who presents for Evaluation of lump on the left breast       Mass of left breast  Patient reports that around one week ago he noticed that he has a lump on his left breast, there is no pain or tenderness. He just noticed this while touching the area. Incidentally, he is concerned and would like it to be checked. Denies history of cancer     Type 2 diabetes mellitus okay Taking all his medications without side effects, last A1c 7.2. Denies hypoglycemia   Thyroid nodule  Patient with history of thyroid nodules. Reviewed recent biopsy results, pathology without evidence of cancer. Discussed with the patient. Close follow-up with another ultrasound in 6 months, asymptomatic at this time with normal thyroid function continues to take Synthroid 25 µg daily        Past medical history, family history, social history and medications reviewed and updated in the record. Today   Current medications, problem list and allergies reviewed in [facility] today   Health maintenance topics are reviewed and updated. Today     Patient Active Problem List    Diagnosis Date Noted   • Schizoaffective disorder (CMS-HCC) 04/14/2017     Priority: High   • Tachycardia 03/07/2017     Priority: High   • Left anterior fascicular block 11/10/2016     Priority: High   • Essential hypertension 07/21/2016     Priority: High   • Diabetes type 2, controlled (CMS-HCC) 01/26/2016     Priority: Medium   • COPD (chronic obstructive pulmonary disease) (CMS-HCC) 12/19/2014     Priority: Medium   • Chronic HCV infection 12/04/2012     Priority: Medium   • CKD (chronic kidney disease) stage 3, GFR 30-59 ml/min      Priority: Low   • Dyslipidemia 04/23/2015     Priority: Low   • Bipolar 1 disorder, manic, moderate (CMS-HCC) 11/22/2011     Priority: Low   • Thyroid  "nodule 04/19/2018   • Hx pulmonary embolism 08/30/2017   • Hyperlipidemia 08/11/2017   • Episodic confusion 04/28/2017   • Sleep apnea 04/28/2017   • Disorientation 02/21/2017   • Muscle mass of leg 08/11/2016   • Insulin dependent diabetes mellitus (CMS-HCC) 05/24/2016   • Emphysema of lung (CMS-HCC) 02/18/2016   • Type II or unspecified type diabetes mellitus without mention of complication, not stated as uncontrolled 06/25/2015   • Chronic respiratory failure, unspecified whether with hypoxia or hypercapnia (CMS-HCC) 06/25/2015   • Elbow fracture, left 03/12/2013   • Psoriasis 10/23/2012   • Chronic foot pain 09/11/2012   • [place] filter in place 11/22/2011   • History of DVT (deep vein thrombosis) 11/22/2011     Family History   Problem Relation Age of Onset   • Cancer Father      blader ca     Social History     Social History   • Marital status: Single     Spouse name: N/A   • Number of children: N/A   • Years of education: N/A     Occupational History   • Not on file.     Social History Main Topics   • Smoking status: Former Smoker     Types: Cigarettes     Quit date: 3/6/2013   • Smokeless tobacco: Never Used   • Alcohol use 0.0 oz/week      Comment: sober since HCV diagnosis   • Drug use: Yes     Types: Methamphetamines      Comment: meth use.  Last use \"a couple weeks ago\", occasional marijuana use   • Sexual activity: Not Currently     Partners: Female     Other Topics Concern   • Not on file     Social History Narrative   • No narrative on file       Current Outpatient Prescriptions   Medication Sig Dispense Refill   • ranitidine (ZANTAC) 300 MG tablet TAKE ONE TABLET BY MOUTH ONE TIME DAILY 30 Tab 1   • metFORMIN (GLUCOPHAGE) 850 MG Tab TAKE ONE TABLET BY MOUTH TWICE DAILY WITH FOOD 120 Tab 1   • metoprolol SR (TOPROL XL) 25 MG TABLET SR 24 HR TAKE ONE TABLET BY MOUTH ONE TIME DAILY 30 Tab 2   • ULTICARE INSULIN SYRINGE 29G X 1/2\" 0.5 ML Misc USE AS DIRECTED BY PHYSICIAN 90 Each 2   • levothyroxine " "(SYNTHROID) 25 MCG Tab TAKE ONE TABLET BY MOUTH EVERY MORNING ON EMPTY STOMACH 30 Tab 3   • clonidine (CATAPRES) 0.1 MG Tab TAKE 1 TABLET ORALLY 2 TIMES DAILY 60 Tab 2   • D3 SUPER STRENGTH 2000 units Cap TAKE 1 CAPSULE ORALLY ONCE DAILY 30 Cap 2   • lisinopril (PRINIVIL, ZESTRIL) 40 MG tablet TAKE ONE TABLET BY MOUTH ONE TIME DAILY 30 Tab 5   • ASPIRIN LOW DOSE 81 MG EC tablet TAKE 1 TABLET ORALLY ONCE DAILY 30 Tab 10   • metoprolol SR (TOPROL XL) 25 MG TABLET SR 24 HR TAKE ONE TABLET BY MOUTH ONE TIME DAILY 30 Tab 3   • Misc. Devices Misc Check FS 2 times daily and as needed 60 Application 5   • MethylPREDNISolone (MEDROL DOSEPAK) 4 MG Tablet Therapy Pack Take 1 Tab by mouth every day. 1 Kit 0   • amlodipine (NORVASC) 10 MG Tab Take 1 Tab by mouth every day. 30 Tab 11   • atorvastatin (LIPITOR) 40 MG Tab Take 1 Tab by mouth every day. 30 Tab 5   • ZEPATIER  MG Tab      • ziprasidone (GEODON) 80 MG Cap Take 1 Cap by mouth 2 Times a Day. 60 Cap 0   • insulin aspart protamine-insulin aspart (NOVOLOG MIX 70/30) (70-30) 100 UNIT/ML injection Inject 10 Units as instructed 2 times a day. 20 mL 5   • temazepam (RESTORIL) 30 MG capsule      • ranitidine (ZANTAC) 150 MG Tab Take 2 Tabs by mouth 1 time daily as needed for Heartburn. 30 Tab 1   • ketoconazole (NIZORAL) 2 % shampoo Apply 5 mL to affected area(s) 1 time daily as needed for Itching.     • fluticasone (FLOVENT HFA) 110 MCG/ACT Aerosol Inhale 2 Puffs by mouth 2 times a day.     • tiotropium (SPIRIVA) 18 MCG Cap Inhale 18 mcg by mouth every day.     • VENTOLIN  (90 BASE) MCG/ACT Aero Soln inhalation aerosol INHALE 2 PUFFS BY MOUTH EVERY 8 HOURS ASNEEDED FOR SHORTNESS OF BREATH 1 Inhaler 7     No current facility-administered medications for this visit.          Review Of Systems  As documented in HPI above  PHYSICAL EXAMINATION:    /78   Pulse 87   Temp 36.9 °C (98.5 °F)   Resp 18   Ht 1.676 m (5' 6\")   Wt 83.9 kg (185 lb)   SpO2 93%   BMI " 29.86 kg/m²   Gen.: Well-developed, well-nourished, no apparent distress, pleasant and cooperative with the examination  HEENT: Normocephalic/atraumatic, sinuses nontender with palpation, TMs clear, nares patent with pink mucosa and clear rhinorrhea, oropharynx clear  Neck: No JVD or bruits, no adenopathy  Cor: Regular rate and rhythm without murmur gallop or rub  Lungs: Clear to auscultation with equal breath sounds bilaterally. No wheezes, rhonchi.  Abdomen: Soft nontender without hepatosplenomegaly or masses appreciated, normoactive bowel sounds  Extremities: No cyanosis, clubbing or edema    Monofilament exam within normal limits. Normal sensation and pulses, no fissures or ulcers or lesions in, noticed very dry feet both skin in both feet      ASSESSMENT/Plan:  1. Mass of left breast  , Possibly related to gynecomastia, we'll do an ultrasound for further evaluation    US-BREAST COMPLETE-LEFT   2. Type 2 diabetes mellitus without complication, with long-term current use of insulin (CMS-HCC)  Continues and vitamin recheck A1c next visit. Referral to podiatry placed      Diabetic Monofilament Lower Extremity Exam    REFERRAL TO PODIATRY   3. Thyroid nodule  Close follow-up with short interval ultrasound follow-up on thyroid nodule biopsy results. No evidence of cancer. Reviewed with the patient today. Asymptomatic        Please note that this dictation was created using voice recognition software. I have made every reasonable attempt to correct obvious errors but there may be errors of grammar and content that I may have overlooked prior to finalization of this note.

## 2018-04-20 DIAGNOSIS — F41.9 ANXIETY: ICD-10-CM

## 2018-04-20 DIAGNOSIS — I10 ESSENTIAL HYPERTENSION: ICD-10-CM

## 2018-04-20 RX ORDER — CLONIDINE HYDROCHLORIDE 0.1 MG/1
TABLET ORAL
Qty: 60 TAB | Refills: 1 | Status: SHIPPED | OUTPATIENT
Start: 2018-04-20 | End: 2018-06-19 | Stop reason: SDUPTHER

## 2018-04-20 RX ORDER — ATORVASTATIN CALCIUM 20 MG/1
TABLET, FILM COATED ORAL
Qty: 30 TAB | Refills: 4 | Status: SHIPPED | OUTPATIENT
Start: 2018-04-20 | End: 2018-09-26 | Stop reason: SDUPTHER

## 2018-04-20 RX ORDER — ATORVASTATIN CALCIUM 40 MG/1
TABLET, FILM COATED ORAL
Qty: 30 TAB | Refills: 4 | Status: SHIPPED | OUTPATIENT
Start: 2018-04-20 | End: 2019-11-15

## 2018-04-20 RX ORDER — CHOLECALCIFEROL (VITAMIN D3) 50 MCG
CAPSULE ORAL
Qty: 30 CAP | Refills: 1 | Status: SHIPPED | OUTPATIENT
Start: 2018-04-20 | End: 2018-06-19 | Stop reason: SDUPTHER

## 2018-05-17 ENCOUNTER — HOSPITAL ENCOUNTER (OUTPATIENT)
Dept: LAB | Facility: MEDICAL CENTER | Age: 63
End: 2018-05-17
Attending: INTERNAL MEDICINE
Payer: MEDICAID

## 2018-05-17 LAB
25(OH)D3 SERPL-MCNC: 33 NG/ML (ref 30–100)
ALBUMIN SERPL BCP-MCNC: 4.5 G/DL (ref 3.2–4.9)
APPEARANCE UR: CLEAR
BACTERIA #/AREA URNS HPF: ABNORMAL /HPF
BASOPHILS # BLD AUTO: 1.4 % (ref 0–1.8)
BASOPHILS # BLD: 0.08 K/UL (ref 0–0.12)
BILIRUB UR QL STRIP.AUTO: NEGATIVE
BUN SERPL-MCNC: 18 MG/DL (ref 8–22)
CALCIUM SERPL-MCNC: 9.7 MG/DL (ref 8.5–10.5)
CHLORIDE SERPL-SCNC: 109 MMOL/L (ref 96–112)
CO2 SERPL-SCNC: 26 MMOL/L (ref 20–33)
COLOR UR: YELLOW
CREAT SERPL-MCNC: 0.97 MG/DL (ref 0.5–1.4)
CREAT UR-MCNC: 112 MG/DL
EOSINOPHIL # BLD AUTO: 0.1 K/UL (ref 0–0.51)
EOSINOPHIL NFR BLD: 1.7 % (ref 0–6.9)
EPI CELLS #/AREA URNS HPF: ABNORMAL /HPF
ERYTHROCYTE [DISTWIDTH] IN BLOOD BY AUTOMATED COUNT: 42.5 FL (ref 35.9–50)
EST. AVERAGE GLUCOSE BLD GHB EST-MCNC: 140 MG/DL
GLUCOSE SERPL-MCNC: 153 MG/DL (ref 65–99)
GLUCOSE UR STRIP.AUTO-MCNC: 100 MG/DL
HBA1C MFR BLD: 6.5 % (ref 0–5.6)
HCT VFR BLD AUTO: 46.2 % (ref 42–52)
HGB BLD-MCNC: 15.1 G/DL (ref 14–18)
HYALINE CASTS #/AREA URNS LPF: ABNORMAL /LPF
IMM GRANULOCYTES # BLD AUTO: 0.03 K/UL (ref 0–0.11)
IMM GRANULOCYTES NFR BLD AUTO: 0.5 % (ref 0–0.9)
KETONES UR STRIP.AUTO-MCNC: NEGATIVE MG/DL
LEUKOCYTE ESTERASE UR QL STRIP.AUTO: ABNORMAL
LYMPHOCYTES # BLD AUTO: 1.98 K/UL (ref 1–4.8)
LYMPHOCYTES NFR BLD: 33.8 % (ref 22–41)
MAGNESIUM SERPL-MCNC: 2.2 MG/DL (ref 1.5–2.5)
MCH RBC QN AUTO: 28.4 PG (ref 27–33)
MCHC RBC AUTO-ENTMCNC: 32.7 G/DL (ref 33.7–35.3)
MCV RBC AUTO: 86.8 FL (ref 81.4–97.8)
MICRO URNS: ABNORMAL
MONOCYTES # BLD AUTO: 0.4 K/UL (ref 0–0.85)
MONOCYTES NFR BLD AUTO: 6.8 % (ref 0–13.4)
NEUTROPHILS # BLD AUTO: 3.26 K/UL (ref 1.82–7.42)
NEUTROPHILS NFR BLD: 55.8 % (ref 44–72)
NITRITE UR QL STRIP.AUTO: NEGATIVE
NRBC # BLD AUTO: 0 K/UL
NRBC BLD-RTO: 0 /100 WBC
PH UR STRIP.AUTO: 6 [PH]
PHOSPHATE SERPL-MCNC: 3.6 MG/DL (ref 2.5–4.5)
PLATELET # BLD AUTO: 283 K/UL (ref 164–446)
PMV BLD AUTO: 10.5 FL (ref 9–12.9)
POTASSIUM SERPL-SCNC: 4 MMOL/L (ref 3.6–5.5)
PROT UR QL STRIP: NEGATIVE MG/DL
PROT UR-MCNC: 13.2 MG/DL (ref 0–15)
PROT/CREAT UR: 118 MG/G (ref 15–68)
PTH-INTACT SERPL-MCNC: 34.2 PG/ML (ref 14–72)
RBC # BLD AUTO: 5.32 M/UL (ref 4.7–6.1)
RBC # URNS HPF: ABNORMAL /HPF
RBC UR QL AUTO: NEGATIVE
SODIUM SERPL-SCNC: 143 MMOL/L (ref 135–145)
SP GR UR STRIP.AUTO: 1.02
URATE SERPL-MCNC: 5 MG/DL (ref 2.5–8.3)
UROBILINOGEN UR STRIP.AUTO-MCNC: 0.2 MG/DL
WBC # BLD AUTO: 5.9 K/UL (ref 4.8–10.8)
WBC #/AREA URNS HPF: ABNORMAL /HPF

## 2018-05-17 PROCEDURE — 84156 ASSAY OF PROTEIN URINE: CPT

## 2018-05-17 PROCEDURE — 82570 ASSAY OF URINE CREATININE: CPT

## 2018-05-17 PROCEDURE — 84550 ASSAY OF BLOOD/URIC ACID: CPT

## 2018-05-17 PROCEDURE — 83735 ASSAY OF MAGNESIUM: CPT

## 2018-05-17 PROCEDURE — 83970 ASSAY OF PARATHORMONE: CPT

## 2018-05-17 PROCEDURE — 81001 URINALYSIS AUTO W/SCOPE: CPT

## 2018-05-17 PROCEDURE — 83036 HEMOGLOBIN GLYCOSYLATED A1C: CPT

## 2018-05-17 PROCEDURE — 80069 RENAL FUNCTION PANEL: CPT

## 2018-05-17 PROCEDURE — 36415 COLL VENOUS BLD VENIPUNCTURE: CPT

## 2018-05-17 PROCEDURE — 85025 COMPLETE CBC W/AUTO DIFF WBC: CPT

## 2018-05-17 PROCEDURE — 82306 VITAMIN D 25 HYDROXY: CPT

## 2018-05-22 RX ORDER — KETOCONAZOLE 20 MG/ML
SHAMPOO TOPICAL
Qty: 120 ML | Refills: 1 | Status: SHIPPED | OUTPATIENT
Start: 2018-05-22 | End: 2019-11-15

## 2018-06-01 RX ORDER — INSULIN ASPART 100 [IU]/ML
INJECTION, SUSPENSION SUBCUTANEOUS
Qty: 20 ML | Refills: 3 | Status: SHIPPED | OUTPATIENT
Start: 2018-06-01 | End: 2019-09-30 | Stop reason: SDUPTHER

## 2018-06-12 ENCOUNTER — HOSPITAL ENCOUNTER (OUTPATIENT)
Dept: RADIOLOGY | Facility: MEDICAL CENTER | Age: 63
End: 2018-06-12
Attending: FAMILY MEDICINE
Payer: MEDICAID

## 2018-06-12 DIAGNOSIS — N63.20 MASS OF LEFT BREAST: ICD-10-CM

## 2018-06-12 PROCEDURE — 76642 ULTRASOUND BREAST LIMITED: CPT | Mod: LT

## 2018-06-12 PROCEDURE — G0279 TOMOSYNTHESIS, MAMMO: HCPCS

## 2018-06-13 ENCOUNTER — TELEPHONE (OUTPATIENT)
Dept: RADIOLOGY | Facility: MEDICAL CENTER | Age: 63
End: 2018-06-13

## 2018-06-13 NOTE — TELEPHONE ENCOUNTER
LM REQUESTING EITHER EPIC COSIGN BE ACCEPTED OR PAPER ORDER BE SIGNED & RETURN FAXED/PT SCHEDULED TOMORROW FOR US BREAST BX/TML

## 2018-06-14 ENCOUNTER — TELEPHONE (OUTPATIENT)
Dept: RADIOLOGY | Facility: MEDICAL CENTER | Age: 63
End: 2018-06-14

## 2018-06-14 ENCOUNTER — HOSPITAL ENCOUNTER (OUTPATIENT)
Dept: RADIOLOGY | Facility: MEDICAL CENTER | Age: 63
End: 2018-06-14
Attending: FAMILY MEDICINE
Payer: MEDICAID

## 2018-06-14 ENCOUNTER — TELEPHONE (OUTPATIENT)
Dept: MEDICAL GROUP | Facility: MEDICAL CENTER | Age: 63
End: 2018-06-14

## 2018-06-14 DIAGNOSIS — R92.8 ABNORMAL FINDINGS ON DIAGNOSTIC IMAGING OF BREAST: ICD-10-CM

## 2018-06-14 PROCEDURE — 88305 TISSUE EXAM BY PATHOLOGIST: CPT

## 2018-06-14 PROCEDURE — 19083 BX BREAST 1ST LESION US IMAG: CPT

## 2018-06-14 NOTE — TELEPHONE ENCOUNTER
----- Message from Nikolas Rico sent at 6/13/2018  3:41 PM PDT -----  Regarding: Procedure Question  Contact: [phone]   please approve the bi-opsy on my left breast. It has been set for 6/14/2018 for 1pm 12:30 check-in. I told them you are not in office on Wed. but you are in office on Thurs. and Fri. .     Thanks,    Nikolas Rico

## 2018-06-14 NOTE — TELEPHONE ENCOUNTER
2ND MSSG LEFT: PT SCHED TODAY 6/14/18 FOR US BREAST BX; NEED DR TO ACCEPT COSIGN REQUEST OR SIGN FAXED PAPER ORDER REQUEST/TML

## 2018-06-15 ENCOUNTER — TELEPHONE (OUTPATIENT)
Dept: RADIOLOGY | Facility: MEDICAL CENTER | Age: 63
End: 2018-06-15

## 2018-06-18 DIAGNOSIS — R79.89 TSH ELEVATION: ICD-10-CM

## 2018-06-19 DIAGNOSIS — I10 ESSENTIAL HYPERTENSION: ICD-10-CM

## 2018-06-19 DIAGNOSIS — F41.9 ANXIETY: ICD-10-CM

## 2018-06-20 RX ORDER — CHOLECALCIFEROL (VITAMIN D3) 50 MCG
CAPSULE ORAL
Qty: 30 CAP | Refills: 1 | Status: SHIPPED | OUTPATIENT
Start: 2018-06-20 | End: 2018-08-29 | Stop reason: SDUPTHER

## 2018-06-20 RX ORDER — ATORVASTATIN CALCIUM 40 MG/1
TABLET, FILM COATED ORAL
Qty: 30 TAB | Refills: 1 | Status: SHIPPED | OUTPATIENT
Start: 2018-06-20 | End: 2018-09-25 | Stop reason: SDUPTHER

## 2018-06-20 RX ORDER — SYRINGE AND NEEDLE,INSULIN,1ML 29 G X1/2"
SYRINGE, EMPTY DISPOSABLE MISCELLANEOUS
Qty: 90 EACH | Refills: 1 | Status: SHIPPED | OUTPATIENT
Start: 2018-06-20 | End: 2018-08-27 | Stop reason: SDUPTHER

## 2018-06-20 RX ORDER — CLONIDINE HYDROCHLORIDE 0.1 MG/1
TABLET ORAL
Qty: 60 TAB | Refills: 0 | Status: SHIPPED | OUTPATIENT
Start: 2018-06-20 | End: 2018-07-24 | Stop reason: SDUPTHER

## 2018-06-26 RX ORDER — RANITIDINE 300 MG/1
TABLET ORAL
Qty: 30 TAB | Refills: 0 | Status: SHIPPED | OUTPATIENT
Start: 2018-06-26 | End: 2018-09-02 | Stop reason: SDUPTHER

## 2018-06-26 RX ORDER — LEVOTHYROXINE SODIUM 0.03 MG/1
TABLET ORAL
Qty: 30 TAB | Refills: 2 | Status: SHIPPED | OUTPATIENT
Start: 2018-06-26 | End: 2018-09-24 | Stop reason: SDUPTHER

## 2018-07-17 ENCOUNTER — OFFICE VISIT (OUTPATIENT)
Dept: MEDICAL GROUP | Facility: MEDICAL CENTER | Age: 63
End: 2018-07-17
Attending: FAMILY MEDICINE
Payer: MEDICAID

## 2018-07-17 VITALS
DIASTOLIC BLOOD PRESSURE: 60 MMHG | RESPIRATION RATE: 16 BRPM | OXYGEN SATURATION: 94 % | HEIGHT: 66 IN | HEART RATE: 92 BPM | SYSTOLIC BLOOD PRESSURE: 110 MMHG | BODY MASS INDEX: 28.61 KG/M2 | WEIGHT: 178 LBS | TEMPERATURE: 97.2 F

## 2018-07-17 DIAGNOSIS — M79.672 PAIN OF LEFT HEEL: ICD-10-CM

## 2018-07-17 DIAGNOSIS — I10 ESSENTIAL HYPERTENSION: ICD-10-CM

## 2018-07-17 DIAGNOSIS — E11.9 CONTROLLED TYPE 2 DIABETES MELLITUS WITHOUT COMPLICATION, WITH LONG-TERM CURRENT USE OF INSULIN (HCC): ICD-10-CM

## 2018-07-17 DIAGNOSIS — Z79.4 CONTROLLED TYPE 2 DIABETES MELLITUS WITHOUT COMPLICATION, WITH LONG-TERM CURRENT USE OF INSULIN (HCC): ICD-10-CM

## 2018-07-17 LAB
HBA1C MFR BLD: 6.3 % (ref ?–5.8)
INT CON NEG: NEGATIVE
INT CON POS: POSITIVE

## 2018-07-17 PROCEDURE — 83036 HEMOGLOBIN GLYCOSYLATED A1C: CPT | Performed by: FAMILY MEDICINE

## 2018-07-17 PROCEDURE — 99212 OFFICE O/P EST SF 10 MIN: CPT | Performed by: FAMILY MEDICINE

## 2018-07-17 PROCEDURE — 99214 OFFICE O/P EST MOD 30 MIN: CPT | Performed by: FAMILY MEDICINE

## 2018-07-17 RX ORDER — NAPROXEN 500 MG/1
500 TABLET ORAL 2 TIMES DAILY WITH MEALS
Qty: 35 TAB | Refills: 0 | Status: SHIPPED | OUTPATIENT
Start: 2018-07-17 | End: 2019-11-15

## 2018-07-17 ASSESSMENT — PAIN SCALES - GENERAL: PAINLEVEL: 3=SLIGHT PAIN

## 2018-07-17 NOTE — PROGRESS NOTES
Chief Complaint:   Chief Complaint   Patient presents with   • Diabetes Mellitus   • Pain     heel       HPI:Established patient   Nikolas Rico is a 62 y.o. male who presents for follow-up    Controlled type 2 diabetes mellitus without complication,    Blood sugar well controlled on as needed use of regular insulin for blood sugar above 150. Continues to take metformin. Denies side effects of medications or hypoglycemia, patient A1c at the office, 6.3. He continues to lose weight because of healthy lifestyle changes and diet control.         Essential hypertension  Normal blood pressure today. Blood pressure at the office 110/60. Continues to take all his medications as directed without side effect     Pain of left heel   Patient reports having this pain on his left heel for the past one month, especially when he wakes up at night and the first step morning, did not use any medication to resolve his pain, he denies joint pain. The patient is in the heel region only . He described the pain as sharp pain located and stays on the heel only without radiation        Past medical history, family history, social history and medications reviewed and updated in the record. Today  Current medications, problem list and allergies reviewed in [facility] . Today  Health maintenance topics are reviewed and updated. Today    Patient Active Problem List    Diagnosis Date Noted   • Schizoaffective disorder (HCC) 04/14/2017     Priority: High   • Tachycardia 03/07/2017     Priority: High   • Left anterior fascicular block 11/10/2016     Priority: High   • Essential hypertension 07/21/2016     Priority: High   • Diabetes type 2, controlled (CMS-[facility]) 01/26/2016     Priority: Medium   • COPD (chronic obstructive pulmonary disease) ([facility]) 12/19/2014     Priority: Medium   • Chronic HCV infection 12/04/2012     Priority: Medium   • CKD (chronic kidney disease) stage 3, GFR 30-59 ml/min      Priority: Low   • Dyslipidemia 04/23/2015  "    Priority: Low   • Bipolar 1 disorder, manic, moderate (CMS-[facility]) 11/22/2011     Priority: Low   • Thyroid nodule 04/19/2018   • Hx pulmonary embolism 08/30/2017   • Hyperlipidemia 08/11/2017   • Episodic confusion 04/28/2017   • Sleep apnea 04/28/2017   • Disorientation 02/21/2017   • Muscle mass of leg 08/11/2016   • Insulin dependent diabetes mellitus ([facility]) 05/24/2016   • Emphysema of lung ([facility]) 02/18/2016   • Type II or unspecified type diabetes mellitus without mention of complication, not stated as uncontrolled 06/25/2015   • Chronic respiratory failure, unspecified whether with hypoxia or hypercapnia ([facility]) 06/25/2015   • Elbow fracture, left 03/12/2013   • Psoriasis 10/23/2012   • Chronic foot pain 09/11/2012   • [place] filter in place 11/22/2011   • History of DVT (deep vein thrombosis) 11/22/2011     Family History   Problem Relation Age of Onset   • Cancer Father      blader ca     Social History     Social History   • Marital status: Single     Spouse name: N/A   • Number of children: N/A   • Years of education: N/A     Occupational History   • Not on file.     Social History Main Topics   • Smoking status: Former Smoker     Types: Cigarettes     Quit date: 3/6/2013   • Smokeless tobacco: Never Used   • Alcohol use 0.0 oz/week      Comment: sober since HCV diagnosis   • Drug use: Yes     Types: Methamphetamines      Comment: meth use.  Last use \"a couple weeks ago\", occasional marijuana use   • Sexual activity: Not Currently     Partners: Female     Other Topics Concern   • Not on file     Social History Narrative   • No narrative on file     Current Outpatient Prescriptions   Medication Sig Dispense Refill   • naproxen (NAPROSYN) 500 MG Tab Take 1 Tab by mouth 2 times a day, with meals. 35 Tab 0   • metoprolol SR (TOPROL XL) 25 MG TABLET SR 24 HR TAKE ONE TABLET BY MOUTH ONE TIME DAILY 30 Tab 1   • levothyroxine (SYNTHROID) 25 MCG Tab TAKE ONE TABLET BY MOUTH EVERY MORNING ON EMPTY STOMACH 30 Tab 2 " "  • ranitidine (ZANTAC) 300 MG tablet TAKE ONE TABLET BY MOUTH ONE TIME DAILY 30 Tab 0   • cloNIDine (CATAPRES) 0.1 MG Tab TAKE 1 TABLET ORALLY 2 TIMES DAILY 60 Tab 0   • ULTICARE INSULIN SYRINGE 29G X 1/2\" 0.5 ML Misc USE AS DIRECTED BY PHYSICIAN 90 Each 1   • D3 SUPER STRENGTH 2000 units Cap TAKE 1 CAPSULE ORALLY ONCE DAILY 30 Cap 1   • atorvastatin (LIPITOR) 40 MG Tab TAKE 1 TABLET ORALLY ONCE DAILY (TAKE WITH 20MG TABLET FOR 60MG) 30 Tab 1   • NOVOLOG MIX 70/30 (70-30) 100 UNIT/ML injection INJECT 10 UNITS SUBCUTANEOUSLY TWICE DAILY AS DIRECTED BY PRESCRIBER 20 mL 3   • ketoconazole (NIZORAL) 2 % shampoo APPLY AS DIRECED BY PHYSICIAN 120 mL 1   • TRUE METRIX BLOOD GLUCOSE TEST strip USE TO CHECK FASTING BLOOD SUGAR TWICE DAILY AND AS NEEDED 100 Strip 4   • atorvastatin (LIPITOR) 20 MG Tab TAKE 1 TABLET ORALLY ONCE DAILY 30 Tab 4   • atorvastatin (LIPITOR) 40 MG Tab TAKE 1 TABLET ORALLY ONCE DAILY (TAKE WITH 20MG TABLET FOR 60MG) 30 Tab 4   • metFORMIN (GLUCOPHAGE) 850 MG Tab TAKE ONE TABLET BY MOUTH TWICE DAILY WITH FOOD 120 Tab 1   • metoprolol SR (TOPROL XL) 25 MG TABLET SR 24 HR TAKE ONE TABLET BY MOUTH ONE TIME DAILY 30 Tab 2   • lisinopril (PRINIVIL, ZESTRIL) 40 MG tablet TAKE ONE TABLET BY MOUTH ONE TIME DAILY 30 Tab 5   • ASPIRIN LOW DOSE 81 MG EC tablet TAKE 1 TABLET ORALLY ONCE DAILY 30 Tab 10   • metoprolol SR (TOPROL XL) 25 MG TABLET SR 24 HR TAKE ONE TABLET BY MOUTH ONE TIME DAILY 30 Tab 3   • Misc. Devices Misc Check FS 2 times daily and as needed 60 Application 5   • MethylPREDNISolone (MEDROL DOSEPAK) 4 MG Tablet Therapy Pack Take 1 Tab by mouth every day. 1 Kit 0   • amlodipine (NORVASC) 10 MG Tab Take 1 Tab by mouth every day. 30 Tab 11   • atorvastatin (LIPITOR) 40 MG Tab Take 1 Tab by mouth every day. 30 Tab 5   • ZEPATIER  MG Tab      • ziprasidone (GEODON) 80 MG Cap Take 1 Cap by mouth 2 Times a Day. 60 Cap 0   • temazepam (RESTORIL) 30 MG capsule      • ranitidine (ZANTAC) 150 MG Tab " "Take 2 Tabs by mouth 1 time daily as needed for Heartburn. 30 Tab 1   • fluticasone (FLOVENT HFA) 110 MCG/ACT Aerosol Inhale 2 Puffs by mouth 2 times a day.     • tiotropium (SPIRIVA) 18 MCG Cap Inhale 18 mcg by mouth every day.     • VENTOLIN  (90 BASE) MCG/ACT Aero Soln inhalation aerosol INHALE 2 PUFFS BY MOUTH EVERY 8 HOURS ASNEEDED FOR SHORTNESS OF BREATH 1 Inhaler 7     No current facility-administered medications for this visit.          Review Of Systems  As documented in HPI above  PHYSICAL EXAMINATION:    /60   Pulse 92   Temp 36.2 °C (97.2 °F)   Resp 16   Ht 1.676 m (5' 5.98\")   Wt 80.7 kg (178 lb)   SpO2 94%   BMI 28.74 kg/m²   Gen.: Well-developed, well-nourished, no apparent distress, pleasant and cooperative with the examination  HEENT: Normocephalic/atraumatic, sinuses nontender with palpation, TMs clear, nares patent with pink mucosa and clear rhinorrhea, oropharynx clear  Neck: No JVD or bruits, no adenopathy  Cor: Regular rate and rhythm without murmur gallop or rub  Lungs: Clear to auscultation with equal breath sounds bilaterally. No wheezes, rhonchi.  Abdomen: Soft nontender without hepatosplenomegaly or masses appreciated, normoactive bowel sounds  Extremities: Bilateral no edema, left heel with dry skin. No tenderness. No deformity noted on both lower extremities    ASSESSMENT/Plan:  1. Controlled type 2 diabetes mellitus without complication, with long-term current use of insulin (HCC)  , Controlled on medications. Continue same regimen  REFERRAL TO OPHTHALMOLOGY    POCT  A1C   2. Essential hypertension  , Controlled. Continue same regimen    3. Pain of left heel  , Possibly plantar fasciitis. Advised to use naproxen as directed, special exercise for fascia stretching discussed with the patient, if not resolved patient to come back for x-ray to rule out bone spur    naproxen (NAPROSYN) 500 MG Tab     Please note that this dictation was created using voice recognition " software. I have worked with consultants from the vendor as well as technical experts from [facility] to optimize the interface. I have made every reasonable attempt to correct obvious errors, but I expect that there are errors of grammar and possibly content that I did not discover before finalizing the note.

## 2018-08-28 RX ORDER — SYRINGE AND NEEDLE,INSULIN,1ML 29 G X1/2"
SYRINGE, EMPTY DISPOSABLE MISCELLANEOUS
Qty: 90 EACH | Refills: 6 | Status: SHIPPED | OUTPATIENT
Start: 2018-08-28 | End: 2019-03-28 | Stop reason: SDUPTHER

## 2018-08-30 RX ORDER — CHOLECALCIFEROL (VITAMIN D3) 50 MCG
CAPSULE ORAL
Qty: 30 CAP | Refills: 2 | Status: SHIPPED
Start: 2018-08-30 | End: 2019-12-31

## 2018-09-04 RX ORDER — RANITIDINE 300 MG/1
300 TABLET ORAL
Qty: 30 TAB | Refills: 1 | Status: SHIPPED | OUTPATIENT
Start: 2018-09-04 | End: 2018-11-08 | Stop reason: SDUPTHER

## 2018-09-04 RX ORDER — CALCIUM CITRATE/VITAMIN D3 200MG-6.25
TABLET ORAL
Qty: 100 STRIP | Refills: 4 | Status: SHIPPED | OUTPATIENT
Start: 2018-09-04 | End: 2019-06-18 | Stop reason: SDUPTHER

## 2018-09-24 DIAGNOSIS — R79.89 TSH ELEVATION: ICD-10-CM

## 2018-09-25 ENCOUNTER — HOSPITAL ENCOUNTER (OUTPATIENT)
Dept: LAB | Facility: MEDICAL CENTER | Age: 63
End: 2018-09-25
Attending: PHYSICIAN ASSISTANT
Payer: MEDICAID

## 2018-09-25 LAB
ALBUMIN SERPL BCP-MCNC: 4.5 G/DL (ref 3.2–4.9)
ALP SERPL-CCNC: 79 U/L (ref 30–99)
ALT SERPL-CCNC: 12 U/L (ref 2–50)
AST SERPL-CCNC: 15 U/L (ref 12–45)
BILIRUB CONJ SERPL-MCNC: 0.2 MG/DL (ref 0.1–0.5)
BILIRUB INDIRECT SERPL-MCNC: 0.4 MG/DL (ref 0–1)
BILIRUB SERPL-MCNC: 0.6 MG/DL (ref 0.1–1.5)
PROT SERPL-MCNC: 7.8 G/DL (ref 6–8.2)

## 2018-09-25 PROCEDURE — 36415 COLL VENOUS BLD VENIPUNCTURE: CPT

## 2018-09-25 PROCEDURE — 80076 HEPATIC FUNCTION PANEL: CPT

## 2018-09-25 PROCEDURE — 87522 HEPATITIS C REVRS TRNSCRPJ: CPT

## 2018-09-25 RX ORDER — LEVOTHYROXINE SODIUM 0.03 MG/1
25 TABLET ORAL
Qty: 30 TAB | Refills: 6 | Status: SHIPPED | OUTPATIENT
Start: 2018-09-25 | End: 2019-05-01 | Stop reason: SDUPTHER

## 2018-09-28 LAB
HCV RNA SERPL NAA+PROBE-ACNC: NORMAL IU/ML
TEST INFO  93644: NORMAL

## 2018-10-18 ENCOUNTER — OFFICE VISIT (OUTPATIENT)
Dept: MEDICAL GROUP | Facility: MEDICAL CENTER | Age: 63
End: 2018-10-18
Attending: FAMILY MEDICINE
Payer: MEDICAID

## 2018-10-18 VITALS
HEIGHT: 66 IN | SYSTOLIC BLOOD PRESSURE: 112 MMHG | RESPIRATION RATE: 18 BRPM | DIASTOLIC BLOOD PRESSURE: 66 MMHG | BODY MASS INDEX: 27.16 KG/M2 | WEIGHT: 169 LBS | HEART RATE: 87 BPM | OXYGEN SATURATION: 95 % | TEMPERATURE: 98.1 F

## 2018-10-18 DIAGNOSIS — I10 ESSENTIAL HYPERTENSION: ICD-10-CM

## 2018-10-18 DIAGNOSIS — Z79.4 CONTROLLED TYPE 2 DIABETES MELLITUS WITHOUT COMPLICATION, WITH LONG-TERM CURRENT USE OF INSULIN (HCC): ICD-10-CM

## 2018-10-18 DIAGNOSIS — E11.9 CONTROLLED TYPE 2 DIABETES MELLITUS WITHOUT COMPLICATION, WITH LONG-TERM CURRENT USE OF INSULIN (HCC): ICD-10-CM

## 2018-10-18 DIAGNOSIS — E66.3 OVER WEIGHT: ICD-10-CM

## 2018-10-18 PROCEDURE — 99213 OFFICE O/P EST LOW 20 MIN: CPT | Performed by: FAMILY MEDICINE

## 2018-10-18 NOTE — PROGRESS NOTES
Chief Complaint:   Chief Complaint   Patient presents with   • Follow-Up     diabetes       HPI:Established patient   Nikolas Rico is a 62 y.o. male who presents for follow-up    Essential hypertension  . Blood pressure well controlled. Today's blood pressure at the office 112/66, patient continues to take his lisinopril 40 mg and amlodipine and he is on metoprolol. Daily without side effects. Denies headache or chest pain or cough or shortness of breath or lower extremity edema    Controlled type 2 diabetes mellitus without complication, with long-term current use of insulin ([facility])   A1c at goal at 6.3, patient on metformin 850 mg twice a day. Denies side effects of medication    Over weight     Has been losing weight by exercising on a regular basis. Patient said he walks about 5 miles daily. Feels great. As per the patient report and very proud of himself that his pain losing weight intentionally in a healthy way. Today's weight is around 169, lost around 8 pounds since last visit        Past medical history, family history, social history and medications reviewed and updated in the record. Today   Current medications, problem list and allergies reviewed in [facility] today   Health maintenance topics are reviewed and updated. Today     Patient Active Problem List    Diagnosis Date Noted   • Schizoaffective disorder ([facility]) 04/14/2017     Priority: High   • Tachycardia 03/07/2017     Priority: High   • Left anterior fascicular block 11/10/2016     Priority: High   • Essential hypertension 07/21/2016     Priority: High   • Diabetes type 2, controlled (CMS-HCC) 01/26/2016     Priority: Medium   • COPD (chronic obstructive pulmonary disease) ([facility]) 12/19/2014     Priority: Medium   • Chronic HCV infection 12/04/2012     Priority: Medium   • CKD (chronic kidney disease) stage 3, GFR 30-59 ml/min ([facility])      Priority: Low   • Dyslipidemia 04/23/2015     Priority: Low   • Bipolar 1 disorder, manic, moderate (CMS-HCC)  "11/22/2011     Priority: Low   • Over weight 10/18/2018   • Thyroid nodule 04/19/2018   • Hx pulmonary embolism 08/30/2017   • Hyperlipidemia 08/11/2017   • Episodic confusion 04/28/2017   • Sleep apnea 04/28/2017   • Disorientation 02/21/2017   • Muscle mass of leg 08/11/2016   • Insulin dependent diabetes mellitus (HCC) 05/24/2016   • Emphysema of lung (HCC) 02/18/2016   • Type II or unspecified type diabetes mellitus without mention of complication, not stated as uncontrolled 06/25/2015   • Chronic respiratory failure, unspecified whether with hypoxia or hypercapnia (HCC) 06/25/2015   • Elbow fracture, left 03/12/2013   • Psoriasis 10/23/2012   • Chronic foot pain 09/11/2012   • [place] filter in place 11/22/2011   • History of DVT (deep vein thrombosis) 11/22/2011     Family History   Problem Relation Age of Onset   • Cancer Father         blader ca     Social History     Social History   • Marital status: Single     Spouse name: N/A   • Number of children: N/A   • Years of education: N/A     Occupational History   • Not on file.     Social History Main Topics   • Smoking status: Former Smoker     Types: Cigarettes     Quit date: 3/6/2013   • Smokeless tobacco: Never Used   • Alcohol use 0.0 oz/week      Comment: sober since HCV diagnosis   • Drug use: Yes     Types: Methamphetamines      Comment: meth use.  Last use \"a couple weeks ago\", occasional marijuana use   • Sexual activity: Not Currently     Partners: Female     Other Topics Concern   • Not on file     Social History Narrative   • No narrative on file       Current Outpatient Prescriptions   Medication Sig Dispense Refill   • atorvastatin (LIPITOR) 40 MG Tab TAKE 1 TABLET ORALLY ONCE DAILY (TAKE WITH 20MG TABLET FOR TOTAL OF 60MG) 30 Tab 6   • atorvastatin (LIPITOR) 20 MG Tab TAKE 1 TABLET ORALLY ONCE DAILY (TAKE WITH 40MG TABLET FOR 60MG TOTAL) 30 Tab 6   • amLODIPine (NORVASC) 10 MG Tab TAKE ONE TABLET BY MOUTH ONE TIME DAILY 30 Tab 6   • " "levothyroxine (SYNTHROID) 25 MCG Tab Take 1 Tab by mouth every morning before breakfast. TAKE ONE TABLET BY MOUTH EVERY MORNING ON EMPTY STOMACH 30 Tab 6   • TRUE METRIX BLOOD GLUCOSE TEST strip USE TO CHECK FASTING BLOOD SUGAR TWICE DAILY AND AS NEEDED 100 Strip 4   • ranitidine (ZANTAC) 300 MG tablet Take 1 Tab by mouth 1 time daily as needed for Heartburn. 30 Tab 1   • D3 SUPER STRENGTH 2000 units Cap TAKE 1 CAPSULE ORALLY ONCE DAILY 30 Cap 2   • ULTICARE INSULIN SYRINGE 29G X 1/2\" 0.5 ML Misc USE AS DIRECTED BY PHYSICIAN 90 Each 6   • metFORMIN (GLUCOPHAGE) 850 MG Tab TAKE ONE TABLET BY MOUTH TWICE DAILY WITH FOOD 120 Tab 6   • metoprolol SR (TOPROL XL) 25 MG TABLET SR 24 HR TAKE ONE TABLET BY MOUTH ONE TIME DAILY 30 Tab 6   • lisinopril (PRINIVIL, ZESTRIL) 40 MG tablet TAKE ONE TABLET BY MOUTH ONE TIME DAILY 30 Tab 4   • cloNIDine (CATAPRES) 0.1 MG Tab TAKE 1 TABLET ORALLY 2 TIMES DAILY 60 Tab 5   • naproxen (NAPROSYN) 500 MG Tab Take 1 Tab by mouth 2 times a day, with meals. 35 Tab 0   • metoprolol SR (TOPROL XL) 25 MG TABLET SR 24 HR TAKE ONE TABLET BY MOUTH ONE TIME DAILY 30 Tab 1   • NOVOLOG MIX 70/30 (70-30) 100 UNIT/ML injection INJECT 10 UNITS SUBCUTANEOUSLY TWICE DAILY AS DIRECTED BY PRESCRIBER 20 mL 3   • ketoconazole (NIZORAL) 2 % shampoo APPLY AS DIRECED BY PHYSICIAN 120 mL 1   • atorvastatin (LIPITOR) 40 MG Tab TAKE 1 TABLET ORALLY ONCE DAILY (TAKE WITH 20MG TABLET FOR 60MG) 30 Tab 4   • metoprolol SR (TOPROL XL) 25 MG TABLET SR 24 HR TAKE ONE TABLET BY MOUTH ONE TIME DAILY 30 Tab 2   • ASPIRIN LOW DOSE 81 MG EC tablet TAKE 1 TABLET ORALLY ONCE DAILY 30 Tab 10   • metoprolol SR (TOPROL XL) 25 MG TABLET SR 24 HR TAKE ONE TABLET BY MOUTH ONE TIME DAILY 30 Tab 3   • Misc. Devices Misc Check FS 2 times daily and as needed 60 Application 5   • MethylPREDNISolone (MEDROL DOSEPAK) 4 MG Tablet Therapy Pack Take 1 Tab by mouth every day. 1 Kit 0   • atorvastatin (LIPITOR) 40 MG Tab Take 1 Tab by mouth every " "day. 30 Tab 5   • ZEPATIER  MG Tab      • ziprasidone (GEODON) 80 MG Cap Take 1 Cap by mouth 2 Times a Day. 60 Cap 0   • temazepam (RESTORIL) 30 MG capsule      • ranitidine (ZANTAC) 150 MG Tab Take 2 Tabs by mouth 1 time daily as needed for Heartburn. 30 Tab 1   • fluticasone (FLOVENT HFA) 110 MCG/ACT Aerosol Inhale 2 Puffs by mouth 2 times a day.     • tiotropium (SPIRIVA) 18 MCG Cap Inhale 18 mcg by mouth every day.     • VENTOLIN  (90 BASE) MCG/ACT Aero Soln inhalation aerosol INHALE 2 PUFFS BY MOUTH EVERY 8 HOURS ASNEEDED FOR SHORTNESS OF BREATH 1 Inhaler 7     No current facility-administered medications for this visit.          Review Of Systems  As documented in HPI above  PHYSICAL EXAMINATION:    /66 (BP Location: Left arm, Patient Position: Sitting, BP Cuff Size: Adult)   Pulse 87   Temp 36.7 °C (98.1 °F) (Temporal)   Resp 18   Ht 1.676 m (5' 6\")   Wt 76.7 kg (169 lb)   SpO2 95%   BMI 27.28 kg/m²        Gen.: Well-developed, well-nourished, no apparent distress, pleasant and cooperative with the examination  HEENT: Normocephalic/atraumatic,       Neck: No JVD or bruits, no adenopathy  Cor: Regular rate and rhythm without murmur gallop or rub  Lungs: Clear to auscultation with equal breath sounds bilaterally. No wheezes, rhonchi.  Abdomen: Soft nontender without hepatosplenomegaly or masses appreciated, normoactive bowel sounds  Extremities: No cyanosis, clubbing or edema          ASSESSMENT/Plan:      1. Essential hypertension  , Controlled on medications. Continue same regimen. No side effects    2. Controlled type 2 diabetes mellitus without complication, with long-term current use of insulin (HCC)  , Controlled on metformin. Continue same regimen. A1c at goal    3. Over weight  . Continue exercise and healthy lifestyle changes and diet control. Patient BMI dropped from obesity. 2. Overweight. BMI at this time at rest 27        Please note that this dictation was created using " voice recognition software. I have made every reasonable attempt to correct obvious errors but there may be errors of grammar and content that I may have overlooked prior to finalization of this note.

## 2018-11-20 ENCOUNTER — TELEPHONE (OUTPATIENT)
Dept: MEDICAL GROUP | Facility: MEDICAL CENTER | Age: 63
End: 2018-11-20

## 2018-11-20 DIAGNOSIS — Z87.898 HX OF BREAST LUMP: ICD-10-CM

## 2018-11-20 NOTE — TELEPHONE ENCOUNTER
Please notify patient order placed he needs to contact radiology department to schedule an appointment.  Thank you

## 2018-11-20 NOTE — TELEPHONE ENCOUNTER
Pt lvm stating he received a letter from [facility] radiology asking him to repeat his mammogram from 06/14/18 because something was found. The asked pt to request an order be placed in epic for another mammogram

## 2018-11-21 ENCOUNTER — TELEPHONE (OUTPATIENT)
Dept: MEDICAL GROUP | Facility: MEDICAL CENTER | Age: 63
End: 2018-11-21

## 2018-11-21 NOTE — TELEPHONE ENCOUNTER
1. Caller Name: [facility] Sched[facility]                                         Call Back Number: [phone] Ext [phone]      Patient approves a detailed voicemail message: yes    Mey called regarding the MRI Ordered on 11/20/18. Needs clarification. Are you wanting a 6 Month f/u diagnostic MRI or an MRI of the Left breast. Please advise.

## 2018-11-22 DIAGNOSIS — N63.0 BREAST LUMP: ICD-10-CM

## 2018-12-12 ENCOUNTER — TELEPHONE (OUTPATIENT)
Dept: MEDICAL GROUP | Facility: MEDICAL CENTER | Age: 63
End: 2018-12-12

## 2018-12-12 NOTE — TELEPHONE ENCOUNTER
[facility] called in [phone]. States the pt needs to have a lab order done prior to the pts imaging. States it can be either a Basic metabolic panel, creatinine serum or renal function panel. States the pt has his procedure on Monday.  Thank you, please advise.

## 2018-12-13 DIAGNOSIS — E11.9 TYPE 2 DIABETES MELLITUS WITHOUT COMPLICATION, WITH LONG-TERM CURRENT USE OF INSULIN (HCC): ICD-10-CM

## 2018-12-13 DIAGNOSIS — Z79.4 TYPE 2 DIABETES MELLITUS WITHOUT COMPLICATION, WITH LONG-TERM CURRENT USE OF INSULIN (HCC): ICD-10-CM

## 2018-12-17 ENCOUNTER — APPOINTMENT (OUTPATIENT)
Dept: RADIOLOGY | Facility: MEDICAL CENTER | Age: 63
End: 2018-12-17
Attending: FAMILY MEDICINE
Payer: MEDICAID

## 2018-12-26 DIAGNOSIS — I10 ESSENTIAL HYPERTENSION: ICD-10-CM

## 2018-12-27 RX ORDER — LISINOPRIL 40 MG/1
TABLET ORAL
Qty: 30 TAB | Refills: 0 | Status: SHIPPED | OUTPATIENT
Start: 2018-12-27 | End: 2019-01-26 | Stop reason: SDUPTHER

## 2019-01-10 ENCOUNTER — HOSPITAL ENCOUNTER (OUTPATIENT)
Dept: RADIOLOGY | Facility: MEDICAL CENTER | Age: 64
End: 2019-01-10
Attending: FAMILY MEDICINE
Payer: MEDICAID

## 2019-01-10 ENCOUNTER — TELEPHONE (OUTPATIENT)
Dept: MEDICAL GROUP | Facility: MEDICAL CENTER | Age: 64
End: 2019-01-10

## 2019-01-10 ENCOUNTER — HOSPITAL ENCOUNTER (OUTPATIENT)
Dept: LAB | Facility: MEDICAL CENTER | Age: 64
End: 2019-01-10
Attending: FAMILY MEDICINE
Payer: MEDICAID

## 2019-01-10 DIAGNOSIS — N63.0 BREAST LUMP: ICD-10-CM

## 2019-01-10 DIAGNOSIS — E04.1 THYROID NODULE: ICD-10-CM

## 2019-01-10 DIAGNOSIS — N63.0 BREAST MASS IN MALE: ICD-10-CM

## 2019-01-10 DIAGNOSIS — E11.9 TYPE 2 DIABETES MELLITUS WITHOUT COMPLICATION, WITH LONG-TERM CURRENT USE OF INSULIN (HCC): ICD-10-CM

## 2019-01-10 DIAGNOSIS — Z79.4 TYPE 2 DIABETES MELLITUS WITHOUT COMPLICATION, WITH LONG-TERM CURRENT USE OF INSULIN (HCC): ICD-10-CM

## 2019-01-10 LAB
ALBUMIN SERPL BCP-MCNC: 4.4 G/DL (ref 3.2–4.9)
ALBUMIN/GLOB SERPL: 1.5 G/DL
ALP SERPL-CCNC: 64 U/L (ref 30–99)
ALT SERPL-CCNC: 10 U/L (ref 2–50)
ANION GAP SERPL CALC-SCNC: 5 MMOL/L (ref 0–11.9)
AST SERPL-CCNC: 15 U/L (ref 12–45)
BILIRUB SERPL-MCNC: 0.5 MG/DL (ref 0.1–1.5)
BUN SERPL-MCNC: 17 MG/DL (ref 8–22)
CALCIUM SERPL-MCNC: 9.6 MG/DL (ref 8.5–10.5)
CHLORIDE SERPL-SCNC: 108 MMOL/L (ref 96–112)
CO2 SERPL-SCNC: 28 MMOL/L (ref 20–33)
CREAT BLD-MCNC: 0.9 MG/DL (ref 0.5–1.4)
CREAT SERPL-MCNC: 0.96 MG/DL (ref 0.5–1.4)
GLOBULIN SER CALC-MCNC: 2.9 G/DL (ref 1.9–3.5)
GLUCOSE SERPL-MCNC: 104 MG/DL (ref 65–99)
POTASSIUM SERPL-SCNC: 3.8 MMOL/L (ref 3.6–5.5)
PROT SERPL-MCNC: 7.3 G/DL (ref 6–8.2)
SODIUM SERPL-SCNC: 141 MMOL/L (ref 135–145)
T4 FREE SERPL-MCNC: 0.86 NG/DL (ref 0.53–1.43)
TSH SERPL DL<=0.005 MIU/L-ACNC: 1.88 UIU/ML (ref 0.38–5.33)

## 2019-01-10 PROCEDURE — 82565 ASSAY OF CREATININE: CPT

## 2019-01-10 PROCEDURE — 84443 ASSAY THYROID STIM HORMONE: CPT

## 2019-01-10 PROCEDURE — A9585 GADOBUTROL INJECTION: HCPCS | Performed by: FAMILY MEDICINE

## 2019-01-10 PROCEDURE — C8908 MRI W/O FOL W/CONT, BREAST,: HCPCS

## 2019-01-10 PROCEDURE — 36415 COLL VENOUS BLD VENIPUNCTURE: CPT

## 2019-01-10 PROCEDURE — 84439 ASSAY OF FREE THYROXINE: CPT

## 2019-01-10 PROCEDURE — 80053 COMPREHEN METABOLIC PANEL: CPT

## 2019-01-10 PROCEDURE — 700117 HCHG RX CONTRAST REV CODE 255: Performed by: FAMILY MEDICINE

## 2019-01-10 RX ORDER — GADOBUTROL 604.72 MG/ML
7.5 INJECTION INTRAVENOUS ONCE
Status: COMPLETED | OUTPATIENT
Start: 2019-01-10 | End: 2019-01-10

## 2019-01-10 RX ADMIN — GADOBUTROL 7.5 ML: 604.72 INJECTION INTRAVENOUS at 14:40

## 2019-01-11 NOTE — TELEPHONE ENCOUNTER
Phone Number Called: [phone] (home)       Message: Called patient and left message stating that a referral was placed for surgeon, and to please call us back in regards to this matter.     Left Message for patient to call back: yes

## 2019-01-11 NOTE — TELEPHONE ENCOUNTER
I received a call from radiology, in regards to recent MRI findings on the left breast mass, radiologist think it suspicious for malignancy, this mass is status post biopsy which revealed benign findings but now repeated MRI shows possible malignancy radiologist recommends surgical referral for complete excision of the mass and further management.    Please call patient let him know that there is referral to the surgeon for him to have complete excision of the mass and further evaluation.  Surgical referral placed as urgent.  Thank you

## 2019-01-26 DIAGNOSIS — I10 ESSENTIAL HYPERTENSION: ICD-10-CM

## 2019-01-28 DIAGNOSIS — I10 ESSENTIAL HYPERTENSION: ICD-10-CM

## 2019-01-28 DIAGNOSIS — F41.9 ANXIETY: ICD-10-CM

## 2019-01-29 RX ORDER — CLONIDINE HYDROCHLORIDE 0.1 MG/1
TABLET ORAL
Qty: 60 TAB | Refills: 0 | Status: SHIPPED | OUTPATIENT
Start: 2019-01-29 | End: 2019-02-27 | Stop reason: SDUPTHER

## 2019-01-29 RX ORDER — LISINOPRIL 40 MG/1
TABLET ORAL
Qty: 30 TAB | Refills: 6 | Status: SHIPPED | OUTPATIENT
Start: 2019-01-29 | End: 2019-08-29 | Stop reason: SDUPTHER

## 2019-01-30 ENCOUNTER — HOSPITAL ENCOUNTER (OUTPATIENT)
Dept: LAB | Facility: MEDICAL CENTER | Age: 64
End: 2019-01-30
Attending: SURGERY
Payer: MEDICAID

## 2019-01-30 LAB — PATHOLOGY CONSULT NOTE: NORMAL

## 2019-01-30 PROCEDURE — 88305 TISSUE EXAM BY PATHOLOGIST: CPT

## 2019-02-27 DIAGNOSIS — F41.9 ANXIETY: ICD-10-CM

## 2019-02-27 DIAGNOSIS — I10 ESSENTIAL HYPERTENSION: ICD-10-CM

## 2019-02-28 RX ORDER — CLONIDINE HYDROCHLORIDE 0.1 MG/1
TABLET ORAL
Qty: 60 TAB | Refills: 2 | Status: SHIPPED | OUTPATIENT
Start: 2019-02-28 | End: 2019-05-29 | Stop reason: SDUPTHER

## 2019-04-04 ENCOUNTER — OFFICE VISIT (OUTPATIENT)
Dept: MEDICAL GROUP | Facility: MEDICAL CENTER | Age: 64
End: 2019-04-04
Attending: FAMILY MEDICINE
Payer: MEDICAID

## 2019-04-04 VITALS
TEMPERATURE: 98.9 F | HEIGHT: 66 IN | DIASTOLIC BLOOD PRESSURE: 80 MMHG | RESPIRATION RATE: 16 BRPM | WEIGHT: 161 LBS | BODY MASS INDEX: 25.88 KG/M2 | HEART RATE: 92 BPM | OXYGEN SATURATION: 94 % | SYSTOLIC BLOOD PRESSURE: 124 MMHG

## 2019-04-04 DIAGNOSIS — R05.9 COUGH: ICD-10-CM

## 2019-04-04 DIAGNOSIS — E11.9 CONTROLLED TYPE 2 DIABETES MELLITUS WITHOUT COMPLICATION, WITH LONG-TERM CURRENT USE OF INSULIN (HCC): ICD-10-CM

## 2019-04-04 DIAGNOSIS — I10 ESSENTIAL HYPERTENSION: ICD-10-CM

## 2019-04-04 DIAGNOSIS — Z79.4 CONTROLLED TYPE 2 DIABETES MELLITUS WITHOUT COMPLICATION, WITH LONG-TERM CURRENT USE OF INSULIN (HCC): ICD-10-CM

## 2019-04-04 LAB
HBA1C MFR BLD: 6.1 % (ref 0–5.6)
INT CON NEG: NEGATIVE
INT CON POS: POSITIVE

## 2019-04-04 PROCEDURE — 99213 OFFICE O/P EST LOW 20 MIN: CPT | Performed by: FAMILY MEDICINE

## 2019-04-04 PROCEDURE — 83036 HEMOGLOBIN GLYCOSYLATED A1C: CPT | Performed by: FAMILY MEDICINE

## 2019-04-04 PROCEDURE — 99214 OFFICE O/P EST MOD 30 MIN: CPT | Performed by: FAMILY MEDICINE

## 2019-04-04 RX ORDER — GUAIFENESIN AND DEXTROMETHORPHAN HYDROBROMIDE 100; 10 MG/5ML; MG/5ML
10 SOLUTION ORAL EVERY 6 HOURS PRN
Qty: 840 ML | Refills: 0 | Status: SHIPPED | OUTPATIENT
Start: 2019-04-04 | End: 2019-11-15

## 2019-04-04 RX ORDER — AZITHROMYCIN 250 MG/1
TABLET, FILM COATED ORAL
Qty: 6 QUANTITY SUFFICIENT | Refills: 0 | Status: SHIPPED | OUTPATIENT
Start: 2019-04-04 | End: 2019-11-15

## 2019-04-04 ASSESSMENT — PAIN SCALES - GENERAL: PAINLEVEL: 2=MINIMAL-SLIGHT

## 2019-04-04 NOTE — PROGRESS NOTES
Chief Complaint:   Chief Complaint   Patient presents with   • Annual Exam   • Medication Refill       HPI: Established patient  Nikolas Rico is a 63 y.o. male who presents for evaluation:     Essential hypertension  Blood pressure today within normal limits 124 hours T patient continues to take all his meds directed denies side effects denies chest pain shortness of breath or lower extremity edema, has this cough has been going on as part of upper respiratory symptoms for the past 1 month    Controlled type 2 diabetes mellitus   Continues to take his metformin as directed A1c 6.1 denies side effects no concerns today.     Cough    Patient reports that he has been having this cough for the past 1 month, started initially with typical flulike symptoms that he describes as feeling tired fatigued upper respiratory tract symptoms described as congestion and subjective fever body ache and then cough the cough did not resolve although other symptoms has resolved, cough is productive with yellowish discoloration of the sputum and now it is becoming green in color.  Sometimes feels some mild shortness of breath no chest pain        Past medical history, family history, social history and medications reviewed and updated in the record. today  Current medications, problem list and allergies reviewed in EPIC today  Health maintenance topics are reviewed and updated.    Patient Active Problem List    Diagnosis Date Noted   • Schizoaffective disorder ([facility]) 04/14/2017     Priority: High   • Tachycardia 03/07/2017     Priority: High   • Left anterior fascicular block 11/10/2016     Priority: High   • Essential hypertension 07/21/2016     Priority: High   • Diabetes type 2, controlled (CMS-[facility]) 01/26/2016     Priority: Medium   • COPD (chronic obstructive pulmonary disease) ([facility]) 12/19/2014     Priority: Medium   • Chronic HCV infection 12/04/2012     Priority: Medium   • CKD (chronic kidney disease) stage 3, GFR 30-59  "ml/min ([facility])      Priority: Low   • Dyslipidemia 04/23/2015     Priority: Low   • Bipolar 1 disorder, manic, moderate (CMS-[facility]) 11/22/2011     Priority: Low   • Over weight 10/18/2018   • Thyroid nodule 04/19/2018   • Hx pulmonary embolism 08/30/2017   • Hyperlipidemia 08/11/2017   • Episodic confusion 04/28/2017   • Sleep apnea 04/28/2017   • Disorientation 02/21/2017   • Muscle mass of leg 08/11/2016   • Insulin dependent diabetes mellitus ([facility]) 05/24/2016   • Emphysema of lung ([facility]) 02/18/2016   • Type II or unspecified type diabetes mellitus without mention of complication, not stated as uncontrolled 06/25/2015   • Chronic respiratory failure, unspecified whether with hypoxia or hypercapnia ([facility]) 06/25/2015   • Elbow fracture, left 03/12/2013   • Psoriasis 10/23/2012   • Chronic foot pain 09/11/2012   • [place] filter in place 11/22/2011   • History of DVT (deep vein thrombosis) 11/22/2011     Family History   Problem Relation Age of Onset   • Cancer Father         blader ca     Social History     Social History   • Marital status: Single     Spouse name: N/A   • Number of children: N/A   • Years of education: N/A     Occupational History   • Not on file.     Social History Main Topics   • Smoking status: Former Smoker     Types: Cigarettes     Quit date: 3/6/2013   • Smokeless tobacco: Never Used   • Alcohol use 0.0 oz/week      Comment: sober since HCV diagnosis   • Drug use: Yes     Types: Methamphetamines      Comment: meth use.  Last use \"a couple weeks ago\", occasional marijuana use   • Sexual activity: Not Currently     Partners: Female     Other Topics Concern   • Not on file     Social History Narrative   • No narrative on file     Current Outpatient Prescriptions   Medication Sig Dispense Refill   • azithromycin (ZITHROMAX) 250 MG Tab 2 tab first day , then 1 tab daily x 4 days 6 Quantity Sufficient 0   • Dextromethorphan-Guaifenesin (TUSSIN DM)  MG/5ML Syrup Take 10 mL by mouth every 6 hours " "as needed. 840 mL 0   • ULTICARE INSULIN SYRINGE 29G X 1/2\" 0.5 ML Misc USE AS DIRECTED BY PHYSICIAN 90 Each 1   • cloNIDine (CATAPRES) 0.1 MG Tab TAKE 1 TABLET ORALLY 2 TIMES DAILY 60 Tab 2   • lisinopril (PRINIVIL, ZESTRIL) 40 MG tablet TAKE ONE TABLET BY MOUTH ONE TIME DAILY 30 Tab 6   • aspirin (ASA) 81 MG Chew Tab chewable tablet TAKE 1 TABLET ORALLY ONCE DAILY 30 Tab 6   • atorvastatin (LIPITOR) 20 MG Tab TAKE 1 TABLET ORALLY ONCE DAILY (TAKE WITH 40MG TABLET FOR 60MG TOTAL) 30 Tab 6   • amLODIPine (NORVASC) 10 MG Tab TAKE ONE TABLET BY MOUTH ONE TIME DAILY 30 Tab 6   • TRUE METRIX BLOOD GLUCOSE TEST strip USE TO CHECK FASTING BLOOD SUGAR TWICE DAILY AND AS NEEDED 100 Strip 4   • D3 SUPER STRENGTH 2000 units Cap TAKE 1 CAPSULE ORALLY ONCE DAILY 30 Cap 2   • metFORMIN (GLUCOPHAGE) 850 MG Tab TAKE ONE TABLET BY MOUTH TWICE DAILY WITH FOOD 120 Tab 6   • NOVOLOG MIX 70/30 (70-30) 100 UNIT/ML injection INJECT 10 UNITS SUBCUTANEOUSLY TWICE DAILY AS DIRECTED BY PRESCRIBER 20 mL 3   • metoprolol SR (TOPROL XL) 25 MG TABLET SR 24 HR TAKE ONE TABLET BY MOUTH ONE TIME DAILY 30 Tab 3   • temazepam (RESTORIL) 30 MG capsule      • ranitidine (ZANTAC) 150 MG Tab Take 2 Tabs by mouth 1 time daily as needed for Heartburn. 30 Tab 1   • VENTOLIN  (90 BASE) MCG/ACT Aero Soln inhalation aerosol INHALE 2 PUFFS BY MOUTH EVERY 8 HOURS ASNEEDED FOR SHORTNESS OF BREATH 1 Inhaler 7   • metoprolol SR (TOPROL XL) 25 MG TABLET SR 24 HR TAKE ONE TABLET BY MOUTH ONE TIME DAILY 30 Tab 0   • ranitidine (ZANTAC) 300 MG tablet TAKE 1 TABLET BY MOUTH ONCE DAILY AS NEEDED FOR HEARTBURN. 30 Tab 2   • atorvastatin (LIPITOR) 40 MG Tab TAKE 1 TABLET ORALLY ONCE DAILY (TAKE WITH 20MG TABLET FOR TOTAL OF 60MG) 30 Tab 6   • levothyroxine (SYNTHROID) 25 MCG Tab Take 1 Tab by mouth every morning before breakfast. TAKE ONE TABLET BY MOUTH EVERY MORNING ON EMPTY STOMACH (Patient not taking: Reported on 4/4/2019) 30 Tab 6   • metoprolol SR (TOPROL XL) " 25 MG TABLET SR 24 HR TAKE ONE TABLET BY MOUTH ONE TIME DAILY 30 Tab 6   • naproxen (NAPROSYN) 500 MG Tab Take 1 Tab by mouth 2 times a day, with meals. 35 Tab 0   • ketoconazole (NIZORAL) 2 % shampoo APPLY AS DIRECED BY PHYSICIAN (Patient not taking: Reported on 4/4/2019) 120 mL 1   • atorvastatin (LIPITOR) 40 MG Tab TAKE 1 TABLET ORALLY ONCE DAILY (TAKE WITH 20MG TABLET FOR 60MG) 30 Tab 4   • metoprolol SR (TOPROL XL) 25 MG TABLET SR 24 HR TAKE ONE TABLET BY MOUTH ONE TIME DAILY 30 Tab 2   • Misc. Devices Misc Check FS 2 times daily and as needed 60 Application 5   • atorvastatin (LIPITOR) 40 MG Tab Take 1 Tab by mouth every day. 30 Tab 5   • ZEPATIER  MG Tab      • ziprasidone (GEODON) 80 MG Cap Take 1 Cap by mouth 2 Times a Day. 60 Cap 0   • fluticasone (FLOVENT HFA) 110 MCG/ACT Aerosol Inhale 2 Puffs by mouth 2 times a day.     • tiotropium (SPIRIVA) 18 MCG Cap Inhale 18 mcg by mouth every day.       No current facility-administered medications for this visit.            Review Of Systems  As documented in HPI above  PHYSICAL EXAMINATION:    /80 (BP Location: Left arm, Patient Position: Sitting, BP Cuff Size: Adult)   Pulse 92   Temp 37.2 °C (98.9 °F) (Temporal)   Gen.: Well-developed, well-nourished, no apparent distress, pleasant and cooperative with the examination  HEENT: Normocephalic/atraumatic,     Neck: No JVD or bruits, no adenopathy  Cor: Regular rate and rhythm without murmur gallop or rub  Lungs: Definitely: Scattered rhonchi bilateral.  Normal breath sounds and normal air entry.    Extremities: No cyanosis, clubbing or edema        ASSESSMENT/Plan:    1. Essential hypertension   controlled continue same regimen   2. Controlled type 2 diabetes mellitus without complication, with long-term current use of insulin (HCC)   stable and controlled continue same regimen    POCT  A1C   3. Cough   prolonged cough likely postinflammatory cough versus bronchitis we will treat with Z-Moisés at this  point since the patient is having greenish discoloration of the sputum now if not resolved to come back for reassessment use Robitussin as directed and increase hydration.  Patient voices understanding.       Please note that this dictation was created using voice recognition software. I have made every reasonable attempt to correct obvious errors but there may be errors of grammar and content that I may have overlooked prior to finalization of this note.

## 2019-04-11 RX ORDER — RANITIDINE 300 MG/1
TABLET ORAL
Qty: 30 TAB | Refills: 2 | Status: SHIPPED | OUTPATIENT
Start: 2019-04-11 | End: 2019-06-18 | Stop reason: SDUPTHER

## 2019-04-30 RX ORDER — METOPROLOL SUCCINATE 25 MG/1
TABLET, EXTENDED RELEASE ORAL
Qty: 30 TAB | Refills: 6 | Status: SHIPPED | OUTPATIENT
Start: 2019-04-30 | End: 2019-11-15 | Stop reason: SDUPTHER

## 2019-05-29 DIAGNOSIS — F41.9 ANXIETY: ICD-10-CM

## 2019-05-29 DIAGNOSIS — I10 ESSENTIAL HYPERTENSION: ICD-10-CM

## 2019-05-30 RX ORDER — CLONIDINE HYDROCHLORIDE 0.1 MG/1
TABLET ORAL
Qty: 60 TAB | Refills: 1 | Status: SHIPPED | OUTPATIENT
Start: 2019-05-30 | End: 2019-08-01 | Stop reason: SDUPTHER

## 2019-05-30 RX ORDER — SYRINGE AND NEEDLE,INSULIN,1ML 29 G X1/2"
SYRINGE, EMPTY DISPOSABLE MISCELLANEOUS
Qty: 90 EACH | Refills: 2 | Status: SHIPPED
Start: 2019-05-30 | End: 2019-12-31

## 2019-06-03 ENCOUNTER — PATIENT MESSAGE (OUTPATIENT)
Dept: MEDICAL GROUP | Facility: MEDICAL CENTER | Age: 64
End: 2019-06-03

## 2019-06-04 NOTE — TELEPHONE ENCOUNTER
From: Nikolas Rico  To: Chepe Rod M.D.  Sent: 6/3/2019 1:44 PM PDT  Subject: Prescription Question    please refill my clonidine … your local [facility] says you have not responded to their request .. thank you

## 2019-07-05 ENCOUNTER — OFFICE VISIT (OUTPATIENT)
Dept: MEDICAL GROUP | Facility: MEDICAL CENTER | Age: 64
End: 2019-07-05
Attending: FAMILY MEDICINE
Payer: MEDICAID

## 2019-07-05 VITALS
RESPIRATION RATE: 16 BRPM | TEMPERATURE: 98.6 F | OXYGEN SATURATION: 93 % | HEIGHT: 66 IN | BODY MASS INDEX: 26.52 KG/M2 | HEART RATE: 86 BPM | WEIGHT: 165 LBS | SYSTOLIC BLOOD PRESSURE: 120 MMHG | DIASTOLIC BLOOD PRESSURE: 64 MMHG

## 2019-07-05 DIAGNOSIS — I10 ESSENTIAL HYPERTENSION: ICD-10-CM

## 2019-07-05 DIAGNOSIS — Z23 NEED FOR TDAP VACCINATION: ICD-10-CM

## 2019-07-05 DIAGNOSIS — H91.93 DECREASED HEARING OF BOTH EARS: ICD-10-CM

## 2019-07-05 DIAGNOSIS — R25.2 CRAMP AND SPASM: ICD-10-CM

## 2019-07-05 PROCEDURE — 99213 OFFICE O/P EST LOW 20 MIN: CPT | Performed by: FAMILY MEDICINE

## 2019-07-05 PROCEDURE — 99214 OFFICE O/P EST MOD 30 MIN: CPT | Performed by: FAMILY MEDICINE

## 2019-07-05 RX ORDER — QUETIAPINE FUMARATE 300 MG/1
300 TABLET, FILM COATED ORAL
COMMUNITY
End: 2021-02-01

## 2019-07-05 NOTE — PROGRESS NOTES
Chief Complaint:   Chief Complaint   Patient presents with   • Follow-Up   • Hearing Problem   • Cramping       HPI: Established patient  Nikolas Rico is a 63 y.o. male who presents for follow-up, discussed the following concerns as follows today:    Essential hypertension  Patient with controlled blood pressure today at the office 120/64 continues to take all his medications as directed without concerns or side effects denies headache or chest pain or shortness of breath or lower extremity edema     Decreased hearing of both ears  Patient today reports that this is a chronic problem that he did not bring up before, he has been having this problem with hearing and sometimes ringing in both ears for the past couple of years.  Would like it to be checked denies any pain or discharge or any other acute issues with his hearing or ears patient denies upper respect tract symptoms     Cramp and spasm  Patient reports that he has been noticing for the past 6 months more cramping and spasm in both feet especially at nighttime, he denies any cramping in his calf muscles or during the daytime, denies hands spasms.  Discussed with the patient importance of hydration and he admits that he might be not drinking enough water patient walks for long walks to help with his health status and diabetes control    Need for Tdap vaccination            Past medical history, family history, social history and medications reviewed and updated in the record. Today    Current medications, problem list and allergies reviewed in EPIC today  Health maintenance topics are reviewed and updated.  Today    Patient Active Problem List    Diagnosis Date Noted   • Schizoaffective disorder (HCC) 04/14/2017     Priority: High   • Tachycardia 03/07/2017     Priority: High   • Left anterior fascicular block 11/10/2016     Priority: High   • Essential hypertension 07/21/2016     Priority: High   • Diabetes type 2, controlled (CMS-HCC) 01/26/2016  "    Priority: Medium   • COPD (chronic obstructive pulmonary disease) ([facility]) 12/19/2014     Priority: Medium   • Chronic HCV infection 12/04/2012     Priority: Medium   • CKD (chronic kidney disease) stage 3, GFR 30-59 ml/min ([facility])      Priority: Low   • Dyslipidemia 04/23/2015     Priority: Low   • Bipolar 1 disorder, manic, moderate (CMS-HCC) 11/22/2011     Priority: Low   • Over weight 10/18/2018   • Thyroid nodule 04/19/2018   • Hx pulmonary embolism 08/30/2017   • Hyperlipidemia 08/11/2017   • Episodic confusion 04/28/2017   • Sleep apnea 04/28/2017   • Disorientation 02/21/2017   • Muscle mass of leg 08/11/2016   • Insulin dependent diabetes mellitus ([facility]) 05/24/2016   • Emphysema of lung ([facility]) 02/18/2016   • Type II or unspecified type diabetes mellitus without mention of complication, not stated as uncontrolled 06/25/2015   • Chronic respiratory failure, unspecified whether with hypoxia or hypercapnia ([facility]) 06/25/2015   • Elbow fracture, left 03/12/2013   • Psoriasis 10/23/2012   • Chronic foot pain 09/11/2012   • [place] filter in place 11/22/2011   • History of DVT (deep vein thrombosis) 11/22/2011     Family History   Problem Relation Age of Onset   • Cancer Father         blader ca     Social History     Social History   • Marital status: Single     Spouse name: N/A   • Number of children: N/A   • Years of education: N/A     Occupational History   • Not on file.     Social History Main Topics   • Smoking status: Former Smoker     Types: Cigarettes     Quit date: 3/6/2013   • Smokeless tobacco: Never Used   • Alcohol use 0.0 oz/week      Comment: sober since HCV diagnosis   • Drug use: Yes     Types: Methamphetamines      Comment: meth use.  Last use \"a couple weeks ago\", occasional marijuana use   • Sexual activity: Not Currently     Partners: Female     Other Topics Concern   • Not on file     Social History Narrative   • No narrative on file     Current Outpatient Prescriptions   Medication Sig " "Dispense Refill   • quetiapine (SEROQUEL) 300 MG tablet Take 300 mg by mouth every bedtime.     • aspirin (ASA) 81 MG Chew Tab chewable tablet CHEW 1 TABLET ORALLY ONCE DAILY 100 Tab 3   • ranitidine (ZANTAC) 300 MG tablet TAKE 1 TABLET BY MOUTH ONCE DAILY AS NEEDED FOR HEARTBURN 30 Tab 1   • TRUE METRIX BLOOD GLUCOSE TEST strip USE TO CHECK FASTING BLOOD SUGAR TWICE DAILY AND AS NEEDED 100 Strip 3   • ULTICARE INSULIN SYRINGE 29G X 1/2\" 0.5 ML Misc USE AS DIRECTED BY PHYSICIAN 90 Each 2   • cloNIDine (CATAPRES) 0.1 MG Tab TAKE 1 TABLET ORALLY 2 TIMES DAILY 60 Tab 1   • atorvastatin (LIPITOR) 20 MG Tab TAKE 1 TABLET ORALLY ONCE DAILY (TAKE WITH 40MG TABLET FOR 60MG TOTAL) 30 Tab 5   • amLODIPine (NORVASC) 10 MG Tab TAKE ONE TABLET BY MOUTH ONE TIME DAILY 30 Tab 6   • metoprolol SR (TOPROL XL) 25 MG TABLET SR 24 HR TAKE ONE TABLET BY MOUTH ONE TIME DAILY 30 Tab 6   • lisinopril (PRINIVIL, ZESTRIL) 40 MG tablet TAKE ONE TABLET BY MOUTH ONE TIME DAILY 30 Tab 6   • D3 SUPER STRENGTH 2000 units Cap TAKE 1 CAPSULE ORALLY ONCE DAILY 30 Cap 2   • metFORMIN (GLUCOPHAGE) 850 MG Tab TAKE ONE TABLET BY MOUTH TWICE DAILY WITH FOOD 120 Tab 6   • NOVOLOG MIX 70/30 (70-30) 100 UNIT/ML injection INJECT 10 UNITS SUBCUTANEOUSLY TWICE DAILY AS DIRECTED BY PRESCRIBER 20 mL 3   • Misc. Devices Misc Check FS 2 times daily and as needed 60 Application 5   • atorvastatin (LIPITOR) 40 MG Tab Take 1 Tab by mouth every day. 30 Tab 5   • temazepam (RESTORIL) 30 MG capsule      • ULTICARE INSULIN SYRINGE 29G X 1/2\" 0.5 ML Misc USE AS DIRECTED BY PHYSICIAN (Patient not taking: Reported on 7/5/2019) 90 Each 1   • levothyroxine (SYNTHROID) 25 MCG Tab TAKE ONE TABLET BY MOUTH EVERY MORNING ON EMPTY STOMACH (Patient not taking: Reported on 7/5/2019) 30 Tab 7   • atorvastatin (LIPITOR) 40 MG Tab TAKE 1 TABLET ORALLY ONCE DAILY (TAKE WITH 20MG TABLET FOR TOTAL OF 60MG) (Patient not taking: Reported on 7/5/2019) 30 Tab 5   • azithromycin (ZITHROMAX) 250 " "MG Tab 2 tab first day , then 1 tab daily x 4 days (Patient not taking: Reported on 7/5/2019) 6 Quantity Sufficient 0   • Dextromethorphan-Guaifenesin (TUSSIN DM)  MG/5ML Syrup Take 10 mL by mouth every 6 hours as needed. (Patient not taking: Reported on 7/5/2019) 840 mL 0   • naproxen (NAPROSYN) 500 MG Tab Take 1 Tab by mouth 2 times a day, with meals. (Patient not taking: Reported on 7/5/2019) 35 Tab 0   • ketoconazole (NIZORAL) 2 % shampoo APPLY AS DIRECED BY PHYSICIAN (Patient not taking: Reported on 4/4/2019) 120 mL 1   • atorvastatin (LIPITOR) 40 MG Tab TAKE 1 TABLET ORALLY ONCE DAILY (TAKE WITH 20MG TABLET FOR 60MG) (Patient not taking: Reported on 7/5/2019) 30 Tab 4   • ZEPATIER  MG Tab      • ziprasidone (GEODON) 80 MG Cap Take 1 Cap by mouth 2 Times a Day. (Patient not taking: Reported on 7/5/2019) 60 Cap 0   • ranitidine (ZANTAC) 150 MG Tab Take 2 Tabs by mouth 1 time daily as needed for Heartburn. (Patient not taking: Reported on 7/5/2019) 30 Tab 1   • fluticasone (FLOVENT HFA) 110 MCG/ACT Aerosol Inhale 2 Puffs by mouth 2 times a day.     • tiotropium (SPIRIVA) 18 MCG Cap Inhale 18 mcg by mouth every day.     • VENTOLIN  (90 BASE) MCG/ACT Aero Soln inhalation aerosol INHALE 2 PUFFS BY MOUTH EVERY 8 HOURS ASNEEDED FOR SHORTNESS OF BREATH (Patient not taking: Reported on 7/5/2019) 1 Inhaler 7     No current facility-administered medications for this visit.            Review Of Systems  As documented in HPI above  PHYSICAL EXAMINATION:    /64 (BP Location: Left arm, Patient Position: Sitting, BP Cuff Size: Adult)   Pulse 86   Temp 37 °C (98.6 °F) (Temporal)   Resp 16   Ht 1.676 m (5' 6\")   Wt 74.8 kg (165 lb)   SpO2 93%   BMI 26.63 kg/m²   Gen.: Well-developed, well-nourished, no apparent distress, pleasant and cooperative with the examination  HEENT: Normocephalic/atraumatic, sinuses nontender with palpation, TMs clear, nares patent with pink mucosa and clear rhinorrhea, " oropharynx clear  Neck: No JVD or bruits, no adenopathy  Cor: Regular rate and rhythm without murmur gallop or rub  Lungs: Clear to auscultation with equal breath sounds bilaterally. No wheezes, rhonchi.    Extremities: No cyanosis, clubbing or edema          ASSESSMENT/Plan:  1. Essential hypertension   well-controlled continue same regimen   2. Decreased hearing of both ears   patient will need hearing evaluation to see if he is in need for hearing aid  REFERRAL TO AUDIOLOGY   3. Cramp and spasm   possibly because of long walks and dehydration advised to increase hydration I will also rule out other causes like electrolyte disturbance and calcium and parathormone issues.  Comp Metabolic Panel    PTH WITH IONIZED CALCIUM   4. Need for Tdap vaccination  Tdap =>8yo IM       Please note that this dictation was created using voice recognition software. I have made every reasonable attempt to correct obvious errors but there may be errors of grammar and content that I may have overlooked prior to finalization of this note.

## 2019-08-23 ENCOUNTER — OFFICE VISIT (OUTPATIENT)
Dept: MEDICAL GROUP | Facility: MEDICAL CENTER | Age: 64
End: 2019-08-23
Attending: FAMILY MEDICINE
Payer: MEDICAID

## 2019-08-23 VITALS
TEMPERATURE: 97.9 F | RESPIRATION RATE: 16 BRPM | HEIGHT: 66 IN | HEART RATE: 92 BPM | OXYGEN SATURATION: 94 % | BODY MASS INDEX: 26.68 KG/M2 | WEIGHT: 166 LBS | SYSTOLIC BLOOD PRESSURE: 126 MMHG | DIASTOLIC BLOOD PRESSURE: 82 MMHG

## 2019-08-23 DIAGNOSIS — H53.40 VISUAL FIELD DEFECT OF RIGHT EYE: ICD-10-CM

## 2019-08-23 PROCEDURE — 99212 OFFICE O/P EST SF 10 MIN: CPT | Performed by: FAMILY MEDICINE

## 2019-08-23 PROCEDURE — 99214 OFFICE O/P EST MOD 30 MIN: CPT | Performed by: FAMILY MEDICINE
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
## 2019-11-15 ENCOUNTER — TELEPHONE (OUTPATIENT)
Dept: MEDICAL GROUP | Facility: MEDICAL CENTER | Age: 64
End: 2019-11-15

## 2019-11-15 ENCOUNTER — OFFICE VISIT (OUTPATIENT)
Dept: MEDICAL GROUP | Facility: MEDICAL CENTER | Age: 64
End: 2019-11-15
Attending: FAMILY MEDICINE
Payer: MEDICAID

## 2019-11-15 VITALS
TEMPERATURE: 96.3 F | BODY MASS INDEX: 26.36 KG/M2 | WEIGHT: 164 LBS | DIASTOLIC BLOOD PRESSURE: 92 MMHG | OXYGEN SATURATION: 95 % | HEIGHT: 66 IN | SYSTOLIC BLOOD PRESSURE: 140 MMHG | HEART RATE: 96 BPM | RESPIRATION RATE: 18 BRPM

## 2019-11-15 DIAGNOSIS — I10 ESSENTIAL HYPERTENSION: ICD-10-CM

## 2019-11-15 DIAGNOSIS — Z76.0 PRESCRIPTION REFILL: ICD-10-CM

## 2019-11-15 DIAGNOSIS — Z79.4 TYPE 2 DIABETES MELLITUS WITHOUT COMPLICATION, WITH LONG-TERM CURRENT USE OF INSULIN (HCC): ICD-10-CM

## 2019-11-15 DIAGNOSIS — R79.89 TSH ELEVATION: ICD-10-CM

## 2019-11-15 DIAGNOSIS — E11.9 TYPE 2 DIABETES MELLITUS WITHOUT COMPLICATION, WITH LONG-TERM CURRENT USE OF INSULIN (HCC): ICD-10-CM

## 2019-11-15 PROCEDURE — 99214 OFFICE O/P EST MOD 30 MIN: CPT | Performed by: FAMILY MEDICINE

## 2019-11-15 PROCEDURE — 99213 OFFICE O/P EST LOW 20 MIN: CPT | Performed by: FAMILY MEDICINE

## 2019-11-15 RX ORDER — ATORVASTATIN CALCIUM 40 MG/1
TABLET, FILM COATED ORAL
Qty: 90 TAB | Refills: 3 | Status: SHIPPED
Start: 2019-11-15 | End: 2020-01-30

## 2019-11-15 RX ORDER — LEVOTHYROXINE SODIUM 0.03 MG/1
25 TABLET ORAL
Qty: 90 TAB | Refills: 3 | Status: SHIPPED | OUTPATIENT
Start: 2019-11-15 | End: 2020-01-30 | Stop reason: SDUPTHER

## 2019-11-15 RX ORDER — LISINOPRIL 40 MG/1
TABLET ORAL
Qty: 90 TAB | Refills: 3 | Status: SHIPPED | OUTPATIENT
Start: 2019-11-15 | End: 2020-01-30 | Stop reason: SDUPTHER

## 2019-11-15 RX ORDER — AMLODIPINE BESYLATE 10 MG/1
TABLET ORAL
Qty: 90 TAB | Refills: 3 | Status: SHIPPED | OUTPATIENT
Start: 2019-11-15 | End: 2020-01-30 | Stop reason: SDUPTHER

## 2019-11-15 RX ORDER — METOPROLOL SUCCINATE 25 MG/1
TABLET, EXTENDED RELEASE ORAL
Qty: 90 TAB | Refills: 3 | Status: SHIPPED | OUTPATIENT
Start: 2019-11-15 | End: 2020-01-30 | Stop reason: SDUPTHER

## 2019-11-15 NOTE — PROGRESS NOTES
Chief Complaint:   Chief Complaint   Patient presents with   • Medication Refill     Pt will be switching to [facility] and will be switching providers       HPI: Established patient  Nikolas Rico is a 63 y.o. male who presents for medications refill since he is changing primary doctor for insurance change, requesting all his medications to be refilled for 3 months until he is able to establish care with a new provider     Essential hypertension  Blood pressure is the office today around 140/90 recheck by me, denies headache or chest pain or shortness of breath or lower extremity edema requesting to refill all his medications, updated in the system denies side effects.   Type 2 diabetes mellitus   Patient requesting to refill his medications today A1c at goal below-7, denies side effects.  He has been exercising every day, and trying his best to control his diet and avoid all carbohydrates and sugar in diet    TSH elevation  No history of elevated TSH hypothyroidism taking low-dose Synthroid every day denies side effects requesting his medications to be refilled today              Past medical history, family history, social history and medications reviewed and updated in the record.  Today  Current medications, problem list and allergies reviewed in [facility] today  Health maintenance topics are reviewed and updated.    Patient Active Problem List    Diagnosis Date Noted   • Schizoaffective disorder ([facility]) 04/14/2017     Priority: High   • Tachycardia 03/07/2017     Priority: High   • Left anterior fascicular block 11/10/2016     Priority: High   • Essential hypertension 07/21/2016     Priority: High   • Diabetes type 2, controlled (CMS-[facility]) 01/26/2016     Priority: Medium   • COPD (chronic obstructive pulmonary disease) ([facility]) 12/19/2014     Priority: Medium   • Chronic HCV infection 12/04/2012     Priority: Medium   • CKD (chronic kidney disease) stage 3, GFR 30-59 ml/min ([facility])      Priority: Low   •  "Dyslipidemia 2015     Priority: Low   • Bipolar 1 disorder, manic, moderate ([facility]) 2011     Priority: Low   • Over weight 10/18/2018   • Thyroid nodule 2018   • Hx pulmonary embolism 2017   • Hyperlipidemia 2017   • Episodic confusion 2017   • Sleep apnea 2017   • Disorientation 2017   • Muscle mass of leg 2016   • Insulin dependent diabetes mellitus (HCC) 2016   • Emphysema of lung ([facility]) 2016   • Type II or unspecified type diabetes mellitus without mention of complication, not stated as uncontrolled 2015   • Chronic respiratory failure, unspecified whether with hypoxia or hypercapnia ([facility]) 2015   • Elbow fracture, left 2013   • Psoriasis 10/23/2012   • Chronic foot pain 2012   • [place] filter in place 2011   • History of DVT (deep vein thrombosis) 2011     Family History   Problem Relation Age of Onset   • Cancer Father         blader ca     Social History     Socioeconomic History   • Marital status: Single     Spouse name: Not on file   • Number of children: Not on file   • Years of education: Not on file   • Highest education level: Not on file   Occupational History   • Not on file   Social Needs   • Financial resource strain: Not on file   • Food insecurity:     Worry: Not on file     Inability: Not on file   • Transportation needs:     Medical: Not on file     Non-medical: Not on file   Tobacco Use   • Smoking status: Former Smoker     Types: Cigarettes     Last attempt to quit: 3/6/2013     Years since quittin.6   • Smokeless tobacco: Never Used   Substance and Sexual Activity   • Alcohol use: Yes     Alcohol/week: 0.0 oz     Comment: sober since HCV diagnosis   • Drug use: Yes     Types: Methamphetamines     Comment: meth use.  Last use \"a couple weeks ago\", occasional marijuana use   • Sexual activity: Not Currently     Partners: Female   Lifestyle   • Physical activity:     Days per week: Not " "on file     Minutes per session: Not on file   • Stress: Not on file   Relationships   • Social connections:     Talks on phone: Not on file     Gets together: Not on file     Attends Judaism service: Not on file     Active member of club or organization: Not on file     Attends meetings of clubs or organizations: Not on file     Relationship status: Not on file   • Intimate partner violence:     Fear of current or ex partner: Not on file     Emotionally abused: Not on file     Physically abused: Not on file     Forced sexual activity: Not on file   Other Topics Concern   • Not on file   Social History Narrative   • Not on file       Current Outpatient Medications   Medication Sig Dispense Refill   • atorvastatin (LIPITOR) 40 MG Tab TAKE 1 TABLET ORALLY ONCE DAILY (TAKE WITH 20MG TABLET FOR TOTAL OF 60MG) 90 Tab 3   • lisinopril (PRINIVIL) 40 MG tablet TAKE ONE TABLET BY MOUTH ONE TIME DAILY 90 Tab 3   • metFORMIN (GLUCOPHAGE) 850 MG Tab Take 1 Tab by mouth 2 times a day, with meals. 180 Tab 3   • levothyroxine (SYNTHROID) 25 MCG Tab Take 1 Tab by mouth every morning before breakfast. TAKE ONE TABLET BY MOUTH EVERY MORNING ON EMPTY STOMACH 90 Tab 3   • amLODIPine (NORVASC) 10 MG Tab TAKE ONE TABLET BY MOUTH ONE TIME DAILY 90 Tab 3   • metoprolol SR (TOPROL XL) 25 MG TABLET SR 24 HR TAKE ONE TABLET BY MOUTH ONE TIME DAILY 90 Tab 3   • insulin regular (HUMULIN R) 100 Unit/mL Solution As per sliding scale 3 Vial 3   • ranitidine (ZANTAC) 300 MG tablet TAKE 1 TABLET BY MOUTH ONCE DAILY AS NEEDED FOR HEARTBURN 30 Tab 3   • ULTICARE INSULIN SYRINGE 29G X 1/2\" 0.5 ML Misc USE AS DIRECTED BY PHYSICIAN 90 Each 4   • cloNIDine (CATAPRES) 0.1 MG Tab TAKE 1 TABLET ORALLY 2 TIMES DAILY 60 Tab 6   • quetiapine (SEROQUEL) 300 MG tablet Take 300 mg by mouth every bedtime.     • aspirin (ASA) 81 MG Chew Tab chewable tablet CHEW 1 TABLET ORALLY ONCE DAILY 100 Tab 3   • TRUE METRIX BLOOD GLUCOSE TEST strip USE TO CHECK FASTING BLOOD " "SUGAR TWICE DAILY AND AS NEEDED 100 Strip 3   • ULTICARE INSULIN SYRINGE 29G X 1/2\" 0.5 ML Misc USE AS DIRECTED BY PHYSICIAN 90 Each 2   • D3 SUPER STRENGTH 2000 units Cap TAKE 1 CAPSULE ORALLY ONCE DAILY 30 Cap 2   • Misc. Devices Misc Check FS 2 times daily and as needed 60 Application 5   • ZEPATIER  MG Tab      • ziprasidone (GEODON) 80 MG Cap Take 1 Cap by mouth 2 Times a Day. (Patient not taking: Reported on 7/5/2019) 60 Cap 0   • temazepam (RESTORIL) 30 MG capsule      • ranitidine (ZANTAC) 150 MG Tab Take 2 Tabs by mouth 1 time daily as needed for Heartburn. (Patient not taking: Reported on 7/5/2019) 30 Tab 1   • fluticasone (FLOVENT HFA) 110 MCG/ACT Aerosol Inhale 2 Puffs by mouth 2 times a day.     • tiotropium (SPIRIVA) 18 MCG Cap Inhale 18 mcg by mouth every day.     • VENTOLIN  (90 BASE) MCG/ACT Aero Soln inhalation aerosol INHALE 2 PUFFS BY MOUTH EVERY 8 HOURS ASNEEDED FOR SHORTNESS OF BREATH (Patient not taking: Reported on 7/5/2019) 1 Inhaler 7     No current facility-administered medications for this visit.          Review Of Systems  As documented in HPI above  PHYSICAL EXAMINATION:    /92 (BP Location: Left arm, Patient Position: Sitting, BP Cuff Size: Adult)   Pulse 96   Temp (!) 35.7 °C (96.3 °F) (Temporal)   Resp 18   Ht 1.676 m (5' 6\")   Wt 74.4 kg (164 lb)   SpO2 95%   BMI 26.47 kg/m²   Gen.: Well-developed, well-nourished, no apparent distress, pleasant and cooperative with the examination  HEENT: Normocephalic/atraumatic, sinuses nontender with palpation, TMs clear, nares patent with pink mucosa and clear rhinorrhea, oropharynx clear  Neck: No JVD or bruits, no adenopathy  Cor: Regular rate and rhythm without murmur gallop or rub  Lungs: Clear to auscultation with equal breath sounds bilaterally. No wheezes, rhonchi.  Abdomen: Soft nontender without hepatosplenomegaly or masses appreciated, normoactive bowel sounds  Extremities: No cyanosis, clubbing or " edema          ASSESSMENT/Plan:  1. Essential hypertension   well-controlled continue same regimen refilled all medications today    lisinopril (PRINIVIL) 40 MG tablet   2. Type 2 diabetes mellitus without complication, with long-term current use of insulin (HCC)   controlled continue same regimen refilled medications  metFORMIN (GLUCOPHAGE) 850 MG Tab   3. TSH elevation   controlled continue Synthroid 25 mg  levothyroxine (SYNTHROID) 25 MCG Tab   4. Prescription refill  atorvastatin (LIPITOR) 40 MG Tab    lisinopril (PRINIVIL) 40 MG tablet    metFORMIN (GLUCOPHAGE) 850 MG Tab    levothyroxine (SYNTHROID) 25 MCG Tab    amLODIPine (NORVASC) 10 MG Tab    metoprolol SR (TOPROL XL) 25 MG TABLET SR 24 HR    insulin regular (HUMULIN R) 100 Unit/mL Solution       Please note that this dictation was created using voice recognition software. I have made every reasonable attempt to correct obvious errors but there may be errors of grammar and content that I may have overlooked prior to finalization of this note.

## 2019-11-16 NOTE — TELEPHONE ENCOUNTER
"VOICEMAIL  1. Caller Name: Mandy from [facility] pharmacy                      Call Back Number: [phone]    2. Message: pharmacy called and stated that pt's rx for the humulin and the directions states \"per sliding scale\" pharmacy states that they need specific directions for this medication.     3. Patient approves office to leave a detailed voicemail/MyChart message: N\A       "

## 2019-11-19 DIAGNOSIS — Z76.0 PRESCRIPTION REFILL: ICD-10-CM

## 2019-12-20 ENCOUNTER — TELEPHONE (OUTPATIENT)
Dept: HEALTH INFORMATION MANAGEMENT | Facility: OTHER | Age: 64
End: 2019-12-20

## 2019-12-20 DIAGNOSIS — F25.9 SCHIZOAFFECTIVE DISORDER, UNSPECIFIED TYPE (HCC): ICD-10-CM

## 2019-12-20 NOTE — TELEPHONE ENCOUNTER
Good morning,      Nikolas would like a referral to behavioral health as he will need medication refills in February 2020. He currently is on a wait list to establish care with a PCP at [address] as his insurance changed to [facility]. Could you send the referral? Thank you.

## 2019-12-23 NOTE — TELEPHONE ENCOUNTER
Referral will be to psychiatry for mediation management and I have signed the order. Would he also like a referral to behavioral health for counseling?

## 2019-12-31 ENCOUNTER — OFFICE VISIT (OUTPATIENT)
Dept: MEDICAL GROUP | Facility: MEDICAL CENTER | Age: 64
End: 2019-12-31
Payer: MEDICARE

## 2019-12-31 VITALS
WEIGHT: 158 LBS | OXYGEN SATURATION: 100 % | SYSTOLIC BLOOD PRESSURE: 130 MMHG | HEART RATE: 97 BPM | HEIGHT: 66 IN | RESPIRATION RATE: 16 BRPM | DIASTOLIC BLOOD PRESSURE: 76 MMHG | BODY MASS INDEX: 25.39 KG/M2

## 2019-12-31 DIAGNOSIS — I10 ESSENTIAL HYPERTENSION: ICD-10-CM

## 2019-12-31 DIAGNOSIS — Z95.828 GREENFIELD FILTER IN PLACE: ICD-10-CM

## 2019-12-31 DIAGNOSIS — Z79.4 CONTROLLED TYPE 2 DIABETES MELLITUS WITHOUT COMPLICATION, WITH LONG-TERM CURRENT USE OF INSULIN (HCC): ICD-10-CM

## 2019-12-31 DIAGNOSIS — B18.2 CHRONIC HEPATITIS C WITHOUT HEPATIC COMA (HCC): ICD-10-CM

## 2019-12-31 DIAGNOSIS — E78.5 DYSLIPIDEMIA: ICD-10-CM

## 2019-12-31 DIAGNOSIS — Z86.711 HX PULMONARY EMBOLISM: ICD-10-CM

## 2019-12-31 DIAGNOSIS — F25.9 SCHIZOAFFECTIVE DISORDER, UNSPECIFIED TYPE (HCC): ICD-10-CM

## 2019-12-31 DIAGNOSIS — Z23 NEED FOR PNEUMOCOCCAL VACCINATION: ICD-10-CM

## 2019-12-31 DIAGNOSIS — J43.9 PULMONARY EMPHYSEMA, UNSPECIFIED EMPHYSEMA TYPE (HCC): ICD-10-CM

## 2019-12-31 DIAGNOSIS — Z86.718 HISTORY OF DVT (DEEP VEIN THROMBOSIS): ICD-10-CM

## 2019-12-31 DIAGNOSIS — Z12.5 SCREENING FOR PROSTATE CANCER: ICD-10-CM

## 2019-12-31 DIAGNOSIS — E11.9 CONTROLLED TYPE 2 DIABETES MELLITUS WITHOUT COMPLICATION, WITH LONG-TERM CURRENT USE OF INSULIN (HCC): ICD-10-CM

## 2019-12-31 DIAGNOSIS — N18.30 CKD (CHRONIC KIDNEY DISEASE) STAGE 3, GFR 30-59 ML/MIN (HCC): Chronic | ICD-10-CM

## 2019-12-31 DIAGNOSIS — G47.30 SLEEP APNEA, UNSPECIFIED TYPE: ICD-10-CM

## 2019-12-31 PROBLEM — E66.3 OVER WEIGHT: Status: RESOLVED | Noted: 2018-10-18 | Resolved: 2019-12-31

## 2019-12-31 PROCEDURE — 99203 OFFICE O/P NEW LOW 30 MIN: CPT | Performed by: NURSE PRACTITIONER

## 2019-12-31 ASSESSMENT — ENCOUNTER SYMPTOMS
DEPRESSION: 1
INSOMNIA: 1

## 2019-12-31 NOTE — PROGRESS NOTES
Subjective:      Nikolas Rico is a 64 y.o. male who presents with Establish Care and Medication Refill        CC: This is a pleasant 64-year-old patient transferring from the [facility] and with a number of chronic health issues including diabetes, history of PE and dyslipidemia.    HPI       1. Chronic hepatitis C without hepatic coma (HCC)  Patient has some paperwork with him today from [facility] showing that his hepatitis C is stable posttreatment.    2. Schizoaffective disorder, unspecified type (HCC)  Patient goes to psychiatry and they have been prescribing his temazepam and psychiatric medicines.  He is wondering if I would prescribe these today.    3. Sleep apnea, unspecified type  Patient has history of sleep apnea but he admits he is not using his CPAP    4. History of DVT (deep vein thrombosis)  Apparent history of DVT and PE for which he was on anticoagulation at one time and has a [place] filter    5. [place] filter in place  Patient apparently has this filter in place status post DVT and PE.  It also appears he was referred to the vascular clinic in 2017 regarding possible removal but patient is not sure that he ever was seen at the vascular clinic and I cannot find any documentation.  He would like to talk to a vascular specialist again regarding this    6. Hx pulmonary embolism  Patient apparently did not need long-term anticoagulation.    7. Controlled type 2 diabetes mellitus without complication, with long-term current use of insulin (HCC)  Patient states his diabetes is normally well controlled with his combination of metformin 850 mg twice a day and only occasional Humulin R insulin.    8. Screening for prostate cancer  Patient due for screening    9. Dyslipidemia  Patient on atorvastatin and reports no myalgias    10. Pulmonary emphysema, unspecified emphysema type (HCC)  History of emphysema but he states he is doing well off cigarettes and has not needed to use  any inhalers    11. Essential hypertension  Patient currently on a combination of amlodipine, metoprolol and clonidine and states it is well controlled    12. CKD (chronic kidney disease) stage 3, GFR 30-59 ml/min ([facility])  Previous history of decreased GFR and he states he was in kidney failure at one time but his last few GFR readings have been normal    13. Need for pneumococcal vaccination  Patient due for his over age 65 pneumonia vaccine  Past Medical History:   Diagnosis Date   • CKD (chronic kidney disease) stage 3, GFR 30-59 ml/min ([facility])    • COPD    • Diabetes    • GERD (gastroesophageal reflux disease)    • Hepatitis C    • Hyperlipidemia    • Hypertension    • Psychiatric disorder     bipolar   • Schizoaffective disorder ([facility]) 10/01/16     Social History     Socioeconomic History   • Marital status: Single     Spouse name: Not on file   • Number of children: Not on file   • Years of education: Not on file   • Highest education level: Not on file   Occupational History   • Not on file   Social Needs   • Financial resource strain: Not on file   • Food insecurity:     Worry: Not on file     Inability: Not on file   • Transportation needs:     Medical: Not on file     Non-medical: Not on file   Tobacco Use   • Smoking status: Former Smoker     Packs/day: 0.00     Types: Cigarettes     Last attempt to quit: 3/6/2013     Years since quittin.8   • Smokeless tobacco: Never Used   Substance and Sexual Activity   • Alcohol use: Yes     Alcohol/week: 0.0 oz     Comment: sober since HCV diagnosis   • Drug use: Yes     Types: Marijuana   • Sexual activity: Not Currently     Partners: Female   Lifestyle   • Physical activity:     Days per week: Not on file     Minutes per session: Not on file   • Stress: Not on file   Relationships   • Social connections:     Talks on phone: Not on file     Gets together: Not on file     Attends Hindu service: Not on file     Active member of club or organization: Not on file      "Attends meetings of clubs or organizations: Not on file     Relationship status: Not on file   • Intimate partner violence:     Fear of current or ex partner: Not on file     Emotionally abused: Not on file     Physically abused: Not on file     Forced sexual activity: Not on file   Other Topics Concern   • Not on file   Social History Narrative   • Not on file     Current Outpatient Medications   Medication Sig Dispense Refill   • insulin regular (HUMULIN R) 100 Unit/mL Solution As per sliding scale 3 Vial 3   • atorvastatin (LIPITOR) 40 MG Tab TAKE 1 TABLET ORALLY ONCE DAILY (TAKE WITH 20MG TABLET FOR TOTAL OF 60MG) 90 Tab 3   • lisinopril (PRINIVIL) 40 MG tablet TAKE ONE TABLET BY MOUTH ONE TIME DAILY 90 Tab 3   • metFORMIN (GLUCOPHAGE) 850 MG Tab Take 1 Tab by mouth 2 times a day, with meals. 180 Tab 3   • levothyroxine (SYNTHROID) 25 MCG Tab Take 1 Tab by mouth every morning before breakfast. TAKE ONE TABLET BY MOUTH EVERY MORNING ON EMPTY STOMACH 90 Tab 3   • amLODIPine (NORVASC) 10 MG Tab TAKE ONE TABLET BY MOUTH ONE TIME DAILY 90 Tab 3   • metoprolol SR (TOPROL XL) 25 MG TABLET SR 24 HR TAKE ONE TABLET BY MOUTH ONE TIME DAILY 90 Tab 3   • ranitidine (ZANTAC) 300 MG tablet TAKE 1 TABLET BY MOUTH ONCE DAILY AS NEEDED FOR HEARTBURN 30 Tab 3   • ULTICARE INSULIN SYRINGE 29G X 1/2\" 0.5 ML Misc USE AS DIRECTED BY PHYSICIAN 90 Each 4   • cloNIDine (CATAPRES) 0.1 MG Tab TAKE 1 TABLET ORALLY 2 TIMES DAILY 60 Tab 6   • quetiapine (SEROQUEL) 300 MG tablet Take 300 mg by mouth every bedtime.     • aspirin (ASA) 81 MG Chew Tab chewable tablet CHEW 1 TABLET ORALLY ONCE DAILY 100 Tab 3   • TRUE METRIX BLOOD GLUCOSE TEST strip USE TO CHECK FASTING BLOOD SUGAR TWICE DAILY AND AS NEEDED 100 Strip 3   • temazepam (RESTORIL) 30 MG capsule        No current facility-administered medications for this visit.      Family History   Problem Relation Age of Onset   • Cancer Father         blader ca         Review of Systems " "  Psychiatric/Behavioral: Positive for depression. The patient has insomnia.    All other systems reviewed and are negative.         Objective:     /76 (BP Location: Left arm, Patient Position: Sitting, BP Cuff Size: Adult)   Pulse 97   Resp 16   Ht 1.676 m (5' 6\")   Wt 71.7 kg (158 lb)   SpO2 100%   BMI 25.50 kg/m²      Physical Exam  Vitals signs and nursing note reviewed.   Constitutional:       General: He is not in acute distress.     Appearance: He is well-developed. He is not diaphoretic.   HENT:      Head: Normocephalic and atraumatic.      Right Ear: External ear normal.      Left Ear: External ear normal.      Nose: Nose normal.   Eyes:      General:         Right eye: No discharge.         Left eye: No discharge.      Conjunctiva/sclera: Conjunctivae normal.   Neck:      Musculoskeletal: Normal range of motion and neck supple.      Thyroid: No thyromegaly.      Trachea: No tracheal deviation.   Cardiovascular:      Rate and Rhythm: Normal rate and regular rhythm.      Heart sounds: Normal heart sounds. No murmur.   Pulmonary:      Effort: Pulmonary effort is normal. No respiratory distress.      Breath sounds: Normal breath sounds. No wheezing or rales.   Musculoskeletal:      Comments: Patient uses walking stick for mobility   Lymphadenopathy:      Cervical: No cervical adenopathy.   Skin:     General: Skin is warm and dry.      Findings: No erythema or rash.   Neurological:      Mental Status: He is alert and oriented to person, place, and time.      Coordination: Coordination normal.   Psychiatric:         Behavior: Behavior normal.         Thought Content: Thought content normal.         Judgment: Judgment normal.                 Assessment/Plan:       1. Chronic hepatitis C without hepatic coma (HCC)  Patient apparently is stable and follows with [facility] and has had previous treatment for hepatitis C    2. Schizoaffective disorder, unspecified type (HCC)  I advised patient that he " will need to get his psychiatric medicines and insomnia medications through psychiatry where he has been going.    3. Sleep apnea, unspecified type  I advised patient he should treat his sleep apnea but he does not wish to.    4. History of DVT (deep vein thrombosis)    - REFERRAL TO VASCULAR MEDICINE Reason for Referral? Coagulation Disorders    5. [place] filter in place  Patient needs to be evaluated for his [place] filter in place and whether this should be removed  - REFERRAL TO VASCULAR MEDICINE Reason for Referral? Coagulation Disorders    6. Hx pulmonary embolism  Does not appear he has needed anticoagulation  - REFERRAL TO VASCULAR MEDICINE Reason for Referral? Coagulation Disorders    7. Controlled type 2 diabetes mellitus without complication, with long-term current use of insulin ([facility])  Most recent hemoglobin A1c was good on his metformin as was his GFR.  I will have him return in 4 weeks with myself and the diabetic nurse to reassess and possibly take him off his insulin.  - Comp Metabolic Panel; Future  - CBC WITHOUT DIFFERENTIAL; Future  - MICROALBUMIN CREAT RATIO URINE; Future  - HEMOGLOBIN A1C; Future  - REFERRAL TO OPHTHALMOLOGY    8. Screening for prostate cancer    - PROSTATE SPECIFIC AG SCREENING; Future    9. Dyslipidemia    - Comp Metabolic Panel; Future  - Lipid Profile; Future    10. Pulmonary emphysema, unspecified emphysema type ([facility])  Patient does not feel he needs inhalers.    11. Essential hypertension  Blood pressure currently controlled on his 3 medications.    12. CKD (chronic kidney disease) stage 3, GFR 30-59 ml/min ([facility])  Most recent GFR is been normal    13. Need for pneumococcal vaccination  Patient left before getting vaccine.

## 2020-01-04 ENCOUNTER — HOSPITAL ENCOUNTER (OUTPATIENT)
Dept: LAB | Facility: MEDICAL CENTER | Age: 65
End: 2020-01-04
Attending: NURSE PRACTITIONER
Payer: MEDICARE

## 2020-01-04 DIAGNOSIS — E78.5 DYSLIPIDEMIA: ICD-10-CM

## 2020-01-04 DIAGNOSIS — Z79.4 CONTROLLED TYPE 2 DIABETES MELLITUS WITHOUT COMPLICATION, WITH LONG-TERM CURRENT USE OF INSULIN (HCC): ICD-10-CM

## 2020-01-04 DIAGNOSIS — E11.9 CONTROLLED TYPE 2 DIABETES MELLITUS WITHOUT COMPLICATION, WITH LONG-TERM CURRENT USE OF INSULIN (HCC): ICD-10-CM

## 2020-01-04 DIAGNOSIS — Z12.5 SCREENING FOR PROSTATE CANCER: ICD-10-CM

## 2020-01-04 LAB
ALBUMIN SERPL BCP-MCNC: 4.2 G/DL (ref 3.2–4.9)
ALBUMIN/GLOB SERPL: 1.4 G/DL
ALP SERPL-CCNC: 70 U/L (ref 30–99)
ALT SERPL-CCNC: 9 U/L (ref 2–50)
ANION GAP SERPL CALC-SCNC: 9 MMOL/L (ref 0–11.9)
AST SERPL-CCNC: 12 U/L (ref 12–45)
BILIRUB SERPL-MCNC: 0.3 MG/DL (ref 0.1–1.5)
BUN SERPL-MCNC: 17 MG/DL (ref 8–22)
CALCIUM SERPL-MCNC: 9.4 MG/DL (ref 8.5–10.5)
CHLORIDE SERPL-SCNC: 107 MMOL/L (ref 96–112)
CHOLEST SERPL-MCNC: 111 MG/DL (ref 100–199)
CO2 SERPL-SCNC: 27 MMOL/L (ref 20–33)
CREAT SERPL-MCNC: 0.92 MG/DL (ref 0.5–1.4)
CREAT UR-MCNC: 40.5 MG/DL
ERYTHROCYTE [DISTWIDTH] IN BLOOD BY AUTOMATED COUNT: 41.1 FL (ref 35.9–50)
EST. AVERAGE GLUCOSE BLD GHB EST-MCNC: 131 MG/DL
FASTING STATUS PATIENT QL REPORTED: NORMAL
GLOBULIN SER CALC-MCNC: 3 G/DL (ref 1.9–3.5)
GLUCOSE SERPL-MCNC: 110 MG/DL (ref 65–99)
HBA1C MFR BLD: 6.2 % (ref 0–5.6)
HCT VFR BLD AUTO: 43.5 % (ref 42–52)
HDLC SERPL-MCNC: 39 MG/DL
HGB BLD-MCNC: 13.9 G/DL (ref 14–18)
LDLC SERPL CALC-MCNC: 55 MG/DL
MCH RBC QN AUTO: 27.6 PG (ref 27–33)
MCHC RBC AUTO-ENTMCNC: 32 G/DL (ref 33.7–35.3)
MCV RBC AUTO: 86.3 FL (ref 81.4–97.8)
MICROALBUMIN UR-MCNC: <0.7 MG/DL
MICROALBUMIN/CREAT UR: NORMAL MG/G (ref 0–30)
PLATELET # BLD AUTO: 303 K/UL (ref 164–446)
PMV BLD AUTO: 11 FL (ref 9–12.9)
POTASSIUM SERPL-SCNC: 3.7 MMOL/L (ref 3.6–5.5)
PROT SERPL-MCNC: 7.2 G/DL (ref 6–8.2)
PSA SERPL-MCNC: 1.18 NG/ML (ref 0–4)
RBC # BLD AUTO: 5.04 M/UL (ref 4.7–6.1)
SODIUM SERPL-SCNC: 143 MMOL/L (ref 135–145)
TRIGL SERPL-MCNC: 83 MG/DL (ref 0–149)
WBC # BLD AUTO: 7.5 K/UL (ref 4.8–10.8)

## 2020-01-04 PROCEDURE — 36415 COLL VENOUS BLD VENIPUNCTURE: CPT

## 2020-01-04 PROCEDURE — 82570 ASSAY OF URINE CREATININE: CPT

## 2020-01-04 PROCEDURE — 84153 ASSAY OF PSA TOTAL: CPT

## 2020-01-04 PROCEDURE — 82043 UR ALBUMIN QUANTITATIVE: CPT

## 2020-01-04 PROCEDURE — 85027 COMPLETE CBC AUTOMATED: CPT

## 2020-01-04 PROCEDURE — 80053 COMPREHEN METABOLIC PANEL: CPT

## 2020-01-04 PROCEDURE — 83036 HEMOGLOBIN GLYCOSYLATED A1C: CPT

## 2020-01-04 PROCEDURE — 80061 LIPID PANEL: CPT

## 2020-01-08 ENCOUNTER — TELEPHONE (OUTPATIENT)
Dept: VASCULAR LAB | Facility: MEDICAL CENTER | Age: 65
End: 2020-01-08

## 2020-01-08 NOTE — TELEPHONE ENCOUNTER
lft vm for pt to schedule Initial with Dr. Malik when available     Michael J Bloch, M.D.  [place]             Please schedule initial visit with King when available

## 2020-01-13 DIAGNOSIS — I10 ESSENTIAL HYPERTENSION: ICD-10-CM

## 2020-01-13 DIAGNOSIS — F41.9 ANXIETY: ICD-10-CM

## 2020-01-14 RX ORDER — CLONIDINE HYDROCHLORIDE 0.1 MG/1
0.1 TABLET ORAL 2 TIMES DAILY
Qty: 60 TAB | Refills: 3 | Status: SHIPPED | OUTPATIENT
Start: 2020-01-14 | End: 2020-01-30 | Stop reason: SDUPTHER

## 2020-01-28 ENCOUNTER — HOSPITAL ENCOUNTER (OUTPATIENT)
Facility: MEDICAL CENTER | Age: 65
End: 2020-01-28
Attending: PHYSICIAN ASSISTANT
Payer: MEDICARE

## 2020-01-28 ENCOUNTER — OFFICE VISIT (OUTPATIENT)
Dept: URGENT CARE | Facility: CLINIC | Age: 65
End: 2020-01-28
Payer: MEDICARE

## 2020-01-28 VITALS
OXYGEN SATURATION: 95 % | BODY MASS INDEX: 25.84 KG/M2 | HEART RATE: 100 BPM | TEMPERATURE: 98.3 F | WEIGHT: 160.8 LBS | RESPIRATION RATE: 18 BRPM | SYSTOLIC BLOOD PRESSURE: 134 MMHG | DIASTOLIC BLOOD PRESSURE: 80 MMHG | HEIGHT: 66 IN

## 2020-01-28 DIAGNOSIS — R31.9 HEMATURIA, UNSPECIFIED TYPE: ICD-10-CM

## 2020-01-28 LAB
ANION GAP SERPL CALC-SCNC: 6 MMOL/L (ref 0–11.9)
APPEARANCE UR: CLEAR
BASOPHILS # BLD AUTO: 1 % (ref 0–1.8)
BASOPHILS # BLD: 0.05 K/UL (ref 0–0.12)
BILIRUB UR STRIP-MCNC: NEGATIVE MG/DL
BUN SERPL-MCNC: 15 MG/DL (ref 8–22)
CALCIUM SERPL-MCNC: 9.7 MG/DL (ref 8.5–10.5)
CHLORIDE SERPL-SCNC: 107 MMOL/L (ref 96–112)
CO2 SERPL-SCNC: 29 MMOL/L (ref 20–33)
COLOR UR AUTO: NORMAL
CREAT SERPL-MCNC: 1.03 MG/DL (ref 0.5–1.4)
EOSINOPHIL # BLD AUTO: 0.09 K/UL (ref 0–0.51)
EOSINOPHIL NFR BLD: 1.7 % (ref 0–6.9)
ERYTHROCYTE [DISTWIDTH] IN BLOOD BY AUTOMATED COUNT: 42.1 FL (ref 35.9–50)
GLUCOSE SERPL-MCNC: 119 MG/DL (ref 65–99)
GLUCOSE UR STRIP.AUTO-MCNC: NEGATIVE MG/DL
HCT VFR BLD AUTO: 40.1 % (ref 42–52)
HGB BLD-MCNC: 13.4 G/DL (ref 14–18)
IMM GRANULOCYTES # BLD AUTO: 0.02 K/UL (ref 0–0.11)
IMM GRANULOCYTES NFR BLD AUTO: 0.4 % (ref 0–0.9)
KETONES UR STRIP.AUTO-MCNC: NEGATIVE MG/DL
LEUKOCYTE ESTERASE UR QL STRIP.AUTO: NEGATIVE
LYMPHOCYTES # BLD AUTO: 1.83 K/UL (ref 1–4.8)
LYMPHOCYTES NFR BLD: 35.5 % (ref 22–41)
MCH RBC QN AUTO: 28.3 PG (ref 27–33)
MCHC RBC AUTO-ENTMCNC: 33.4 G/DL (ref 33.7–35.3)
MCV RBC AUTO: 84.6 FL (ref 81.4–97.8)
MONOCYTES # BLD AUTO: 0.42 K/UL (ref 0–0.85)
MONOCYTES NFR BLD AUTO: 8.1 % (ref 0–13.4)
NEUTROPHILS # BLD AUTO: 2.75 K/UL (ref 1.82–7.42)
NEUTROPHILS NFR BLD: 53.3 % (ref 44–72)
NITRITE UR QL STRIP.AUTO: NEGATIVE
NRBC # BLD AUTO: 0 K/UL
NRBC BLD-RTO: 0 /100 WBC
PH UR STRIP.AUTO: 7 [PH] (ref 5–8)
PLATELET # BLD AUTO: 279 K/UL (ref 164–446)
PMV BLD AUTO: 10.6 FL (ref 9–12.9)
POTASSIUM SERPL-SCNC: 3.7 MMOL/L (ref 3.6–5.5)
PROT UR QL STRIP: 100 MG/DL
RBC # BLD AUTO: 4.74 M/UL (ref 4.7–6.1)
RBC UR QL AUTO: NORMAL
SODIUM SERPL-SCNC: 142 MMOL/L (ref 135–145)
SP GR UR STRIP.AUTO: 1.01
UROBILINOGEN UR STRIP-MCNC: 0.2 MG/DL
WBC # BLD AUTO: 5.2 K/UL (ref 4.8–10.8)

## 2020-01-28 PROCEDURE — 99213 OFFICE O/P EST LOW 20 MIN: CPT | Performed by: PHYSICIAN ASSISTANT

## 2020-01-28 PROCEDURE — 81002 URINALYSIS NONAUTO W/O SCOPE: CPT | Performed by: PHYSICIAN ASSISTANT

## 2020-01-28 PROCEDURE — 80048 BASIC METABOLIC PNL TOTAL CA: CPT

## 2020-01-28 PROCEDURE — 85025 COMPLETE CBC W/AUTO DIFF WBC: CPT

## 2020-01-28 ASSESSMENT — ENCOUNTER SYMPTOMS
BRUISES/BLEEDS EASILY: 0
SHORTNESS OF BREATH: 0
BACK PAIN: 0
COUGH: 0
FLANK PAIN: 0
CHILLS: 0
FEVER: 0
SENSORY CHANGE: 0
MYALGIAS: 0
DIARRHEA: 0
FOCAL WEAKNESS: 0
NAUSEA: 0
VOMITING: 0
ABDOMINAL PAIN: 0

## 2020-01-29 ENCOUNTER — HOSPITAL ENCOUNTER (OUTPATIENT)
Dept: RADIOLOGY | Facility: MEDICAL CENTER | Age: 65
End: 2020-01-29
Attending: PHYSICIAN ASSISTANT
Payer: MEDICARE

## 2020-01-29 DIAGNOSIS — R31.9 HEMATURIA, UNSPECIFIED TYPE: ICD-10-CM

## 2020-01-29 PROCEDURE — 76775 US EXAM ABDO BACK WALL LIM: CPT

## 2020-01-29 NOTE — PROGRESS NOTES
Subjective:     Nikolas Rico  is a 64 y.o. male who presents for Other (blood in urine started today after a fall)       Other   This is a new problem. The current episode started today. Pertinent negatives include no abdominal pain, chills, coughing, fever, myalgias, nausea, rash or vomiting.     Patient comes in clinic noting hematuria twice today.  He notes a trip without falling to the ground earlier in the day he thinks may be associated.  He reports he was walking and did not see a step and had jumped down and landed abruptly.  He reports landing on both feet with a cane in hand and denies falling to the ground.  He thinks this jarring stepdown may be associated with new onset painless hematuria but appeared shortly thereafter.  He reports 2 episodes of urination with gross hematuria.  He denies dysuria frequency urgency.  He denies any other pains or abnormalities.  Denies flank pain or abdominal pain.  Denies fevers chills nausea vomiting.  Denies past medical history of similar.  He has a history of chronic kidney disease stage III with GFR 30-59 range last blood work was around 3 to 4 weeks ago.  Patient has follow-up with primary care in 2 days.    Past Medical History:   Diagnosis Date   • CKD (chronic kidney disease) stage 3, GFR 30-59 ml/min ([facility])    • COPD    • Diabetes    • GERD (gastroesophageal reflux disease)    • Hepatitis C    • Hyperlipidemia    • Hypertension    • Psychiatric disorder     bipolar   • Schizoaffective disorder ([facility]) 10/01/16     Past Surgical History:   Procedure Laterality Date   • OTHER ABDOMINAL SURGERY     • OTHER ORTHOPEDIC SURGERY       Social History     Socioeconomic History   • Marital status: Single     Spouse name: Not on file   • Number of children: Not on file   • Years of education: Not on file   • Highest education level: Not on file   Occupational History   • Not on file   Social Needs   • Financial resource strain: Not on file   • Food insecurity:      Worry: Not on file     Inability: Not on file   • Transportation needs:     Medical: Not on file     Non-medical: Not on file   Tobacco Use   • Smoking status: Former Smoker     Packs/day: 0.00     Types: Cigarettes     Last attempt to quit: 3/6/2013     Years since quittin.9   • Smokeless tobacco: Never Used   Substance and Sexual Activity   • Alcohol use: Yes     Alcohol/week: 0.0 oz     Comment: sober since HCV diagnosis   • Drug use: Yes     Types: Marijuana   • Sexual activity: Not Currently     Partners: Female   Lifestyle   • Physical activity:     Days per week: Not on file     Minutes per session: Not on file   • Stress: Not on file   Relationships   • Social connections:     Talks on phone: Not on file     Gets together: Not on file     Attends Confucianism service: Not on file     Active member of club or organization: Not on file     Attends meetings of clubs or organizations: Not on file     Relationship status: Not on file   • Intimate partner violence:     Fear of current or ex partner: Not on file     Emotionally abused: Not on file     Physically abused: Not on file     Forced sexual activity: Not on file   Other Topics Concern   • Not on file   Social History Narrative   • Not on file      Family History   Problem Relation Age of Onset   • Cancer Father         blader ca    Review of Systems   Constitutional: Negative for chills and fever.   Respiratory: Negative for cough and shortness of breath.    Gastrointestinal: Negative for abdominal pain, diarrhea, nausea and vomiting.   Genitourinary: Positive for hematuria. Negative for dysuria, flank pain, frequency and urgency.   Musculoskeletal: Negative for back pain and myalgias.   Skin: Negative for rash.   Neurological: Negative for sensory change and focal weakness.   Endo/Heme/Allergies: Does not bruise/bleed easily.     Allergies   Allergen Reactions   • Nkda [No Known Drug Allergy]       Objective:   /80 (BP Location: Left arm,  "Patient Position: Sitting, BP Cuff Size: Adult)   Pulse 100   Temp 36.8 °C (98.3 °F)   Resp 18   Ht 1.676 m (5' 6\")   Wt 72.9 kg (160 lb 12.8 oz)   SpO2 95%   BMI 25.95 kg/m²   Physical Exam  Vitals signs and nursing note reviewed.   Constitutional:       General: He is not in acute distress.     Appearance: He is well-developed. He is not diaphoretic.   HENT:      Head: Normocephalic and atraumatic.      Right Ear: External ear normal.      Left Ear: External ear normal.      Nose: Nose normal.   Eyes:      General: No scleral icterus.        Right eye: No discharge.         Left eye: No discharge.      Conjunctiva/sclera: Conjunctivae normal.   Neck:      Musculoskeletal: Neck supple.   Pulmonary:      Effort: Pulmonary effort is normal. No respiratory distress.   Abdominal:      General: Abdomen is flat. Bowel sounds are normal.      Palpations: Abdomen is soft.      Tenderness: There is no tenderness. There is no right CVA tenderness, left CVA tenderness, guarding or rebound. Negative signs include Go's sign and McBurney's sign.   Musculoskeletal: Normal range of motion.      Thoracic back: Normal.      Lumbar back: Normal.   Skin:     General: Skin is warm and dry.      Coloration: Skin is not pale.   Neurological:      Mental Status: He is alert and oriented to person, place, and time.      Coordination: Coordination normal.       Results for orders placed or performed in visit on 01/28/20   POCT Urinalysis   Result Value Ref Range    POC Color red Negative    POC Appearance clear Negative    POC Leukocyte Esterase negative Negative    POC Nitrites negative Negative    POC Urobiligen 0.2 Negative (0.2) mg/dL    POC Protein 100 Negative mg/dL    POC Urine PH 7.0 5.0 - 8.0    POC Blood large Negative    POC Specific Gravity 1.015 <1.005 - >1.030    POC Ketones negative Negative mg/dL    POC Bilirubin negative Negative mg/dL    POC Glucose negative Negative mg/dL     Lab Results   Component Value " Date/Time    WBC 5.2 01/28/2020 04:25 PM    RBC 4.74 01/28/2020 04:25 PM    HEMOGLOBIN 13.4 (L) 01/28/2020 04:25 PM    HEMATOCRIT 40.1 (L) 01/28/2020 04:25 PM    MCV 84.6 01/28/2020 04:25 PM    MCH 28.3 01/28/2020 04:25 PM    MCHC 33.4 (L) 01/28/2020 04:25 PM    MPV 10.6 01/28/2020 04:25 PM    NEUTSPOLYS 53.30 01/28/2020 04:25 PM    LYMPHOCYTES 35.50 01/28/2020 04:25 PM    MONOCYTES 8.10 01/28/2020 04:25 PM    EOSINOPHILS 1.70 01/28/2020 04:25 PM    BASOPHILS 1.00 01/28/2020 04:25 PM    HYPOCHROMIA 1+ 05/13/2013 08:10 AM      BMP - (see chart - unable to copy in)    US renal -patient canceled on his own accord; I was able to contact him by phone encouraged him to keep follow-up with primary care the day after tomorrow.    To ER with any acute worsening over interim      Assessment/Plan:   Assessment    1. Hematuria, unspecified type  - POCT Urinalysis  - CBC WITH DIFFERENTIAL; Future  - Basic Metabolic Panel; Future  - US-RENAL; Future  Supportive care is reviewed with patient/caregiver -patient describes no actual fall to the ground think this may represent more painless hematuria based on his description-attempting to work-up history with renal dysfunction and imaging of bladder-patient canceled ultrasound, encouraged to follow-up with primary care and to the emergency room with any worsening over interim    Return to clinic with lack of resolution or progression of symptoms.  ER precautions with any worsening symptoms are reviewed with patient/caregiver and they do express understanding    Differential diagnosis, natural history, supportive care, and indications for immediate follow-up discussed.

## 2020-01-30 ENCOUNTER — OFFICE VISIT (OUTPATIENT)
Dept: MEDICAL GROUP | Facility: MEDICAL CENTER | Age: 65
End: 2020-01-30
Payer: MEDICARE

## 2020-01-30 VITALS
OXYGEN SATURATION: 96 % | BODY MASS INDEX: 25.39 KG/M2 | TEMPERATURE: 97.4 F | WEIGHT: 158 LBS | HEIGHT: 66 IN | RESPIRATION RATE: 16 BRPM | HEART RATE: 86 BPM | DIASTOLIC BLOOD PRESSURE: 70 MMHG | SYSTOLIC BLOOD PRESSURE: 110 MMHG

## 2020-01-30 DIAGNOSIS — E11.9 CONTROLLED TYPE 2 DIABETES MELLITUS WITHOUT COMPLICATION, WITH LONG-TERM CURRENT USE OF INSULIN (HCC): ICD-10-CM

## 2020-01-30 DIAGNOSIS — Z86.711 HX PULMONARY EMBOLISM: ICD-10-CM

## 2020-01-30 DIAGNOSIS — Z79.4 CONTROLLED TYPE 2 DIABETES MELLITUS WITHOUT COMPLICATION, WITH LONG-TERM CURRENT USE OF INSULIN (HCC): ICD-10-CM

## 2020-01-30 DIAGNOSIS — Z86.718 HISTORY OF DVT (DEEP VEIN THROMBOSIS): ICD-10-CM

## 2020-01-30 DIAGNOSIS — F25.9 SCHIZOAFFECTIVE DISORDER, UNSPECIFIED TYPE (HCC): ICD-10-CM

## 2020-01-30 DIAGNOSIS — Z95.828 GREENFIELD FILTER IN PLACE: ICD-10-CM

## 2020-01-30 DIAGNOSIS — E04.1 THYROID NODULE: ICD-10-CM

## 2020-01-30 DIAGNOSIS — J43.9 PULMONARY EMPHYSEMA, UNSPECIFIED EMPHYSEMA TYPE (HCC): ICD-10-CM

## 2020-01-30 DIAGNOSIS — Z23 NEED FOR PNEUMOCOCCAL VACCINATION: ICD-10-CM

## 2020-01-30 DIAGNOSIS — E03.4 HYPOTHYROIDISM DUE TO ACQUIRED ATROPHY OF THYROID: ICD-10-CM

## 2020-01-30 DIAGNOSIS — Z87.19 H/O CHRONIC HEPATITIS: ICD-10-CM

## 2020-01-30 DIAGNOSIS — I10 ESSENTIAL HYPERTENSION: ICD-10-CM

## 2020-01-30 DIAGNOSIS — N18.30 CKD (CHRONIC KIDNEY DISEASE) STAGE 3, GFR 30-59 ML/MIN: Chronic | ICD-10-CM

## 2020-01-30 DIAGNOSIS — E78.5 DYSLIPIDEMIA: ICD-10-CM

## 2020-01-30 DIAGNOSIS — Z00.00 MEDICARE ANNUAL WELLNESS VISIT, SUBSEQUENT: ICD-10-CM

## 2020-01-30 DIAGNOSIS — G47.30 SLEEP APNEA, UNSPECIFIED TYPE: ICD-10-CM

## 2020-01-30 PROBLEM — R41.0 DISORIENTATION: Status: RESOLVED | Noted: 2017-02-21 | Resolved: 2020-01-30

## 2020-01-30 PROBLEM — R00.0 TACHYCARDIA: Status: RESOLVED | Noted: 2017-03-07 | Resolved: 2020-01-30

## 2020-01-30 PROBLEM — R41.0 EPISODIC CONFUSION: Status: RESOLVED | Noted: 2017-04-28 | Resolved: 2020-01-30

## 2020-01-30 PROCEDURE — G0438 PPPS, INITIAL VISIT: HCPCS | Performed by: NURSE PRACTITIONER

## 2020-01-30 PROCEDURE — 99213 OFFICE O/P EST LOW 20 MIN: CPT | Mod: 25 | Performed by: NURSE PRACTITIONER

## 2020-01-30 PROCEDURE — G0009 ADMIN PNEUMOCOCCAL VACCINE: HCPCS | Performed by: NURSE PRACTITIONER

## 2020-01-30 PROCEDURE — 90670 PCV13 VACCINE IM: CPT | Performed by: NURSE PRACTITIONER

## 2020-01-30 RX ORDER — ATORVASTATIN CALCIUM 40 MG/1
40 TABLET, FILM COATED ORAL DAILY
Qty: 100 TAB | Refills: 3 | Status: SHIPPED | OUTPATIENT
Start: 2020-01-30 | End: 2021-04-11

## 2020-01-30 RX ORDER — CLONIDINE HYDROCHLORIDE 0.1 MG/1
0.1 TABLET ORAL 2 TIMES DAILY
Qty: 200 TAB | Refills: 3 | Status: SHIPPED | OUTPATIENT
Start: 2020-01-30 | End: 2021-02-12

## 2020-01-30 RX ORDER — METOPROLOL SUCCINATE 25 MG/1
TABLET, EXTENDED RELEASE ORAL
Qty: 100 TAB | Refills: 3 | Status: SHIPPED | OUTPATIENT
Start: 2020-01-30 | End: 2021-02-12

## 2020-01-30 RX ORDER — LEVOTHYROXINE SODIUM 0.03 MG/1
25 TABLET ORAL
Qty: 100 TAB | Refills: 3 | Status: SHIPPED | OUTPATIENT
Start: 2020-01-30 | End: 2021-02-12

## 2020-01-30 RX ORDER — LISINOPRIL 40 MG/1
TABLET ORAL
Qty: 100 TAB | Refills: 3 | Status: SHIPPED | OUTPATIENT
Start: 2020-01-30 | End: 2021-04-11

## 2020-01-30 RX ORDER — AMLODIPINE BESYLATE 10 MG/1
TABLET ORAL
Qty: 100 TAB | Refills: 3 | Status: SHIPPED | OUTPATIENT
Start: 2020-01-30 | End: 2021-02-12

## 2020-01-30 ASSESSMENT — ACTIVITIES OF DAILY LIVING (ADL): BATHING_REQUIRES_ASSISTANCE: 0

## 2020-01-30 ASSESSMENT — PATIENT HEALTH QUESTIONNAIRE - PHQ9: CLINICAL INTERPRETATION OF PHQ2 SCORE: 0

## 2020-01-30 ASSESSMENT — ENCOUNTER SYMPTOMS: GENERAL WELL-BEING: EXCELLENT

## 2020-01-30 NOTE — PROGRESS NOTES
Annual Health Assessment Questions:    1.  Are you currently engaging in any exercise or physical activity? Yes    2.  How would you describe your mood or emotional well-being today? fair    3.  Have you had any falls in the last year? No    4.  Have you noticed any problems with your balance or had difficulty walking? Yes    5.  In the last six months have you experienced any leakage of urine? No    6. DPA/Advanced Directive: Patient does not have an Advanced Directive.  A packet and workshop information was given on Advanced Directives.

## 2020-01-30 NOTE — PROGRESS NOTES
CC: Patient here today for annual Medicare wellness visit as well as follow-up on chronic illnesses and to discuss lab results for his hypothyroidism and diabetes.  This is patient's second visit to this office after recently establishing from the [facility].    HPI:   Nikolas presents today with the following.    1. Medicare annual wellness visit, subsequent  Screening performed below.    2. Hypothyroidism due to acquired atrophy of thyroid  Patient apparently at one point had a mild elevation in TSH so was started on low-dose levothyroxine and states he is doing well on this.  His last TSH from a year ago was therapeutic at 1.8 on the dosing.    3. Thyroid nodule  Patient apparently had a thyroid nodule noted on an ultrasound and review of chart notes shows biopsy was negative and he was advised to follow-up if he became symptomatic.  He denies any problems with his swallowing or breathing or noticing any nodules of the neck.    4. Controlled type 2 diabetes mellitus without complication, with long-term current use of insulin (HCC)  Patient is currently on metformin 850 mg twice daily with normal GFR and no microalbuminuria.  His hemoglobin A1c comes back at 6.2 and he is not obese.  He is taking his statin and ACE inhibitor.    5. Schizoaffective disorder, unspecified type (HCC)  Patient reports at one time he thought he was bipolar but he goes to a psychiatrist and they have diagnosed him with schizophrenia for which they prescribed him Seroquel and temazepam.    6. Pulmonary emphysema, unspecified emphysema type (HCC)  Patient states he no longer smokes cigarettes and has no trouble breathing and does not need an albuterol inhaler    7. Dyslipidemia  Cholesterol levels come back with a good LDL and total cholesterol on a statin.    8. Essential hypertension  Blood pressure is been controlled on his 3 antihypertensive medicines.    9. Hx pulmonary embolism  Patient gives history of previous pulmonary  embolism and has been off anticoagulation and has a [place] filter.    10. History of DVT (deep vein thrombosis)  Patient reports no new DVT symptoms.    11. [place] filter in place  Patient was referred to the vascular clinic on his initial visit because of this and it does appear he has an appointment in the next month.    12. CKD (chronic kidney disease) stage 3, GFR 30-59 ml/min (HCC)  Previous history of low GFR but his last 2 results of been greater than 60.    13. H/O chronic hepatitis  Patient states this was treated in the past and he no longer has hepatitis C and his liver functions been normal    14. Sleep apnea, unspecified type  Patient is supposed to be on CPAP but he states he will not use it because he sleeps worse with the machine.    15. Need for pneumococcal vaccination  Patient is due for his first pneumococcal vaccine over age 65      Depression Screening    Little interest or pleasure in doing things?  0 - not at all  Feeling down, depressed , or hopeless? 0 - not at all  Patient Health Questionnaire Score: 0     If depressive symptoms identified deferred to follow up visit unless specifically addressed in assessment and plan.    Interpretation of PHQ-9 Total Score   Score Severity   1-4 No Depression   5-9 Mild Depression   10-14 Moderate Depression   15-19 Moderately Severe Depression   20-27 Severe Depression    Screening for Cognitive Impairment    Three Minute Recall (village, kitchen, baby) 3/3    [place] clock face with all 12 numbers and set the hands to show 10 past 10.  Yes    Cognitive concerns identified deferred for follow up unless specifically addressed in assessment and plan.    Fall Risk Assessment    Has the patient had two or more falls in the last year or any fall with injury in the last year?  No    Safety Assessment    Throw rugs on floor.  Yes  Handrails on all stairs.  Yes  Good lighting in all hallways.  Yes  Difficulty hearing.  No  Patient counseled about all  safety risks that were identified.    Functional Assessment ADLs    Are there any barriers preventing you from cooking for yourself or meeting nutritional needs?  No.    Are there any barriers preventing you from driving safely or obtaining transportation?  No.    Are there any barriers preventing you from using a telephone or calling for help?  No.    Are there any barriers preventing you from shopping?  No.    Are there any barriers preventing you from taking care of your own finances?  No.    Are there any barriers preventing you from managing your medications?  No.    Are there any barriers preventing you from showering, bathing or dressing yourself?  No.    Are you currently engaging in any exercise or physical activity?  Yes.     What is your perception of your health?  Excellent.      Health Maintenance Summary                Annual Wellness Visit Overdue 1955     ANNUAL PFT SCREENING-FEV1 AND FEV/FVC RATIO / SPIROMETRY Overdue 12/1/2012      Done 12/1/2011 PFT DICTATED RESULTS    DIABETES MONOFILAMENT / LE EXAM Overdue 4/19/2019      Done 4/19/2018 AMB DIABETIC MONOFILAMENT LOWER EXTREMITY EXAM     Patient has more history with this topic...    RETINAL SCREENING Overdue 8/16/2019      Done 8/16/2018 REFERRAL FOR RETINAL SCREENING EXAM     Patient has more history with this topic...    IMM PNEUMOCOCCAL VACCINE: 65+ Years Overdue 12/17/2019      Done 12/11/2011 Imm Admin: Pneumococcal polysaccharide vaccine (PPSV-23)     Patient has more history with this topic...    IMM ZOSTER VACCINES Postponed 6/14/2024 Originally 12/17/2004. System: vaccine not available, other system reasons    IMM HEP B VACCINE Postponed 6/15/2024 Originally 10/24/2014. Insurance/Financial     Done 11/14/2013 Imm Admin: Hep A/HEP B Combined Vaccine ([employer])     Patient has more history with this topic...    IMM DTaP/Tdap/Td Vaccine Postponed 6/15/2024 Originally 12/17/1966. Insurance/Financial    A1C SCREENING Next Due 7/4/2020       Done 1/4/2020 HEMOGLOBIN A1C     Patient has more history with this topic...    FASTING LIPID PROFILE Next Due 1/4/2021      Done 1/4/2020 LIPID PROFILE     Patient has more history with this topic...    URINE ACR / MICROALBUMIN Next Due 1/4/2021      Done 1/4/2020 MICROALBUMIN CREAT RATIO URINE     Patient has more history with this topic...    SERUM CREATININE Next Due 1/28/2021      Done 1/28/2020 BASIC METABOLIC PANEL     Patient has more history with this topic...    COLONOSCOPY Next Due 10/9/2022      Done 10/9/2017 REFERRAL TO GI FOR COLONOSCOPY          Patient Care Team:  NORMA Syed as PCP - General (Internal Medicine)  Jes Trent O.D. as Consulting Physician (Optometry)          Patient Active Problem List    Diagnosis Date Noted   • Schizoaffective disorder (HCC) 04/14/2017     Priority: High   • Left anterior fascicular block 11/10/2016     Priority: High   • Essential hypertension 07/21/2016     Priority: High   • Diabetes type 2, controlled (CMS-[facility]) 01/26/2016     Priority: Medium   • COPD (chronic obstructive pulmonary disease) ([facility]) 12/19/2014     Priority: Medium   • Dyslipidemia 04/23/2015     Priority: Low   • H/O chronic hepatitis 01/30/2020   • Thyroid nodule 04/19/2018   • Hx pulmonary embolism 08/30/2017   • Sleep apnea 04/28/2017   • [place] filter in place 11/22/2011   • History of DVT (deep vein thrombosis) 11/22/2011       Current Outpatient Medications   Medication Sig Dispense Refill   • cloNIDine (CATAPRES) 0.1 MG Tab Take 1 Tab by mouth 2 Times a Day. 200 Tab 3   • atorvastatin (LIPITOR) 40 MG Tab Take 1 Tab by mouth every day. 100 Tab 3   • lisinopril (PRINIVIL) 40 MG tablet TAKE ONE TABLET BY MOUTH ONE TIME DAILY 100 Tab 3   • metFORMIN (GLUCOPHAGE) 850 MG Tab Take 1 Tab by mouth 2 times a day, with meals. 200 Tab 3   • levothyroxine (SYNTHROID) 25 MCG Tab Take 1 Tab by mouth every morning before breakfast. TAKE ONE TABLET BY MOUTH EVERY MORNING ON EMPTY  "STOMACH 100 Tab 3   • amLODIPine (NORVASC) 10 MG Tab TAKE ONE TABLET BY MOUTH ONE TIME DAILY 100 Tab 3   • metoprolol SR (TOPROL XL) 25 MG TABLET SR 24 HR TAKE ONE TABLET BY MOUTH ONE TIME DAILY 100 Tab 3   • glucose blood (TRUE METRIX BLOOD GLUCOSE TEST) strip USE TO CHECK FASTING BLOOD SUGAR TWICE DAILY AND AS NEEDED 100 Strip 11   • insulin regular (HUMULIN R) 100 Unit/mL Solution As per sliding scale 3 Vial 3   • ranitidine (ZANTAC) 300 MG tablet TAKE 1 TABLET BY MOUTH ONCE DAILY AS NEEDED FOR HEARTBURN 30 Tab 3   • ULTICARE INSULIN SYRINGE 29G X 1/2\" 0.5 ML Misc USE AS DIRECTED BY PHYSICIAN 90 Each 4   • quetiapine (SEROQUEL) 300 MG tablet Take 300 mg by mouth every bedtime.     • aspirin (ASA) 81 MG Chew Tab chewable tablet CHEW 1 TABLET ORALLY ONCE DAILY 100 Tab 3   • temazepam (RESTORIL) 30 MG capsule        No current facility-administered medications for this visit.          Allergies as of 01/30/2020 - Reviewed 01/30/2020   Allergen Reaction Noted   • Nkda [no known drug allergy]  01/17/2008        ROS: As per HPI.    /70 (BP Location: Right arm, Patient Position: Sitting, BP Cuff Size: Adult)   Pulse 86   Temp 36.3 °C (97.4 °F) (Temporal)   Resp 16   Ht 1.676 m (5' 6\")   Wt 71.7 kg (158 lb)   SpO2 96%   BMI 25.50 kg/m²     Physical Exam:  Gen:         Alert and oriented, No apparent distress.  Neck:        No Lymphadenopathy or Bruits.  Lungs:     Clear to auscultation bilaterally  CV:          Regular rate and rhythm. No murmurs, rubs or gallops.  Abd:         Soft non tender, non distended. Normal active bowel sounds.  No  Hepatosplenomegaly, No pulsatile masses.                   Ext:          No clubbing, cyanosis, edema.      Assessment and Plan.   64 y.o. male with the following issues.    1. Medicare annual wellness visit, subsequent  Annual wellness topics discussed, review of chronic medical problems completed    - Initial Annual Wellness Visit - Includes PPPS ()    2. " Hypothyroidism due to acquired atrophy of thyroid  I will have patient do repeat TSH and if he is therapeutic, I will have him continue on low-dose levothyroxine as long as he does not have problems with agitation or explain explained weight loss.  - TSH; Future  - levothyroxine (SYNTHROID) 25 MCG Tab; Take 1 Tab by mouth every morning before breakfast. TAKE ONE TABLET BY MOUTH EVERY MORNING ON EMPTY STOMACH  Dispense: 100 Tab; Refill: 3    3. Thyroid nodule  He has not been symptomatic since he had his biopsy but I will consider repeat ultrasound in the next year.  He will do a TSH test before his next visit    4. Controlled type 2 diabetes mellitus without complication, with long-term current use of insulin ([facility])  Hemoglobin A1c is doing well at 6.2 on his current dose of metformin and his GFR is normal and he does not show microalbuminuria.  He is on a statin and ACE inhibitor and aspirin.  - Diabetic Monofilament LE Exam  - HEMOGLOBIN A1C; Future  - metFORMIN (GLUCOPHAGE) 850 MG Tab; Take 1 Tab by mouth 2 times a day, with meals.  Dispense: 200 Tab; Refill: 3  - glucose blood (TRUE METRIX BLOOD GLUCOSE TEST) strip; USE TO CHECK FASTING BLOOD SUGAR TWICE DAILY AND AS NEEDED  Dispense: 100 Strip; Refill: 11    5. Schizoaffective disorder, unspecified type ([facility])  Patient currently follows with psychiatry which is prescribing his medicines and his behavior in the office is always pleasant    6. Pulmonary emphysema, unspecified emphysema type ([facility])  Patient reports he has not needed to use an albuterol inhaler in a while    7. Dyslipidemia  Cholesterol levels are good on a statin and he reports no myalgias  - atorvastatin (LIPITOR) 40 MG Tab; Take 1 Tab by mouth every day.  Dispense: 100 Tab; Refill: 3    8. Essential hypertension  I will refill patient's medicines today and blood pressure is been controlled  - cloNIDine (CATAPRES) 0.1 MG Tab; Take 1 Tab by mouth 2 Times a Day.  Dispense: 200 Tab; Refill: 3  -  lisinopril (PRINIVIL) 40 MG tablet; TAKE ONE TABLET BY MOUTH ONE TIME DAILY  Dispense: 100 Tab; Refill: 3  - amLODIPine (NORVASC) 10 MG Tab; TAKE ONE TABLET BY MOUTH ONE TIME DAILY  Dispense: 100 Tab; Refill: 3  - metoprolol SR (TOPROL XL) 25 MG TABLET SR 24 HR; TAKE ONE TABLET BY MOUTH ONE TIME DAILY  Dispense: 100 Tab; Refill: 3    9. Hx pulmonary embolism  Patient to follow with the vascular clinic    10. History of DVT (deep vein thrombosis)  Patient reports no new symptoms and will follow with the vascular clinic.    11. [place] filter in place  Patient referred to the vascular clinic after his initial visit with the above history    12. CKD (chronic kidney disease) stage 3, GFR 30-59 ml/min ([facility])  GFR has been normal for the last 2 test so I will take this diagnosis office chart    13. H/O chronic hepatitis  Patient apparently had treatment and has no elevations in liver functions    14. Sleep apnea, unspecified type  Patient declines using CPAP    15. Need for pneumococcal vaccination  I have placed the below orders and discussed them with an approved delegating provider. The MA is performing the below orders under the direction of Dr. Tony Corbett 13 [id number]       none known

## 2020-02-07 ENCOUNTER — TELEPHONE (OUTPATIENT)
Dept: VASCULAR LAB | Facility: MEDICAL CENTER | Age: 65
End: 2020-02-07

## 2020-02-07 ENCOUNTER — OFFICE VISIT (OUTPATIENT)
Dept: VASCULAR LAB | Facility: MEDICAL CENTER | Age: 65
End: 2020-02-07
Attending: INTERNAL MEDICINE
Payer: MEDICARE

## 2020-02-07 VITALS
SYSTOLIC BLOOD PRESSURE: 119 MMHG | DIASTOLIC BLOOD PRESSURE: 62 MMHG | WEIGHT: 162.1 LBS | HEIGHT: 66 IN | BODY MASS INDEX: 26.05 KG/M2 | HEART RATE: 76 BPM

## 2020-02-07 DIAGNOSIS — Z79.02 LONG TERM (CURRENT) USE OF ANTITHROMBOTICS/ANTIPLATELETS: ICD-10-CM

## 2020-02-07 DIAGNOSIS — Z79.4 INSULIN LONG-TERM USE (HCC): ICD-10-CM

## 2020-02-07 DIAGNOSIS — Z79.4 CONTROLLED TYPE 2 DIABETES MELLITUS WITHOUT COMPLICATION, WITH LONG-TERM CURRENT USE OF INSULIN (HCC): ICD-10-CM

## 2020-02-07 DIAGNOSIS — S35.10XA: ICD-10-CM

## 2020-02-07 DIAGNOSIS — I10 ESSENTIAL HYPERTENSION: ICD-10-CM

## 2020-02-07 DIAGNOSIS — E11.9 CONTROLLED TYPE 2 DIABETES MELLITUS WITHOUT COMPLICATION, WITH LONG-TERM CURRENT USE OF INSULIN (HCC): ICD-10-CM

## 2020-02-07 DIAGNOSIS — E78.5 DYSLIPIDEMIA: ICD-10-CM

## 2020-02-07 DIAGNOSIS — Z86.711 HX PULMONARY EMBOLISM: ICD-10-CM

## 2020-02-07 DIAGNOSIS — R19.06 ABDOMINAL WALL MASS OF EPIGASTRIC REGION: ICD-10-CM

## 2020-02-07 DIAGNOSIS — I25.10 CORONARY ARTERY CALCIFICATION SEEN ON CAT SCAN: ICD-10-CM

## 2020-02-07 DIAGNOSIS — G47.33 OBSTRUCTIVE SLEEP APNEA SYNDROME: ICD-10-CM

## 2020-02-07 DIAGNOSIS — Z95.828 PRESENCE OF IVC FILTER: ICD-10-CM

## 2020-02-07 PROCEDURE — 99212 OFFICE O/P EST SF 10 MIN: CPT | Performed by: FAMILY MEDICINE

## 2020-02-07 PROCEDURE — 99204 OFFICE O/P NEW MOD 45 MIN: CPT | Performed by: FAMILY MEDICINE

## 2020-02-07 ASSESSMENT — ENCOUNTER SYMPTOMS
VOMITING: 0
DOUBLE VISION: 0
HEADACHES: 0
ABDOMINAL PAIN: 0
NERVOUS/ANXIOUS: 0
BRUISES/BLEEDS EASILY: 0
FEVER: 0
WEAKNESS: 0
BLOOD IN STOOL: 0
BLURRED VISION: 0
HEMOPTYSIS: 0
SEIZURES: 0
CHILLS: 0
NAUSEA: 0
DEPRESSION: 0
DIARRHEA: 0
PALPITATIONS: 0
DIZZINESS: 0
SORE THROAT: 0
INSOMNIA: 0
WHEEZING: 0
FOCAL WEAKNESS: 0
MYALGIAS: 0
ORTHOPNEA: 0
TREMORS: 0
COUGH: 0
SHORTNESS OF BREATH: 0

## 2020-02-07 NOTE — PATIENT INSTRUCTIONS
- we recommend eval with Dr. John   - continue all current medications, see updated medication list for changes     BLOOD PRESSURE:  - if you notice BP > 140/>90 for more than 3 days, then contact our office ([facility], [phone]) for further instructions    If you are being treated for blood pressure today: please be advised that stabilizing BP may require multiple med changes and close monitoring over the next several months and initially there may be additional imaging and labs and the possibility of adverse drug reactions. Variability of your blood pressure and heart rate may occur until we have things stabilized.  Please feel free to contact our office as needed for questions or concerns.        SODIUM RESTRICTIONS:   - consume no more than 2,300mg of sodium daily (look at food labels) ,or if you have high BP then reduce to no more than 1,500mg of sodium daily   For more information visit: https://www."[employer]/contents/low-sodium-diet-the-basics/print?ktbreTqi=3621&source=see_link       DASH DIET:  Overview: (https://www.heart.org/en/health-topics/high-blood-pressure/changes-you-can-make-to-manage-high-blood-pressure/managing-blood-pressure-with-a-heart-healthy-diet)  Tips for shopping:  https://www.[facility].org/healthy-lifestyle/nutrition-and-healthy-eating/in-depth/dash-diet/art-93806713?p=1   DASH is a flexible and balanced eating plan that helps create a heart-healthy eating style for life.  The DASH eating plan requires no special foods and instead provides daily and weekly nutritional goals. This plan recommends:  · Eating vegetables, fruits, and whole grains  · Including fat-free or low-fat dairy products, fish, poultry, beans, nuts, and vegetable oils  · Limiting foods that are high in saturated fat, such as fatty meats, full-fat dairy products, and tropical oils such as coconut, palm kernel, and palm oils  · Limiting sugar-sweetened beverages and sweets.  Based on these  recommendations, the following table shows examples of daily and weekly servings that meet DASH eating plan targets for a 2,512-flvsteg-l-day diet.    CHOLESTEROL-REDUCING DIETS:  1) AHA diet  (http://www.heart.org/en/healthy-living/healthy-eating/eat-smart/nutrition-basics/aha-diet-and-lifestyle-recommendations)   2) Mediterranean diet (http://www.heart.org/en/healthy-living/healthy-eating/eat-smart/nutrition-basics/mediterranean-diet)   3) Lowering triglycerides: (http://my.americanheart.org/idc/groups/ahamah-public/@wc/@sop/@[facility]/documents/downloadable/[facility]_425988.pdf)     PHYSICAL ACTIVITY:  Unless directed otherwise at today's visit or you are physically incapable due to your current health or medical conditions, it is advised per the American Heart Association to engage in moderate to vigorous physical activity such as brisk walking, swimming, cycling, >150 minutes per week at 30-40 minutes per session, 3 to 5 times weekly.      GENERAL HEALTHY DIET ADVICE:  - the USDA food pattern (https://www.cnpp.usda.gov/USDAFoodPatterns)  - plate method (https://www.choosemyplate.gov/)  - consume diet that emphasizes intake of vegetables, fruits, and whole grains,  - use low fat diary products, poultry, fish, legumes, nontropical vegetable oils, nuts  - limit intake of sweets, sugar-sweetned beverages, and red meats   - reduce saturated and trans fats to <6% of your daily calories

## 2020-02-07 NOTE — TELEPHONE ENCOUNTER
S/with Farhad at [facility] dept regarding medical request denial letter (scanned in media) due to their retention time being 7-10yrs.  Informed him that our request was for records from 2011 which is still in that time gap. He stated that he will reopen request, and can take up to 2-5 business days for records to be faxed over.    **End result- Records are no longer available per Brittani at [facility]

## 2020-02-07 NOTE — TELEPHONE ENCOUNTER
Spoke with pt over phone informing him we have referred him to Vascular Surgery with Dr. John regarding IVC filter recommendations.    Given phone number to TUAN.    Tala GONZALEZ  [facility]

## 2020-02-07 NOTE — PROGRESS NOTES
INITIAL VASCULAR VISIT  Subjective:   Nikolas Rico is a 64 y.o. male who presents today 2/7/2020 for   Chief Complaint   Patient presents with   • New Patient     History of deep vein thrombosis, Ronnie filter in place, Hx pulmonary embolism   Referred for length of therapy (LOT) determination of anticoagulation in context of acute venothromboembolic disease    HPI:    IVC filter:   Denies current sx including abdominal pain, distal extremity ischemic sx, n/v, CP, SOB.  Date of placement: pt reports 8/9/2011 at [facility] (no records available at time of visit)  Reason for placement: hx of PE, per patient, reported to have been told he could be on long-term warfarin vs IVC placement.  Pt opted for filter placement.   Pending surgeries: none    VTE disease / Anticoagulation:   Current symptoms:  Denies current SOB, CP, leg swelling, edema, leg pain, cyanosis of digits, redness of extremities.  Current antithrombotic agent: ASA 81mg    Complications: none, tolerating well, no bleeding or bruising   Date of initiation of anticoagulation: specifically denies use of any type of oral AC agents in the past  Adherence: reports complete, no missed doses      Pertinent VTE pmhx:   Date of Diagnosis: 7/2011   Type of Venous thromboembolic disease (VTE): pt reports PE only   Type of imaging: unknown, presumed CTA - records requested   Preceding/presenting symptoms: per patient, had syncopal episode and reported glucose of 1,700 with polyuria/polydipsia.  Apparently LOC for estimated 29-30h, roommate found him down, called 911, brought to [facility], admitted to ICU for 1 month  VTE tx course: pt reports no blood thinners used including heparin to his knowledge  Any personal VTE hx? No  Any family VTE hx? No    UNPROVOKED VS PROVOKED:   Recent surgery ? No  Recent trauma ? No  Smoker? Yes, Details: quit 2013 - prior meth and cigarettes   Extended travel? No  Other periods of immobility? Had extended LOC -  believed for 30 hours prior to admission to [facility]   Other risk factors for VTE disease:  none  MEN:  Colorectal CA screening:yes              Prostate CA screening: yes   Hx of Testosterone therapy: no  Hypercoaguability work-up completed?  NO    HTN:  Current HTN concerns: No specific concerns since last visit   ADRs: No  Recent studies/labs completed (reviewed with patient, noted below): yes  HTN sx:  No current blurred or changed vision, chest pain, shortness of breath, headache, nausea, dizziness/vertigo   Home BP log: not checking routinely   24h ABPM completed: not tested   Adherence to current HTN meds: compliant all of the time     Hyperlipidemia:    Stable, no current concerns  Current treatment: High intensity statin   atorva  Myalgias? no  Other adverse drug reactions? no  Lipid profile:   Lab Results   Component Value Date/Time    CHOLSTRLTOT 111 01/04/2020 0707    TRIGLYCERIDE 83 01/04/2020 0707    HDL 39 (A) 01/04/2020 0707    LDL 55 01/04/2020 0707     LDL (mg/dL)   Date Value   01/04/2020 55   11/16/2017 117 (H)     HDL (mg/dL)   Date Value   01/04/2020 39 (A)   11/16/2017 34 (A)     T2D:  Stable. No current symptoms reported.   Tolerating meds and no recent med changes.   Using insulin  Home blood sugars stable, reports no lows.   Last A1c 6.2    Past Medical History:   Diagnosis Date   • CKD (chronic kidney disease) stage 3, GFR 30-59 ml/min ([facility])    • COPD    • Diabetes    • GERD (gastroesophageal reflux disease)    • Hepatitis C    • Hyperlipidemia    • Hypertension    • Psychiatric disorder     bipolar   • Schizoaffective disorder (HCC) 10/01/16     Past Surgical History:   Procedure Laterality Date   • OTHER ABDOMINAL SURGERY     • OTHER ORTHOPEDIC SURGERY       Family History   Problem Relation Age of Onset   • Cancer Father         blader ca   • Clotting Disorder Neg Hx      Social History     Tobacco Use   Smoking Status Former Smoker   • Packs/day: 0.00   • Types: Cigarettes   • Last  "attempt to quit: 3/6/2013   • Years since quittin.9   Smokeless Tobacco Never Used     Social History     Tobacco Use   • Smoking status: Former Smoker     Packs/day: 0.00     Types: Cigarettes     Last attempt to quit: 3/6/2013     Years since quittin.9   • Smokeless tobacco: Never Used   Substance Use Topics   • Alcohol use: Yes     Alcohol/week: 0.0 oz     Comment: sober since HCV diagnosis   • Drug use: Yes     Types: Marijuana     Outpatient Encounter Medications as of 2020   Medication Sig Dispense Refill   • cloNIDine (CATAPRES) 0.1 MG Tab Take 1 Tab by mouth 2 Times a Day. 200 Tab 3   • atorvastatin (LIPITOR) 40 MG Tab Take 1 Tab by mouth every day. 100 Tab 3   • lisinopril (PRINIVIL) 40 MG tablet TAKE ONE TABLET BY MOUTH ONE TIME DAILY 100 Tab 3   • metFORMIN (GLUCOPHAGE) 850 MG Tab Take 1 Tab by mouth 2 times a day, with meals. 200 Tab 3   • levothyroxine (SYNTHROID) 25 MCG Tab Take 1 Tab by mouth every morning before breakfast. TAKE ONE TABLET BY MOUTH EVERY MORNING ON EMPTY STOMACH 100 Tab 3   • amLODIPine (NORVASC) 10 MG Tab TAKE ONE TABLET BY MOUTH ONE TIME DAILY 100 Tab 3   • metoprolol SR (TOPROL XL) 25 MG TABLET SR 24 HR TAKE ONE TABLET BY MOUTH ONE TIME DAILY 100 Tab 3   • glucose blood (TRUE METRIX BLOOD GLUCOSE TEST) strip USE TO CHECK FASTING BLOOD SUGAR TWICE DAILY AND AS NEEDED 100 Strip 11   • insulin regular (HUMULIN R) 100 Unit/mL Solution As per sliding scale 3 Vial 3   • ranitidine (ZANTAC) 300 MG tablet TAKE 1 TABLET BY MOUTH ONCE DAILY AS NEEDED FOR HEARTBURN 30 Tab 3   • ULTICARE INSULIN SYRINGE 29G X 1/2\" 0.5 ML Misc USE AS DIRECTED BY PHYSICIAN 90 Each 4   • quetiapine (SEROQUEL) 300 MG tablet Take 300 mg by mouth every bedtime.     • aspirin (ASA) 81 MG Chew Tab chewable tablet CHEW 1 TABLET ORALLY ONCE DAILY 100 Tab 3   • temazepam (RESTORIL) 30 MG capsule        No facility-administered encounter medications on file as of 2020.      Allergies   Allergen Reactions " "  • Nkda [No Known Drug Allergy]      DIET AND EXERCISE:  Weight Change:mild increase   BMI Readings from Last 4 Encounters:   02/07/20 26.16 kg/m²   01/30/20 25.50 kg/m²   01/28/20 25.95 kg/m²   12/31/19 25.50 kg/m²      Diet: low carbohydrate  Exercise: minimal exercise     Review of Systems   Constitutional: Negative for chills, fever and malaise/fatigue.   HENT: Negative for nosebleeds, sore throat and tinnitus.    Eyes: Negative for blurred vision and double vision.   Respiratory: Negative for cough, hemoptysis, shortness of breath and wheezing.    Cardiovascular: Negative for chest pain, palpitations, orthopnea and leg swelling.   Gastrointestinal: Negative for abdominal pain, blood in stool, diarrhea, melena, nausea and vomiting.   Genitourinary: Negative for hematuria.   Musculoskeletal: Negative for joint pain and myalgias.   Skin: Negative for itching and rash.   Neurological: Negative for dizziness, tremors, focal weakness, seizures, weakness and headaches.   Endo/Heme/Allergies: Does not bruise/bleed easily.   Psychiatric/Behavioral: Negative for depression. The patient is not nervous/anxious and does not have insomnia.       Objective:     Vitals:    02/07/20 0810   BP: 119/62   BP Location: Left arm   Patient Position: Sitting   BP Cuff Size: Adult   Pulse: 76   Weight: 73.5 kg (162 lb 1.6 oz)   Height: 1.676 m (5' 6\")      BP Readings from Last 5 Encounters:   02/07/20 119/62   01/30/20 110/70   01/28/20 134/80   12/31/19 130/76   11/15/19 140/92      Body mass index is 26.16 kg/m².  Physical Exam  Vitals signs reviewed.   Constitutional:       Appearance: Normal appearance.   HENT:      Head: Normocephalic and atraumatic.      Nose: Nose normal.      Mouth/Throat:      Mouth: Mucous membranes are moist.      Pharynx: Oropharynx is clear.   Eyes:      Extraocular Movements: Extraocular movements intact.      Conjunctiva/sclera: Conjunctivae normal.   Neck:      Musculoskeletal: Normal range of motion " and neck supple.   Cardiovascular:      Rate and Rhythm: Normal rate and regular rhythm.      Pulses: Normal pulses.           Carotid pulses are 2+ on the right side and 2+ on the left side.       Radial pulses are 2+ on the right side and 2+ on the left side.        Dorsalis pedis pulses are 2+ on the right side and 2+ on the left side.        Posterior tibial pulses are 2+ on the right side and 2+ on the left side.      Heart sounds: Normal heart sounds.      Comments:    Spider telangectasia:       RLE:  None      LLE: none   Varicosities:           RLE: none      LLE: none   Corona phlebectatica:      RLE:  None        LLE:  None   Cording:         RLE:  None     LLE: None     Pulmonary:      Effort: Pulmonary effort is normal.      Breath sounds: Normal breath sounds.   Abdominal:      General: Abdomen is flat. Bowel sounds are normal. There is no distension.      Palpations: Abdomen is soft. There is mass (subxiphoid/epigastric area - round, firm, immobile, estimated size 2-3cm, midline).      Tenderness: There is no tenderness. There is no guarding or rebound.   Musculoskeletal:      Right lower leg: No edema.      Left lower leg: No edema.   Skin:     General: Skin is warm and dry.      Capillary Refill: Capillary refill takes less than 2 seconds.   Neurological:      General: No focal deficit present.      Mental Status: He is alert and oriented to person, place, and time. Mental status is at baseline.   Psychiatric:         Mood and Affect: Mood normal.         Behavior: Behavior normal.       Lab Results   Component Value Date    CHOLSTRLTOT 111 01/04/2020    LDL 55 01/04/2020    HDL 39 (A) 01/04/2020    TRIGLYCERIDE 83 01/04/2020         Lab Results   Component Value Date    HBA1C 6.2 (H) 01/04/2020      Lab Results   Component Value Date    SODIUM 142 01/28/2020    POTASSIUM 3.7 01/28/2020    CHLORIDE 107 01/28/2020    CO2 29 01/28/2020    GLUCOSE 119 (H) 01/28/2020    BUN 15 01/28/2020    CREATININE  1.03 2020    IFAFRICA >60 2020    IFNOTAFR >60 2020        Lab Results   Component Value Date    WBC 5.2 2020    RBC 4.74 2020    HEMOGLOBIN 13.4 (L) 2020    HEMATOCRIT 40.1 (L) 2020    MCV 84.6 2020    MCH 28.3 2020    MCHC 33.4 (L) 2020    MPV 10.6 2020      VASCULAR IMAGING:     Last EKG:   Results for orders placed or performed during the hospital encounter of 17   EKG (NOW)   Result Value Ref Range    Report       [facility] Emergency Dept[facility]    Test Date:  2017  Pt Name:    AKUA DEXTER            Department: ER  MRN:        [id number]                      Room:       [facility] [[email or web address]  Gender:     M                            Technician: [id number]  :        [id number]                   Requested By:VENESSA MUSE  Order #:    [id number]                    Reading MD: VENESSA MUSE MD    Measurements  Intervals                                Axis  Rate:       87                           P:          62  DE:         172                          QRS:        112  QRSD:       80                           T:          20  QT:         360  QTc:        433    Interpretive Statements  SINUS RHYTHM  CONSIDER RIGHT VENTRICULAR HYPERTROPHY  BASELINE WANDER IN LEAD(S) V3  Compared to ECG 2016 12:22:21  Left anterior fascicular block no longer present    Electronically Signed On 2017 14:34:02 PST by VENESSA MUSE MD       CTA PE 2017  1.  There is no CT evidence of acute pulmonary embolism.  2.  There is no evidence of pneumonia, pleural effusion, or failure.  3.  There is a small anterior pericardial effusion.  4.  There is a questionable right thyroid gland nodule.    Echo 2017  Sligtly tachycardic.  Mild concentric left ventricular hypertrophy.  Normal chamber sizes.  Trace mitral regurgitation.  Structurally normal aortic valve without significant stenosis or   regurgitation.  Estimated right ventricular  systolic pressure  is 30 mmHg.  Normal right ventricular size and systolic function.  Grade I diastolic dysfunction.  Normal pericardium without effusion.     US renal 1/29/20  1. Mild bilateral hydronephrosis.  2. Nonobstructing stone in the lower pole of the left kidney.  3. No suspicious renal lesions.    Noncontrast CT abd 4/11/19 ([facility], media tab)  1) IVC filter with extension through the IVC wall.  One of the struts may involve aortic wall  2) calcific coronary atherosclerosis, possible small pericardial effusion     Medical Decision Making:  Today's Assessment / Status / Plan:     1. Injury, vena cava, inferior, initial encounter     2. Abdominal wall mass of epigastric region  US-ABDOMEN LTD (SOFT TISSUE)   3. Presence of IVC filter     4. Hx pulmonary embolism     5. Essential hypertension     6. Obstructive sleep apnea syndrome     7. Controlled type 2 diabetes mellitus without complication, with long-term current use of insulin (HCC)     8. Dyslipidemia     9. Insulin long-term use (HCC)     10. Long term (current) use of antithrombotics/antiplatelets     11. Coronary artery calcification seen on CAT scan       Patient Type: Primary Prevention and DM    Etiology of Established CVD if Present:   1) hx of PE, unclear if DVT - 2011  2) subclinical CAD on CT scan     IVC filter:   Discussed the risks and benefits of leaving the IVC filter in place versus removing it.  Based upon noncontrast CT from 4/2019 may have 2 strut perforations through IVC and with one potentially fistulated into adjacent abd aorta.  No indications of acute bleeding on exam.  Noted to have firm epigastric area abd mass, superficially and needs further evaluation.   In general, we recommend IVC filter retrieval, as generally risks of maintaining outweigh benefits, but his case is unclear if surgical retrieval may be more risky due to potential for IVC and/or Aortic dissection or rupture.  I have explained we will need more information  with IR angiograms of IVC (and possible Aorta) to determine extent of current anatomic placement and perforation/fistulation status.  I would recommend completion via vascular surgery, so I have provided the referral information.   He may require IVC retrieval at [facility] IVC filter retrieval program with Dr. De Souza, who is leading expert in complex retrieval.    Future Surgeries: none  Bleeding complications while on anticoagulation: n/a  Decision:   - [facility] records requested from 2011  - check abdominal wall u/s as noted below   - review case with Dr. Bloch and we concur for referral to [facility] surgery for further eval (Dr. John, [facility])  - based upon findings, benefits/risks of retrieval will be discussed   - in the meantime, pt is advised to avoid strenous lifting and activities that could lead to impact to the abdomen and risk of further perf and/or dissection/rupture  - to seek prompt attention or 911 if acute abdominal pain, vomiting, syncope, distal extremity acute ischemic sx     Antithrombotic therapy:  Indication: prior hx of PE/DVT  Anti-Platelet/Anti-Coagulant Tx: yes  Antithrombotic therapy plan:  - continue ASA 81mg daily   - counseled on signs and symptoms of acute VTE that require seeking prompt attention in the ED to include shortness of breath, chest pain, pain with deep inhalation, acute leg swelling and/or pain in calf or leg   - elevate legs as much as possible, use compression stockings/socks if directed by your provider  - Avoid hormonal therapies including estrogen or testosterone-containing meds, or raloxifene or tamoxifene (commonly used for osteoporosis)  - Avoid sedentary periods  - continue complete avoidance of tobacco products  - if having any invasive procedure,please make sure the doctor knows of your history of blood clots and current anticoagulation status    Lipid Management: Qualifies for Statin Therapy Based on 2018 ACC/AHA Guidelines: yes  Calculated 10-Year Risk of  ASCVD (if LDL <189, no CKD G3b/4/5): N/A  Currently on Statin: Yes  NLA Risk Category (2014):   High:  T2D and 0-1 RF and no evidence of end-org damage, LDL-C >189, or CKD stage G3 or 4  Tx threshold: non-HDL-C >129, LDL-C >99  Tx goal:  non-HDL <130, LDL-C <100 (optional: apoB<90)   At goal:  yes  Plan:  - reinforced ongoing TLC measures   - continue atorva 40mg   - defer mgmt to PCP     Blood Pressure Management:Goal: ACC/AHA (2017) goal <130/80  Home BP at goal:  yes  Office BP at goal:  yes  Echo: normal  ACR: normal  Device candidate? no  Plan:   Monitoring:   - start/continue home BP monitoring, reviewed correct technique, provide BP log and instructions  - order 24h ABPM:  NO  - monitor lytes/gfr routinely   - contact office if BP consistently >140/>90 to discussion of tx adjustments   - defer mgmt to PCP   Medications:  ACEi/ARB: continue lisinopril 40mg   DHP-CCB: continue amlodipine 10mg   Thiazide: consider as next agent   Other:   Aldosterone Antagonist: consider as additional agent    Loop Diuretic: not indicated   Peripheral Alpha Blocker: not indicated   Other CCB: not indicated   Direct Vasodilator: not indicated   Centrally Acting Alpha Agonist: clonidine 0.1mg BID, may consider patch if rebounding recurrently   Other:   BB: continue metoprolol 25mg ER daily   DRI: not indicated      Glycemic Status: Diabetic  Last A1c = 6.2  Goal A1c < 7.0  ACR: normal  Plan:  - continue current medications, defer mgmt to PCP  - recommmend for routine care with PCP (or endocrine) to include regular A1c monitoring, annual albumin/creatinine ratio (ACR), annual diabetic retinopathy screening, foot exams, annual flu vaccine, and updates to pneumonia vaccines as appropriate     Smoking: former, quit 2013. continued complete avoidance of all tobacco products     Physical Activity: continue healthy activity to improve CV fitness, see care instructions for additional details     Weight Management and Nutrition: Dietary  plan was discussed with patient at this visit including DASH, low sodium and/or as outlined in care instructions     Other:   1) abdominal wall mass, nontender, epigastric.  Unclear if fistulation, filter perf vs other    - check u/s     2) RAJWINDER, stable on CPAP   - continue current treatment plan, defer mgmt to PCP     Instructed to follow-up with PCP for remainder of adult medical needs: yes  We will partner with other providers in the management of established vascular disease and cardiometabolic risk factors.    Studies to Be Obtained: referral to vasc surg (dr. John) and abd u/s - ordered, aprn to track,  Labs to Be Obtained: none     Follow up in: 2mo with aprn     Federico Malik M.D.

## 2020-02-10 ENCOUNTER — HOSPITAL ENCOUNTER (OUTPATIENT)
Dept: RADIOLOGY | Facility: MEDICAL CENTER | Age: 65
End: 2020-02-10
Attending: FAMILY MEDICINE
Payer: MEDICARE

## 2020-02-10 DIAGNOSIS — R19.06 ABDOMINAL WALL MASS OF EPIGASTRIC REGION: ICD-10-CM

## 2020-02-10 PROCEDURE — 76705 ECHO EXAM OF ABDOMEN: CPT

## 2020-03-05 ENCOUNTER — TELEPHONE (OUTPATIENT)
Dept: VASCULAR LAB | Facility: MEDICAL CENTER | Age: 65
End: 2020-03-05

## 2020-03-10 ENCOUNTER — TELEPHONE (OUTPATIENT)
Dept: VASCULAR LAB | Facility: MEDICAL CENTER | Age: 65
End: 2020-03-10

## 2020-03-10 NOTE — TELEPHONE ENCOUNTER
Pt LM on my VM stating that he had an appt with the vasc surgeon and he was basically disrespected. His appt was a 9:30 and the doctor still had not seen him by 10:00 so he left and he is not interested in going back. IVC filter APN informed. Pt place as refused on spread sheet. STEPHEN Daniels.

## 2020-03-11 ENCOUNTER — TELEPHONE (OUTPATIENT)
Dept: VASCULAR LAB | Facility: MEDICAL CENTER | Age: 65
End: 2020-03-11

## 2020-03-11 NOTE — TELEPHONE ENCOUNTER
Called pt requesting that he call back to let us know if he has seen the vasc surgery. My phone number left on . STEPHEN Daniels.

## 2020-03-18 ENCOUNTER — TELEPHONE (OUTPATIENT)
Dept: VASCULAR LAB | Facility: MEDICAL CENTER | Age: 65
End: 2020-03-18

## 2020-03-23 ENCOUNTER — PATIENT OUTREACH (OUTPATIENT)
Dept: HEALTH INFORMATION MANAGEMENT | Facility: OTHER | Age: 65
End: 2020-03-23

## 2020-03-23 NOTE — PROGRESS NOTES
Outcome: Left Message    Please transfer to Patient Outreach Team at [phone] when patient returns call.    [facility] Verified: yes    Attempt # 1

## 2020-03-23 NOTE — PROGRESS NOTES
1. [facility] Verified: yes    2. Verify PCP: yes    3. Review and add  to Care Team: yes    4. Reviewed/Updated the following with patient:       •   Communication Preference Obtained? YES  • MyChart Activation: already active       •   E-Mail Address Obtained? YES       •   Appointment Day and Time Preferences? YES       •   Preferred Pharmacy? YES       •   Preferred Lab? YES    6. Care Gap Scheduling (Attempt to Schedule EACH Overdue Care Gap!)      Called pt and introduced SCP PA program/ no RESP symptoms at this time./ Pt has no questions at this time.

## 2020-04-28 ENCOUNTER — TELEPHONE (OUTPATIENT)
Dept: VASCULAR LAB | Facility: MEDICAL CENTER | Age: 65
End: 2020-04-28

## 2020-04-28 NOTE — TELEPHONE ENCOUNTER
Called pt requesting that he call back to let us know if he has seen the vasc surgery. My phone number left on  requesting a call back.  STEPHEN Daniels.

## 2020-05-05 ENCOUNTER — TELEPHONE (OUTPATIENT)
Dept: VASCULAR LAB | Facility: MEDICAL CENTER | Age: 65
End: 2020-05-05

## 2020-05-14 ENCOUNTER — TELEPHONE (OUTPATIENT)
Dept: VASCULAR LAB | Facility: MEDICAL CENTER | Age: 65
End: 2020-05-14

## 2020-05-14 NOTE — TELEPHONE ENCOUNTER
Pt to afraid of using the elevator to make an appt with Dr. John at this time. He will consider it when this  virus is done. STEPHEN Daniels.

## 2020-06-08 ENCOUNTER — TELEPHONE (OUTPATIENT)
Dept: VASCULAR LAB | Facility: MEDICAL CENTER | Age: 65
End: 2020-06-08

## 2020-06-08 NOTE — TELEPHONE ENCOUNTER
LM on VM regarding the need for him to see Dr. Armstrong. Pt has refused in the past due to the virus.  Requested that the pt call my number to discuss. Phone number to my office given. STEPHEN Daniels.

## 2020-06-22 ENCOUNTER — TELEPHONE (OUTPATIENT)
Dept: VASCULAR LAB | Facility: MEDICAL CENTER | Age: 65
End: 2020-06-22

## 2020-06-30 ENCOUNTER — TELEPHONE (OUTPATIENT)
Dept: VASCULAR LAB | Facility: MEDICAL CENTER | Age: 65
End: 2020-06-30

## 2020-06-30 NOTE — TELEPHONE ENCOUNTER
Spoke with the pt regarding the appt with Dr. John. He said he had an appt with them and he went to it. He was kept in the exam room for 1/2 hr. He poked his head out because someone else got seen before he did. He asked what was up with that. The MA said nothing. He said his time was valuable too and he walked out. At this point he said he is not interested in going to them again. I asked if he would like to have a different referral sent to go elsewhere but he said he would call if he felt he needed to do that. He didn't want me to call him back. STEPHEN Daniels.

## 2020-07-01 ENCOUNTER — TELEPHONE (OUTPATIENT)
Dept: VASCULAR LAB | Facility: MEDICAL CENTER | Age: 65
End: 2020-07-01

## 2020-07-01 NOTE — TELEPHONE ENCOUNTER
Spoke with patient regarding making a f/u visit here. Patient states that he does not with to follow up here in [facility] nor does he with to follow up with [facility] again either. Will send message to MD.

## 2020-07-06 ENCOUNTER — TELEPHONE (OUTPATIENT)
Dept: VASCULAR LAB | Facility: MEDICAL CENTER | Age: 65
End: 2020-07-06

## 2020-08-05 ENCOUNTER — OFFICE VISIT (OUTPATIENT)
Dept: MEDICAL GROUP | Facility: MEDICAL CENTER | Age: 65
End: 2020-08-05
Payer: MEDICARE

## 2020-08-05 VITALS
BODY MASS INDEX: 24.43 KG/M2 | DIASTOLIC BLOOD PRESSURE: 72 MMHG | RESPIRATION RATE: 16 BRPM | WEIGHT: 152 LBS | HEIGHT: 66 IN | OXYGEN SATURATION: 95 % | HEART RATE: 85 BPM | SYSTOLIC BLOOD PRESSURE: 124 MMHG

## 2020-08-05 DIAGNOSIS — R19.06 ABDOMINAL WALL MASS OF EPIGASTRIC REGION: ICD-10-CM

## 2020-08-05 DIAGNOSIS — I10 ESSENTIAL HYPERTENSION: ICD-10-CM

## 2020-08-05 DIAGNOSIS — E11.9 CONTROLLED TYPE 2 DIABETES MELLITUS WITHOUT COMPLICATION, WITH LONG-TERM CURRENT USE OF INSULIN (HCC): ICD-10-CM

## 2020-08-05 DIAGNOSIS — Z95.828 PRESENCE OF IVC FILTER: ICD-10-CM

## 2020-08-05 DIAGNOSIS — E03.8 OTHER SPECIFIED HYPOTHYROIDISM: ICD-10-CM

## 2020-08-05 DIAGNOSIS — Z79.4 CONTROLLED TYPE 2 DIABETES MELLITUS WITHOUT COMPLICATION, WITH LONG-TERM CURRENT USE OF INSULIN (HCC): ICD-10-CM

## 2020-08-05 DIAGNOSIS — F25.9 SCHIZOAFFECTIVE DISORDER, UNSPECIFIED TYPE (HCC): ICD-10-CM

## 2020-08-05 PROCEDURE — 99214 OFFICE O/P EST MOD 30 MIN: CPT | Performed by: NURSE PRACTITIONER

## 2020-08-05 ASSESSMENT — FIBROSIS 4 INDEX: FIB4 SCORE: 0.93

## 2020-08-05 NOTE — PROGRESS NOTES
Subjective:      Nikolas Rico is a 64 y.o. male who presents with Follow-Up (6 month)        CC: Patient is here today for follow-up on diabetes, hypertension, hypothyroidism and presence of IVC filter.    HPI         1. Presence of IVC filter  Patient was referred to the vascular center previously because of history of IVC filter.  He had a CT of the abdomen done in March of last year which showed IVC filter with extension through the IVC wall was concerned that 1 of the struts may involve the aortic but an ultrasound done for follow-up in February showed only a curvilinear echogenic structure of the wall corresponding to densely calcified structure on prior CT, most likely related to prior surgery.    He was advised by the vascular specialist to consider having this removed.  He was referred to [facility]"" but felt he had to wait too long for his visit and subsequently left and will not go back to them.  He was also given the option of going to [place] but refuses to do so.  He states he is aware of the risk with this in place but currently does not want to go forward with having it removed.  He also wishes to continue on baby aspirin only for anticoagulation.    2. Controlled type 2 diabetes mellitus without complication, with long-term current use of insulin (HCC)  Last hemoglobin A1c was good on his metformin 850 mg twice daily but he did not complete the lab work ordered at his last visit but states he will do it this week.  He is taking his statin and ACE inhibitor.    3. Schizoaffective disorder, unspecified type (HCC)  Patient states he has declined going back to his psychiatrist and is coming off his Seroquel and does not feel he needs it anymore.    4. Essential hypertension  Blood pressure is been controlled on current lisinopril, amlodipine and metoprolol combination.    5. Other specified hypothyroidism  Patient due for TSH testing and he is currently on levothyroxine 25 mcg.    6.  Abdominal wall mass of epigastric region  Abdominal wall mass shown to be probable calcification from prior surgery.  Review of chart shows he also had an MR of the breast in January of last year at his previous PCP office and biopsy report showed no evidence of cancer.  He is not having any current symptoms.  Past Medical History:   Diagnosis Date   • CKD (chronic kidney disease) stage 3, GFR 30-59 ml/min ([facility])    • COPD    • Diabetes    • GERD (gastroesophageal reflux disease)    • Hepatitis C    • Hyperlipidemia    • Hypertension    • Psychiatric disorder     bipolar   • Schizoaffective disorder ([facility]) 10/01/16     Social History     Socioeconomic History   • Marital status: Single     Spouse name: Not on file   • Number of children: Not on file   • Years of education: Not on file   • Highest education level: Not on file   Occupational History   • Not on file   Social Needs   • Financial resource strain: Not on file   • Food insecurity     Worry: Not on file     Inability: Not on file   • Transportation needs     Medical: Not on file     Non-medical: Not on file   Tobacco Use   • Smoking status: Former Smoker     Packs/day: 0.00     Types: Cigarettes     Quit date: 3/6/2013     Years since quittin.4   • Smokeless tobacco: Never Used   Substance and Sexual Activity   • Alcohol use: Yes     Alcohol/week: 0.0 oz     Comment: sober since HCV diagnosis   • Drug use: Yes     Types: Marijuana   • Sexual activity: Not Currently     Partners: Female   Lifestyle   • Physical activity     Days per week: Not on file     Minutes per session: Not on file   • Stress: Not on file   Relationships   • Social connections     Talks on phone: Not on file     Gets together: Not on file     Attends Yazidi service: Not on file     Active member of club or organization: Not on file     Attends meetings of clubs or organizations: Not on file     Relationship status: Not on file   • Intimate partner violence     Fear of current or ex  "partner: Not on file     Emotionally abused: Not on file     Physically abused: Not on file     Forced sexual activity: Not on file   Other Topics Concern   • Not on file   Social History Narrative   • Not on file     Current Outpatient Medications   Medication Sig Dispense Refill   • cloNIDine (CATAPRES) 0.1 MG Tab Take 1 Tab by mouth 2 Times a Day. 200 Tab 3   • atorvastatin (LIPITOR) 40 MG Tab Take 1 Tab by mouth every day. 100 Tab 3   • lisinopril (PRINIVIL) 40 MG tablet TAKE ONE TABLET BY MOUTH ONE TIME DAILY 100 Tab 3   • metFORMIN (GLUCOPHAGE) 850 MG Tab Take 1 Tab by mouth 2 times a day, with meals. 200 Tab 3   • levothyroxine (SYNTHROID) 25 MCG Tab Take 1 Tab by mouth every morning before breakfast. TAKE ONE TABLET BY MOUTH EVERY MORNING ON EMPTY STOMACH 100 Tab 3   • amLODIPine (NORVASC) 10 MG Tab TAKE ONE TABLET BY MOUTH ONE TIME DAILY 100 Tab 3   • metoprolol SR (TOPROL XL) 25 MG TABLET SR 24 HR TAKE ONE TABLET BY MOUTH ONE TIME DAILY 100 Tab 3   • glucose blood (TRUE METRIX BLOOD GLUCOSE TEST) strip USE TO CHECK FASTING BLOOD SUGAR TWICE DAILY AND AS NEEDED 100 Strip 11   • insulin regular (HUMULIN R) 100 Unit/mL Solution As per sliding scale 3 Vial 3   • ranitidine (ZANTAC) 300 MG tablet TAKE 1 TABLET BY MOUTH ONCE DAILY AS NEEDED FOR HEARTBURN 30 Tab 3   • ULTICARE INSULIN SYRINGE 29G X 1/2\" 0.5 ML Misc USE AS DIRECTED BY PHYSICIAN 90 Each 4   • quetiapine (SEROQUEL) 300 MG tablet Take 300 mg by mouth every bedtime.     • aspirin (ASA) 81 MG Chew Tab chewable tablet CHEW 1 TABLET ORALLY ONCE DAILY 100 Tab 3   • temazepam (RESTORIL) 30 MG capsule        No current facility-administered medications for this visit.      Family History   Problem Relation Age of Onset   • Cancer Father         blader ca   • Clotting Disorder Neg Hx          Review of Systems   All other systems reviewed and are negative.         Objective:     /72 (BP Location: Right arm, Patient Position: Sitting, BP Cuff Size: " "Adult)   Pulse 85   Resp 16   Ht 1.676 m (5' 6\")   Wt 68.9 kg (152 lb)   SpO2 95%   BMI 24.53 kg/m²      Physical Exam  Vitals signs and nursing note reviewed.   Constitutional:       General: He is not in acute distress.     Appearance: He is well-developed. He is not diaphoretic.   HENT:      Head: Normocephalic and atraumatic.      Right Ear: External ear normal.      Left Ear: External ear normal.      Nose: Nose normal.   Eyes:      General:         Right eye: No discharge.         Left eye: No discharge.      Conjunctiva/sclera: Conjunctivae normal.   Neck:      Musculoskeletal: Normal range of motion and neck supple.      Thyroid: No thyromegaly.      Trachea: No tracheal deviation.   Cardiovascular:      Rate and Rhythm: Normal rate and regular rhythm.      Heart sounds: Normal heart sounds. No murmur.   Pulmonary:      Effort: Pulmonary effort is normal. No respiratory distress.      Breath sounds: Normal breath sounds. No wheezing or rales.   Lymphadenopathy:      Cervical: No cervical adenopathy.   Skin:     General: Skin is warm and dry.      Findings: No erythema or rash.   Neurological:      Mental Status: He is alert and oriented to person, place, and time.      Coordination: Coordination normal.   Psychiatric:         Behavior: Behavior normal.         Thought Content: Thought content normal.         Judgment: Judgment normal.      Comments: Spent much of the visit complaining about his treatment at the surgery office as well as issues with ophthalmology office.                 Assessment/Plan:        1. Presence of IVC filter  I reviewed with patient as did his vascular specialist the risk of keeping this in place.  He refuses to go back to [facility] because he feels he was not treated well.  He also refuses to go to [place] to look at removal.  He states he is aware of the risks of having this in place and he seems mentally able to make his own choices.  He was given warnings by the " vascular specialist at his last visit in February and also does not wish to go back there.  I recommend he have imaging of the area at least yearly.  He refuses any other anticoagulation but baby aspirin.    2. Controlled type 2 diabetes mellitus without complication, with long-term current use of insulin (HCC)  I advised patient he needs to get his lab work done so I can check his hemoglobin A1c to see if he needs more than metformin.  He also is due for his eye check and he states he is going to a different ophthalmologist because he is not happy at his previous office.  He will continue on his ACE inhibitor and statin.  - HEMOGLOBIN A1C; Future  - Comp Metabolic Panel; Future    3. Schizoaffective disorder, unspecified type (HCC)  Patient also unhappy with psychiatry and wants to come off all his medicines that he is currently taking.    4. Essential hypertension  Blood pressure currently controlled on his medicines.    5. Other specified hypothyroidism  Patient due for lab work to see if any changes in levothyroxine dosage need to be made.  - TRIIDOTHYRONINE; Future  - TSH+FREE T4    6. Abdominal wall mass of epigastric region  Apparently shows disc calcification from previous surgery but I did recommend yearly follow-up.

## 2020-08-06 ENCOUNTER — TELEPHONE (OUTPATIENT)
Dept: VASCULAR LAB | Facility: MEDICAL CENTER | Age: 65
End: 2020-08-06

## 2020-08-07 NOTE — TELEPHONE ENCOUNTER
Per above MA notes, patient is refusing follow-up here in vascular medicine clinic.  He is also refusing to follow-up with his vascular surgeon.  He requested we not contact him again    Pending further patient contact, we will defer all further vascular care to PCP    Michael Bloch, MD  Vascular Medicine    Cc: CARLOS Llanos

## 2020-11-04 ENCOUNTER — HOSPITAL ENCOUNTER (OUTPATIENT)
Dept: LAB | Facility: MEDICAL CENTER | Age: 65
End: 2020-11-04
Attending: NURSE PRACTITIONER
Payer: MEDICARE

## 2020-11-04 DIAGNOSIS — E03.8 OTHER SPECIFIED HYPOTHYROIDISM: ICD-10-CM

## 2020-11-04 DIAGNOSIS — Z79.4 CONTROLLED TYPE 2 DIABETES MELLITUS WITHOUT COMPLICATION, WITH LONG-TERM CURRENT USE OF INSULIN (HCC): ICD-10-CM

## 2020-11-04 DIAGNOSIS — E11.9 CONTROLLED TYPE 2 DIABETES MELLITUS WITHOUT COMPLICATION, WITH LONG-TERM CURRENT USE OF INSULIN (HCC): ICD-10-CM

## 2020-11-04 LAB
ALBUMIN SERPL BCP-MCNC: 4.7 G/DL (ref 3.2–4.9)
ALBUMIN/GLOB SERPL: 1.7 G/DL
ALP SERPL-CCNC: 74 U/L (ref 30–99)
ALT SERPL-CCNC: 14 U/L (ref 2–50)
ANION GAP SERPL CALC-SCNC: 8 MMOL/L (ref 7–16)
AST SERPL-CCNC: 17 U/L (ref 12–45)
BILIRUB SERPL-MCNC: 0.5 MG/DL (ref 0.1–1.5)
BUN SERPL-MCNC: 11 MG/DL (ref 8–22)
CALCIUM SERPL-MCNC: 10 MG/DL (ref 8.5–10.5)
CHLORIDE SERPL-SCNC: 106 MMOL/L (ref 96–112)
CO2 SERPL-SCNC: 27 MMOL/L (ref 20–33)
CREAT SERPL-MCNC: 0.73 MG/DL (ref 0.5–1.4)
FASTING STATUS PATIENT QL REPORTED: NORMAL
GLOBULIN SER CALC-MCNC: 2.7 G/DL (ref 1.9–3.5)
GLUCOSE SERPL-MCNC: 119 MG/DL (ref 65–99)
POTASSIUM SERPL-SCNC: 4.1 MMOL/L (ref 3.6–5.5)
PROT SERPL-MCNC: 7.4 G/DL (ref 6–8.2)
SODIUM SERPL-SCNC: 141 MMOL/L (ref 135–145)

## 2020-11-04 PROCEDURE — 84443 ASSAY THYROID STIM HORMONE: CPT

## 2020-11-04 PROCEDURE — 80053 COMPREHEN METABOLIC PANEL: CPT

## 2020-11-04 PROCEDURE — 36415 COLL VENOUS BLD VENIPUNCTURE: CPT

## 2020-11-04 PROCEDURE — 84439 ASSAY OF FREE THYROXINE: CPT

## 2020-11-04 PROCEDURE — 83036 HEMOGLOBIN GLYCOSYLATED A1C: CPT

## 2020-11-04 PROCEDURE — 84480 ASSAY TRIIODOTHYRONINE (T3): CPT

## 2020-11-05 LAB
EST. AVERAGE GLUCOSE BLD GHB EST-MCNC: 128 MG/DL
HBA1C MFR BLD: 6.1 % (ref 0–5.6)
T3 SERPL-MCNC: 130 NG/DL (ref 60–181)
T4 FREE SERPL-MCNC: 1.33 NG/DL (ref 0.93–1.7)
TSH SERPL DL<=0.005 MIU/L-ACNC: 1.44 UIU/ML (ref 0.38–5.33)

## 2021-01-27 ENCOUNTER — TELEPHONE (OUTPATIENT)
Dept: MEDICAL GROUP | Facility: MEDICAL CENTER | Age: 66
End: 2021-01-27

## 2021-01-27 NOTE — TELEPHONE ENCOUNTER
Left message for patient to call back regarding pre-visit planning. Please transfer call to [phone]  .

## 2021-01-29 NOTE — TELEPHONE ENCOUNTER
"Attempted to reach pt via phone   Pt answers phone, I stated I was from Howard's office  His response was \"Well F You Then\" and hung up  "

## 2021-01-30 SDOH — ECONOMIC STABILITY: TRANSPORTATION INSECURITY
IN THE PAST 12 MONTHS, HAS THE LACK OF TRANSPORTATION KEPT YOU FROM MEDICAL APPOINTMENTS OR FROM GETTING MEDICATIONS?: NO

## 2021-01-30 SDOH — ECONOMIC STABILITY: HOUSING INSECURITY
IN THE LAST 12 MONTHS, WAS THERE A TIME WHEN YOU DID NOT HAVE A STEADY PLACE TO SLEEP OR SLEPT IN A SHELTER (INCLUDING NOW)?: YES

## 2021-01-30 SDOH — ECONOMIC STABILITY: HOUSING INSECURITY
IN THE LAST 12 MONTHS, WAS THERE A TIME WHEN YOU DID NOT HAVE A STEADY PLACE TO SLEEP OR SLEPT IN A SHELTER (INCLUDING NOW)?: NO

## 2021-01-30 SDOH — ECONOMIC STABILITY: HOUSING INSECURITY: IN THE LAST 12 MONTHS, HOW MANY PLACES HAVE YOU LIVED?: 1

## 2021-01-30 SDOH — ECONOMIC STABILITY: INCOME INSECURITY: IN THE LAST 12 MONTHS, WAS THERE A TIME WHEN YOU WERE NOT ABLE TO PAY THE MORTGAGE OR RENT ON TIME?: NO

## 2021-01-30 SDOH — HEALTH STABILITY: MENTAL HEALTH
STRESS IS WHEN SOMEONE FEELS TENSE, NERVOUS, ANXIOUS, OR CAN'T SLEEP AT NIGHT BECAUSE THEIR MIND IS TROUBLED. HOW STRESSED ARE YOU?: TO SOME EXTENT

## 2021-01-30 SDOH — ECONOMIC STABILITY: TRANSPORTATION INSECURITY
IN THE PAST 12 MONTHS, HAS LACK OF RELIABLE TRANSPORTATION KEPT YOU FROM MEDICAL APPOINTMENTS, MEETINGS, WORK OR FROM GETTING THINGS NEEDED FOR DAILY LIVING?: NO

## 2021-01-30 SDOH — HEALTH STABILITY: PHYSICAL HEALTH: ON AVERAGE, HOW MANY MINUTES DO YOU ENGAGE IN EXERCISE AT THIS LEVEL?: 140 MINUTES

## 2021-01-30 SDOH — HEALTH STABILITY: PHYSICAL HEALTH: ON AVERAGE, HOW MANY DAYS PER WEEK DO YOU ENGAGE IN MODERATE TO STRENUOUS EXERCISE (LIKE A BRISK WALK)?: 7 DAYS

## 2021-01-30 ASSESSMENT — SOCIAL DETERMINANTS OF HEALTH (SDOH)
WITHIN THE PAST 12 MONTHS, YOU WORRIED THAT YOUR FOOD WOULD RUN OUT BEFORE YOU GOT THE MONEY TO BUY MORE: SOMETIMES TRUE
HOW OFTEN DO YOU ATTEND CHURCH OR RELIGIOUS SERVICES?: NEVER
DO YOU BELONG TO ANY CLUBS OR ORGANIZATIONS SUCH AS CHURCH GROUPS UNIONS, FRATERNAL OR ATHLETIC GROUPS, OR SCHOOL GROUPS?: NO
HOW OFTEN DO YOU HAVE SIX OR MORE DRINKS ON ONE OCCASION: NEVER
HOW OFTEN DO YOU ATTENT MEETINGS OF THE CLUB OR ORGANIZATION YOU BELONG TO?: NEVER
WITHIN THE PAST 12 MONTHS, THE FOOD YOU BOUGHT JUST DIDN'T LAST AND YOU DIDN'T HAVE MONEY TO GET MORE: SOMETIMES TRUE
HOW OFTEN DO YOU HAVE A DRINK CONTAINING ALCOHOL: MONTHLY OR LESS
HOW MANY DRINKS CONTAINING ALCOHOL DO YOU HAVE ON A TYPICAL DAY WHEN YOU ARE DRINKING: 1 OR 2
HOW HARD IS IT FOR YOU TO PAY FOR THE VERY BASICS LIKE FOOD, HOUSING, MEDICAL CARE, AND HEATING?: SOMEWHAT HARD
HOW OFTEN DO YOU GET TOGETHER WITH FRIENDS OR RELATIVES?: NEVER
IN A TYPICAL WEEK, HOW MANY TIMES DO YOU TALK ON THE PHONE WITH FAMILY, FRIENDS, OR NEIGHBORS?: MORE THAN THREE TIMES A WEEK
ARE YOU MARRIED, WIDOWED, DIVORCED, SEPARATED, NEVER MARRIED, OR LIVING WITH A PARTNER?: NEVER MARRIED

## 2021-02-01 ENCOUNTER — OFFICE VISIT (OUTPATIENT)
Dept: MEDICAL GROUP | Facility: MEDICAL CENTER | Age: 66
End: 2021-02-01
Payer: MEDICARE

## 2021-02-01 VITALS
WEIGHT: 142 LBS | HEART RATE: 100 BPM | HEIGHT: 66 IN | OXYGEN SATURATION: 98 % | DIASTOLIC BLOOD PRESSURE: 76 MMHG | TEMPERATURE: 98.2 F | RESPIRATION RATE: 16 BRPM | BODY MASS INDEX: 22.82 KG/M2 | SYSTOLIC BLOOD PRESSURE: 116 MMHG

## 2021-02-01 DIAGNOSIS — E78.5 DYSLIPIDEMIA: ICD-10-CM

## 2021-02-01 DIAGNOSIS — G47.33 OBSTRUCTIVE SLEEP APNEA SYNDROME: ICD-10-CM

## 2021-02-01 DIAGNOSIS — Z86.711 HX PULMONARY EMBOLISM: ICD-10-CM

## 2021-02-01 DIAGNOSIS — Z00.00 MEDICARE ANNUAL WELLNESS VISIT, SUBSEQUENT: ICD-10-CM

## 2021-02-01 DIAGNOSIS — Z79.4 INSULIN LONG-TERM USE (HCC): ICD-10-CM

## 2021-02-01 DIAGNOSIS — R19.06 ABDOMINAL WALL MASS OF EPIGASTRIC REGION: ICD-10-CM

## 2021-02-01 DIAGNOSIS — F25.9 SCHIZOAFFECTIVE DISORDER, UNSPECIFIED TYPE (HCC): ICD-10-CM

## 2021-02-01 DIAGNOSIS — Z95.828 PRESENCE OF IVC FILTER: ICD-10-CM

## 2021-02-01 DIAGNOSIS — L40.9 PSORIASIS: ICD-10-CM

## 2021-02-01 DIAGNOSIS — E04.1 THYROID NODULE: ICD-10-CM

## 2021-02-01 DIAGNOSIS — J43.9 PULMONARY EMPHYSEMA, UNSPECIFIED EMPHYSEMA TYPE (HCC): ICD-10-CM

## 2021-02-01 DIAGNOSIS — Z79.4 CONTROLLED TYPE 2 DIABETES MELLITUS WITHOUT COMPLICATION, WITH LONG-TERM CURRENT USE OF INSULIN (HCC): ICD-10-CM

## 2021-02-01 DIAGNOSIS — I10 ESSENTIAL HYPERTENSION: ICD-10-CM

## 2021-02-01 DIAGNOSIS — E11.9 CONTROLLED TYPE 2 DIABETES MELLITUS WITHOUT COMPLICATION, WITH LONG-TERM CURRENT USE OF INSULIN (HCC): ICD-10-CM

## 2021-02-01 DIAGNOSIS — Z87.19 H/O CHRONIC HEPATITIS: ICD-10-CM

## 2021-02-01 PROBLEM — Z79.02 LONG TERM (CURRENT) USE OF ANTITHROMBOTICS/ANTIPLATELETS: Status: RESOLVED | Noted: 2020-02-07 | Resolved: 2021-02-01

## 2021-02-01 PROCEDURE — G0439 PPPS, SUBSEQ VISIT: HCPCS | Performed by: NURSE PRACTITIONER

## 2021-02-01 ASSESSMENT — PATIENT HEALTH QUESTIONNAIRE - PHQ9: CLINICAL INTERPRETATION OF PHQ2 SCORE: 0

## 2021-02-01 ASSESSMENT — FIBROSIS 4 INDEX: FIB4 SCORE: 1.07

## 2021-02-01 ASSESSMENT — ACTIVITIES OF DAILY LIVING (ADL): BATHING_REQUIRES_ASSISTANCE: 0

## 2021-02-01 ASSESSMENT — ENCOUNTER SYMPTOMS: GENERAL WELL-BEING: EXCELLENT

## 2021-02-01 NOTE — PROGRESS NOTES
CC: Patient here for annual Medicare wellness visit.    HPI:   Nikolas presents today with the following.    1. Medicare annual wellness visit, subsequent  Screening performed below.    2. Schizoaffective disorder, unspecified type (HCC)  Patient again states he has felt he did not need any medicine for schizophrenia so he has stopped all medicines and states he is doing well off medicine and without counseling.    3. Pulmonary emphysema, unspecified emphysema type (HCC)  Patient states his emphysema is well controlled and the only time he is short of breath is if he is walking up 3 flights of stairs or more and he states he walks multiple miles per day without problems    4. Controlled type 2 diabetes mellitus without complication, with long-term current use of insulin ([facility])  Most recent A1c comes back very good at 6.1.  He states he is taking his Metformin twice a day and rarely uses his short acting insulin, typically only when he is a high carb meal and his blood sugars go up to 200    5. Presence of IVC filter  Patient previously advised to have this removed and he was referred to a vascular surgeon but did not want a wait in the office and left.  He was also referred to the vascular clinic at [facility] but review of notes shows that he refuses follow-up and advised them not to contact him again.    6. Essential hypertension  Blood pressure is been controlled on current medicines    7. Obstructive sleep apnea syndrome  Patient continues not to use CPAP at night per his choice.    8. Hx pulmonary embolism  No reports of shortness of breath, tachycardia, leg pain or hemoptysis and he has an IVC filter in place and is only using aspirin for anticoagulation per his choice    9. H/O chronic hepatitis  Patient reports this was treated a while back.    10. Abdominal wall mass of epigastric region  Last findings were probably old surgical related calcifications but he is due for recheck    11. Dyslipidemia  Cholesterol  levels have previously done well on his atorvastatin    12. Thyroid nodule  Last biopsy was negative for cancer but he is due for recheck.  Blood work including free thyroxine, TSH and T3 were normal.    13. Insulin long-term use (HCC)  Patient only using insulin occasionally when blood sugars are very high    14.  Psoriasis  Would like referral to dermatology due to worsening condition.    Depression Screening    Little interest or pleasure in doing things?  0 - not at all  Feeling down, depressed , or hopeless? 0 - not at all  Patient Health Questionnaire Score: 0     If depressive symptoms identified deferred to follow up visit unless specifically addressed in assessment and plan.    Interpretation of PHQ-9 Total Score   Score Severity   1-4 No Depression   5-9 Mild Depression   10-14 Moderate Depression   15-19 Moderately Severe Depression   20-27 Severe Depression    Screening for Cognitive Impairment    Three Minute Recall (river, nation, finger) 3/3    [place] clock face with all 12 numbers and set the hands to show 10 past 11.  Yes    Cognitive concerns identified deferred for follow up unless specifically addressed in assessment and plan.    Fall Risk Assessment    Has the patient had two or more falls in the last year or any fall with injury in the last year?  Yes    Safety Assessment    Throw rugs on floor.  Yes  Handrails on all stairs.  Yes  Good lighting in all hallways.  Yes  Difficulty hearing.  No  Patient counseled about all safety risks that were identified.    Functional Assessment ADLs    Are there any barriers preventing you from cooking for yourself or meeting nutritional needs?  No.    Are there any barriers preventing you from driving safely or obtaining transportation?  No.    Are there any barriers preventing you from using a telephone or calling for help?  No.    Are there any barriers preventing you from shopping?  No.    Are there any barriers preventing you from taking care of your own  finances?  No.    Are there any barriers preventing you from managing your medications?  No.    Are there any barriers preventing you from showering, bathing or dressing yourself?  No.    Are you currently engaging in any exercise or physical activity?  Yes.     What is your perception of your health?  Excellent.      Health Maintenance Summary                FASTING LIPID PROFILE Overdue 1/4/2021      Done 1/4/2020 LIPID PROFILE     Patient has more history with this topic...    URINE ACR / MICROALBUMIN Overdue 1/4/2021      Done 1/4/2020 MICROALBUMIN CREAT RATIO URINE     Patient has more history with this topic...    DIABETES MONOFILAMENT / LE EXAM Overdue 1/30/2021      Done 1/30/2020 AMB DIABETIC MONOFILAMENT LOWER EXTREMITY EXAM     Patient has more history with this topic...    IMM PNEUMOCOCCAL VACCINE: 65+ Years Overdue 1/30/2021      Done 1/30/2020 Imm Admin: Pneumococcal Conjugate Vaccine (Prevnar/PCV-13)     Patient has more history with this topic...    RETINAL SCREENING Postponed 3/12/2021 Originally 8/16/2019. Patient Refused     Done 8/16/2018 REFERRAL FOR RETINAL SCREENING EXAM     Patient has more history with this topic...    IMM ZOSTER VACCINES Postponed 6/14/2024 Originally 12/17/2004. System: vaccine not available, other system reasons    IMM HEP B VACCINE Postponed 6/15/2024 Originally 10/24/2014. Insurance/Financial     Done 11/14/2013 Imm Admin: Hep A/HEP B Combined Vaccine (TwinRix)     Patient has more history with this topic...    IMM DTaP/Tdap/Td Vaccine Postponed 6/15/2024 Originally 12/17/1973. Insurance/Financial    Annual Pulmonary Function Test / Spirometry Postponed 8/11/2025 Originally 12/1/2012. Patient Refused     Done 12/1/2011 PFT DICTATED RESULTS    A1C SCREENING Next Due 5/4/2021      Done 11/4/2020 HEMOGLOBIN A1C     Patient has more history with this topic...    SERUM CREATININE Next Due 11/4/2021      Done 11/4/2020 COMP METABOLIC PANEL     Patient has more history with  this topic...    Annual Wellness Visit Next Due 2/2/2022      Done 2/1/2021 Visit Dx: Medicare annual wellness visit, subsequent     Patient has more history with this topic...    COLONOSCOPY Next Due 10/9/2022      Done 10/9/2017 REFERRAL TO GI FOR COLONOSCOPY          Patient Care Team:  NORMA Syed as PCP - General (Internal Medicine)  Jes Trent O.D. as Consulting Physician (Optometry)          Patient Active Problem List    Diagnosis Date Noted   • Schizoaffective disorder (HCC) 04/14/2017     Priority: High   • Left anterior fascicular block 11/10/2016     Priority: High   • Essential hypertension 07/21/2016     Priority: High   • Diabetes type 2, controlled (CMS-HCC) 01/26/2016     Priority: Medium   • COPD (chronic obstructive pulmonary disease) ([facility]) 12/19/2014     Priority: Medium   • Dyslipidemia 04/23/2015     Priority: Low   • Presence of IVC filter 02/07/2020   • Insulin long-term use ([facility]) 02/07/2020   • Abdominal wall mass of epigastric region 02/07/2020   • Coronary artery calcification seen on CAT scan 02/07/2020   • H/O chronic hepatitis 01/30/2020   • Thyroid nodule 04/19/2018   • Hx pulmonary embolism 08/30/2017   • Sleep apnea 04/28/2017   • History of DVT (deep vein thrombosis) 11/22/2011       Current Outpatient Medications   Medication Sig Dispense Refill   • cloNIDine (CATAPRES) 0.1 MG Tab Take 1 Tab by mouth 2 Times a Day. 200 Tab 3   • atorvastatin (LIPITOR) 40 MG Tab Take 1 Tab by mouth every day. 100 Tab 3   • lisinopril (PRINIVIL) 40 MG tablet TAKE ONE TABLET BY MOUTH ONE TIME DAILY 100 Tab 3   • metFORMIN (GLUCOPHAGE) 850 MG Tab Take 1 Tab by mouth 2 times a day, with meals. 200 Tab 3   • levothyroxine (SYNTHROID) 25 MCG Tab Take 1 Tab by mouth every morning before breakfast. TAKE ONE TABLET BY MOUTH EVERY MORNING ON EMPTY STOMACH 100 Tab 3   • amLODIPine (NORVASC) 10 MG Tab TAKE ONE TABLET BY MOUTH ONE TIME DAILY 100 Tab 3   • metoprolol SR (TOPROL XL) 25 MG  "TABLET SR 24 HR TAKE ONE TABLET BY MOUTH ONE TIME DAILY 100 Tab 3   • glucose blood (TRUE METRIX BLOOD GLUCOSE TEST) strip USE TO CHECK FASTING BLOOD SUGAR TWICE DAILY AND AS NEEDED 100 Strip 11   • insulin regular (HUMULIN R) 100 Unit/mL Solution As per sliding scale 3 Vial 3   • ranitidine (ZANTAC) 300 MG tablet TAKE 1 TABLET BY MOUTH ONCE DAILY AS NEEDED FOR HEARTBURN 30 Tab 3   • ULTICARE INSULIN SYRINGE 29G X 1/2\" 0.5 ML Misc USE AS DIRECTED BY PHYSICIAN 90 Each 4   • aspirin (ASA) 81 MG Chew Tab chewable tablet CHEW 1 TABLET ORALLY ONCE DAILY 100 Tab 3     No current facility-administered medications for this visit.          Allergies as of 02/01/2021 - Reviewed 02/01/2021   Allergen Reaction Noted   • Nkda [no known drug allergy]  01/17/2008        ROS: As per HPI.    /76 (BP Location: Right arm, Patient Position: Sitting, BP Cuff Size: Adult)   Pulse 100   Temp 36.8 °C (98.2 °F) (Temporal)   Resp 16   Ht 1.676 m (5' 6\")   Wt 64.4 kg (142 lb)   SpO2 98%   BMI 22.92 kg/m²     Physical Exam:  Gen:         Alert and oriented, No apparent distress.  Sometimes difficult to keep on track as he wants to keep talking about parking issues near where he lives  Speech: Clear and goal-directed  Dentition: Fair  Hearing: Good    Assessment and Plan.   65 y.o. male with the following issues.    1. Medicare annual wellness visit, subsequent  Annual wellness topics discussed, review of chronic medical problems completed    - Subsequent Annual Wellness Visit - Includes PPPS ()    2. Schizoaffective disorder, unspecified type (HCC)  Patient again states he does not want to treat his schizophrenia and does not feel he needs counseling or medications.    3. Pulmonary emphysema, unspecified emphysema type (HCC)  Patient actually is fairly active and walking many miles per day and reports no breathing issues.    4. Controlled type 2 diabetes mellitus without complication, with long-term current use of insulin " (HCC)  Most recent A1c comes back very good on current dose of Metformin and he will continue to use his short acting insulin only when needed.  He will do lab work before his next visit  - MICROALBUMIN CREAT RATIO URINE; Future  - HEMOGLOBIN A1C; Future  - Comp Metabolic Panel; Future    5. Presence of IVC filter  Patient again advised to see vascular surgeon but now has declined this twice for me and also has declined following at the vascular surgeons office or vascular medicine office.  He states he is aware of the risks    6. Essential hypertension  Blood pressure controlled on current medicines    7. Obstructive sleep apnea syndrome  Patient declines using CPAP    8. Hx pulmonary embolism  Patient currently only using baby aspirin for antiembolization per his request and does not want to follow with the vascular clinic.  He reports no PE symptoms    9. H/O chronic hepatitis  Previously treated    10. Abdominal wall mass of epigastric region  We will get a ultrasound just to check on the status of this with last imaging from a year ago showing a densely calcified structure, probable secondary to surgery  - US-ABDOMEN COMPLETE SURVEY; Future    11. Dyslipidemia  Cholesterol levels good on statin  - Lipid Profile; Future    12. Thyroid nodule  Previous thyroid nodule biopsied and did not show neoplastic and TSH, T3 and T4 normal and due for recheck.  - US-SOFT TISSUES OF HEAD - NECK; Future    13. Insulin long-term use (HCC)  Patient will continue to use this only as needed with high blood sugar readings.

## 2021-02-11 ENCOUNTER — HOSPITAL ENCOUNTER (OUTPATIENT)
Dept: RADIOLOGY | Facility: MEDICAL CENTER | Age: 66
End: 2021-02-11
Attending: NURSE PRACTITIONER
Payer: MEDICARE

## 2021-02-11 DIAGNOSIS — E04.1 THYROID NODULE: ICD-10-CM

## 2021-02-11 DIAGNOSIS — R19.06 ABDOMINAL WALL MASS OF EPIGASTRIC REGION: ICD-10-CM

## 2021-02-11 PROCEDURE — 76705 ECHO EXAM OF ABDOMEN: CPT

## 2021-02-11 PROCEDURE — 76536 US EXAM OF HEAD AND NECK: CPT

## 2021-02-12 DIAGNOSIS — Z79.4 CONTROLLED TYPE 2 DIABETES MELLITUS WITHOUT COMPLICATION, WITH LONG-TERM CURRENT USE OF INSULIN (HCC): ICD-10-CM

## 2021-02-12 DIAGNOSIS — E11.9 CONTROLLED TYPE 2 DIABETES MELLITUS WITHOUT COMPLICATION, WITH LONG-TERM CURRENT USE OF INSULIN (HCC): ICD-10-CM

## 2021-02-12 DIAGNOSIS — E03.4 HYPOTHYROIDISM DUE TO ACQUIRED ATROPHY OF THYROID: ICD-10-CM

## 2021-02-12 DIAGNOSIS — I10 ESSENTIAL HYPERTENSION: ICD-10-CM

## 2021-02-12 RX ORDER — METOPROLOL SUCCINATE 25 MG/1
TABLET, EXTENDED RELEASE ORAL
Qty: 90 TABLET | Refills: 3 | Status: SHIPPED | OUTPATIENT
Start: 2021-02-12 | End: 2022-02-09

## 2021-02-12 RX ORDER — LEVOTHYROXINE SODIUM 0.03 MG/1
TABLET ORAL
Qty: 90 TABLET | Refills: 3 | Status: SHIPPED | OUTPATIENT
Start: 2021-02-12 | End: 2022-02-09

## 2021-02-12 RX ORDER — AMLODIPINE BESYLATE 10 MG/1
TABLET ORAL
Qty: 90 TABLET | Refills: 3 | Status: SHIPPED | OUTPATIENT
Start: 2021-02-12 | End: 2022-02-15

## 2021-02-12 RX ORDER — CLONIDINE HYDROCHLORIDE 0.1 MG/1
TABLET ORAL
Qty: 180 TABLET | Refills: 3 | Status: SHIPPED | OUTPATIENT
Start: 2021-02-12 | End: 2022-01-13

## 2021-03-03 DIAGNOSIS — Z23 NEED FOR VACCINATION: ICD-10-CM

## 2021-03-08 ENCOUNTER — OFFICE VISIT (OUTPATIENT)
Dept: MEDICAL GROUP | Facility: MEDICAL CENTER | Age: 66
End: 2021-03-08
Payer: MEDICARE

## 2021-03-08 VITALS
TEMPERATURE: 98.2 F | HEART RATE: 80 BPM | SYSTOLIC BLOOD PRESSURE: 126 MMHG | RESPIRATION RATE: 16 BRPM | HEIGHT: 66 IN | DIASTOLIC BLOOD PRESSURE: 68 MMHG | WEIGHT: 139 LBS | OXYGEN SATURATION: 95 % | BODY MASS INDEX: 22.34 KG/M2

## 2021-03-08 DIAGNOSIS — E04.1 THYROID NODULE: ICD-10-CM

## 2021-03-08 DIAGNOSIS — N63.0 BREAST MASS IN MALE: ICD-10-CM

## 2021-03-08 DIAGNOSIS — Z95.828 PRESENCE OF IVC FILTER: ICD-10-CM

## 2021-03-08 PROCEDURE — 99214 OFFICE O/P EST MOD 30 MIN: CPT | Performed by: NURSE PRACTITIONER

## 2021-03-08 ASSESSMENT — ENCOUNTER SYMPTOMS: MYALGIAS: 1

## 2021-03-08 ASSESSMENT — FIBROSIS 4 INDEX: FIB4 SCORE: 1.07

## 2021-03-08 NOTE — PROGRESS NOTES
Subjective:      Nikolas Rico is a 65 y.o. male who presents with Other (lump on left breast )        CC: Patient is here today with concerns over pain and nodular areas of the left breast region.    HPI       1. Breast mass in male  Patient had an MRI of the left breast in 2019 because of a nodular area and it was considered a category 4C for a slightly spiculated enhancing mass.  The biopsy of the left breast mass done soon afterwards showed benign lobulated fat with focal fat necrosis suggestive of lipoma.  No malignancy or atypia was identified.    Patient states he has some discomfort in the area around the biopsy site and also feels some smaller nodular areas around it as well so would like to get a new imaging studies of the area.    2. Thyroid nodule  Patient also had history of right thyroid nodule which was suspicious and required biopsy that came back in 2018 showing follicular lesion of undetermined significance.  No evidence of papillary carcinoma.  He did a repeat imaging ultrasound of the thyroid just last month which came back showing stable highly suspicious right thyroid nodule with FNA recommended-already performed.    3. Presence of IVC filter  Patient has presence of filter on the right side but today again refuses to go forward with having this removed or even following back with the vascular specialist because he is concerned about repercussions of surgery.  Past Medical History:   Diagnosis Date   • CKD (chronic kidney disease) stage 3, GFR 30-59 ml/min    • COPD    • Diabetes    • GERD (gastroesophageal reflux disease)    • Hepatitis C    • Hyperlipidemia    • Hypertension    • Psychiatric disorder     bipolar   • Schizoaffective disorder (HCC) 10/01/16     Social History     Socioeconomic History   • Marital status: Single     Spouse name: Not on file   • Number of children: Not on file   • Years of education: Not on file   • Highest education level: Not on file   Occupational  History   • Not on file   Tobacco Use   • Smoking status: Former Smoker     Packs/day: 0.00     Types: Cigarettes     Quit date: 3/6/2013     Years since quittin.0   • Smokeless tobacco: Never Used   Substance and Sexual Activity   • Alcohol use: Yes     Alcohol/week: 0.0 oz     Comment: sober since HCV diagnosis   • Drug use: Yes     Types: Marijuana   • Sexual activity: Not Currently     Partners: Female   Other Topics Concern   • Not on file   Social History Narrative   • Not on file     Social Determinants of Health     Financial Resource Strain: Medium Risk   • Difficulty of Paying Living Expenses: Somewhat hard   Food Insecurity: Food Insecurity Present   • Worried About Running Out of Food in the Last Year: Sometimes true   • Ran Out of Food in the Last Year: Sometimes true   Transportation Needs: No Transportation Needs   • Lack of Transportation (Medical): No   • Lack of Transportation (Non-Medical): No   Physical Activity: Sufficiently Active   • Days of Exercise per Week: 7 days   • Minutes of Exercise per Session: 140 min   Stress: Stress Concern Present   • Feeling of Stress : To some extent   Social Connections: Moderately Isolated   • Frequency of Communication with Friends and Family: More than three times a week   • Frequency of Social Gatherings with Friends and Family: Never   • Attends Catholic Services: Never   • Active Member of Clubs or Organizations: No   • Attends Club or Organization Meetings: Never   • Marital Status: Never    Intimate Partner Violence:    • Fear of Current or Ex-Partner:    • Emotionally Abused:    • Physically Abused:    • Sexually Abused:      Current Outpatient Medications   Medication Sig Dispense Refill   • levothyroxine (SYNTHROID) 25 MCG Tab TAKE 1 TABLET BY MOUTH EVERY MORNING BEFORE BREAKFAST ON AN EMPTY STOMACH  90 tablet 3   • amLODIPine (NORVASC) 10 MG Tab TAKE ONE TABLET BY MOUTH ONE TIME DAILY  90 tablet 3   • metoprolol SR (TOPROL XL) 25 MG  "TABLET SR 24 HR TAKE ONE TABLET BY MOUTH ONE TIME DAILY  90 tablet 3   • cloNIDine (CATAPRES) 0.1 MG Tab TAKE 1 TABLET BY MOUTH TWICE DAILY  180 tablet 3   • CONTOUR NEXT TEST strip USE TO CHECK FASTING BLOOD SUGAR TWICE DAILY AND AS NEEDED  200 Strip 3   • atorvastatin (LIPITOR) 40 MG Tab Take 1 Tab by mouth every day. 100 Tab 3   • lisinopril (PRINIVIL) 40 MG tablet TAKE ONE TABLET BY MOUTH ONE TIME DAILY 100 Tab 3   • metFORMIN (GLUCOPHAGE) 850 MG Tab Take 1 Tab by mouth 2 times a day, with meals. 200 Tab 3   • insulin regular (HUMULIN R) 100 Unit/mL Solution As per sliding scale 3 Vial 3   • ULTICARE INSULIN SYRINGE 29G X 1/2\" 0.5 ML Misc USE AS DIRECTED BY PHYSICIAN 90 Each 4   • aspirin (ASA) 81 MG Chew Tab chewable tablet CHEW 1 TABLET ORALLY ONCE DAILY 100 Tab 3   • ranitidine (ZANTAC) 300 MG tablet TAKE 1 TABLET BY MOUTH ONCE DAILY AS NEEDED FOR HEARTBURN (Patient not taking: Reported on 3/8/2021) 30 Tab 3     No current facility-administered medications for this visit.     Family History   Problem Relation Age of Onset   • Cancer Father         blader ca   • Clotting Disorder Neg Hx          Review of Systems   Musculoskeletal: Positive for myalgias.   All other systems reviewed and are negative.         Objective:     /68 (BP Location: Right arm, Patient Position: Sitting, BP Cuff Size: Adult)   Pulse 80   Temp 36.8 °C (98.2 °F) (Temporal)   Resp 16   Ht 1.676 m (5' 6\")   Wt 63 kg (139 lb)   SpO2 95%   BMI 22.44 kg/m²      Physical Exam  Vitals and nursing note reviewed.   Constitutional:       General: He is not in acute distress.     Appearance: He is well-developed. He is not diaphoretic.   HENT:      Head: Normocephalic and atraumatic.      Right Ear: External ear normal.      Left Ear: External ear normal.      Nose: Nose normal.   Eyes:      General:         Right eye: No discharge.         Left eye: No discharge.      Conjunctiva/sclera: Conjunctivae normal.   Neck:      Thyroid: No " thyromegaly.      Trachea: No tracheal deviation.   Cardiovascular:      Rate and Rhythm: Normal rate and regular rhythm.      Heart sounds: Normal heart sounds. No murmur.   Pulmonary:      Effort: Pulmonary effort is normal. No respiratory distress.      Breath sounds: Normal breath sounds. No wheezing or rales.   Musculoskeletal:      Cervical back: Normal range of motion and neck supple.   Lymphadenopathy:      Cervical: No cervical adenopathy.   Skin:     General: Skin is warm and dry.      Findings: No erythema or rash.      Comments: There is evidence of scar site on the left chest wall and there are 1 or 2 nodular areas around the site.   Neurological:      Mental Status: He is alert and oriented to person, place, and time.      Coordination: Coordination normal.   Psychiatric:         Behavior: Behavior normal.         Thought Content: Thought content normal.         Judgment: Judgment normal.                 Assessment/Plan:        1. Breast mass in male  Previous biopsy was negative but patient is having discomfort and notices some new nodules so I will send him for mammogram and ultrasound or do an MRI if the radiologist feels this is the better option.  I told him if anything is suspicious we can send him back to  who did the previous biopsy.  - MA DIAGNOSTIC MAMMO LEFT W/CAD; Future  - US-BREAST LIMITED-LEFT; Future    2. Thyroid nodule  Patient had biopsy done in 2018 showing no papular carcinoma but his recent ultrasound showed stable but highly suspicious right thyroid nodule so I will refer him to ENT to see if further biopsy is needed beyond the one from 2018.  - REFERRAL TO ENT    3. Presence of IVC filter  Patient again declines going to vascular surgery to have this removed despite the risks with it in place.

## 2021-03-18 ENCOUNTER — HOSPITAL ENCOUNTER (OUTPATIENT)
Dept: RADIOLOGY | Facility: MEDICAL CENTER | Age: 66
End: 2021-03-18
Attending: NURSE PRACTITIONER
Payer: MEDICARE

## 2021-03-18 DIAGNOSIS — N63.0 BREAST MASS IN MALE: ICD-10-CM

## 2021-03-18 PROCEDURE — 76642 ULTRASOUND BREAST LIMITED: CPT | Mod: LT

## 2021-03-18 PROCEDURE — G0279 TOMOSYNTHESIS, MAMMO: HCPCS

## 2021-04-11 DIAGNOSIS — I10 ESSENTIAL HYPERTENSION: ICD-10-CM

## 2021-04-11 DIAGNOSIS — E78.5 DYSLIPIDEMIA: ICD-10-CM

## 2021-04-11 RX ORDER — ATORVASTATIN CALCIUM 40 MG/1
TABLET, FILM COATED ORAL
Qty: 100 TABLET | Refills: 3 | Status: SHIPPED | OUTPATIENT
Start: 2021-04-11 | End: 2022-04-29

## 2021-04-11 RX ORDER — LISINOPRIL 40 MG/1
TABLET ORAL
Qty: 100 TABLET | Refills: 3 | Status: SHIPPED | OUTPATIENT
Start: 2021-04-11 | End: 2022-04-29

## 2021-06-08 ENCOUNTER — HOSPITAL ENCOUNTER (OUTPATIENT)
Dept: LAB | Facility: MEDICAL CENTER | Age: 66
End: 2021-06-08
Attending: NURSE PRACTITIONER
Payer: MEDICARE

## 2021-06-08 DIAGNOSIS — E11.9 CONTROLLED TYPE 2 DIABETES MELLITUS WITHOUT COMPLICATION, WITH LONG-TERM CURRENT USE OF INSULIN (HCC): ICD-10-CM

## 2021-06-08 DIAGNOSIS — Z79.4 CONTROLLED TYPE 2 DIABETES MELLITUS WITHOUT COMPLICATION, WITH LONG-TERM CURRENT USE OF INSULIN (HCC): ICD-10-CM

## 2021-06-08 DIAGNOSIS — Z76.0 PRESCRIPTION REFILL: ICD-10-CM

## 2021-06-08 DIAGNOSIS — E78.5 DYSLIPIDEMIA: ICD-10-CM

## 2021-06-08 LAB
ALBUMIN SERPL BCP-MCNC: 4.1 G/DL (ref 3.2–4.9)
ALBUMIN/GLOB SERPL: 1.6 G/DL
ALP SERPL-CCNC: 69 U/L (ref 30–99)
ALT SERPL-CCNC: 15 U/L (ref 2–50)
ANION GAP SERPL CALC-SCNC: 10 MMOL/L (ref 7–16)
AST SERPL-CCNC: 19 U/L (ref 12–45)
BILIRUB SERPL-MCNC: 0.5 MG/DL (ref 0.1–1.5)
BUN SERPL-MCNC: 13 MG/DL (ref 8–22)
CALCIUM SERPL-MCNC: 8.9 MG/DL (ref 8.5–10.5)
CHLORIDE SERPL-SCNC: 104 MMOL/L (ref 96–112)
CHOLEST SERPL-MCNC: 126 MG/DL (ref 100–199)
CO2 SERPL-SCNC: 26 MMOL/L (ref 20–33)
CREAT SERPL-MCNC: 0.74 MG/DL (ref 0.5–1.4)
CREAT UR-MCNC: 36.22 MG/DL
EST. AVERAGE GLUCOSE BLD GHB EST-MCNC: 126 MG/DL
FASTING STATUS PATIENT QL REPORTED: NORMAL
GLOBULIN SER CALC-MCNC: 2.6 G/DL (ref 1.9–3.5)
GLUCOSE SERPL-MCNC: 94 MG/DL (ref 65–99)
HBA1C MFR BLD: 6 % (ref 4–5.6)
HDLC SERPL-MCNC: 46 MG/DL
LDLC SERPL CALC-MCNC: 68 MG/DL
MICROALBUMIN UR-MCNC: <1.2 MG/DL
MICROALBUMIN/CREAT UR: NORMAL MG/G (ref 0–30)
POTASSIUM SERPL-SCNC: 3.5 MMOL/L (ref 3.6–5.5)
PROT SERPL-MCNC: 6.7 G/DL (ref 6–8.2)
SODIUM SERPL-SCNC: 140 MMOL/L (ref 135–145)
TRIGL SERPL-MCNC: 60 MG/DL (ref 0–149)

## 2021-06-08 PROCEDURE — 36415 COLL VENOUS BLD VENIPUNCTURE: CPT

## 2021-06-08 PROCEDURE — 82043 UR ALBUMIN QUANTITATIVE: CPT

## 2021-06-08 PROCEDURE — 83036 HEMOGLOBIN GLYCOSYLATED A1C: CPT

## 2021-06-08 PROCEDURE — 82570 ASSAY OF URINE CREATININE: CPT

## 2021-06-08 PROCEDURE — 80053 COMPREHEN METABOLIC PANEL: CPT

## 2021-06-08 PROCEDURE — 80061 LIPID PANEL: CPT

## 2021-06-08 NOTE — TELEPHONE ENCOUNTER
Received request via: Pharmacy    Was the patient seen in the last year in this department? Yes    Does the patient have an active prescription (recently filled or refills available) for medication(s) requested? No        NO APPT SCHEDULED AT THIS TIME

## 2021-06-16 ENCOUNTER — TELEPHONE (OUTPATIENT)
Dept: MEDICAL GROUP | Facility: MEDICAL CENTER | Age: 66
End: 2021-06-16

## 2021-06-16 NOTE — TELEPHONE ENCOUNTER
ESTABLISHED PATIENT PRE-VISIT PLANNING     Patient was NOT contacted to complete PVP.     Note: Patient will not be contacted if there is no indication to call.     1.  Reviewed notes from the last few office visits within the medical group: Yes    2.  If any orders were placed at last visit or intended to be done for this visit (i.e. 6 mos follow-up), do we have Results/Consult Notes?         •  Labs - Labs ordered, completed on 6/8/2021 and results are in chart.  Note: If patient appointment is for lab review and patient did not complete labs, check with provider if OK to reschedule patient until labs completed.       •  Imaging - Imaging was not ordered at last office visit.    3. Is this appointment scheduled as a Hospital Follow-Up? No    4.  Immunizations were updated in Epic using Reconcile Outside Information activity? No, nothing to Reconcile.      6.  AHA (Pulse8) form printed for Provider? Yes

## 2021-06-24 ENCOUNTER — OFFICE VISIT (OUTPATIENT)
Dept: MEDICAL GROUP | Facility: MEDICAL CENTER | Age: 66
End: 2021-06-24
Payer: MEDICARE

## 2021-06-24 VITALS
OXYGEN SATURATION: 97 % | TEMPERATURE: 97 F | DIASTOLIC BLOOD PRESSURE: 60 MMHG | HEART RATE: 86 BPM | BODY MASS INDEX: 21.65 KG/M2 | HEIGHT: 66 IN | WEIGHT: 134.7 LBS | SYSTOLIC BLOOD PRESSURE: 134 MMHG

## 2021-06-24 DIAGNOSIS — E78.5 DYSLIPIDEMIA: ICD-10-CM

## 2021-06-24 DIAGNOSIS — Z79.4 CONTROLLED TYPE 2 DIABETES MELLITUS WITHOUT COMPLICATION, WITH LONG-TERM CURRENT USE OF INSULIN (HCC): ICD-10-CM

## 2021-06-24 DIAGNOSIS — I10 ESSENTIAL HYPERTENSION: ICD-10-CM

## 2021-06-24 DIAGNOSIS — E11.9 CONTROLLED TYPE 2 DIABETES MELLITUS WITHOUT COMPLICATION, WITH LONG-TERM CURRENT USE OF INSULIN (HCC): ICD-10-CM

## 2021-06-24 DIAGNOSIS — B07.0 PLANTAR WART: ICD-10-CM

## 2021-06-24 PROCEDURE — 99213 OFFICE O/P EST LOW 20 MIN: CPT | Performed by: NURSE PRACTITIONER

## 2021-06-24 ASSESSMENT — FIBROSIS 4 INDEX: FIB4 SCORE: 1.16

## 2021-06-24 NOTE — PATIENT INSTRUCTIONS
Plantar Warts  Warts are small growths on the skin. When they occur on the underside (sole) of the foot, they are called plantar warts. Plantar warts often occur in groups, with several small warts around a larger wart. They tend to develop on the heel or the ball of the foot. They may grow into the deeper layers of skin or rise above the surface of the skin.  Most warts are not painful, and they usually do not cause problems. However, plantar warts may cause pain when you walk because pressure is applied to them. Plantar warts may spread to other areas of the sole. They can also spread to other areas of the body through direct and indirect contact. Warts often go away on their own in time. Various treatments may be done if needed or desired.  What are the causes?  Plantar warts are caused by a type of virus that is called human papillomavirus (HPV).  · Walking barefoot can cause exposure to the virus, especially if your feet are wet.  · HPV attacks a break in the skin of the foot.  What increases the risk?  You are more likely to develop this condition if you:  · Are between 10-20 years of age.  · Use public showers or locker rooms.  · Have a weakened body defense system (immune system).  What are the signs or symptoms?  Common symptoms of this condition include:  · Flat or slightly raised growths that have a rough surface and look similar to a callus.  · Pain when you use your foot to support your body weight.  How is this diagnosed?  A plantar wart can usually be diagnosed from its appearance. In some cases, a tissue sample may be removed (biopsy) to be looked at under a microscope.  How is this treated?  In many cases, warts do not need treatment. Without treatment, they often go away with time. If treatment is needed or desired, options may include:  · Applying medicated solutions, creams, or patches to the wart. These may be over-the-counter or prescription medicines that make the skin soft so that layers will  gradually shed away. In many cases, the medicine is applied one or two times a day and covered with a bandage.  · Freezing the wart with liquid nitrogen (cryotherapy).  · Burning the wart with:  ? Laser treatment.  ? An electrified probe (electrocautery).  · Injecting a medicine (Candida antigen) into the wart to help the body's immune system fight off the wart.  · Having surgery to remove the wart.  · Putting duct tape over the top of the wart (occlusion). You will leave the tape in place for as long as told by your health care provider, and then you will replace it with a new strip of tape. This is done until the wart goes away.  Repeat treatment may be needed if you choose to remove warts. Warts sometimes go away and come back again.  Follow these instructions at home:  · Apply medicated creams or solutions only as told by your health care provider. This may involve:  ? Soaking the affected area in warm water.  ? Removing the top layer of softened skin before you apply the medicine. A pumice stone works well for removing the skin.  ? Applying a bandage over the affected area after you apply the medicine.  ? Repeating the process daily or as told by your health care provider.  · Do not scratch or pick at a wart.  · Wash your hands after you touch a wart.  · If a wart is painful, try covering it with a bandage that has a hole in the middle. This helps to take pressure off the wart.  · Keep all follow-up visits as told by your health care provider. This is important.  How is this prevented?  Take these actions to help prevent warts:  · Wear shoes and socks. Change your socks daily.  · Keep your feet clean and dry.  · Do not walk barefoot in shared locker rooms, shower areas, or swimming pools.  · Check your feet regularly.  · Avoid direct contact with warts on other people.  Contact a health care provider if:  · Your warts do not improve after treatment.  · You have redness, swelling, or pain at the site of a  wart.  · You have bleeding from a wart that does not stop with light pressure.  · You have diabetes and you develop a wart.  Summary  · Warts are small growths on the skin. When they occur on the underside (sole) of the foot, they are called plantar warts.  · In many cases, warts do not need treatment. Without treatment, they often go away with time.  · Apply medicated creams or solutions only as told by your health care provider.  · Do not scratch or pick at a wart. Wash your hands after you touch a wart.  · Keep all follow-up visits as told by your health care provider. This is important.  This information is not intended to replace advice given to you by your health care provider. Make sure you discuss any questions you have with your health care provider.  Document Released: 03/09/2005 Document Revised: 07/16/2019 Document Reviewed: 07/16/2019  [employer] Patient Education © 2020 [employer]

## 2021-06-24 NOTE — LETTER
Request for Medical Records    Patient Name: Nikolas Rico    : 1955      Dear Doctor: ***    The above named patient receives primary care at the [facility] by NORMA Syed.  The patient informs us that you are his eye care Provider.    Please fax a copy of the most recent eye exam to [phone] or answer the  questions below and fax this sheet back to us at the above number.  Attached is a signed Release of Information.      Date of last eye exam: _____________    Retinal eye exam summary:        Please select the choice(s) that apply.    ____ No diabetic retinopathy    ____    Diabetic retinopathy present      Printed Name and Credentials: __________________________________    Signature of Eye Care Provider: _________________________________    We appreciate your assistance and collaboration in providing efficient patient care!    Kindest Regards,    [facility] [address] [facility]  [place] [address] [phone]

## 2021-06-24 NOTE — PROGRESS NOTES
Chief Complaint   Patient presents with   • Leg Problem     pain on foot.    • Lab Results     DOS: 6/8/2021       Subjective:     HPI:     Nikolas Rico is a 65 y.o. male here to discuss the evaluation and management of:    Patient of Oscar GONZALEZ    Here for foot pain as well as review his labs.    1. Plantar wart  Complains of pain on the plantar aspect of his right foot.  Has been present for over a year and a half.  No trauma or injury reported.  There is no swelling.  Upon examination it does appear to have a plantar wart.  Recommend wearing inserts in his shoes to help provide extra cushion when he is walking.    2. Controlled type 2 diabetes mellitus without complication, with long-term current use of insulin (formerly [facility])  A1c at 6.0%.Compliant with his Metformin, Novolin R.  Requesting records for his retinal exam.      3. Dyslipidemia  Tolerating atorvastatin without myalgias.  LDL below 70.    4. Essential hypertension  Tolerating amlodipine 10 mg, clonidine 0.1 mg and lisinopril 40 mg daily for this.  He is also taking metoprolol 25 mg daily.  Have reviewed most recent CMP.  GFR above 30.        ROS:  Denies any Headache, Blurred Vision, Confusion, Chest pain,  Shortness of breath,  Abdominal pain, Changes of bowel or bladder, Lower ext edema, Fevers, Nights sweats, Weight Changes, Focal weakness or numbness.  And all other systems reviewed and are all negative. POSITIVE FOR see above        Current Outpatient Medications:   •  insulin regular (NOVOLIN R) 100 Unit/mL Solution, AS PER SLIDING SCALE, Disp: 10 mL, Rfl: 0  •  metFORMIN (GLUCOPHAGE) 850 MG Tab, TAKE ONE TABLET BY MOUTH TWICE DAILY WITH MEALS , Disp: 200 tablet, Rfl: 0  •  lisinopril (PRINIVIL) 40 MG tablet, TAKE ONE TABLET BY MOUTH ONE TIME DAILY , Disp: 100 tablet, Rfl: 3  •  atorvastatin (LIPITOR) 40 MG Tab, TAKE ONE TABLET BY MOUTH ONE TIME DAILY , Disp: 100 tablet, Rfl: 3  •  levothyroxine (SYNTHROID) 25 MCG Tab, TAKE 1 TABLET BY  "MOUTH EVERY MORNING BEFORE BREAKFAST ON AN EMPTY STOMACH , Disp: 90 tablet, Rfl: 3  •  amLODIPine (NORVASC) 10 MG Tab, TAKE ONE TABLET BY MOUTH ONE TIME DAILY , Disp: 90 tablet, Rfl: 3  •  metoprolol SR (TOPROL XL) 25 MG TABLET SR 24 HR, TAKE ONE TABLET BY MOUTH ONE TIME DAILY , Disp: 90 tablet, Rfl: 3  •  cloNIDine (CATAPRES) 0.1 MG Tab, TAKE 1 TABLET BY MOUTH TWICE DAILY , Disp: 180 tablet, Rfl: 3  •  CONTOUR NEXT TEST strip, USE TO CHECK FASTING BLOOD SUGAR TWICE DAILY AND AS NEEDED , Disp: 200 Strip, Rfl: 3  •  ULTICARE INSULIN SYRINGE 29G X 1/2\" 0.5 ML Misc, USE AS DIRECTED BY PHYSICIAN, Disp: 90 Each, Rfl: 4  •  aspirin (ASA) 81 MG Chew Tab chewable tablet, CHEW 1 TABLET ORALLY ONCE DAILY, Disp: 100 Tab, Rfl: 3    No Known Allergies    Past Medical History:   Diagnosis Date   • CKD (chronic kidney disease) stage 3, GFR 30-59 ml/min ([facility])    • COPD    • Diabetes    • GERD (gastroesophageal reflux disease)    • Hepatitis C    • Hyperlipidemia    • Hypertension    • Psychiatric disorder     bipolar   • Schizoaffective disorder (HCC) 10/01/16     Past Surgical History:   Procedure Laterality Date   • OTHER ABDOMINAL SURGERY     • OTHER ORTHOPEDIC SURGERY       Family History   Problem Relation Age of Onset   • Cancer Father         blader ca   • Clotting Disorder Neg Hx      Social History     Socioeconomic History   • Marital status: Single     Spouse name: Not on file   • Number of children: Not on file   • Years of education: Not on file   • Highest education level: Not on file   Occupational History   • Not on file   Tobacco Use   • Smoking status: Former Smoker     Packs/day: 0.00     Types: Cigarettes     Quit date: 3/6/2013     Years since quittin.3   • Smokeless tobacco: Never Used   Vaping Use   • Vaping Use: Never used   Substance and Sexual Activity   • Alcohol use: Yes     Alcohol/week: 0.0 oz     Comment: sober since HCV diagnosis   • Drug use: Yes     Types: Marijuana   • Sexual activity: Not " "Currently     Partners: Female   Other Topics Concern   • Not on file   Social History Narrative   • Not on file     Social Determinants of Health     Financial Resource Strain: Medium Risk   • Difficulty of Paying Living Expenses: Somewhat hard   Food Insecurity: Food Insecurity Present   • Worried About Running Out of Food in the Last Year: Sometimes true   • Ran Out of Food in the Last Year: Sometimes true   Transportation Needs: No Transportation Needs   • Lack of Transportation (Medical): No   • Lack of Transportation (Non-Medical): No   Physical Activity: Sufficiently Active   • Days of Exercise per Week: 7 days   • Minutes of Exercise per Session: 140 min   Stress: Stress Concern Present   • Feeling of Stress : To some extent   Social Connections: Socially Isolated   • Frequency of Communication with Friends and Family: More than three times a week   • Frequency of Social Gatherings with Friends and Family: Never   • Attends Christian Services: Never   • Active Member of Clubs or Organizations: No   • Attends Club or Organization Meetings: Never   • Marital Status: Never    Intimate Partner Violence:    • Fear of Current or Ex-Partner:    • Emotionally Abused:    • Physically Abused:    • Sexually Abused:        Objective:     Vitals: /60 (BP Location: Right arm, Patient Position: Sitting, BP Cuff Size: Adult)   Pulse 86   Temp 36.1 °C (97 °F) (Temporal)   Ht 1.676 m (5' 6\")   Wt 61.1 kg (134 lb 11.2 oz)   SpO2 97%   BMI 21.74 kg/m²    General: Alert, pleasant, NAD  HEENT: Normocephalic.    Skin: Warm, dry, no rashes.  Plantar wart on right foot.  Extremities: No leg edema. No discoloration  Neurological: No tremors  Psych:  Affect/mood is normal, judgement is good, memory is intact, grooming is appropriate.    Assessment/Plan:     Nikolas was seen today for leg problem and lab results.    Diagnoses and all orders for this visit:    Plantar wart  Patient agreeable to " cryotherapy.    CRYOTHERAPY:  Discussed risks and benefits of cryotherapy. Patient verbally agreed. 3 applications of cryotherapy were applied to 1 lesion on right plantar aspect of foot. Patient tolerated procedure well. Aftercare instructions given.    Controlled type 2 diabetes mellitus without complication, with long-term current use of insulin (HCC)  Stable. A1C I 6.1% in the clinic. Continue current regimen at this time. Cheek feet daily.  Obtaining retinal exam.    Dyslipidemia  Controlled on current statin without myalgias..  LDL below 70.  Continue current regimen.    Essential hypertension  Stable on current regimen. No CP, SOB, no leg swelling.  Recommend DASH diet.      Return in about 3 months (around 9/24/2021).          Rebecca ALANIZ.

## 2021-06-24 NOTE — LETTER
Request for Medical Records    Patient Name: Nikolas Rico    : 1955      Dear Doctor: [facility]    The above named patient receives primary care at the [facility] by NORMA Syed.  The patient informs us that you are his eye care Provider.    Please fax a copy of the most recent eye exam to [phone] or answer the  questions below and fax this sheet back to us at the above number.  Attached is a signed Release of Information.      Date of last eye exam: _____________    Retinal eye exam summary:        Please select the choice(s) that apply.    ____ No diabetic retinopathy    ____    Diabetic retinopathy present      Printed Name and Credentials: __________________________________    Signature of Eye Care Provider: _________________________________    We appreciate your assistance and collaboration in providing efficient patient care!    Kindest Regards,    [facility] [address] [place] [id number] [phone]

## 2021-07-21 ENCOUNTER — PATIENT MESSAGE (OUTPATIENT)
Dept: HEALTH INFORMATION MANAGEMENT | Facility: OTHER | Age: 66
End: 2021-07-21

## 2021-07-22 ENCOUNTER — PATIENT OUTREACH (OUTPATIENT)
Dept: HEALTH INFORMATION MANAGEMENT | Facility: OTHER | Age: 66
End: 2021-07-22

## 2021-07-22 NOTE — NON-PROVIDER
Attempt #: Final  [facility] Verified: yes  Verify PCP: yes     Comprehensive Health Assessment   1. Scheduling Status:Scheduled     Care Gap Scheduling (Attempt to Schedule EACH Overdue Care Gap!)  Health Maintenance Due   Topic Date Due   • RETINAL SCREENING  08/16/2019   • DIABETES MONOFILAMENT / LE EXAM  01/30/2021       - Patient already has appointment scheduled for Retinal Eye Exam./ Monofilament exam.    MyChart Activation: already active

## 2021-07-30 DIAGNOSIS — E11.9 CONTROLLED TYPE 2 DIABETES MELLITUS WITHOUT COMPLICATION, WITH LONG-TERM CURRENT USE OF INSULIN (HCC): ICD-10-CM

## 2021-07-30 DIAGNOSIS — Z79.4 CONTROLLED TYPE 2 DIABETES MELLITUS WITHOUT COMPLICATION, WITH LONG-TERM CURRENT USE OF INSULIN (HCC): ICD-10-CM

## 2021-09-24 ENCOUNTER — OFFICE VISIT (OUTPATIENT)
Dept: MEDICAL GROUP | Facility: MEDICAL CENTER | Age: 66
End: 2021-09-24
Payer: MEDICARE

## 2021-09-24 VITALS
HEIGHT: 65 IN | HEART RATE: 64 BPM | SYSTOLIC BLOOD PRESSURE: 130 MMHG | DIASTOLIC BLOOD PRESSURE: 78 MMHG | BODY MASS INDEX: 22.33 KG/M2 | RESPIRATION RATE: 16 BRPM | WEIGHT: 134 LBS | OXYGEN SATURATION: 96 % | TEMPERATURE: 97.6 F

## 2021-09-24 DIAGNOSIS — B35.1 ONYCHOMYCOSIS: ICD-10-CM

## 2021-09-24 DIAGNOSIS — Z23 NEED FOR VACCINATION: ICD-10-CM

## 2021-09-24 DIAGNOSIS — G47.00 INSOMNIA, UNSPECIFIED TYPE: ICD-10-CM

## 2021-09-24 DIAGNOSIS — M25.572 ACUTE LEFT ANKLE PAIN: ICD-10-CM

## 2021-09-24 PROCEDURE — 99214 OFFICE O/P EST MOD 30 MIN: CPT | Mod: 25 | Performed by: NURSE PRACTITIONER

## 2021-09-24 PROCEDURE — 90662 IIV NO PRSV INCREASED AG IM: CPT | Performed by: NURSE PRACTITIONER

## 2021-09-24 PROCEDURE — G0008 ADMIN INFLUENZA VIRUS VAC: HCPCS | Performed by: NURSE PRACTITIONER

## 2021-09-24 RX ORDER — TRAZODONE HYDROCHLORIDE 50 MG/1
50 TABLET ORAL NIGHTLY PRN
Qty: 30 TABLET | Refills: 6 | Status: SHIPPED | OUTPATIENT
Start: 2021-09-24 | End: 2022-02-02 | Stop reason: SDUPTHER

## 2021-09-24 ASSESSMENT — FIBROSIS 4 INDEX: FIB4 SCORE: 1.16

## 2021-09-24 NOTE — PROGRESS NOTES
"Chief Complaint   Patient presents with   • Follow-Up       Subjective:     HPI:     Nikolas Rico is a 65 y.o. male here to discuss the evaluation and management of:    [place] [facility] [place] states after his follow-up with MORELIA SO he would like me to know that he does not need his walking stick for balance he uses this for other purposes.  States he also has hearing aids at home when he feels he does not need to wear them on a daily basis.  States he can hear what he wants to hear.    He is concerned about his \"plaque build up.\"  He is on statin therapy.  He is very active. He reports he walks frequently.     1. Onychomycosis  Patient does have significant onychomycosis on his left foot involving at least 3 of his toenails it.  Requesting referral to podiatry.    2. Insomnia, unspecified type  He is requesting to have trazodone nightly as he does have difficulties with sleeping.  Has had this in the past and has tolerated it well.    3. Acute left ankle pain  Having problem with his ankle.  He has some tenderness to the medial malleolus.  There is slight redness although this could be from his Ace wrap that he had in place.  He states that the soreness has been there for about 1 month.  Has a remote history of gout.  It is not swollen at this time.  He also is worried about bruising he has noticedhowever on upon further examination is appear that he has varicosities in that area.  There is no bruising.     ROS:  Denies any Headache, Blurred Vision, Confusion, Chest pain,  Shortness of breath,  Abdominal pain, Changes of bowel or bladder, Lower ext edema, Fevers, Nights sweats, Weight Changes, Focal weakness or numbness.  And all other systems reviewed and are all negative. POSITIVE FOR : see above        Current Outpatient Medications:   •  traZODone (DESYREL) 50 MG Tab, Take 1 Tablet by mouth at bedtime as needed for Sleep., Disp: 30 Tablet, Rfl: 6  •  metFORMIN (GLUCOPHAGE) 850 MG Tab, TAKE ONE " "TABLET BY MOUTH TWICE DAILY WITH MEALS , Disp: 200 tablet, Rfl: 0  •  insulin regular (NOVOLIN R) 100 Unit/mL Solution, AS PER SLIDING SCALE, Disp: 10 mL, Rfl: 0  •  lisinopril (PRINIVIL) 40 MG tablet, TAKE ONE TABLET BY MOUTH ONE TIME DAILY , Disp: 100 tablet, Rfl: 3  •  atorvastatin (LIPITOR) 40 MG Tab, TAKE ONE TABLET BY MOUTH ONE TIME DAILY , Disp: 100 tablet, Rfl: 3  •  levothyroxine (SYNTHROID) 25 MCG Tab, TAKE 1 TABLET BY MOUTH EVERY MORNING BEFORE BREAKFAST ON AN EMPTY STOMACH , Disp: 90 tablet, Rfl: 3  •  amLODIPine (NORVASC) 10 MG Tab, TAKE ONE TABLET BY MOUTH ONE TIME DAILY , Disp: 90 tablet, Rfl: 3  •  metoprolol SR (TOPROL XL) 25 MG TABLET SR 24 HR, TAKE ONE TABLET BY MOUTH ONE TIME DAILY , Disp: 90 tablet, Rfl: 3  •  cloNIDine (CATAPRES) 0.1 MG Tab, TAKE 1 TABLET BY MOUTH TWICE DAILY , Disp: 180 tablet, Rfl: 3  •  CONTOUR NEXT TEST strip, USE TO CHECK FASTING BLOOD SUGAR TWICE DAILY AND AS NEEDED , Disp: 200 Strip, Rfl: 3  •  ULTICARE INSULIN SYRINGE 29G X 1/2\" 0.5 ML Misc, USE AS DIRECTED BY PHYSICIAN, Disp: 90 Each, Rfl: 4  •  aspirin (ASA) 81 MG Chew Tab chewable tablet, CHEW 1 TABLET ORALLY ONCE DAILY, Disp: 100 Tab, Rfl: 3    No Known Allergies    Past Medical History:   Diagnosis Date   • CKD (chronic kidney disease) stage 3, GFR 30-59 ml/min ([facility])    • COPD    • Diabetes    • GERD (gastroesophageal reflux disease)    • Hepatitis C    • Hyperlipidemia    • Hypertension    • Psychiatric disorder     bipolar   • Schizoaffective disorder (HCC) 10/01/16     Past Surgical History:   Procedure Laterality Date   • OTHER ABDOMINAL SURGERY     • OTHER ORTHOPEDIC SURGERY       Family History   Problem Relation Age of Onset   • Cancer Father         blader ca   • Clotting Disorder Neg Hx      Social History     Socioeconomic History   • Marital status: Single     Spouse name: Not on file   • Number of children: Not on file   • Years of education: Not on file   • Highest education level: Not on file " "  Occupational History   • Not on file   Tobacco Use   • Smoking status: Former Smoker     Packs/day: 0.50     Types: Cigarettes     Quit date: 3/6/2013     Years since quittin.5   • Smokeless tobacco: Never Used   Vaping Use   • Vaping Use: Never used   Substance and Sexual Activity   • Alcohol use: Yes     Alcohol/week: 0.0 oz     Comment: sober since HCV diagnosis   • Drug use: Yes     Types: Marijuana   • Sexual activity: Not Currently     Partners: Female   Other Topics Concern   • Not on file   Social History Narrative   • Not on file     Social Determinants of Health     Financial Resource Strain: Medium Risk   • Difficulty of Paying Living Expenses: Somewhat hard   Food Insecurity: Food Insecurity Present   • Worried About Running Out of Food in the Last Year: Sometimes true   • Ran Out of Food in the Last Year: Sometimes true   Transportation Needs: No Transportation Needs   • Lack of Transportation (Medical): No   • Lack of Transportation (Non-Medical): No   Physical Activity: Sufficiently Active   • Days of Exercise per Week: 7 days   • Minutes of Exercise per Session: 140 min   Stress: Stress Concern Present   • Feeling of Stress : To some extent   Social Connections: Socially Isolated   • Frequency of Communication with Friends and Family: More than three times a week   • Frequency of Social Gatherings with Friends and Family: Never   • Attends L[employer] day Services: Never   • Active Member of Clubs or Organizations: No   • Attends Club or Organization Meetings: Never   • Marital Status: Never    Intimate Partner Violence:    • Fear of Current or Ex-Partner:    • Emotionally Abused:    • Physically Abused:    • Sexually Abused:        Objective:     Vitals: /78   Pulse 64   Temp 36.4 °C (97.6 °F)   Resp 16   Ht 1.651 m (5' 5\")   Wt 60.8 kg (134 lb)   SpO2 96%   BMI 22.30 kg/m²    General: Alert, pleasant, NAD  HEENT: Normocephalic.  Neck supple.   Respiratory: no distress, no " audible wheezing, RR -WNL  Skin: Warm, dry, no rashes.  Extremities: No leg edema. No discoloration  Neurological: No tremors  Psych:  Affect/mood is normal, judgement is good, memory is intact, grooming is appropriate.    Assessment/Plan:     Nikolas was seen today for follow-up.    Diagnoses and all orders for this visit:    Onychomycosis  -     REFERRAL TO PODIATRY    Insomnia, unspecified type  Chronic, stable.  Trial of trazodone.  -     traZODone (DESYREL) 50 MG Tab; Take 1 Tablet by mouth at bedtime as needed for Sleep.    Acute left ankle pain  Acute, stable.  Have ordered ankle x-ray for further evaluation of degenerative changes versus erosive changes versus crystal uropathy.  -     DX-ANKLE 3+ VIEWS LEFT; Future    Need for vaccination  -     INFLUENZA VACCINE, HIGH DOSE (65+ ONLY)      Return in about 3 months (around 12/24/2021).    {I have placed the above orders and discussed them with an approved delegating provider.  The MA is performing the below orders under the direction of Dr. Harjit SPRAGUE

## 2021-09-24 NOTE — PROGRESS NOTES
"Nikolas Rico is a 65 y.o. male here for a non-provider visit for:   FLU    Reason for immunization: Annual Flu Vaccine  Immunization records indicate need for vaccine: Yes, confirmed with Epic  Minimum interval has been met for this vaccine: No  ABN completed: Not Indicated    VIS Dated  8/6/2021 was given to patient: Yes  All IAC Questionnaire questions were answered \"No.\"    Patient tolerated injection and no adverse effects were observed or reported: Yes    Pt scheduled for next dose in series: Not Indicated  "

## 2021-11-01 ENCOUNTER — TELEPHONE (OUTPATIENT)
Dept: HEALTH INFORMATION MANAGEMENT | Facility: OTHER | Age: 66
End: 2021-11-01

## 2021-11-01 NOTE — TELEPHONE ENCOUNTER
Member requested a call. Spoke with member, would like to know how we can help for him to get reimbursed for the $148 you pay Medicare. He heard this on a TV ad. Informed member this is something he would have to call Medicare, to see if he qualifies, we don't have this type of information. Member understood, no further questions

## 2021-11-02 ENCOUNTER — HOSPITAL ENCOUNTER (OUTPATIENT)
Dept: LAB | Facility: MEDICAL CENTER | Age: 66
End: 2021-11-02
Attending: PODIATRIST
Payer: MEDICARE

## 2021-11-02 LAB
ALBUMIN SERPL BCP-MCNC: 4.5 G/DL (ref 3.2–4.9)
ALP SERPL-CCNC: 61 U/L (ref 30–99)
ALT SERPL-CCNC: 16 U/L (ref 2–50)
AST SERPL-CCNC: 23 U/L (ref 12–45)
BILIRUB CONJ SERPL-MCNC: <0.2 MG/DL (ref 0.1–0.5)
BILIRUB INDIRECT SERPL-MCNC: NORMAL MG/DL (ref 0–1)
BILIRUB SERPL-MCNC: 0.4 MG/DL (ref 0.1–1.5)
PROT SERPL-MCNC: 7 G/DL (ref 6–8.2)

## 2021-11-02 PROCEDURE — 80076 HEPATIC FUNCTION PANEL: CPT

## 2021-11-02 PROCEDURE — 36415 COLL VENOUS BLD VENIPUNCTURE: CPT

## 2021-11-08 ENCOUNTER — TELEPHONE (OUTPATIENT)
Dept: MEDICAL GROUP | Facility: PHYSICIAN GROUP | Age: 66
End: 2021-11-08

## 2021-11-08 NOTE — TELEPHONE ENCOUNTER
NEW PATIENT VISIT PRE-VISIT PLANNING    1.  Epic[employer] Patient is checked in Patient Demographics?Yes    2.  Immunizations were updated in Epic using Reconcile Outside Information activity? Yes         3.  Is this appointment scheduled as a Hospital Follow-Up? No    4.  Patient is due for the following Health Maintenance Topics:   Health Maintenance Due   Topic Date Due   • RETINAL SCREENING  08/28/2020   • DIABETES MONOFILAMENT / LE EXAM  01/30/2021   • A1C SCREENING  12/08/2021         5.  Reviewed/Updated the following with patient:       •   Preferred Pharmacy? Yes       •   Preferred Lab? Yes       •   Preferred Communication? Yes       •   Allergies? Yes       •   Medications? YES. Was Abstract Encounter opened and chart updated? YES       •   Social History? No       •   Family History (document living status of immediate family members and if + hx of  cancer, diabetes, hypertension, hyperlipidemia, heart attack, stroke) No    6.  Updated Care Team?       •   DME Company (gait device, O2, CPAP, etc.) NO       •   Other Specialists (eye doctor, derm, GYN, cardiology, endo, etc): NO    7.  AHA (Puls8) form printed for Provider? No, already completed

## 2021-11-10 ENCOUNTER — OFFICE VISIT (OUTPATIENT)
Dept: MEDICAL GROUP | Facility: PHYSICIAN GROUP | Age: 66
End: 2021-11-10
Payer: MEDICARE

## 2021-11-10 VITALS
TEMPERATURE: 98.5 F | SYSTOLIC BLOOD PRESSURE: 116 MMHG | BODY MASS INDEX: 22.71 KG/M2 | OXYGEN SATURATION: 98 % | HEIGHT: 65 IN | WEIGHT: 136.3 LBS | RESPIRATION RATE: 12 BRPM | DIASTOLIC BLOOD PRESSURE: 68 MMHG | HEART RATE: 76 BPM

## 2021-11-10 DIAGNOSIS — F19.11 HISTORY OF SUBSTANCE ABUSE (HCC): ICD-10-CM

## 2021-11-10 DIAGNOSIS — Z76.89 ESTABLISHING CARE WITH NEW DOCTOR, ENCOUNTER FOR: ICD-10-CM

## 2021-11-10 DIAGNOSIS — E11.9 CONTROLLED TYPE 2 DIABETES MELLITUS WITHOUT COMPLICATION, WITH LONG-TERM CURRENT USE OF INSULIN (HCC): ICD-10-CM

## 2021-11-10 DIAGNOSIS — E78.5 HYPERLIPIDEMIA ASSOCIATED WITH TYPE 2 DIABETES MELLITUS (HCC): ICD-10-CM

## 2021-11-10 DIAGNOSIS — E11.59 HYPERTENSION ASSOCIATED WITH DIABETES (HCC): ICD-10-CM

## 2021-11-10 DIAGNOSIS — Z79.4 CONTROLLED TYPE 2 DIABETES MELLITUS WITHOUT COMPLICATION, WITH LONG-TERM CURRENT USE OF INSULIN (HCC): ICD-10-CM

## 2021-11-10 DIAGNOSIS — E11.69 HYPERLIPIDEMIA ASSOCIATED WITH TYPE 2 DIABETES MELLITUS (HCC): ICD-10-CM

## 2021-11-10 DIAGNOSIS — I15.2 HYPERTENSION ASSOCIATED WITH DIABETES (HCC): ICD-10-CM

## 2021-11-10 DIAGNOSIS — Z23 NEED FOR VACCINATION: ICD-10-CM

## 2021-11-10 DIAGNOSIS — J41.0 SIMPLE CHRONIC BRONCHITIS (HCC): ICD-10-CM

## 2021-11-10 DIAGNOSIS — Z86.19 HISTORY OF HEPATITIS C: ICD-10-CM

## 2021-11-10 DIAGNOSIS — Z12.5 SCREENING FOR MALIGNANT NEOPLASM OF PROSTATE: ICD-10-CM

## 2021-11-10 LAB — RETINAL SCREEN: NORMAL

## 2021-11-10 PROCEDURE — 92250 FUNDUS PHOTOGRAPHY W/I&R: CPT | Performed by: FAMILY MEDICINE

## 2021-11-10 PROCEDURE — 90715 TDAP VACCINE 7 YRS/> IM: CPT | Performed by: FAMILY MEDICINE

## 2021-11-10 PROCEDURE — 90471 IMMUNIZATION ADMIN: CPT | Performed by: FAMILY MEDICINE

## 2021-11-10 PROCEDURE — 99215 OFFICE O/P EST HI 40 MIN: CPT | Mod: 25 | Performed by: FAMILY MEDICINE

## 2021-11-10 RX ORDER — TERBINAFINE HYDROCHLORIDE 250 MG/1
TABLET ORAL
COMMUNITY
Start: 2021-11-03 | End: 2022-02-02

## 2021-11-10 RX ORDER — KETOCONAZOLE 20 MG/G
CREAM TOPICAL
COMMUNITY
Start: 2021-11-03 | End: 2023-02-22

## 2021-11-10 ASSESSMENT — FIBROSIS 4 INDEX: FIB4 SCORE: 1.36

## 2021-11-10 NOTE — ASSESSMENT & PLAN NOTE
Chronic issue. bp today is 116/68.  Continues to take metoprolol XL 25 mg, lisinopril 40 mg, clonidine 0.1 mg twice a day, and amlodipine 10 mg daily.

## 2021-11-10 NOTE — TELEPHONE ENCOUNTER
Requested Prescriptions     Pending Prescriptions Disp Refills   • metFORMIN (GLUCOPHAGE) 850 MG Tab [Pharmacy Med Name: METFORMIN 850 MG    TAB GRAN] 200 Tablet 2     Sig: TAKE ONE TABLET BY MOUTH TWICE DAILY WITH MEALS   Dayana Valerio M.D.

## 2021-11-10 NOTE — ASSESSMENT & PLAN NOTE
Chronic issue. A1c was rechecked today and improved to 5.6%.    Continues to take metformin 850mg bid and novolin SSI.  Typically uses SSI 2x week when sugars are 180-200s.     F/u with Dr Vicente. Started on lamisil 1.5months ago.   Has left 1-3 rd toes. Improving.

## 2021-11-10 NOTE — ASSESSMENT & PLAN NOTE
Chronic. Stable.  Doesn't use any inhalers.   Quit smoking 3/16/2013. Quit with aid of chantix.   1/2 ppd.  98% RA  Continues to run daily --6miles a day.

## 2021-11-12 ENCOUNTER — HOSPITAL ENCOUNTER (OUTPATIENT)
Dept: LAB | Facility: MEDICAL CENTER | Age: 66
End: 2021-11-12
Attending: FAMILY MEDICINE
Payer: MEDICARE

## 2021-11-12 DIAGNOSIS — E11.9 CONTROLLED TYPE 2 DIABETES MELLITUS WITHOUT COMPLICATION, WITH LONG-TERM CURRENT USE OF INSULIN (HCC): ICD-10-CM

## 2021-11-12 DIAGNOSIS — Z79.4 CONTROLLED TYPE 2 DIABETES MELLITUS WITHOUT COMPLICATION, WITH LONG-TERM CURRENT USE OF INSULIN (HCC): ICD-10-CM

## 2021-11-12 DIAGNOSIS — I15.2 HYPERTENSION ASSOCIATED WITH DIABETES (HCC): ICD-10-CM

## 2021-11-12 DIAGNOSIS — E11.59 HYPERTENSION ASSOCIATED WITH DIABETES (HCC): ICD-10-CM

## 2021-11-12 DIAGNOSIS — Z12.5 SCREENING FOR MALIGNANT NEOPLASM OF PROSTATE: ICD-10-CM

## 2021-11-12 LAB
ANION GAP SERPL CALC-SCNC: 11 MMOL/L (ref 7–16)
BASOPHILS # BLD AUTO: 0.8 % (ref 0–1.8)
BASOPHILS # BLD: 0.05 K/UL (ref 0–0.12)
BUN SERPL-MCNC: 17 MG/DL (ref 8–22)
CALCIUM SERPL-MCNC: 9.7 MG/DL (ref 8.5–10.5)
CHLORIDE SERPL-SCNC: 105 MMOL/L (ref 96–112)
CO2 SERPL-SCNC: 26 MMOL/L (ref 20–33)
CREAT SERPL-MCNC: 0.82 MG/DL (ref 0.5–1.4)
EOSINOPHIL # BLD AUTO: 0.13 K/UL (ref 0–0.51)
EOSINOPHIL NFR BLD: 2.1 % (ref 0–6.9)
ERYTHROCYTE [DISTWIDTH] IN BLOOD BY AUTOMATED COUNT: 45.2 FL (ref 35.9–50)
GLUCOSE SERPL-MCNC: 109 MG/DL (ref 65–99)
HCT VFR BLD AUTO: 43.7 % (ref 42–52)
HGB BLD-MCNC: 14.2 G/DL (ref 14–18)
IMM GRANULOCYTES # BLD AUTO: 0.03 K/UL (ref 0–0.11)
IMM GRANULOCYTES NFR BLD AUTO: 0.5 % (ref 0–0.9)
LYMPHOCYTES # BLD AUTO: 1.53 K/UL (ref 1–4.8)
LYMPHOCYTES NFR BLD: 24.6 % (ref 22–41)
MCH RBC QN AUTO: 28.4 PG (ref 27–33)
MCHC RBC AUTO-ENTMCNC: 32.5 G/DL (ref 33.7–35.3)
MCV RBC AUTO: 87.4 FL (ref 81.4–97.8)
MONOCYTES # BLD AUTO: 0.36 K/UL (ref 0–0.85)
MONOCYTES NFR BLD AUTO: 5.8 % (ref 0–13.4)
NEUTROPHILS # BLD AUTO: 4.11 K/UL (ref 1.82–7.42)
NEUTROPHILS NFR BLD: 66.2 % (ref 44–72)
NRBC # BLD AUTO: 0 K/UL
NRBC BLD-RTO: 0 /100 WBC
PLATELET # BLD AUTO: 309 K/UL (ref 164–446)
PMV BLD AUTO: 10.2 FL (ref 9–12.9)
POTASSIUM SERPL-SCNC: 4 MMOL/L (ref 3.6–5.5)
PSA SERPL-MCNC: 1.05 NG/ML (ref 0–4)
RBC # BLD AUTO: 5 M/UL (ref 4.7–6.1)
SODIUM SERPL-SCNC: 142 MMOL/L (ref 135–145)
TSH SERPL DL<=0.005 MIU/L-ACNC: 1.88 UIU/ML (ref 0.38–5.33)
WBC # BLD AUTO: 6.2 K/UL (ref 4.8–10.8)

## 2021-11-12 PROCEDURE — 84153 ASSAY OF PSA TOTAL: CPT

## 2021-11-12 PROCEDURE — 82306 VITAMIN D 25 HYDROXY: CPT

## 2021-11-12 PROCEDURE — 36415 COLL VENOUS BLD VENIPUNCTURE: CPT

## 2021-11-12 PROCEDURE — 85025 COMPLETE CBC W/AUTO DIFF WBC: CPT

## 2021-11-12 PROCEDURE — 84443 ASSAY THYROID STIM HORMONE: CPT

## 2021-11-12 PROCEDURE — 80048 BASIC METABOLIC PNL TOTAL CA: CPT

## 2021-11-15 LAB — 25(OH)D3 SERPL-MCNC: 48 NG/ML (ref 30–80)

## 2022-01-08 NOTE — TELEPHONE ENCOUNTER
Juanpablo on  for pt to call back to let me know if he has an appt with dr. Alvaro mabry . My phone number proved for response. STEPHEN Daniels.     Labs/EKG/Medications

## 2022-01-12 ENCOUNTER — APPOINTMENT (OUTPATIENT)
Dept: MEDICAL GROUP | Facility: PHYSICIAN GROUP | Age: 67
End: 2022-01-12
Payer: MEDICARE

## 2022-01-13 DIAGNOSIS — I10 ESSENTIAL HYPERTENSION: ICD-10-CM

## 2022-01-13 RX ORDER — CLONIDINE HYDROCHLORIDE 0.1 MG/1
TABLET ORAL
Qty: 180 TABLET | Refills: 3 | Status: SHIPPED | OUTPATIENT
Start: 2022-01-13 | End: 2022-09-07 | Stop reason: SDUPTHER

## 2022-01-14 NOTE — TELEPHONE ENCOUNTER
Requested Prescriptions     Pending Prescriptions Disp Refills   • cloNIDine (CATAPRES) 0.1 MG Tab [Pharmacy Med Name: CLONIDINE 0.1 MG    TAB UNIC] 180 Tablet 3     Sig: TAKE 1 TABLET BY MOUTH TWICE DAILY   Dayana Valerio M.D.

## 2022-02-02 ENCOUNTER — OFFICE VISIT (OUTPATIENT)
Dept: MEDICAL GROUP | Facility: PHYSICIAN GROUP | Age: 67
End: 2022-02-02
Payer: MEDICARE

## 2022-02-02 VITALS
BODY MASS INDEX: 23.44 KG/M2 | OXYGEN SATURATION: 97 % | TEMPERATURE: 98 F | HEART RATE: 84 BPM | DIASTOLIC BLOOD PRESSURE: 74 MMHG | WEIGHT: 140.7 LBS | HEIGHT: 65 IN | RESPIRATION RATE: 14 BRPM | SYSTOLIC BLOOD PRESSURE: 122 MMHG

## 2022-02-02 DIAGNOSIS — Z79.4 CONTROLLED TYPE 2 DIABETES MELLITUS WITHOUT COMPLICATION, WITH LONG-TERM CURRENT USE OF INSULIN (HCC): ICD-10-CM

## 2022-02-02 DIAGNOSIS — M25.561 CHRONIC PAIN OF RIGHT KNEE: ICD-10-CM

## 2022-02-02 DIAGNOSIS — F25.9 SCHIZOAFFECTIVE DISORDER, UNSPECIFIED TYPE (HCC): ICD-10-CM

## 2022-02-02 DIAGNOSIS — N64.59 OTHER SIGNS AND SYMPTOMS IN BREAST: ICD-10-CM

## 2022-02-02 DIAGNOSIS — J41.0 SIMPLE CHRONIC BRONCHITIS (HCC): ICD-10-CM

## 2022-02-02 DIAGNOSIS — E11.9 CONTROLLED TYPE 2 DIABETES MELLITUS WITHOUT COMPLICATION, WITH LONG-TERM CURRENT USE OF INSULIN (HCC): ICD-10-CM

## 2022-02-02 DIAGNOSIS — G89.29 CHRONIC PAIN OF LEFT ANKLE: ICD-10-CM

## 2022-02-02 DIAGNOSIS — Z98.890 HISTORY OF BREAST LUMP/MASS EXCISION: ICD-10-CM

## 2022-02-02 DIAGNOSIS — M25.572 CHRONIC PAIN OF LEFT ANKLE: ICD-10-CM

## 2022-02-02 DIAGNOSIS — G89.29 CHRONIC PAIN OF RIGHT KNEE: ICD-10-CM

## 2022-02-02 DIAGNOSIS — G47.09 OTHER INSOMNIA: ICD-10-CM

## 2022-02-02 LAB
HBA1C MFR BLD: 5.9 % (ref 0–5.6)
INT CON NEG: NEGATIVE
INT CON POS: POSITIVE

## 2022-02-02 PROCEDURE — 99214 OFFICE O/P EST MOD 30 MIN: CPT | Performed by: FAMILY MEDICINE

## 2022-02-02 PROCEDURE — 83036 HEMOGLOBIN GLYCOSYLATED A1C: CPT | Performed by: FAMILY MEDICINE

## 2022-02-02 RX ORDER — TRAZODONE HYDROCHLORIDE 50 MG/1
50 TABLET ORAL NIGHTLY PRN
Qty: 100 TABLET | Refills: 2 | Status: SHIPPED
Start: 2022-02-02 | End: 2022-06-20

## 2022-02-02 ASSESSMENT — FIBROSIS 4 INDEX: FIB4 SCORE: 1.25

## 2022-02-02 NOTE — ASSESSMENT & PLAN NOTE
Chronic medical diagnosis.  Continues to take Metformin 850 mg twice a day.  Hemoglobin A1c checked in the office today is 5.9%. hasn't had to use SSI in over 3 months.

## 2022-02-02 NOTE — ASSESSMENT & PLAN NOTE
chroniic issue. Quit smoking 2013.   Hx bronchitis in the past. Was on spirivia, flovent,albuterol in the past. Hasn't been on any since 2019 and currently not following up with pulm.

## 2022-02-02 NOTE — PROGRESS NOTES
Annual Health Assessment Questions:    1.  Are you currently engaging in any exercise or physical activity? Yes    2.  How would you describe your mood or emotional well-being today? good    3.  Have you had any falls in the last year? No    4.  Have you noticed any problems with your balance or had difficulty walking? No    5.  In the last six months have you experienced any leakage of urine? No    6. DPA/Advanced Directive: Patient does not have an Advanced Directive.  A packet and workshop information was given on Advanced Directives.    CC:   Chief Complaint   Patient presents with   • Follow-Up     2 months    • Foot Problem     fungus inside L foot    • Medication Problem     trazadone 50 mf 2 times a day          HPI:   Nikolas presents today for a 3-month follow-up visit.  He was last seen in our office on November 10        Diabetes type 2, controlled ([facility])  Chronic medical diagnosis.  Continues to take Metformin 850 mg twice a day.  Hemoglobin A1c checked in the office today is 5.9%. hasn't had to use SSI in over 3 months.     Schizoaffective disorder ([facility])  Chronic.  Off all meds for the last year.  Hasn't been f/u with psych.     COPD (chronic obstructive pulmonary disease) ([facility])  chroniic issue. Quit smoking 2013.   Hx bronchitis in the past. Was on spirivia, flovent,albuterol in the past. Hasn't been on any since 2019 and currently not following up with pulm.     Other insomnia    Chronic . Taking trazodone 50mg for insomnia.  Requesting to increase it to 100mg daily. Was on cpap in the past.     Left foot pain  Chronic issue x 6 months. . Has been complaining of left ankle and foot pain.  Notices it when he walks but not present at rest. xrays were ordered by previous pcp but he didn't do it. Stopped using the lamisil.     Chronic pain of right knee  Right knee pain x 6 mths.. Had stopped running and didn't notice any improvement. Pain worse going upstairs and with walking.      Chronic pain of  "left ankle  Chronic issue x 6 months. . Has been complaining of left ankle and foot pain.  Notices it when he walks but not present at rest. xrays were ordered by previous pcp but he didn't do it. Stopped using the lamisil.         Current Outpatient Medications Ordered in Epic   Medication Sig Dispense Refill   • traZODone (DESYREL) 50 MG Tab Take 1 Tablet by mouth at bedtime as needed for Sleep (1 tablet nightly and can repeat another tablet if necessary). 100 Tablet 2   • cloNIDine (CATAPRES) 0.1 MG Tab TAKE 1 TABLET BY MOUTH TWICE DAILY 180 Tablet 3   • ketoconazole (NIZORAL) 2 % Cream      • metFORMIN (GLUCOPHAGE) 850 MG Tab TAKE ONE TABLET BY MOUTH TWICE DAILY WITH MEALS 200 Tablet 2   • lisinopril (PRINIVIL) 40 MG tablet TAKE ONE TABLET BY MOUTH ONE TIME DAILY  100 tablet 3   • atorvastatin (LIPITOR) 40 MG Tab TAKE ONE TABLET BY MOUTH ONE TIME DAILY  100 tablet 3   • levothyroxine (SYNTHROID) 25 MCG Tab TAKE 1 TABLET BY MOUTH EVERY MORNING BEFORE BREAKFAST ON AN EMPTY STOMACH  90 tablet 3   • amLODIPine (NORVASC) 10 MG Tab TAKE ONE TABLET BY MOUTH ONE TIME DAILY  90 tablet 3   • metoprolol SR (TOPROL XL) 25 MG TABLET SR 24 HR TAKE ONE TABLET BY MOUTH ONE TIME DAILY  90 tablet 3   • CONTOUR NEXT TEST strip USE TO CHECK FASTING BLOOD SUGAR TWICE DAILY AND AS NEEDED  200 Strip 3   • ULTICARE INSULIN SYRINGE 29G X 1/2\" 0.5 ML Misc USE AS DIRECTED BY PHYSICIAN 90 Each 4   • aspirin (ASA) 81 MG Chew Tab chewable tablet CHEW 1 TABLET ORALLY ONCE DAILY 100 Tab 3   • insulin regular (NOVOLIN R) 100 Unit/mL Solution AS PER SLIDING SCALE (Patient not taking: Reported on 2/2/2022) 10 mL 0     No current Epic-ordered facility-administered medications on file.       Past Medical History:   Diagnosis Date   • CKD (chronic kidney disease) stage 3, GFR 30-59 ml/min ([facility])    • COPD    • Diabetes    • GERD (gastroesophageal reflux disease)    • Hepatitis C    • Hyperlipidemia    • Hypertension    • Psychiatric disorder     " "bipolar   • Schizoaffective disorder (HCC) 10/01/16       Social History     Tobacco Use   • Smoking status: Former Smoker     Packs/day: 0.50     Types: Cigarettes     Quit date: 3/6/2013     Years since quittin.9   • Smokeless tobacco: Never Used   Vaping Use   • Vaping Use: Never used   Substance Use Topics   • Alcohol use: Yes     Alcohol/week: 0.0 oz     Comment: sober since HCV diagnosis   • Drug use: Yes     Types: Marijuana       Allergies:  Patient has no known allergies.      Objective:       Exam:  /74 (BP Location: Left arm, Patient Position: Sitting, BP Cuff Size: Adult)   Pulse 84   Temp 36.7 °C (98 °F) (Temporal)   Resp 14   Ht 1.651 m (5' 5\")   Wt 63.8 kg (140 lb 11.2 oz)   SpO2 97%   BMI 23.41 kg/m²   Body mass index is 23.41 kg/m².  Wt Readings from Last 4 Encounters:   22 63.8 kg (140 lb 11.2 oz)   11/10/21 61.8 kg (136 lb 4.8 oz)   21 60.8 kg (134 lb)   21 62.6 kg (138 lb)       Gen: Alert and oriented, No apparent distress. Appropriately groomed.  Neck: Neck is supple without lymphadenopathy.No thyromegaly.   Lungs: Normal effort, CTA bilaterally, no wheezes, rhonchi, or rales  CV: Regular rate and rhythm. No Lower extremity edema  Skin: No rash noted.  Right knee: tenderness over medial meniscus. Full range of extension and flexion.  Left ankle/foot: no edema. Tender over MTP joints and medial and lateral malleoli.       Assessment & Plan:     66 y.o. male with the following -     1. Controlled type 2 diabetes mellitus without complication, with long-term current use of insulin (HCC)  Chronic. Stable. C/w metformin  - POCT Hemoglobin A1C    2. Chronic pain of right knee  Chronic. Not improving. Check xrays  - DX-KNEE 2- RIGHT; Future    3. Chronic pain of left ankle  Chronic. Not improving. Check xrays  - DX-FOOT-2- LEFT; Future  - DX-ANKLE 3+ VIEWS LEFT; Future    4. Schizoaffective disorder, unspecified type (HCC)  Chronic.  Was on meds and f/u with psych in " the past.  He discontinued both of these. States that he's doing well.  Encouraged to update me if he would like a new referral to psych .    5. Simple chronic bronchitis (HCC)  Chronic. Stable. Off any inhalers    6. Other insomnia  -chronic issue. Has been taking trazodone 100mg nightly.  States that this does helps with insomnia   traZODone (DESYREL) 50 MG Tab; Take 1 Tablet by mouth at bedtime as needed for Sleep (1 tablet nightly and can repeat another tablet if necessary).  Dispense: 100 Tablet; Refill: 2    7. History of breast lump/mass excision  8. Other signs and symptoms in breast   History of left breast mass and biopsy 3 years ago.  continues to have annual mammograms  - MA-DIAGNOSTIC MAMMO BILAT W/TOMOSYNTHESIS W/CAD; Future.      Return in about 4 weeks (around 3/2/2022).    Please note that this dictation was created using voice recognition software. I have made every reasonable attempt to correct obvious errors, but I expect that there are errors of grammar and possibly content that I did not discover before finalizing the note.

## 2022-02-02 NOTE — ASSESSMENT & PLAN NOTE
Chronic . Taking trazodone 50mg for insomnia.  Requesting to increase it to 100mg daily. Was on cpap in the past.

## 2022-02-03 ENCOUNTER — HOSPITAL ENCOUNTER (OUTPATIENT)
Dept: RADIOLOGY | Facility: MEDICAL CENTER | Age: 67
End: 2022-02-03
Attending: FAMILY MEDICINE
Payer: MEDICARE

## 2022-02-03 DIAGNOSIS — G89.29 CHRONIC PAIN OF LEFT ANKLE: ICD-10-CM

## 2022-02-03 DIAGNOSIS — G89.29 CHRONIC PAIN OF RIGHT KNEE: ICD-10-CM

## 2022-02-03 DIAGNOSIS — M25.561 CHRONIC PAIN OF RIGHT KNEE: ICD-10-CM

## 2022-02-03 DIAGNOSIS — M79.672 LEFT FOOT PAIN: ICD-10-CM

## 2022-02-03 DIAGNOSIS — M25.572 CHRONIC PAIN OF LEFT ANKLE: ICD-10-CM

## 2022-02-03 PROCEDURE — 73560 X-RAY EXAM OF KNEE 1 OR 2: CPT | Mod: RT

## 2022-02-03 PROCEDURE — 73620 X-RAY EXAM OF FOOT: CPT | Mod: LT

## 2022-02-03 PROCEDURE — 73610 X-RAY EXAM OF ANKLE: CPT | Mod: LT

## 2022-02-03 NOTE — ASSESSMENT & PLAN NOTE
Right knee pain x 6 mths.. Had stopped running and didn't notice any improvement. Pain worse going upstairs and with walking.

## 2022-02-03 NOTE — ASSESSMENT & PLAN NOTE
Chronic issue x 6 months. . Has been complaining of left ankle and foot pain.  Notices it when he walks but not present at rest. xrays were ordered by previous pcp but he didn't do it. Stopped using the lamisil.

## 2022-02-08 DIAGNOSIS — I10 ESSENTIAL HYPERTENSION: ICD-10-CM

## 2022-02-08 DIAGNOSIS — E03.4 HYPOTHYROIDISM DUE TO ACQUIRED ATROPHY OF THYROID: ICD-10-CM

## 2022-02-09 RX ORDER — METOPROLOL SUCCINATE 25 MG/1
TABLET, EXTENDED RELEASE ORAL
Qty: 90 TABLET | Refills: 3 | Status: SHIPPED | OUTPATIENT
Start: 2022-02-09 | End: 2022-09-07 | Stop reason: SDUPTHER

## 2022-02-09 RX ORDER — LEVOTHYROXINE SODIUM 0.03 MG/1
TABLET ORAL
Qty: 90 TABLET | Refills: 3 | Status: SHIPPED | OUTPATIENT
Start: 2022-02-09 | End: 2022-09-07 | Stop reason: SDUPTHER

## 2022-02-09 NOTE — TELEPHONE ENCOUNTER
Requested Prescriptions     Pending Prescriptions Disp Refills   • levothyroxine (SYNTHROID) 25 MCG Tab [Pharmacy Med Name: LEVOTHYROXIN 25 MCG TAB JEREMIAH] 90 Tablet 3     Sig: TAKE 1 TABLET BY MOUTH EVERY MORNING BEFORE BREAKFAST ON AN EMPTY STOMACH   • metoprolol SR (TOPROL XL) 25 MG TABLET SR 24 HR [Pharmacy Med Name: METOPROL SUC E 25MG TAB NORT] 90 Tablet 3     Sig: TAKE ONE TABLET BY MOUTH ONE TIME DAILY   Dayana Valerio M.D.

## 2022-02-15 DIAGNOSIS — I10 ESSENTIAL HYPERTENSION: ICD-10-CM

## 2022-02-15 RX ORDER — AMLODIPINE BESYLATE 10 MG/1
TABLET ORAL
Qty: 90 TABLET | Refills: 3 | Status: SHIPPED | OUTPATIENT
Start: 2022-02-15 | End: 2022-09-07 | Stop reason: SDUPTHER

## 2022-02-15 NOTE — TELEPHONE ENCOUNTER
Requested Prescriptions     Pending Prescriptions Disp Refills   • amLODIPine (NORVASC) 10 MG Tab [Pharmacy Med Name: AMLODIPINE 10 MG    TAB UNIC] 90 Tablet 3     Sig: TAKE ONE TABLET BY MOUTH ONE TIME DAILY   Dayana Valerio M.D.

## 2022-03-04 ENCOUNTER — HOSPITAL ENCOUNTER (OUTPATIENT)
Dept: RADIOLOGY | Facility: MEDICAL CENTER | Age: 67
End: 2022-03-04
Attending: FAMILY MEDICINE
Payer: MEDICARE

## 2022-03-04 DIAGNOSIS — N64.59 OTHER SIGNS AND SYMPTOMS IN BREAST: ICD-10-CM

## 2022-03-04 DIAGNOSIS — Z98.890 HISTORY OF BREAST LUMP/MASS EXCISION: ICD-10-CM

## 2022-03-04 PROCEDURE — G0279 TOMOSYNTHESIS, MAMMO: HCPCS

## 2022-04-12 ENCOUNTER — OFFICE VISIT (OUTPATIENT)
Dept: MEDICAL GROUP | Facility: PHYSICIAN GROUP | Age: 67
End: 2022-04-12
Payer: MEDICARE

## 2022-04-12 VITALS
OXYGEN SATURATION: 97 % | RESPIRATION RATE: 12 BRPM | HEART RATE: 80 BPM | SYSTOLIC BLOOD PRESSURE: 124 MMHG | WEIGHT: 139.5 LBS | BODY MASS INDEX: 22.42 KG/M2 | TEMPERATURE: 97.8 F | DIASTOLIC BLOOD PRESSURE: 72 MMHG | HEIGHT: 66 IN

## 2022-04-12 DIAGNOSIS — Z79.4 CONTROLLED TYPE 2 DIABETES MELLITUS WITHOUT COMPLICATION, WITH LONG-TERM CURRENT USE OF INSULIN (HCC): ICD-10-CM

## 2022-04-12 DIAGNOSIS — E11.9 CONTROLLED TYPE 2 DIABETES MELLITUS WITHOUT COMPLICATION, WITH LONG-TERM CURRENT USE OF INSULIN (HCC): ICD-10-CM

## 2022-04-12 DIAGNOSIS — E04.1 THYROID NODULE: ICD-10-CM

## 2022-04-12 PROCEDURE — 99214 OFFICE O/P EST MOD 30 MIN: CPT | Performed by: FAMILY MEDICINE

## 2022-04-12 RX ORDER — LATANOPROST 50 UG/ML
SOLUTION/ DROPS OPHTHALMIC
COMMUNITY
Start: 2022-02-18 | End: 2023-02-22

## 2022-04-12 ASSESSMENT — FIBROSIS 4 INDEX: FIB4 SCORE: 1.25

## 2022-04-12 NOTE — PROGRESS NOTES
CC:   Chief Complaint   Patient presents with   • Follow-Up     2 months.          HPI:   Nikolas presents today for a follow-up visit.  He was last seen in our office on 2/2/22.  In the interim he had his [facility] evaluation on March 9 by Dr. Crews.  Also has been seen by Dr Nixon.     Diabetes type 2, controlled (CMS-HCC)    Chronic. Last A1c from February was improved at 5.9. currently taking metformin 850 bid. Last took the insulin 6 months ago. Home sugars in the 80-90s range.     Thyroid nodule  Last thyroid FNA was in march 2018. Pathology was follicular lesion of undetermined significance with 5-15% risk of malignancy and category III. Last us thyroid from February 2021 with 1.4cm stable right thyroid nodule. Denies any hoarseness or dysphagia.       Current Outpatient Medications Ordered in Epic   Medication Sig Dispense Refill   • latanoprost (XALATAN) 0.005 % Solution      • metFORMIN (GLUCOPHAGE) 850 MG Tab Take 850 mg by mouth every day.     • amLODIPine (NORVASC) 10 MG Tab TAKE ONE TABLET BY MOUTH ONE TIME DAILY 90 Tablet 3   • levothyroxine (SYNTHROID) 25 MCG Tab TAKE 1 TABLET BY MOUTH EVERY MORNING BEFORE BREAKFAST ON AN EMPTY STOMACH 90 Tablet 3   • metoprolol SR (TOPROL XL) 25 MG TABLET SR 24 HR TAKE ONE TABLET BY MOUTH ONE TIME DAILY 90 Tablet 3   • traZODone (DESYREL) 50 MG Tab Take 1 Tablet by mouth at bedtime as needed for Sleep (1 tablet nightly and can repeat another tablet if necessary). 100 Tablet 2   • cloNIDine (CATAPRES) 0.1 MG Tab TAKE 1 TABLET BY MOUTH TWICE DAILY 180 Tablet 3   • ketoconazole (NIZORAL) 2 % Cream      • insulin regular (NOVOLIN R) 100 Unit/mL Solution AS PER SLIDING SCALE 10 mL 0   • lisinopril (PRINIVIL) 40 MG tablet TAKE ONE TABLET BY MOUTH ONE TIME DAILY  100 tablet 3   • atorvastatin (LIPITOR) 40 MG Tab TAKE ONE TABLET BY MOUTH ONE TIME DAILY  100 tablet 3   • CONTOUR NEXT TEST strip USE TO CHECK FASTING BLOOD SUGAR TWICE DAILY AND AS NEEDED  200 Strip 3   • ULTICARE  "INSULIN SYRINGE 29G X 1/2\" 0.5 ML Misc USE AS DIRECTED BY PHYSICIAN 90 Each 4   • aspirin (ASA) 81 MG Chew Tab chewable tablet CHEW 1 TABLET ORALLY ONCE DAILY 100 Tab 3     No current Epic-ordered facility-administered medications on file.       Past Medical History:   Diagnosis Date   • CKD (chronic kidney disease) stage 3, GFR 30-59 ml/min ([facility])    • COPD    • Diabetes    • GERD (gastroesophageal reflux disease)    • Hepatitis C    • Hyperlipidemia    • Hypertension    • Psychiatric disorder     bipolar   • Schizoaffective disorder ([facility]) 10/01/16       Social History     Tobacco Use   • Smoking status: Former Smoker     Packs/day: 0.50     Types: Cigarettes     Quit date: 3/6/2013     Years since quittin.1   • Smokeless tobacco: Never Used   Vaping Use   • Vaping Use: Never used   Substance Use Topics   • Alcohol use: Yes     Alcohol/week: 0.0 oz     Comment: sober since HCV diagnosis   • Drug use: Yes     Types: Marijuana       Allergies:  Patient has no known allergies.      Objective:       Exam:  /72 (BP Location: Left arm, Patient Position: Sitting, BP Cuff Size: Adult)   Pulse 80   Temp 36.6 °C (97.8 °F) (Temporal)   Resp 12   Ht 1.676 m (5' 6\")   Wt 63.3 kg (139 lb 8 oz)   SpO2 97%   BMI 22.52 kg/m²   Body mass index is 22.52 kg/m².  Wt Readings from Last 4 Encounters:   22 63.3 kg (139 lb 8 oz)   22 63.7 kg (140 lb 8 oz)   22 63.8 kg (140 lb 11.2 oz)   11/10/21 61.8 kg (136 lb 4.8 oz)       Gen: Alert and oriented, No apparent distress. Appropriately groomed.  Neck: Neck is supple without lymphadenopathy.No thyromegaly.   Lungs: Normal effort, CTA bilaterally, no wheezes, rhonchi, or rales  CV: Regular rate and rhythm. No Lower extremity edema  Skin: No rash noted.      Assessment & Plan:     66 y.o. male with the following -     1. Controlled type 2 diabetes mellitus without complication, with long-term current use of insulin ([facility])  Chronic. Well controlled. Decrease " metformin 850 daily as he has been having some low sugars at home. Patient will update me if sugars are elevated.- HEMOGLOBIN A1C; Future  - Comp Metabolic Panel; Future  - Lipid Profile; Future  - MICROALBUMIN CREAT RATIO URINE; Future    2. Thyroid nodule  Chronic issue. Sonogram from 2021 with stable size.   Pathology from FNA completed in 2018 with follicular lesion of undetermined significance, category III.  D/w patient that we may need to repeat FNA. Start off with repeat sonogram. - US-THYROID; Future    Other orders  - latanoprost (XALATAN) 0.005 % Solution  - metFORMIN (GLUCOPHAGE) 850 MG Tab; Take 850 mg by mouth every day.        I spent a total of 32minutes with record review, exam, communication with the patient, communication with other providers, and documentation of this encounter.      Return in about 2 months (around 6/12/2022).    Please note that this dictation was created using voice recognition software. I have made every reasonable attempt to correct obvious errors, but I expect that there are errors of grammar and possibly content that I did not discover before finalizing the note.

## 2022-04-13 NOTE — ASSESSMENT & PLAN NOTE
Last thyroid FNA was in march 2018. Pathology was follicular lesion of undetermined significance with 5-15% risk of malignancy and category III. Last us thyroid from February 2021 with 1.4cm stable right thyroid nodule. Denies any hoarseness or dysphagia.

## 2022-04-13 NOTE — ASSESSMENT & PLAN NOTE
Chronic. Last A1c from February was improved at 5.9. currently taking metformin 850 bid. Last took the insulin 6 months ago. Home sugars in the 80-90s range.

## 2022-04-16 DIAGNOSIS — E11.9 CONTROLLED TYPE 2 DIABETES MELLITUS WITHOUT COMPLICATION, WITH LONG-TERM CURRENT USE OF INSULIN (HCC): ICD-10-CM

## 2022-04-16 DIAGNOSIS — Z79.4 CONTROLLED TYPE 2 DIABETES MELLITUS WITHOUT COMPLICATION, WITH LONG-TERM CURRENT USE OF INSULIN (HCC): ICD-10-CM

## 2022-04-18 NOTE — TELEPHONE ENCOUNTER
Requested Prescriptions     Pending Prescriptions Disp Refills   • CONTOUR NEXT TEST strip [Pharmacy Med Name: CONTOUR NEXT        MIKEL ASCE] 100 Strip 3     Sig: USE TO CHECK FASTING BLOOD SUGAR TWICE DAILY AND AS NEEDED   Dayana Valerio M.D.

## 2022-04-27 ENCOUNTER — HOSPITAL ENCOUNTER (OUTPATIENT)
Dept: RADIOLOGY | Facility: MEDICAL CENTER | Age: 67
End: 2022-04-27
Attending: FAMILY MEDICINE
Payer: MEDICARE

## 2022-04-27 DIAGNOSIS — E04.1 THYROID NODULE: ICD-10-CM

## 2022-04-27 PROCEDURE — 76536 US EXAM OF HEAD AND NECK: CPT

## 2022-04-28 DIAGNOSIS — E04.1 THYROID NODULE: ICD-10-CM

## 2022-04-29 ENCOUNTER — HOSPITAL ENCOUNTER (OUTPATIENT)
Dept: RADIOLOGY | Facility: MEDICAL CENTER | Age: 67
End: 2022-04-29
Attending: FAMILY MEDICINE
Payer: MEDICARE

## 2022-04-29 DIAGNOSIS — E78.5 DYSLIPIDEMIA: ICD-10-CM

## 2022-04-29 DIAGNOSIS — I10 ESSENTIAL HYPERTENSION: ICD-10-CM

## 2022-04-29 DIAGNOSIS — E04.1 THYROID NODULE: ICD-10-CM

## 2022-04-29 PROCEDURE — 10005 FNA BX W/US GDN 1ST LES: CPT

## 2022-04-29 PROCEDURE — 88112 CYTOPATH CELL ENHANCE TECH: CPT

## 2022-04-29 RX ORDER — LIDOCAINE HYDROCHLORIDE 10 MG/ML
INJECTION, SOLUTION INFILTRATION; PERINEURAL
Status: DISCONTINUED
Start: 2022-04-29 | End: 2022-04-30 | Stop reason: HOSPADM

## 2022-04-29 RX ORDER — LISINOPRIL 40 MG/1
TABLET ORAL
Qty: 100 TABLET | Refills: 3 | Status: SHIPPED | OUTPATIENT
Start: 2022-04-29 | End: 2022-09-07 | Stop reason: SDUPTHER

## 2022-04-29 RX ORDER — ATORVASTATIN CALCIUM 40 MG/1
TABLET, FILM COATED ORAL
Qty: 100 TABLET | Refills: 3 | Status: SHIPPED | OUTPATIENT
Start: 2022-04-29 | End: 2022-08-01 | Stop reason: SDUPTHER

## 2022-04-29 NOTE — TELEPHONE ENCOUNTER
Requested Prescriptions     Pending Prescriptions Disp Refills   • lisinopril (PRINIVIL) 40 MG tablet [Pharmacy Med Name: LISINOPRIL 40 MG    TAB SOLC] 100 Tablet 3     Sig: TAKE ONE TABLET BY MOUTH ONE TIME DAILY   • atorvastatin (LIPITOR) 40 MG Tab [Pharmacy Med Name: ATORVASTATIN 40 MG  TAB NORT] 100 Tablet 3     Sig: TAKE ONE TABLET BY MOUTH ONE TIME DAILY   Dayana Valerio M.D.

## 2022-04-29 NOTE — PROGRESS NOTES
[facility]     Right Thyroid Fine Needle Aspiration done by Dr. Siddiqui, Right anterior aspect of neck access site; x1 jar of cytolyt obtained and sent to pathology.  Patient tolerated the procedure well.    All questions and concerns answered prior to being dc'd; pt provided with appropriate education for procedure, pt discharge to home.

## 2022-05-02 LAB — CYTOLOGY REG CYTOL: NORMAL

## 2022-05-04 DIAGNOSIS — E04.1 THYROID NODULE: ICD-10-CM

## 2022-05-10 ENCOUNTER — HOSPITAL ENCOUNTER (OUTPATIENT)
Dept: RADIOLOGY | Facility: MEDICAL CENTER | Age: 67
End: 2022-05-10
Attending: FAMILY MEDICINE
Payer: MEDICARE

## 2022-05-10 DIAGNOSIS — E04.1 THYROID NODULE: ICD-10-CM

## 2022-05-10 LAB — CYTOLOGY REG CYTOL: NORMAL

## 2022-05-10 PROCEDURE — 88112 CYTOPATH CELL ENHANCE TECH: CPT

## 2022-05-10 PROCEDURE — 10005 FNA BX W/US GDN 1ST LES: CPT

## 2022-05-10 RX ORDER — LIDOCAINE HYDROCHLORIDE 10 MG/ML
INJECTION, SOLUTION INFILTRATION; PERINEURAL
Status: DISPENSED
Start: 2022-05-10 | End: 2022-05-10

## 2022-05-10 NOTE — PROGRESS NOTES
Pt presents to [facility] for R thyroid FNA. Pt consented at BS by Dr. Gil. Pt prepped and draped in sterile fashion.     0929 Time out procedure start     0931 1 jar cytolyt (4FNA) 1 afirma (2FNA) obtained by Dr. Gil and sent to lab.     0942 dressing applied pt ambulated out of department.

## 2022-06-03 ENCOUNTER — HOSPITAL ENCOUNTER (OUTPATIENT)
Dept: LAB | Facility: MEDICAL CENTER | Age: 67
End: 2022-06-03
Attending: FAMILY MEDICINE
Payer: MEDICARE

## 2022-06-03 DIAGNOSIS — Z79.4 CONTROLLED TYPE 2 DIABETES MELLITUS WITHOUT COMPLICATION, WITH LONG-TERM CURRENT USE OF INSULIN (HCC): ICD-10-CM

## 2022-06-03 DIAGNOSIS — E11.9 CONTROLLED TYPE 2 DIABETES MELLITUS WITHOUT COMPLICATION, WITH LONG-TERM CURRENT USE OF INSULIN (HCC): ICD-10-CM

## 2022-06-03 LAB
ALBUMIN SERPL BCP-MCNC: 4.5 G/DL (ref 3.2–4.9)
ALBUMIN/GLOB SERPL: 1.8 G/DL
ALP SERPL-CCNC: 59 U/L (ref 30–99)
ALT SERPL-CCNC: 22 U/L (ref 2–50)
ANION GAP SERPL CALC-SCNC: 13 MMOL/L (ref 7–16)
AST SERPL-CCNC: 27 U/L (ref 12–45)
BILIRUB SERPL-MCNC: 0.6 MG/DL (ref 0.1–1.5)
BUN SERPL-MCNC: 16 MG/DL (ref 8–22)
CALCIUM SERPL-MCNC: 9.3 MG/DL (ref 8.5–10.5)
CHLORIDE SERPL-SCNC: 107 MMOL/L (ref 96–112)
CHOLEST SERPL-MCNC: 129 MG/DL (ref 100–199)
CO2 SERPL-SCNC: 22 MMOL/L (ref 20–33)
CREAT SERPL-MCNC: 0.81 MG/DL (ref 0.5–1.4)
CREAT UR-MCNC: 23.47 MG/DL
EST. AVERAGE GLUCOSE BLD GHB EST-MCNC: 123 MG/DL
GFR SERPLBLD CREATININE-BSD FMLA CKD-EPI: 96 ML/MIN/1.73 M 2
GLOBULIN SER CALC-MCNC: 2.5 G/DL (ref 1.9–3.5)
GLUCOSE SERPL-MCNC: 103 MG/DL (ref 65–99)
HBA1C MFR BLD: 5.9 % (ref 4–5.6)
HDLC SERPL-MCNC: 52 MG/DL
LDLC SERPL CALC-MCNC: 64 MG/DL
MICROALBUMIN UR-MCNC: <1.2 MG/DL
MICROALBUMIN/CREAT UR: NORMAL MG/G (ref 0–30)
POTASSIUM SERPL-SCNC: 3.5 MMOL/L (ref 3.6–5.5)
PROT SERPL-MCNC: 7 G/DL (ref 6–8.2)
SODIUM SERPL-SCNC: 142 MMOL/L (ref 135–145)
TRIGL SERPL-MCNC: 64 MG/DL (ref 0–149)

## 2022-06-03 PROCEDURE — 36415 COLL VENOUS BLD VENIPUNCTURE: CPT

## 2022-06-03 PROCEDURE — 83036 HEMOGLOBIN GLYCOSYLATED A1C: CPT

## 2022-06-03 PROCEDURE — 82570 ASSAY OF URINE CREATININE: CPT

## 2022-06-03 PROCEDURE — 82043 UR ALBUMIN QUANTITATIVE: CPT

## 2022-06-03 PROCEDURE — 80053 COMPREHEN METABOLIC PANEL: CPT

## 2022-06-03 PROCEDURE — 80061 LIPID PANEL: CPT

## 2022-06-16 ENCOUNTER — TELEPHONE (OUTPATIENT)
Dept: MEDICAL GROUP | Facility: PHYSICIAN GROUP | Age: 67
End: 2022-06-16
Payer: MEDICARE

## 2022-06-16 NOTE — TELEPHONE ENCOUNTER
ESTABLISHED PATIENT PRE-VISIT PLANNING     Patient was NOT contacted to complete PVP.     Note: Patient will not be contacted if there is no indication to call.     1.  Reviewed notes from the last few office visits within the medical group: Yes    2.  If any orders were placed at last visit or intended to be done for this visit (i.e. 6 mos follow-up), do we have Results/Consult Notes?         •  Labs - Labs ordered, completed on 6/3/2022 and results are in chart.  Note: If patient appointment is for lab review and patient did not complete labs, check with provider if OK to reschedule patient until labs completed.       •  Imaging - Imaging was not ordered at last office visit.       •  Referrals - No referrals were ordered at last office visit.    3. Is this appointment scheduled as a Hospital Follow-Up? No    4.  Immunizations were updated in Epic using Reconcile Outside Information activity? Yes    5.  Patient is due for the following Health Maintenance Topics:   There are no preventive care reminders to display for this patient.      6.  AHA (Pulse8) form printed for Provider? No, already completed

## 2022-06-20 ENCOUNTER — OFFICE VISIT (OUTPATIENT)
Dept: MEDICAL GROUP | Facility: PHYSICIAN GROUP | Age: 67
End: 2022-06-20
Payer: MEDICARE

## 2022-06-20 VITALS
DIASTOLIC BLOOD PRESSURE: 74 MMHG | HEIGHT: 66 IN | HEART RATE: 91 BPM | OXYGEN SATURATION: 97 % | WEIGHT: 137.5 LBS | RESPIRATION RATE: 12 BRPM | BODY MASS INDEX: 22.1 KG/M2 | TEMPERATURE: 97.5 F | SYSTOLIC BLOOD PRESSURE: 122 MMHG

## 2022-06-20 DIAGNOSIS — E78.5 HYPERLIPIDEMIA ASSOCIATED WITH TYPE 2 DIABETES MELLITUS (HCC): ICD-10-CM

## 2022-06-20 DIAGNOSIS — Z79.4 CONTROLLED TYPE 2 DIABETES MELLITUS WITHOUT COMPLICATION, WITH LONG-TERM CURRENT USE OF INSULIN (HCC): ICD-10-CM

## 2022-06-20 DIAGNOSIS — E11.69 HYPERLIPIDEMIA ASSOCIATED WITH TYPE 2 DIABETES MELLITUS (HCC): ICD-10-CM

## 2022-06-20 DIAGNOSIS — G47.09 OTHER INSOMNIA: ICD-10-CM

## 2022-06-20 DIAGNOSIS — E11.9 CONTROLLED TYPE 2 DIABETES MELLITUS WITHOUT COMPLICATION, WITH LONG-TERM CURRENT USE OF INSULIN (HCC): ICD-10-CM

## 2022-06-20 DIAGNOSIS — E11.59 HYPERTENSION ASSOCIATED WITH DIABETES (HCC): ICD-10-CM

## 2022-06-20 DIAGNOSIS — I15.2 HYPERTENSION ASSOCIATED WITH DIABETES (HCC): ICD-10-CM

## 2022-06-20 PROCEDURE — 99214 OFFICE O/P EST MOD 30 MIN: CPT | Performed by: FAMILY MEDICINE

## 2022-06-20 RX ORDER — TRAZODONE HYDROCHLORIDE 100 MG/1
100 TABLET ORAL NIGHTLY
Qty: 100 TABLET | Refills: 3 | Status: SHIPPED | OUTPATIENT
Start: 2022-06-20 | End: 2022-09-06 | Stop reason: SDUPTHER

## 2022-06-20 ASSESSMENT — FIBROSIS 4 INDEX: FIB4 SCORE: 1.25

## 2022-06-20 NOTE — ASSESSMENT & PLAN NOTE
Wakes up at 2:30, goes to the bathroom and then then falls asleep. Has been taking two trazodone 50mg for the last month. Would like a refill for this.

## 2022-06-20 NOTE — ASSESSMENT & PLAN NOTE
Chronic. Currently taking metformin 850 bid.  States that his metformin 500 mg was not received by the pharmacy.  Continues to take aspirin and Lipitor 40 mg daily.  Recent A1c 5.9%.  Checks fingersticks 2-3 times a day.   Fasting today was 134. Normal range has been in the 110-120s.

## 2022-06-20 NOTE — PROGRESS NOTES
CC:   Chief Complaint   Patient presents with   • Diabetes Follow-up     2 months.    • Lab Results   • Medication Follow-up     Trazodone and metformin dosage.          HPI:   Nikolas presents today for a follow-up visit and to discuss recent lab results..  Last seen by me on April 12.    Since our last appointment, has been seen by:  Underwent fine-needle aspiration of right thyroid 1.4 cm nodule on May 10 with benign pathology.     Diabetes type 2, controlled (CMS-HCC)  Chronic. Currently taking metformin 850 bid.  States that his metformin 500 mg was not received by the pharmacy.  Continues to take aspirin and Lipitor 40 mg daily.  Recent A1c 5.9%.  Checks fingersticks 2-3 times a day.   Fasting today was 134. Normal range has been in the 110-120s.     Hyperlipidemia associated with type 2 diabetes mellitus (HCC)  Chronic medical condition.  Currently taking lipitor 40mg daily.    Tolerating the medication well without any side effects.  Patient denies chest pain or lower extremity muscle cramps.  Last set of labs from June 2022 were stable.     Other insomnia  Wakes up at 2:30, goes to the bathroom and then then falls asleep. Has been taking two trazodone 50mg for the last month. Would like a refill for this.     Hypertension associated with diabetes (HCC)  Chronic medical diagnosis.    BP today in office is122/74.  Currently taking clonidine 0.1mg bid, amlodipine 10, lisinopril 40, metoprolol 25mg.   Denies any headaches or chest pain.   Checks bp at home occasionally, in range of 130s/80s.       Current Outpatient Medications Ordered in Epic   Medication Sig Dispense Refill   • glucose blood (CONTOUR NEXT TEST) strip Check fingersticks three times a day. E11.9 200 Strip 3   • metFORMIN (GLUCOPHAGE) 500 MG Tab Take 1 Tablet by mouth 2 times a day with meals. 200 Tablet 3   • traZODone (DESYREL) 100 MG Tab Take 1 Tablet by mouth every evening. 100 Tablet 3   • lisinopril (PRINIVIL) 40 MG tablet TAKE ONE TABLET  "BY MOUTH ONE TIME DAILY 100 Tablet 3   • atorvastatin (LIPITOR) 40 MG Tab TAKE ONE TABLET BY MOUTH ONE TIME DAILY 100 Tablet 3   • latanoprost (XALATAN) 0.005 % Solution      • amLODIPine (NORVASC) 10 MG Tab TAKE ONE TABLET BY MOUTH ONE TIME DAILY 90 Tablet 3   • levothyroxine (SYNTHROID) 25 MCG Tab TAKE 1 TABLET BY MOUTH EVERY MORNING BEFORE BREAKFAST ON AN EMPTY STOMACH 90 Tablet 3   • metoprolol SR (TOPROL XL) 25 MG TABLET SR 24 HR TAKE ONE TABLET BY MOUTH ONE TIME DAILY 90 Tablet 3   • cloNIDine (CATAPRES) 0.1 MG Tab TAKE 1 TABLET BY MOUTH TWICE DAILY 180 Tablet 3   • ketoconazole (NIZORAL) 2 % Cream      • insulin regular (NOVOLIN R) 100 Unit/mL Solution AS PER SLIDING SCALE 10 mL 0   • ULTICARE INSULIN SYRINGE 29G X 1/2\" 0.5 ML Misc USE AS DIRECTED BY PHYSICIAN 90 Each 4   • aspirin (ASA) 81 MG Chew Tab chewable tablet CHEW 1 TABLET ORALLY ONCE DAILY 100 Tab 3     No current Epic-ordered facility-administered medications on file.       Past Medical History:   Diagnosis Date   • CKD (chronic kidney disease) stage 3, GFR 30-59 ml/min ([facility])    • COPD    • Diabetes    • GERD (gastroesophageal reflux disease)    • Hepatitis C    • Hyperlipidemia    • Hypertension    • Psychiatric disorder     bipolar   • Schizoaffective disorder ([facility]) 10/01/16       Social History     Tobacco Use   • Smoking status: Former Smoker     Packs/day: 0.50     Types: Cigarettes     Quit date: 3/6/2013     Years since quittin.2   • Smokeless tobacco: Never Used   Vaping Use   • Vaping Use: Never used   Substance Use Topics   • Alcohol use: Yes     Alcohol/week: 0.0 oz     Comment: sober since HCV diagnosis   • Drug use: Yes     Types: Marijuana       Allergies:  Patient has no known allergies.      Objective:       Exam:  /74   Pulse 91   Temp 36.4 °C (97.5 °F) (Temporal)   Resp 12   Ht 1.676 m (5' 6\")   Wt 62.4 kg (137 lb 8 oz)   SpO2 97%   BMI 22.19 kg/m²   Body mass index is 22.19 kg/m².  Wt Readings from Last 4 " Encounters:   06/20/22 62.4 kg (137 lb 8 oz)   04/12/22 63.3 kg (139 lb 8 oz)   03/09/22 63.7 kg (140 lb 8 oz)   02/02/22 63.8 kg (140 lb 11.2 oz)       Gen: Alert and oriented, No apparent distress. Appropriately groomed.  Neck: Neck is supple without lymphadenopathy.No thyromegaly.   Lungs: Normal effort, CTA bilaterally, no wheezes, rhonchi, or rales  CV: Regular rate and rhythm. No Lower extremity edema  Skin: No rash noted.      Assessment & Plan:     66 y.o. male with the following -     1. Controlled type 2 diabetes mellitus without complication, with long-term current use of insulin ([facility])  Chronic medical diagnosis.  Stable.  Recent labs with A1c of 5.9%.  Will decrease metformin from 850 twice a day to 500 mg twice a day.  New prescription sent to his pharmacy today.  - glucose blood (CONTOUR NEXT TEST) strip; Check fingersticks three times a day. E11.9  Dispense: 200 Strip; Refill: 3  - metFORMIN (GLUCOPHAGE) 500 MG Tab; Take 1 Tablet by mouth 2 times a day with meals.  Dispense: 200 Tablet; Refill: 3    2. Other insomnia  Chronic medical diagnosis.  Stable with trazodone 100 mg daily.  Currently taking 2 tablets of the 50 mg nightly.  Requesting a new prescription.  - traZODone (DESYREL) 100 MG Tab; Take 1 Tablet by mouth every evening.  Dispense: 100 Tablet; Refill: 3    3. Hyperlipidemia associated with type 2 diabetes mellitus (HCC)  Chronic medical diagnosis.  Stable.  Continue with atorvastatin 40 mg daily.    4. Hypertension associated with diabetes (HCC)  Chronic medical diagnosis.  Stable.  Continue with lisinopril 40 mg, clonidine 0.1 mg twice a day,.  Amlodipine 10 mg daily, and metoprolol 25 mg daily.      I spent a total of 36 minutes with record review, exam, communication with the patient, communication with other providers, and documentation of this encounter.      Return in about 3 months (around 9/20/2022), or 3.5 months, for Diabetes.    Please note that this dictation was created using  voice recognition software. I have made every reasonable attempt to correct obvious errors, but I expect that there are errors of grammar and possibly content that I did not discover before finalizing the note.

## 2022-06-20 NOTE — ASSESSMENT & PLAN NOTE
Chronic medical diagnosis.    BP today in office is122/74.  Currently taking clonidine 0.1mg bid, amlodipine 10, lisinopril 40, metoprolol 25mg.   Denies any headaches or chest pain.   Checks bp at home occasionally, in range of 130s/80s.

## 2022-06-20 NOTE — ASSESSMENT & PLAN NOTE
Chronic medical condition.  Currently taking lipitor 40mg daily.    Tolerating the medication well without any side effects.  Patient denies chest pain or lower extremity muscle cramps.  Last set of labs from June 2022 were stable.

## 2022-07-01 RX ORDER — BLOOD-GLUCOSE METER
EACH MISCELLANEOUS
Qty: 1 EACH | Refills: 0 | Status: SHIPPED | OUTPATIENT
Start: 2022-07-01

## 2022-07-01 RX ORDER — BLOOD-GLUCOSE METER
EACH MISCELLANEOUS
COMMUNITY
End: 2022-07-01 | Stop reason: SDUPTHER

## 2022-07-01 NOTE — TELEPHONE ENCOUNTER
Received request via: Patient lost his while moving.     Was the patient seen in the last year in this department? Yes    Does the patient have an active prescription (recently filled or refills available) for medication(s) requested? No

## 2022-08-01 ENCOUNTER — PATIENT MESSAGE (OUTPATIENT)
Dept: MEDICAL GROUP | Facility: PHYSICIAN GROUP | Age: 67
End: 2022-08-01
Payer: MEDICARE

## 2022-08-01 DIAGNOSIS — E78.5 DYSLIPIDEMIA: ICD-10-CM

## 2022-08-02 RX ORDER — ATORVASTATIN CALCIUM 40 MG/1
40 TABLET, FILM COATED ORAL DAILY
Qty: 100 TABLET | Refills: 0 | Status: SHIPPED | OUTPATIENT
Start: 2022-08-02 | End: 2022-09-07 | Stop reason: SDUPTHER

## 2022-09-05 ENCOUNTER — TELEPHONE (OUTPATIENT)
Dept: HEALTH INFORMATION MANAGEMENT | Facility: OTHER | Age: 67
End: 2022-09-05
Payer: MEDICARE

## 2022-09-06 DIAGNOSIS — G47.09 OTHER INSOMNIA: ICD-10-CM

## 2022-09-06 RX ORDER — TRAZODONE HYDROCHLORIDE 100 MG/1
100 TABLET ORAL NIGHTLY
Qty: 100 TABLET | Refills: 3 | Status: SHIPPED | OUTPATIENT
Start: 2022-09-06 | End: 2022-09-07 | Stop reason: SDUPTHER

## 2022-09-07 ENCOUNTER — PATIENT MESSAGE (OUTPATIENT)
Dept: MEDICAL GROUP | Facility: PHYSICIAN GROUP | Age: 67
End: 2022-09-07
Payer: MEDICARE

## 2022-09-07 DIAGNOSIS — I10 ESSENTIAL HYPERTENSION: ICD-10-CM

## 2022-09-07 DIAGNOSIS — E78.5 DYSLIPIDEMIA: ICD-10-CM

## 2022-09-07 DIAGNOSIS — E11.9 CONTROLLED TYPE 2 DIABETES MELLITUS WITHOUT COMPLICATION, WITH LONG-TERM CURRENT USE OF INSULIN (HCC): ICD-10-CM

## 2022-09-07 DIAGNOSIS — Z79.4 CONTROLLED TYPE 2 DIABETES MELLITUS WITHOUT COMPLICATION, WITH LONG-TERM CURRENT USE OF INSULIN (HCC): ICD-10-CM

## 2022-09-07 DIAGNOSIS — G47.09 OTHER INSOMNIA: ICD-10-CM

## 2022-09-07 DIAGNOSIS — E03.4 HYPOTHYROIDISM DUE TO ACQUIRED ATROPHY OF THYROID: ICD-10-CM

## 2022-09-07 DIAGNOSIS — Z76.0 PRESCRIPTION REFILL: ICD-10-CM

## 2022-09-07 PROCEDURE — RXMED WILLOW AMBULATORY MEDICATION CHARGE: Performed by: FAMILY MEDICINE

## 2022-09-07 RX ORDER — LISINOPRIL 40 MG/1
40 TABLET ORAL DAILY
Qty: 100 TABLET | Refills: 3 | Status: SHIPPED | OUTPATIENT
Start: 2022-09-07 | End: 2023-09-25 | Stop reason: SDUPTHER

## 2022-09-07 RX ORDER — IBUPROFEN 200 MG
TABLET ORAL
Qty: 100 EACH | Refills: 3 | Status: SHIPPED | OUTPATIENT
Start: 2022-09-07

## 2022-09-07 RX ORDER — METOPROLOL SUCCINATE 25 MG/1
25 TABLET, EXTENDED RELEASE ORAL DAILY
Qty: 100 TABLET | Refills: 3 | Status: SHIPPED | OUTPATIENT
Start: 2022-09-07 | End: 2023-09-25 | Stop reason: SDUPTHER

## 2022-09-07 RX ORDER — TRAZODONE HYDROCHLORIDE 100 MG/1
100 TABLET ORAL NIGHTLY
Qty: 100 TABLET | Refills: 3 | Status: SHIPPED | OUTPATIENT
Start: 2022-09-07 | End: 2023-09-25 | Stop reason: SDUPTHER

## 2022-09-07 RX ORDER — AMLODIPINE BESYLATE 10 MG/1
10 TABLET ORAL DAILY
Qty: 100 TABLET | Refills: 3 | Status: SHIPPED | OUTPATIENT
Start: 2022-09-07 | End: 2023-09-25 | Stop reason: SDUPTHER

## 2022-09-07 RX ORDER — CLONIDINE HYDROCHLORIDE 0.1 MG/1
0.1 TABLET ORAL 2 TIMES DAILY
Qty: 200 TABLET | Refills: 3 | Status: SHIPPED | OUTPATIENT
Start: 2022-09-07 | End: 2023-09-25 | Stop reason: SDUPTHER

## 2022-09-07 RX ORDER — LEVOTHYROXINE SODIUM 0.03 MG/1
25 TABLET ORAL
Qty: 100 TABLET | Refills: 3 | Status: SHIPPED | OUTPATIENT
Start: 2022-09-07 | End: 2023-09-25 | Stop reason: SDUPTHER

## 2022-09-07 RX ORDER — ATORVASTATIN CALCIUM 40 MG/1
40 TABLET, FILM COATED ORAL DAILY
Qty: 100 TABLET | Refills: 3 | Status: SHIPPED | OUTPATIENT
Start: 2022-09-07 | End: 2023-09-25 | Stop reason: SDUPTHER

## 2022-09-07 NOTE — TELEPHONE ENCOUNTER
Requested Prescriptions     Pending Prescriptions Disp Refills   • traZODone (DESYREL) 100 MG Tab 100 Tablet 3     Sig: Take 1 Tablet by mouth every evening.   Dayana Valerio M.D.

## 2022-09-07 NOTE — TELEPHONE ENCOUNTER
"Requested Prescriptions     Pending Prescriptions Disp Refills   • amLODIPine (NORVASC) 10 MG Tab 100 Tablet 3     Sig: Take 1 Tablet by mouth every day.   • atorvastatin (LIPITOR) 40 MG Tab 100 Tablet 3     Sig: Take 1 Tablet by mouth every day.   • cloNIDine (CATAPRES) 0.1 MG Tab 200 Tablet 3     Sig: Take 1 Tablet by mouth 2 times a day.   • insulin regular (NOVOLIN R) 100 Unit/mL Solution 10 mL 3     Sig: AS PER SLIDING SCALE   • levothyroxine (SYNTHROID) 25 MCG Tab 100 Tablet 3     Sig: TAKE 1 TABLET BY MOUTH EVERY MORNING BEFORE BREAKFAST ON AN EMPTY STOMACH   • lisinopril (PRINIVIL) 40 MG tablet 100 Tablet 3     Sig: Take 1 Tablet by mouth every day.   • metFORMIN (GLUCOPHAGE) 500 MG Tab 200 Tablet 3     Sig: Take 1 Tablet by mouth 2 times a day with meals.   • metoprolol SR (TOPROL XL) 25 MG TABLET SR 24  Tablet 3     Sig: Take 1 Tablet by mouth every day.   • traZODone (DESYREL) 100 MG Tab 100 Tablet 3     Sig: Take 1 Tablet by mouth every evening.   • INSULIN SYRINGE .5CC/29G (ULTICARE INSULIN SYRINGE) 29G X 1/2\" 0.5 ML Misc 100 Each 3     Sig: USE AS DIRECTED BY PHYSICIAN   • glucose blood (CONTOUR NEXT TEST) strip 300 Strip 3     Sig: Check fingersticks three times a day. E11.9   Dayana Valerio M.D.  "

## 2022-09-09 PROCEDURE — RXMED WILLOW AMBULATORY MEDICATION CHARGE: Performed by: FAMILY MEDICINE

## 2022-09-13 ENCOUNTER — PHARMACY VISIT (OUTPATIENT)
Dept: PHARMACY | Facility: MEDICAL CENTER | Age: 67
End: 2022-09-13
Payer: COMMERCIAL

## 2022-09-13 NOTE — TELEPHONE ENCOUNTER
Dilution (U/0.1 Cc): 4 Was the patient seen in the last year in this department? Yes     Does patient have an active prescription for medications requested? No     Received Request Via: Pharmacy   Periorbital Skin Units: 0 Show Additional Area 1: Yes Consent: Verbal consent obtained. Risks include but not limited to lid/brow ptosis, bruising, swelling, diplopia, temporary effect, incomplete chemical denervation. Show Right And Left Brow Units: No Detail Level: Detailed Post-Care Instructions: Patient instructed to not lie down for 4 hours and limit physical activity for 24 hours. Forehead Units: 5 Show Inventory Tab: Show Glabellar Complex Units: 26 Additional Area 1 Location: upper lip Additional Area 2 Location: chin

## 2022-09-15 ENCOUNTER — PHARMACY VISIT (OUTPATIENT)
Dept: PHARMACY | Facility: MEDICAL CENTER | Age: 67
End: 2022-09-15
Payer: COMMERCIAL

## 2022-09-15 NOTE — PROGRESS NOTES
CC:    Chief Complaint   Patient presents with   • Establish Care   • Nail Problem     Both foot but worse on left. History of Fungal problems.        HISTORY OF THE PRESENT ILLNESS: Patient is a 65 y.o. male. This pleasant patient is here today to establish care.. His prior PCP was Rebecca Farfan.       Hypertension associated with diabetes ([facility])  Chronic issue. bp today is 116/68.  Continues to take metoprolol XL 25 mg, lisinopril 40 mg, clonidine 0.1 mg twice a day, and amlodipine 10 mg daily.    Hyperlipidemia associated with type 2 diabetes mellitus (HCC)  Chronic issue.continues to take lipitor 40mg daily.      Ref. Range 6/8/2021 06:10 6/8/2021 06:11   Cholesterol,Tot Latest Ref Range: 100 - 199 mg/dL  126   Triglycerides Latest Ref Range: 0 - 149 mg/dL  60   HDL Latest Ref Range: >=40 mg/dL  46   LDL Latest Ref Range: <100 mg/dL  68     Diabetes type 2, controlled ([facility])  Chronic issue. A1c was rechecked today and improved to 5.6%.    Continues to take metformin 850mg bid and novolin SSI.  Typically uses SSI 2x week when sugars are 180-200s.   F/u with podiatry, Dr Vicente. Started on lamisil 1.5months ago.   Has fungal infection to left 1-3 rd toes. Improving.     COPD (chronic obstructive pulmonary disease) ([facility])  Chronic. Stable.  Doesn't use any inhalers.   Quit smoking 3/16/2013. Quit with aid of chantix.   1/2 ppd.  98% RA  Continues to run daily --6miles a day.       Allergies: Patient has no known allergies.    Current Outpatient Medications Ordered in Epic   Medication Sig Dispense Refill   • terbinafine (LAMISIL) 250 MG Tab      • ketoconazole (NIZORAL) 2 % Cream      • metFORMIN (GLUCOPHAGE) 850 MG Tab Take 1 Tablet by mouth 2 times a day with meals. 200 Tablet 2   • traZODone (DESYREL) 50 MG Tab Take 1 Tablet by mouth at bedtime as needed for Sleep. 30 Tablet 6   • insulin regular (NOVOLIN R) 100 Unit/mL Solution AS PER SLIDING SCALE 10 mL 0   • lisinopril (PRINIVIL) 40 MG tablet TAKE  CC:  Stephania M Devinrafat is here today for Follow-up (2 week)       Medications: medications verified, no change    Refills needed today?  NO    Latex allergy or sensitivity: No known latex allergy     Patient would like communication of their results via:    Chatty    Cell Phone:   Telephone Information:   Mobile [phone]     Okay to leave a message containing results? Yes    Patient's current [facility] status: Active.    Advanced directives: discussed and need to bring in a copy    Care Teams Verified: No Changes    Depression Screen: no    Tobacco history: verified    Health Maintenance Due   Topic Date Due   • Hepatitis B Vaccine (1 of 3 - 3-dose series) Never done   • Shingles Vaccine (1 of 2) Never done   • DM/CKD Microalbumin  12/16/2021   • COVID-19 Vaccine (5 - Booster for Pfizer series) 08/12/2022   • Influenza Vaccine (1) 09/01/2022       Patient is due for topics as listed above but is not proceeding with Immunization(s) COVID-19, Hep B, Influenza and Shingles and DM/CKD Microalbumin at this time. Pt will discuss with provider.    "ONE TABLET BY MOUTH ONE TIME DAILY  100 tablet 3   • atorvastatin (LIPITOR) 40 MG Tab TAKE ONE TABLET BY MOUTH ONE TIME DAILY  100 tablet 3   • levothyroxine (SYNTHROID) 25 MCG Tab TAKE 1 TABLET BY MOUTH EVERY MORNING BEFORE BREAKFAST ON AN EMPTY STOMACH  90 tablet 3   • amLODIPine (NORVASC) 10 MG Tab TAKE ONE TABLET BY MOUTH ONE TIME DAILY  90 tablet 3   • metoprolol SR (TOPROL XL) 25 MG TABLET SR 24 HR TAKE ONE TABLET BY MOUTH ONE TIME DAILY  90 tablet 3   • cloNIDine (CATAPRES) 0.1 MG Tab TAKE 1 TABLET BY MOUTH TWICE DAILY  180 tablet 3   • CONTOUR NEXT TEST strip USE TO CHECK FASTING BLOOD SUGAR TWICE DAILY AND AS NEEDED  200 Strip 3   • ULTICARE INSULIN SYRINGE 29G X 1/2\" 0.5 ML Misc USE AS DIRECTED BY PHYSICIAN 90 Each 4   • aspirin (ASA) 81 MG Chew Tab chewable tablet CHEW 1 TABLET ORALLY ONCE DAILY 100 Tab 3     No current Epic-ordered facility-administered medications on file.       Past Medical History:   Diagnosis Date   • CKD (chronic kidney disease) stage 3, GFR 30-59 ml/min ([facility])    • COPD    • Diabetes    • GERD (gastroesophageal reflux disease)    • Hepatitis C    • Hyperlipidemia    • Hypertension    • Psychiatric disorder     bipolar   • Schizoaffective disorder (HCC) 10/01/16       Past Surgical History:   Procedure Laterality Date   • OTHER ABDOMINAL SURGERY     • OTHER ORTHOPEDIC SURGERY         Social History     Tobacco Use   • Smoking status: Former Smoker     Packs/day: 0.50     Types: Cigarettes     Quit date: 3/6/2013     Years since quittin.6   • Smokeless tobacco: Never Used   Vaping Use   • Vaping Use: Never used   Substance Use Topics   • Alcohol use: Yes     Alcohol/week: 0.0 oz     Comment: sober since HCV diagnosis   • Drug use: Yes     Types: Marijuana       Social History     Social History Narrative   • Not on file       Family History   Problem Relation Age of Onset   • Cancer Father         blader ca   • Clotting Disorder Neg Hx        Health Maintenance: Tdap today    " "  Objective:     Exam:   /68 (BP Location: Left arm, Patient Position: Sitting, BP Cuff Size: Adult)   Pulse 76   Temp 36.9 °C (98.5 °F) (Temporal)   Resp 12   Ht 1.651 m (5' 5\")   Wt 61.8 kg (136 lb 4.8 oz)   SpO2 98%   BMI 22.68 kg/m²   Body mass index is 22.68 kg/m².  Wt Readings from Last 4 Encounters:   11/10/21 61.8 kg (136 lb 4.8 oz)   09/24/21 60.8 kg (134 lb)   07/23/21 62.6 kg (138 lb)   06/24/21 61.1 kg (134 lb 11.2 oz)     General: Normal appearing. No distress. Appropriately groomed.  HEENT: Normocephalic. Eyes conjunctiva clear lids without ptosis,   Neck: Supple, Thyroid is not enlarged.   Pulmonary: Clear to ausculation.  Normal effort. No rales, ronchi, or wheezing.  Cardiovascular: Regular rate and rhythm, no lower extremity edema  Abdomen: Soft, nontender, nondistended. Normal bowel sounds.   Neurologic: Grossly nonfocal  Skin: Warm and dry.  Left 1-3 toe onychomycosis.  Musculoskeletal: Normal gait. No extremity cyanosis, clubbing, or edema.  Psych: Normal mood and affect. Alert and oriented x3. Judgment and insight is normal.    Monofilament examination shows intact sensation in 6 over 6 areas tested bilaterally.   Visual examination revels no lesions, ulcers. Pulses 2+ and symmetrical -DP and PT. Good capillary refill, less than 2 seconds      Assessment & Plan:   65 y.o. male with the following -    1. Establishing care with new doctor, encounter for  Transferring care from [facility].    2. Controlled type 2 diabetes mellitus without complication, with long-term current use of insulin (HCC)  Chronic issue. Well controlled.  A1c improved to 5.6 today.  encouarged patient to continue checking fingersticks and if they are lower to discontinue the SSI.  Next step would be to lower his Metformin. Risk of hypoglycemia discussed with patient.  - Diabetic Monofilament Lower Extremity Exam  - Referral to Ophthalmology  - POCT Retinal Eye Exam  - TSH WITH REFLEX TO FT4; Future  - " metFORMIN (GLUCOPHAGE) 850 MG Tab; Take 1 Tablet by mouth 2 times a day with meals.  Dispense: 200 Tablet; Refill: 2    3. Hypertension associated with diabetes (HCC)  Chronic medical diagnosis. Blood pressure is stable current medications.  - CBC WITH DIFFERENTIAL; Future  - Basic Metabolic Panel; Future  - VITAMIN D,25 HYDROXY; Future    4. Hyperlipidemia associated with type 2 diabetes mellitus (HCC)  Chronic medical diagnosis. Stable with Lipitor 40 mg daily.    5. Simple chronic bronchitis (HCC)  Chronic medical diagnosis. He is an ex-smoker. Currently not following up with pulmonology or on any inhalers. Last set of PFTs available for review are from 2011.    6. Screening for malignant neoplasm of prostate  - Prostate Specific Ag Screening; Future    7. Need for vaccination  - Tdap =>6yo IM    8. History of hepatitis C  9. History of substance abuse (HCC)  History of. States that he was treated.  Hx IVDA in the past.    I spent a total of 50 minutes with record review, exam, communication with the patient, communication with other providers, and documentation of this encounter.       No follow-ups on file.    Please note that this dictation was created using voice recognition software. I have made every reasonable attempt to correct obvious errors, but I expect that there are errors of grammar and possibly content that I did not discover before finalizing the note.

## 2022-09-22 ENCOUNTER — OFFICE VISIT (OUTPATIENT)
Dept: MEDICAL GROUP | Facility: PHYSICIAN GROUP | Age: 67
End: 2022-09-22
Payer: MEDICARE

## 2022-09-22 VITALS
TEMPERATURE: 97.1 F | OXYGEN SATURATION: 97 % | RESPIRATION RATE: 12 BRPM | BODY MASS INDEX: 23.32 KG/M2 | HEIGHT: 66 IN | SYSTOLIC BLOOD PRESSURE: 126 MMHG | WEIGHT: 145.1 LBS | HEART RATE: 81 BPM | DIASTOLIC BLOOD PRESSURE: 74 MMHG

## 2022-09-22 DIAGNOSIS — Z23 NEED FOR VACCINATION: ICD-10-CM

## 2022-09-22 DIAGNOSIS — Z79.4 CONTROLLED TYPE 2 DIABETES MELLITUS WITHOUT COMPLICATION, WITH LONG-TERM CURRENT USE OF INSULIN (HCC): ICD-10-CM

## 2022-09-22 DIAGNOSIS — M54.50 CHRONIC RIGHT-SIDED LOW BACK PAIN WITHOUT SCIATICA: ICD-10-CM

## 2022-09-22 DIAGNOSIS — E11.9 CONTROLLED TYPE 2 DIABETES MELLITUS WITHOUT COMPLICATION, WITH LONG-TERM CURRENT USE OF INSULIN (HCC): ICD-10-CM

## 2022-09-22 DIAGNOSIS — J41.0 SIMPLE CHRONIC BRONCHITIS (HCC): ICD-10-CM

## 2022-09-22 DIAGNOSIS — G89.29 CHRONIC RIGHT-SIDED LOW BACK PAIN WITHOUT SCIATICA: ICD-10-CM

## 2022-09-22 LAB
HBA1C MFR BLD: 6.1 % (ref 0–5.6)
INT CON NEG: ABNORMAL
INT CON POS: ABNORMAL

## 2022-09-22 PROCEDURE — 90662 IIV NO PRSV INCREASED AG IM: CPT | Performed by: FAMILY MEDICINE

## 2022-09-22 PROCEDURE — 99214 OFFICE O/P EST MOD 30 MIN: CPT | Mod: 25 | Performed by: FAMILY MEDICINE

## 2022-09-22 PROCEDURE — RXMED WILLOW AMBULATORY MEDICATION CHARGE: Performed by: FAMILY MEDICINE

## 2022-09-22 PROCEDURE — G0008 ADMIN INFLUENZA VIRUS VAC: HCPCS | Performed by: FAMILY MEDICINE

## 2022-09-22 PROCEDURE — 83036 HEMOGLOBIN GLYCOSYLATED A1C: CPT | Performed by: FAMILY MEDICINE

## 2022-09-22 RX ORDER — ALBUTEROL SULFATE 90 UG/1
2 AEROSOL, METERED RESPIRATORY (INHALATION) EVERY 4 HOURS PRN
Qty: 18 G | Refills: 1 | Status: SHIPPED | OUTPATIENT
Start: 2022-09-22

## 2022-09-22 ASSESSMENT — FIBROSIS 4 INDEX: FIB4 SCORE: 1.25

## 2022-09-22 NOTE — PROGRESS NOTES
CC:   Chief Complaint   Patient presents with    Diabetes Follow-up     3 months.          HPI:   Nikolas presents today for a follow-up visit.  Last seen by me on June 20..    Since our last appointment, has been seen by:  has a medical Marijuana dispensary card    Diabetes type 2, controlled (CMS-HCC)  Chronic medical diagnosis.  Continues to take metformin 500 mg twice a day.  He globin A1c checked in the office today is at 6.1%.His metformin was decreased from 850 twice a day to 500 twice a day 3 months ago. Not as physically active as before .  fastings at home 90-130s. Has gained 8 pounds over the last 3 months. Hasn't had to use his SSI.     COPD (chronic obstructive pulmonary disease) ([facility])  Quit smoking 2013.  Started smoking as a teen. Smoked on/off for years.   Denies any coughing or SOB.   Used to be on an inhaler. Has never been evaluated by pulm.    Chronic right-sided low back pain without sciatica  Chronic issue. Right lower back pain. Thinks he has issues with sciatica.  Denies any hx recent trauma or injury. Has hx bulging lumbar disk. Last xrays in 2014. Takes tylenol.       Current Outpatient Medications Ordered in Epic   Medication Sig Dispense Refill    albuterol 108 (90 Base) MCG/ACT Aero Soln inhalation aerosol Inhale 2 Puffs every four hours as needed for Shortness of Breath. 18 g 1    amLODIPine (NORVASC) 10 MG Tab Take 1 Tablet by mouth every day. 100 Tablet 3    atorvastatin (LIPITOR) 40 MG Tab Take 1 Tablet by mouth every day. 100 Tablet 3    cloNIDine (CATAPRES) 0.1 MG Tab Take 1 Tablet by mouth 2 times a day. 200 Tablet 3    insulin regular (NOVOLIN R) 100 Unit/mL Solution AS PER SLIDING SCALE 10 mL 3    levothyroxine (SYNTHROID) 25 MCG Tab Take 1 Tablet by mouth every morning on an empty stomach. 100 Tablet 3    lisinopril (PRINIVIL) 40 MG tablet Take 1 Tablet by mouth every day. 100 Tablet 3    metFORMIN (GLUCOPHAGE) 500 MG Tab Take 1 Tablet by mouth 2 times a day with meals. 200  "Tablet 3    metoprolol SR (TOPROL XL) 25 MG TABLET SR 24 HR Take 1 Tablet by mouth every day. 100 Tablet 3    traZODone (DESYREL) 100 MG Tab Take 1 Tablet by mouth every evening. 100 Tablet 3    INSULIN SYRINGE .5CC/29G (ULTICARE INSULIN SYRINGE) 29G X 1/2\" 0.5 ML Misc USE AS DIRECTED BY PHYSICIAN 100 Each 3    glucose blood (CONTOUR NEXT TEST) strip Check fingersticks three times a day. E11.9 300 Strip 3    Blood Glucose Monitoring Suppl (CONTOUR NEXT ONE) Device 1 Glucometer 1 Each 0    latanoprost (XALATAN) 0.005 % Solution       ketoconazole (NIZORAL) 2 % Cream       aspirin (ASA) 81 MG Chew Tab chewable tablet CHEW 1 TABLET ORALLY ONCE DAILY 100 Tab 3     No current Epic-ordered facility-administered medications on file.       Past Medical History:   Diagnosis Date    CKD (chronic kidney disease) stage 3, GFR 30-59 ml/min ([facility])     COPD     Diabetes     GERD (gastroesophageal reflux disease)     Hepatitis C     Hyperlipidemia     Hypertension     Psychiatric disorder     bipolar    Schizoaffective disorder ([facility]) 10/01/16       Social History     Tobacco Use    Smoking status: Former     Packs/day: 0.50     Types: Cigarettes     Quit date: 3/6/2013     Years since quittin.5    Smokeless tobacco: Never   Vaping Use    Vaping Use: Never used   Substance Use Topics    Alcohol use: Yes     Alcohol/week: 0.0 oz     Comment: sober since HCV diagnosis    Drug use: Yes     Types: Marijuana       Allergies:  Patient has no known allergies.    Health Maintenance: due for repeat colonoscopy- patient will call [facility] and schedule this.      Objective:       Exam:  /74   Pulse 81   Temp 36.2 °C (97.1 °F) (Temporal)   Resp 12   Ht 1.676 m (5' 6\")   Wt 65.8 kg (145 lb 1.6 oz)   SpO2 97%   BMI 23.42 kg/m²   Body mass index is 23.42 kg/m².  Wt Readings from Last 4 Encounters:   22 65.8 kg (145 lb 1.6 oz)   22 62.4 kg (137 lb 8 oz)   22 63.3 kg (139 lb 8 oz)   22 63.7 kg (140 lb 8 oz) "       Gen: Alert and oriented, No apparent distress. Appropriately groomed.  Neck: Neck is supple without lymphadenopathy.No thyromegaly.   Lungs: Normal effort, CTA bilaterally, no wheezes, rhonchi, or rales  CV: Regular rate and rhythm. No Lower extremity edema  Skin: No rash noted.  Monofilament examination shows intact sensation in 6 over 6 areas tested bilaterally.   Visual examination revels no lesions, ulcers. Pulses 2+ and symmetrical -DP and PT. Good capillary refill, less than 2 seconds       Assessment & Plan:     66 y.o. male with the following -     1. Controlled type 2 diabetes mellitus without complication, with long-term current use of insulin (HCC)  Chronic medical diagnosis.  Stable.  A1c checked in the office today slightly elevated at 6.1%.  Has gained 8 pounds over the last few months.  States that he has not been as physically active.  For now, we will continue with his metformin 500 mg twice a day.  He was previously on 850 twice a day and this was decreased approximately 3 months ago to 500 twice daily.  - POCT A1C  - Referral to Ophthalmology  - HEMOGLOBIN A1C; Future  - CBC WITH DIFFERENTIAL; Future  - Comp Metabolic Panel; Future  - Lipid Profile; Future  - MICROALBUMIN CREAT RATIO URINE; Future  - Diabetic Monofilament LE Exam    2. Need for vaccination  - INFLUENZA VACCINE, HIGH DOSE (65+ ONLY)    3. Simple chronic bronchitis (HCC)  -chronic.  Overall stable.  Currently not on any inhalers.  States that he was previously on 1 but currently not using anything.  Last PFTs were completed in 2011.  New orders provided.  Patient does have a history of smoking in the past.  Has never been screened for abdominal aortic aneurysm.  New orders for ultrasound placed today.    US-ABDOMINAL AORTA W/O DOPPLER; Future  - albuterol 108 (90 Base) MCG/ACT Aero Soln inhalation aerosol; Inhale 2 Puffs every four hours as needed for Shortness of Breath.  Dispense: 18 g; Refill: 1  - PULMONARY FUNCTION TESTS  -Test requested: Complete Pulmonary Function Test; Include MIPS/MEPS? Yes; Future    4. Chronic right-sided low back pain without sciatica  Chronic medical diagnosis.  Occasional flareups.  We did discuss imaging and/or referral to physical therapy.  For the moment, patient would like to just monitor symptoms.  Encouraged to keep me updated.    Return in about 4 months (around 1/22/2023) for Diabetes.    Please note that this dictation was created using voice recognition software. I have made every reasonable attempt to correct obvious errors, but I expect that there are errors of grammar and possibly content that I did not discover before finalizing the note.

## 2022-09-22 NOTE — ASSESSMENT & PLAN NOTE
Chronic issue. Right lower back pain. Thinks he has issues with sciatica.  Denies any hx recent trauma or injury. Has hx bulging lumbar disk. Last xrays in 2014. Takes tylenol.

## 2022-09-22 NOTE — ASSESSMENT & PLAN NOTE
Chronic medical diagnosis.  Continues to take metformin 500 mg twice a day.  He globin A1c checked in the office today is at 6.1%.His metformin was decreased from 850 twice a day to 500 twice a day 3 months ago. Not as physically active as before .  fastings at home 90-130s. Has gained 8 pounds over the last 3 months. Hasn't had to use his SSI.

## 2022-09-22 NOTE — ASSESSMENT & PLAN NOTE
Quit smoking 2013.  Started smoking as a teen. Smoked on/off for years.   Denies any coughing or SOB.   Used to be on an inhaler. Has never been evaluated by pulm.

## 2022-09-23 ENCOUNTER — PHARMACY VISIT (OUTPATIENT)
Dept: PHARMACY | Facility: MEDICAL CENTER | Age: 67
End: 2022-09-23
Payer: COMMERCIAL

## 2022-10-10 ENCOUNTER — HOSPITAL ENCOUNTER (OUTPATIENT)
Dept: PULMONOLOGY | Facility: MEDICAL CENTER | Age: 67
End: 2022-10-10
Attending: FAMILY MEDICINE
Payer: MEDICARE

## 2022-10-10 DIAGNOSIS — J41.0 SIMPLE CHRONIC BRONCHITIS (HCC): ICD-10-CM

## 2022-10-10 PROCEDURE — 94729 DIFFUSING CAPACITY: CPT | Mod: 26 | Performed by: INTERNAL MEDICINE

## 2022-10-10 PROCEDURE — 94726 PLETHYSMOGRAPHY LUNG VOLUMES: CPT | Mod: 26 | Performed by: INTERNAL MEDICINE

## 2022-10-10 PROCEDURE — 94060 EVALUATION OF WHEEZING: CPT

## 2022-10-10 PROCEDURE — 94729 DIFFUSING CAPACITY: CPT

## 2022-10-10 PROCEDURE — 94726 PLETHYSMOGRAPHY LUNG VOLUMES: CPT

## 2022-10-10 PROCEDURE — 94060 EVALUATION OF WHEEZING: CPT | Mod: 26 | Performed by: INTERNAL MEDICINE

## 2022-10-10 RX ORDER — ALBUTEROL SULFATE 2.5 MG/3ML
2.5 SOLUTION RESPIRATORY (INHALATION)
Status: DISCONTINUED | OUTPATIENT
Start: 2022-10-10 | End: 2022-10-11 | Stop reason: HOSPADM

## 2022-10-10 RX ADMIN — ALBUTEROL SULFATE 2.5 MG: 2.5 SOLUTION RESPIRATORY (INHALATION) at 11:20

## 2022-10-10 ASSESSMENT — PULMONARY FUNCTION TESTS
FEV1/FVC_PREDICTED: 77
FEV1/FVC_PERCENT_PREDICTED: 88
FVC_PERCENT_PREDICTED: 99
FEV1_PREDICTED: 2.92
FEV1/FVC: 67.86
FEV1_PERCENT_PREDICTED: 97
FVC_PERCENT_PREDICTED: 110
FEV1/FVC_PERCENT_PREDICTED: 87
FEV1: 2.7
FVC_LLN: 3.16
FEV1/FVC_PERCENT_PREDICTED: 77
FEV1_LLN: 2.44
FEV1/FVC_PERCENT_CHANGE: -5
FEV1/FVC: 68
FEV1/FVC: 72
FEV1/FVC_PERCENT_LLN: 64
FEV1/FVC: 72
FVC: 4.2
FVC: 3.75
FEV1_PERCENT_CHANGE: 11
FEV1/FVC_PERCENT_PREDICTED: 93
FVC_LLN: 3.16
FEV1_PERCENT_PREDICTED: 92
FEV1_PERCENT_CHANGE: 5
FEV1/FVC_PERCENT_PREDICTED: 93
FEV1/FVC_PERCENT_CHANGE: 45
FEV1_LLN: 2.44
FVC_PREDICTED: 3.79
FEV1/FVC_PERCENT_LLN: 64
FEV1: 2.85

## 2022-10-14 ENCOUNTER — HOSPITAL ENCOUNTER (OUTPATIENT)
Dept: RADIOLOGY | Facility: MEDICAL CENTER | Age: 67
End: 2022-10-14
Attending: FAMILY MEDICINE
Payer: MEDICARE

## 2022-10-14 DIAGNOSIS — J41.0 SIMPLE CHRONIC BRONCHITIS (HCC): ICD-10-CM

## 2022-10-14 PROCEDURE — 76775 US EXAM ABDO BACK WALL LIM: CPT

## 2022-10-14 PROCEDURE — RXMED WILLOW AMBULATORY MEDICATION CHARGE: Performed by: FAMILY MEDICINE

## 2022-10-17 PROCEDURE — RXMED WILLOW AMBULATORY MEDICATION CHARGE: Performed by: FAMILY MEDICINE

## 2022-10-18 ENCOUNTER — PHARMACY VISIT (OUTPATIENT)
Dept: PHARMACY | Facility: MEDICAL CENTER | Age: 67
End: 2022-10-18
Payer: COMMERCIAL

## 2022-10-19 NOTE — PROCEDURES
DATE OF SERVICE:  10/10/2022     PULMONARY FUNCTION TEST INTERPRETATION     REQUESTING PROVIDER:  Dayana Valerio MD     REASON FOR REQUEST:  Simple chronic bronchitis.     INTERPRETATION:   1.  Acceptable and reproducible.  2.  FEV1 2.7 liters (92%), FVC 3.75 liters (99%), ratio 72%.  3.  Flow volume loops, concavity of the expiratory loop suggestive of   obstruction.  4.  TLC 37.37 liters (118%).  5.  DLCO 28.36 mL/mmHg (121%).     IMPRESSION:  Normal spirometry with no response to bronchodilator except for   mid flow where there was a 14% response.  Evidence of airtrapping and slightly   elevated DLCO suggest maybe underlying reactive airway disease.  Clinically   correlate.        ______________________________  MD SHENG Woodruff/SHOLA    DD:  10/19/2022 10:37  DT:  10/19/2022 12:29    Job#:  [id number]    CC:Dayana Valerio MD

## 2022-11-22 PROCEDURE — RXMED WILLOW AMBULATORY MEDICATION CHARGE: Performed by: OPHTHALMOLOGY

## 2022-11-23 ENCOUNTER — PHARMACY VISIT (OUTPATIENT)
Dept: PHARMACY | Facility: MEDICAL CENTER | Age: 67
End: 2022-11-23
Payer: COMMERCIAL

## 2022-11-28 PROCEDURE — RXMED WILLOW AMBULATORY MEDICATION CHARGE: Performed by: INTERNAL MEDICINE

## 2022-11-29 ENCOUNTER — PHARMACY VISIT (OUTPATIENT)
Dept: PHARMACY | Facility: MEDICAL CENTER | Age: 67
End: 2022-11-29
Payer: COMMERCIAL

## 2022-12-16 PROCEDURE — RXMED WILLOW AMBULATORY MEDICATION CHARGE: Performed by: OPHTHALMOLOGY

## 2022-12-16 PROCEDURE — RXMED WILLOW AMBULATORY MEDICATION CHARGE: Performed by: FAMILY MEDICINE

## 2022-12-19 ENCOUNTER — PHARMACY VISIT (OUTPATIENT)
Dept: PHARMACY | Facility: MEDICAL CENTER | Age: 67
End: 2022-12-19
Payer: COMMERCIAL

## 2022-12-21 ENCOUNTER — DOCUMENTATION (OUTPATIENT)
Dept: HEALTH INFORMATION MANAGEMENT | Facility: OTHER | Age: 67
End: 2022-12-21
Payer: MEDICARE

## 2022-12-28 ENCOUNTER — HOSPITAL ENCOUNTER (OUTPATIENT)
Dept: LAB | Facility: MEDICAL CENTER | Age: 67
End: 2022-12-28
Attending: FAMILY MEDICINE
Payer: MEDICARE

## 2022-12-28 DIAGNOSIS — E11.9 CONTROLLED TYPE 2 DIABETES MELLITUS WITHOUT COMPLICATION, WITH LONG-TERM CURRENT USE OF INSULIN (HCC): ICD-10-CM

## 2022-12-28 DIAGNOSIS — Z79.4 CONTROLLED TYPE 2 DIABETES MELLITUS WITHOUT COMPLICATION, WITH LONG-TERM CURRENT USE OF INSULIN (HCC): ICD-10-CM

## 2022-12-28 LAB
ALBUMIN SERPL BCP-MCNC: 4.5 G/DL (ref 3.2–4.9)
ALBUMIN/GLOB SERPL: 1.7 G/DL
ALP SERPL-CCNC: 75 U/L (ref 30–99)
ALT SERPL-CCNC: 13 U/L (ref 2–50)
ANION GAP SERPL CALC-SCNC: 10 MMOL/L (ref 7–16)
AST SERPL-CCNC: 19 U/L (ref 12–45)
BASOPHILS # BLD AUTO: 0.8 % (ref 0–1.8)
BASOPHILS # BLD: 0.05 K/UL (ref 0–0.12)
BILIRUB SERPL-MCNC: 0.5 MG/DL (ref 0.1–1.5)
BUN SERPL-MCNC: 12 MG/DL (ref 8–22)
CALCIUM ALBUM COR SERPL-MCNC: 8.9 MG/DL (ref 8.5–10.5)
CALCIUM SERPL-MCNC: 9.3 MG/DL (ref 8.5–10.5)
CHLORIDE SERPL-SCNC: 105 MMOL/L (ref 96–112)
CHOLEST SERPL-MCNC: 149 MG/DL (ref 100–199)
CO2 SERPL-SCNC: 23 MMOL/L (ref 20–33)
CREAT SERPL-MCNC: 0.78 MG/DL (ref 0.5–1.4)
CREAT UR-MCNC: 81.75 MG/DL
EOSINOPHIL # BLD AUTO: 0.09 K/UL (ref 0–0.51)
EOSINOPHIL NFR BLD: 1.5 % (ref 0–6.9)
ERYTHROCYTE [DISTWIDTH] IN BLOOD BY AUTOMATED COUNT: 42.4 FL (ref 35.9–50)
EST. AVERAGE GLUCOSE BLD GHB EST-MCNC: 131 MG/DL
GFR SERPLBLD CREATININE-BSD FMLA CKD-EPI: 97 ML/MIN/1.73 M 2
GLOBULIN SER CALC-MCNC: 2.6 G/DL (ref 1.9–3.5)
GLUCOSE SERPL-MCNC: 103 MG/DL (ref 65–99)
HBA1C MFR BLD: 6.2 % (ref 4–5.6)
HCT VFR BLD AUTO: 44.1 % (ref 42–52)
HDLC SERPL-MCNC: 54 MG/DL
HGB BLD-MCNC: 14.4 G/DL (ref 14–18)
IMM GRANULOCYTES # BLD AUTO: 0.04 K/UL (ref 0–0.11)
IMM GRANULOCYTES NFR BLD AUTO: 0.7 % (ref 0–0.9)
LDLC SERPL CALC-MCNC: 81 MG/DL
LYMPHOCYTES # BLD AUTO: 1.95 K/UL (ref 1–4.8)
LYMPHOCYTES NFR BLD: 33 % (ref 22–41)
MCH RBC QN AUTO: 28.2 PG (ref 27–33)
MCHC RBC AUTO-ENTMCNC: 32.7 G/DL (ref 33.7–35.3)
MCV RBC AUTO: 86.3 FL (ref 81.4–97.8)
MICROALBUMIN UR-MCNC: <1.2 MG/DL
MICROALBUMIN/CREAT UR: NORMAL MG/G (ref 0–30)
MONOCYTES # BLD AUTO: 0.4 K/UL (ref 0–0.85)
MONOCYTES NFR BLD AUTO: 6.8 % (ref 0–13.4)
NEUTROPHILS # BLD AUTO: 3.38 K/UL (ref 1.82–7.42)
NEUTROPHILS NFR BLD: 57.2 % (ref 44–72)
NRBC # BLD AUTO: 0 K/UL
NRBC BLD-RTO: 0 /100 WBC
PLATELET # BLD AUTO: 322 K/UL (ref 164–446)
PMV BLD AUTO: 9.8 FL (ref 9–12.9)
POTASSIUM SERPL-SCNC: 3.4 MMOL/L (ref 3.6–5.5)
PROT SERPL-MCNC: 7.1 G/DL (ref 6–8.2)
RBC # BLD AUTO: 5.11 M/UL (ref 4.7–6.1)
SODIUM SERPL-SCNC: 138 MMOL/L (ref 135–145)
TRIGL SERPL-MCNC: 70 MG/DL (ref 0–149)
WBC # BLD AUTO: 5.9 K/UL (ref 4.8–10.8)

## 2022-12-28 PROCEDURE — 82570 ASSAY OF URINE CREATININE: CPT

## 2022-12-28 PROCEDURE — 36415 COLL VENOUS BLD VENIPUNCTURE: CPT

## 2022-12-28 PROCEDURE — 85025 COMPLETE CBC W/AUTO DIFF WBC: CPT

## 2022-12-28 PROCEDURE — 83036 HEMOGLOBIN GLYCOSYLATED A1C: CPT

## 2022-12-28 PROCEDURE — 80053 COMPREHEN METABOLIC PANEL: CPT

## 2022-12-28 PROCEDURE — 82043 UR ALBUMIN QUANTITATIVE: CPT

## 2022-12-28 PROCEDURE — 80061 LIPID PANEL: CPT

## 2023-01-04 PROCEDURE — RXMED WILLOW AMBULATORY MEDICATION CHARGE: Performed by: FAMILY MEDICINE

## 2023-01-06 ENCOUNTER — PHARMACY VISIT (OUTPATIENT)
Dept: PHARMACY | Facility: MEDICAL CENTER | Age: 68
End: 2023-01-06
Payer: COMMERCIAL

## 2023-01-21 PROCEDURE — RXMED WILLOW AMBULATORY MEDICATION CHARGE: Performed by: OPHTHALMOLOGY

## 2023-01-21 PROCEDURE — RXMED WILLOW AMBULATORY MEDICATION CHARGE: Performed by: FAMILY MEDICINE

## 2023-01-23 ENCOUNTER — PHARMACY VISIT (OUTPATIENT)
Dept: PHARMACY | Facility: MEDICAL CENTER | Age: 68
End: 2023-01-23
Payer: COMMERCIAL

## 2023-02-08 PROBLEM — H40.9 GLAUCOMA: Status: ACTIVE | Noted: 2023-02-08

## 2023-02-08 PROBLEM — E03.9 HYPOTHYROIDISM: Status: ACTIVE | Noted: 2023-02-08

## 2023-02-22 ENCOUNTER — TELEMEDICINE (OUTPATIENT)
Dept: MEDICAL GROUP | Facility: PHYSICIAN GROUP | Age: 68
End: 2023-02-22
Payer: MEDICARE

## 2023-02-22 VITALS
OXYGEN SATURATION: 93 % | HEART RATE: 57 BPM | SYSTOLIC BLOOD PRESSURE: 127 MMHG | HEIGHT: 65 IN | DIASTOLIC BLOOD PRESSURE: 79 MMHG | WEIGHT: 141.8 LBS | BODY MASS INDEX: 23.63 KG/M2

## 2023-02-22 DIAGNOSIS — E11.69 DYSLIPIDEMIA DUE TO TYPE 2 DIABETES MELLITUS (HCC): ICD-10-CM

## 2023-02-22 DIAGNOSIS — R07.89 OTHER CHEST PAIN: ICD-10-CM

## 2023-02-22 DIAGNOSIS — E03.9 HYPOTHYROIDISM, UNSPECIFIED TYPE: ICD-10-CM

## 2023-02-22 DIAGNOSIS — E11.319 TYPE 2 DIABETES MELLITUS WITH RETINOPATHY, WITH LONG-TERM CURRENT USE OF INSULIN, MACULAR EDEMA PRESENCE UNSPECIFIED, UNSPECIFIED LATERALITY, UNSPECIFIED RETINOPATHY SEVERITY (HCC): ICD-10-CM

## 2023-02-22 DIAGNOSIS — E78.5 DYSLIPIDEMIA DUE TO TYPE 2 DIABETES MELLITUS (HCC): ICD-10-CM

## 2023-02-22 DIAGNOSIS — Z12.5 SCREENING FOR MALIGNANT NEOPLASM OF PROSTATE: ICD-10-CM

## 2023-02-22 DIAGNOSIS — I15.2 HYPERTENSION ASSOCIATED WITH TYPE 2 DIABETES MELLITUS (HCC): ICD-10-CM

## 2023-02-22 DIAGNOSIS — E11.59 HYPERTENSION ASSOCIATED WITH TYPE 2 DIABETES MELLITUS (HCC): ICD-10-CM

## 2023-02-22 DIAGNOSIS — Z79.4 TYPE 2 DIABETES MELLITUS WITH RETINOPATHY, WITH LONG-TERM CURRENT USE OF INSULIN, MACULAR EDEMA PRESENCE UNSPECIFIED, UNSPECIFIED LATERALITY, UNSPECIFIED RETINOPATHY SEVERITY (HCC): ICD-10-CM

## 2023-02-22 PROCEDURE — 99214 OFFICE O/P EST MOD 30 MIN: CPT | Mod: 95 | Performed by: FAMILY MEDICINE

## 2023-02-22 ASSESSMENT — FIBROSIS 4 INDEX: FIB4 SCORE: 1.11

## 2023-02-22 NOTE — ASSESSMENT & PLAN NOTE
Chronic medical condition.  Currently taking lipitor 40mg.    Tolerating the medication well without any side effects.  Patient denies chest pain or lower extremity muscle cramps.  Last set of labs from December were stable.       Latest Reference Range & Units 12/28/22 06:10   Cholesterol,Tot 100 - 199 mg/dL 149   Triglycerides 0 - 149 mg/dL 70   HDL >=40 mg/dL 54   LDL <100 mg/dL 81

## 2023-02-22 NOTE — ASSESSMENT & PLAN NOTE
Chronic. Bp today 127/79.   Lisinopril 40, metoprolol SR 25, amlodipine 10mg and clonidine 0.1mg bid. Last Echo in 2017.   Last seen by Dr Field in 2016 with f/u prn.

## 2023-02-22 NOTE — ASSESSMENT & PLAN NOTE
New issue. Noticed left sided chest pain over the last few months.  At times, thinks this has gone to his left shoulder. Unsure about correlation with rest/exertion.  Can occur in the morning or evening time. Over the last few months has noticed it happening more frequently. Poor historian. Unsure if its daily or not. 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 Last cards f/u was in 2016 with Dr Field.

## 2023-03-08 PROCEDURE — RXMED WILLOW AMBULATORY MEDICATION CHARGE: Performed by: OPHTHALMOLOGY

## 2023-03-10 ENCOUNTER — HOSPITAL ENCOUNTER (OUTPATIENT)
Dept: RADIOLOGY | Facility: MEDICAL CENTER | Age: 68
End: 2023-03-10
Attending: FAMILY MEDICINE
Payer: MEDICARE

## 2023-03-10 DIAGNOSIS — R07.89 OTHER CHEST PAIN: ICD-10-CM

## 2023-03-10 PROCEDURE — 700111 HCHG RX REV CODE 636 W/ 250 OVERRIDE (IP)

## 2023-03-10 PROCEDURE — 78452 HT MUSCLE IMAGE SPECT MULT: CPT

## 2023-03-10 RX ORDER — REGADENOSON 0.08 MG/ML
0.4 INJECTION, SOLUTION INTRAVENOUS ONCE
Status: COMPLETED | OUTPATIENT
Start: 2023-03-10 | End: 2023-03-10

## 2023-03-10 RX ORDER — REGADENOSON 0.08 MG/ML
INJECTION, SOLUTION INTRAVENOUS
Status: COMPLETED
Start: 2023-03-10 | End: 2023-03-10

## 2023-03-10 RX ORDER — AMINOPHYLLINE 25 MG/ML
100 INJECTION, SOLUTION INTRAVENOUS
Status: DISCONTINUED | OUTPATIENT
Start: 2023-03-10 | End: 2023-03-11 | Stop reason: HOSPADM

## 2023-03-10 RX ADMIN — REGADENOSON 0.4 MG: 0.08 INJECTION, SOLUTION INTRAVENOUS at 10:05

## 2023-03-13 ENCOUNTER — PHARMACY VISIT (OUTPATIENT)
Dept: PHARMACY | Facility: MEDICAL CENTER | Age: 68
End: 2023-03-13
Payer: COMMERCIAL

## 2023-03-14 ENCOUNTER — HOSPITAL ENCOUNTER (OUTPATIENT)
Dept: RADIOLOGY | Facility: MEDICAL CENTER | Age: 68
End: 2023-03-14
Attending: FAMILY MEDICINE
Payer: MEDICARE

## 2023-03-14 DIAGNOSIS — Z12.31 VISIT FOR SCREENING MAMMOGRAM: ICD-10-CM

## 2023-03-28 ENCOUNTER — HOSPITAL ENCOUNTER (OUTPATIENT)
Dept: RADIOLOGY | Facility: MEDICAL CENTER | Age: 68
End: 2023-03-28
Attending: FAMILY MEDICINE
Payer: MEDICARE

## 2023-03-28 PROCEDURE — 77063 BREAST TOMOSYNTHESIS BI: CPT

## 2023-04-02 PROCEDURE — RXMED WILLOW AMBULATORY MEDICATION CHARGE: Performed by: FAMILY MEDICINE

## 2023-04-05 ENCOUNTER — PHARMACY VISIT (OUTPATIENT)
Dept: PHARMACY | Facility: MEDICAL CENTER | Age: 68
End: 2023-04-05
Payer: COMMERCIAL

## 2023-04-24 ENCOUNTER — APPOINTMENT (OUTPATIENT)
Dept: MEDICAL GROUP | Facility: PHYSICIAN GROUP | Age: 68
End: 2023-04-24
Payer: MEDICARE

## 2023-04-25 PROCEDURE — RXMED WILLOW AMBULATORY MEDICATION CHARGE: Performed by: OPHTHALMOLOGY

## 2023-04-25 PROCEDURE — RXMED WILLOW AMBULATORY MEDICATION CHARGE: Performed by: FAMILY MEDICINE

## 2023-04-27 ENCOUNTER — PHARMACY VISIT (OUTPATIENT)
Dept: PHARMACY | Facility: MEDICAL CENTER | Age: 68
End: 2023-04-27
Payer: COMMERCIAL

## 2023-05-01 ENCOUNTER — HOSPITAL ENCOUNTER (OUTPATIENT)
Dept: LAB | Facility: MEDICAL CENTER | Age: 68
End: 2023-05-01
Attending: FAMILY MEDICINE
Payer: MEDICARE

## 2023-05-01 DIAGNOSIS — Z79.4 TYPE 2 DIABETES MELLITUS WITH RETINOPATHY, WITH LONG-TERM CURRENT USE OF INSULIN, MACULAR EDEMA PRESENCE UNSPECIFIED, UNSPECIFIED LATERALITY, UNSPECIFIED RETINOPATHY SEVERITY (HCC): ICD-10-CM

## 2023-05-01 DIAGNOSIS — E03.9 HYPOTHYROIDISM, UNSPECIFIED TYPE: ICD-10-CM

## 2023-05-01 DIAGNOSIS — Z12.5 SCREENING FOR MALIGNANT NEOPLASM OF PROSTATE: ICD-10-CM

## 2023-05-01 DIAGNOSIS — E11.319 TYPE 2 DIABETES MELLITUS WITH RETINOPATHY, WITH LONG-TERM CURRENT USE OF INSULIN, MACULAR EDEMA PRESENCE UNSPECIFIED, UNSPECIFIED LATERALITY, UNSPECIFIED RETINOPATHY SEVERITY (HCC): ICD-10-CM

## 2023-05-01 LAB
25(OH)D3 SERPL-MCNC: 31 NG/ML (ref 30–100)
ALBUMIN SERPL BCP-MCNC: 4.3 G/DL (ref 3.2–4.9)
ALBUMIN/GLOB SERPL: 1.7 G/DL
ALP SERPL-CCNC: 74 U/L (ref 30–99)
ALT SERPL-CCNC: 12 U/L (ref 2–50)
ANION GAP SERPL CALC-SCNC: 9 MMOL/L (ref 7–16)
AST SERPL-CCNC: 20 U/L (ref 12–45)
BILIRUB SERPL-MCNC: 0.5 MG/DL (ref 0.1–1.5)
BUN SERPL-MCNC: 10 MG/DL (ref 8–22)
CALCIUM ALBUM COR SERPL-MCNC: 9 MG/DL (ref 8.5–10.5)
CALCIUM SERPL-MCNC: 9.2 MG/DL (ref 8.5–10.5)
CHLORIDE SERPL-SCNC: 108 MMOL/L (ref 96–112)
CHOLEST SERPL-MCNC: 146 MG/DL (ref 100–199)
CO2 SERPL-SCNC: 24 MMOL/L (ref 20–33)
CREAT SERPL-MCNC: 0.79 MG/DL (ref 0.5–1.4)
CREAT UR-MCNC: 145.88 MG/DL
EST. AVERAGE GLUCOSE BLD GHB EST-MCNC: 126 MG/DL
FASTING STATUS PATIENT QL REPORTED: NORMAL
GFR SERPLBLD CREATININE-BSD FMLA CKD-EPI: 97 ML/MIN/1.73 M 2
GLOBULIN SER CALC-MCNC: 2.5 G/DL (ref 1.9–3.5)
GLUCOSE SERPL-MCNC: 108 MG/DL (ref 65–99)
HBA1C MFR BLD: 6 % (ref 4–5.6)
HDLC SERPL-MCNC: 52 MG/DL
LDLC SERPL CALC-MCNC: 79 MG/DL
MICROALBUMIN UR-MCNC: 5.3 MG/DL
MICROALBUMIN/CREAT UR: 36 MG/G (ref 0–30)
POTASSIUM SERPL-SCNC: 4.6 MMOL/L (ref 3.6–5.5)
PROT SERPL-MCNC: 6.8 G/DL (ref 6–8.2)
PSA SERPL-MCNC: 0.99 NG/ML (ref 0–4)
SODIUM SERPL-SCNC: 141 MMOL/L (ref 135–145)
TRIGL SERPL-MCNC: 73 MG/DL (ref 0–149)
TSH SERPL DL<=0.005 MIU/L-ACNC: 1.18 UIU/ML (ref 0.38–5.33)
VIT B12 SERPL-MCNC: 377 PG/ML (ref 211–911)

## 2023-05-01 PROCEDURE — 80061 LIPID PANEL: CPT

## 2023-05-01 PROCEDURE — 82043 UR ALBUMIN QUANTITATIVE: CPT

## 2023-05-01 PROCEDURE — 82570 ASSAY OF URINE CREATININE: CPT

## 2023-05-01 PROCEDURE — 80053 COMPREHEN METABOLIC PANEL: CPT

## 2023-05-01 PROCEDURE — 83036 HEMOGLOBIN GLYCOSYLATED A1C: CPT

## 2023-05-01 PROCEDURE — 84153 ASSAY OF PSA TOTAL: CPT

## 2023-05-01 PROCEDURE — 84443 ASSAY THYROID STIM HORMONE: CPT

## 2023-05-01 PROCEDURE — 82306 VITAMIN D 25 HYDROXY: CPT

## 2023-05-01 PROCEDURE — 36415 COLL VENOUS BLD VENIPUNCTURE: CPT

## 2023-05-01 PROCEDURE — 82607 VITAMIN B-12: CPT

## 2023-05-12 PROCEDURE — RXMED WILLOW AMBULATORY MEDICATION CHARGE: Performed by: OPTOMETRIST

## 2023-05-17 ENCOUNTER — PHARMACY VISIT (OUTPATIENT)
Dept: PHARMACY | Facility: MEDICAL CENTER | Age: 68
End: 2023-05-17
Payer: COMMERCIAL

## 2023-05-23 ENCOUNTER — OFFICE VISIT (OUTPATIENT)
Dept: MEDICAL GROUP | Facility: PHYSICIAN GROUP | Age: 68
End: 2023-05-23
Payer: MEDICARE

## 2023-05-23 VITALS
SYSTOLIC BLOOD PRESSURE: 116 MMHG | WEIGHT: 137 LBS | BODY MASS INDEX: 22.82 KG/M2 | HEIGHT: 65 IN | DIASTOLIC BLOOD PRESSURE: 72 MMHG | RESPIRATION RATE: 14 BRPM | HEART RATE: 85 BPM | TEMPERATURE: 97.3 F | OXYGEN SATURATION: 98 %

## 2023-05-23 DIAGNOSIS — R80.9 MICROALBUMINURIA DUE TO TYPE 2 DIABETES MELLITUS (HCC): ICD-10-CM

## 2023-05-23 DIAGNOSIS — Z79.4 TYPE 2 DIABETES MELLITUS WITH RETINOPATHY, WITH LONG-TERM CURRENT USE OF INSULIN, MACULAR EDEMA PRESENCE UNSPECIFIED, UNSPECIFIED LATERALITY, UNSPECIFIED RETINOPATHY SEVERITY (HCC): ICD-10-CM

## 2023-05-23 DIAGNOSIS — B35.1 ONYCHOMYCOSIS: ICD-10-CM

## 2023-05-23 DIAGNOSIS — E11.29 MICROALBUMINURIA DUE TO TYPE 2 DIABETES MELLITUS (HCC): ICD-10-CM

## 2023-05-23 DIAGNOSIS — M18.12 DEGENERATIVE ARTHRITIS OF THUMB, LEFT: ICD-10-CM

## 2023-05-23 DIAGNOSIS — E11.319 TYPE 2 DIABETES MELLITUS WITH RETINOPATHY, WITH LONG-TERM CURRENT USE OF INSULIN, MACULAR EDEMA PRESENCE UNSPECIFIED, UNSPECIFIED LATERALITY, UNSPECIFIED RETINOPATHY SEVERITY (HCC): ICD-10-CM

## 2023-05-23 DIAGNOSIS — J41.0 SIMPLE CHRONIC BRONCHITIS (HCC): ICD-10-CM

## 2023-05-23 DIAGNOSIS — R10.32 GROIN PAIN, LEFT: ICD-10-CM

## 2023-05-23 PROCEDURE — 3074F SYST BP LT 130 MM HG: CPT | Performed by: FAMILY MEDICINE

## 2023-05-23 PROCEDURE — 3078F DIAST BP <80 MM HG: CPT | Performed by: FAMILY MEDICINE

## 2023-05-23 PROCEDURE — 99214 OFFICE O/P EST MOD 30 MIN: CPT | Performed by: FAMILY MEDICINE

## 2023-05-23 RX ORDER — KETOCONAZOLE 20 MG/G
CREAM TOPICAL
Qty: 30 G | Refills: 2 | Status: SHIPPED | OUTPATIENT
Start: 2023-05-23

## 2023-05-23 ASSESSMENT — FIBROSIS 4 INDEX: FIB4 SCORE: 1.22

## 2023-05-23 NOTE — ASSESSMENT & PLAN NOTE
Chronic. Right handed patient. Left thumb pain.feels stiff.  Suspects its arthritis.  Taking acetaminophen bid-tid with improvement.

## 2023-05-23 NOTE — ASSESSMENT & PLAN NOTE
New medical diagnosis.  Recent labs with urine microalbumin creatinine ratio elevated to 36.  Continues to take lisinopril 40mg daily. Bp stable at 116/72

## 2023-05-23 NOTE — ASSESSMENT & PLAN NOTE
Chronic medical diagnosis.  Recent labs with improvement in A1c from 6.2 to 6%.  Metformin 500mg bid  Had f/u ophjoo in March

## 2023-05-23 NOTE — ASSESSMENT & PLAN NOTE
New issue , over the last month.   Initially thought it was a left groin injury. Had difficulty walking. Felt that his joint was popping when walked.

## 2023-05-23 NOTE — ASSESSMENT & PLAN NOTE
Taking albuterol prn. Last took it two days ago.   Had been on flovent and spiriva in the past. D/c'd in 2019.  98% RA  PFTs completed in oct 2022

## 2023-05-23 NOTE — PROGRESS NOTES
CC:   Chief Complaint   Patient presents with    Joint Pain     L wrist, possible arthritis and L hip    Nail Problem     Using ketoconazole at home         HPI:   Nikolas presents today for a f/u visit and to discuss recent lab results last seen by me on 2/22/23. .      Upcoming appointments with:  echo- June  Cards- July    Degenerative arthritis of thumb, left  Chronic. Right handed patient. Left thumb pain.feels stiff.  Suspects its arthritis.  Taking acetaminophen bid-tid with improvement.     Onychomycosis    Chronic.  Bilateral 2nd toe  Ketoconazole x 3 months with improving.     Groin pain, left  New issue , over the last month.   Initially thought it was a left groin injury. Had difficulty walking. Felt that his joint was popping when walked.     Type 2 diabetes mellitus with retinopathy, with long-term current use of insulin ([facility])  Chronic medical diagnosis.  Recent labs with improvement in A1c from 6.2 to 6%.  Metformin 500mg bid  Had f/u ophtho in March    COPD (chronic obstructive pulmonary disease) ([facility])    Taking albuterol prn. Last took it two days ago.   Had been on flovent and spiriva in the past. D/c'd in 2019.  98% RA  PFTs completed in oct 2022    Microalbuminuria due to type 2 diabetes mellitus ([facility])  New medical diagnosis.  Recent labs with urine microalbumin creatinine ratio elevated to 36.  Continues to take lisinopril 40mg daily. Bp stable at 116/72        Current Outpatient Medications Ordered in Epic   Medication Sig Dispense Refill    ketoconazole (NIZORAL) 2 % Cream Apply twice a day to affected toenails. 30 g 2    latanoprost (XALATAN) 0.005 % Solution Instill 1 drop into both eyes at bedtime 7.5 mL 3    latanoprost (XALATAN) 0.005 % Solution Instill 1 drop into both eyes at bedtime 2.5 mL 12    albuterol 108 (90 Base) MCG/ACT Aero Soln inhalation aerosol Inhale 2 Puffs every four hours as needed for Shortness of Breath. 18 g 1    amLODIPine (NORVASC) 10 MG Tab Take 1 Tablet by mouth  "every day. 100 Tablet 3    atorvastatin (LIPITOR) 40 MG Tab Take 1 Tablet by mouth every day. 100 Tablet 3    cloNIDine (CATAPRES) 0.1 MG Tab Take 1 Tablet by mouth 2 times a day. 200 Tablet 3    insulin regular (NOVOLIN R) 100 Unit/mL Solution AS PER SLIDING SCALE 10 mL 3    levothyroxine (SYNTHROID) 25 MCG Tab Take 1 Tablet by mouth every morning on an empty stomach. 100 Tablet 3    lisinopril (PRINIVIL) 40 MG tablet Take 1 Tablet by mouth every day. 100 Tablet 3    metFORMIN (GLUCOPHAGE) 500 MG Tab Take 1 Tablet by mouth 2 times a day with meals. 200 Tablet 3    metoprolol SR (TOPROL XL) 25 MG TABLET SR 24 HR Take 1 Tablet by mouth every day. 100 Tablet 3    traZODone (DESYREL) 100 MG Tab Take 1 Tablet by mouth every evening. 100 Tablet 3    INSULIN SYRINGE .5CC/29G (ULTICARE INSULIN SYRINGE) 29G X 1/2\" 0.5 ML Misc USE AS DIRECTED BY PHYSICIAN 100 Each 3    glucose blood (CONTOUR NEXT TEST) strip Check fingersticks three times a day. E11.9 300 Strip 3    Blood Glucose Monitoring Suppl (CONTOUR NEXT ONE) Device 1 Glucometer 1 Each 0    aspirin (ASA) 81 MG Chew Tab chewable tablet CHEW 1 TABLET ORALLY ONCE DAILY 100 Tab 3     No current Epic-ordered facility-administered medications on file.       Past Medical History:   Diagnosis Date    BMI 23.0-23.9, adult 2/8/2023    CKD (chronic kidney disease) stage 3, GFR 30-59 ml/min ([facility])     COPD     Diabetes     GERD (gastroesophageal reflux disease)     Hepatitis C     Hyperlipidemia     Hypertension     Psychiatric disorder     bipolar    Schizoaffective disorder (HCC) 10/01/16       Social History     Tobacco Use    Smoking status: Former     Packs/day: 0.50     Types: Cigarettes     Quit date: 3/6/2013     Years since quitting: 10.2    Smokeless tobacco: Never   Vaping Use    Vaping Use: Never used   Substance Use Topics    Alcohol use: Yes     Alcohol/week: 0.0 oz     Comment: sober since HCV diagnosis    Drug use: Yes     Types: Marijuana       Allergies:  Patient " "has no known allergies.      Objective:       Exam:  /72 (BP Location: Left arm, Patient Position: Sitting, BP Cuff Size: Adult)   Pulse 85   Temp 36.3 °C (97.3 °F) (Temporal)   Resp 14   Ht 1.651 m (5' 5\")   Wt 62.1 kg (137 lb)   SpO2 98%   BMI 22.80 kg/m²   Body mass index is 22.8 kg/m².  Wt Readings from Last 4 Encounters:   05/23/23 62.1 kg (137 lb)   02/22/23 64.3 kg (141 lb 12.8 oz)   02/08/23 64 kg (141 lb)   09/22/22 65.8 kg (145 lb 1.6 oz)       Gen: Alert and oriented, No apparent distress. Appropriately groomed.  Neck: Neck is supple without lymphadenopathy.No thyromegaly.   Lungs: Normal effort, CTA bilaterally, no wheezes, rhonchi, or rales  CV: Regular rate and rhythm. No Lower extremity edema  Skin: No rash noted.  Bilateral 2nd toes with onychomycosis  Left thumb: tenderness over palpation to the 1st metacarpal joint      Assessment & Plan:     67 y.o. male with the following -     1. Onychomycosis  Chronic medical diagnosis.  Improving with ketoconazole cream.  States that this was initially prescribed to him by his podiatrist.  Requesting refills.  - ketoconazole (NIZORAL) 2 % Cream; Apply twice a day to affected toenails.  Dispense: 30 g; Refill: 2    2. Degenerative arthritis of thumb, left  Chronic medical diagnosis.  Improving with Tylenol as needed.  Patient suspect this is underlying arthritis.  Denies any history of trauma.  Declining further evaluation with imaging.    3. Groin pain, left  Knee medical diagnosis: For the last month.  States that his symptoms have improved.  We will continue to monitor.    4. Type 2 diabetes mellitus with retinopathy, with long-term current use of insulin, macular edema presence unspecified, unspecified laterality, unspecified retinopathy severity (HCC)  -Chronic medical diagnosis.  Improving.  Recent labs with A1c at 6%.  Continue with metformin 500 mg twice a day.    MICROALBUMIN CREAT RATIO URINE; Future    5. Simple chronic bronchitis " (HCC)  Chronic medical diagnosis.  States that over the last few weeks he has been utilizing his albuterol more frequently.  Last took it 2 days ago.  Records reviewed and patient had been taking Flovent and Spiriva in the past.  This was discontinued in 2019.  Patient states that he felt that that he did not need any additional inhalers as he had been doing well.  We will continue to monitor.  Discussed with patient that we may need to add a maintenance inhaler.    6. Microalbuminuria due to type 2 diabetes mellitus (HCC)  New medical diagnosis.  Recent urine microalbumin creatinine ratio increased to 36.  Continue with lisinopril 40 mg daily.  Discussed with patient importance of blood pressure and diabetes control.  Also encouraged him to avoid NSAIDs.      Return in about 4 months (around 9/23/2023) for Diabetes, microalbuminuria, A1c in office.    Please note that this dictation was created using voice recognition software. I have made every reasonable attempt to correct obvious errors, but I expect that there are errors of grammar and possibly content that I did not discover before finalizing the note.

## 2023-05-24 ENCOUNTER — PHARMACY VISIT (OUTPATIENT)
Dept: PHARMACY | Facility: MEDICAL CENTER | Age: 68
End: 2023-05-24
Payer: COMMERCIAL

## 2023-05-24 PROCEDURE — RXMED WILLOW AMBULATORY MEDICATION CHARGE: Performed by: FAMILY MEDICINE

## 2023-06-06 ENCOUNTER — DOCUMENTATION (OUTPATIENT)
Dept: HEALTH INFORMATION MANAGEMENT | Facility: OTHER | Age: 68
End: 2023-06-06
Payer: MEDICARE

## 2023-06-13 ENCOUNTER — HOSPITAL ENCOUNTER (OUTPATIENT)
Dept: CARDIOLOGY | Facility: MEDICAL CENTER | Age: 68
End: 2023-06-13
Attending: FAMILY MEDICINE
Payer: MEDICARE

## 2023-06-13 DIAGNOSIS — R07.89 OTHER CHEST PAIN: ICD-10-CM

## 2023-06-13 LAB
LV EJECT FRACT  99904: 65
LV EJECT FRACT MOD 2C 99903: 69.19
LV EJECT FRACT MOD 4C 99902: 42.62
LV EJECT FRACT MOD BP 99901: 56.19

## 2023-06-13 PROCEDURE — 93306 TTE W/DOPPLER COMPLETE: CPT | Mod: 26 | Performed by: INTERNAL MEDICINE

## 2023-06-13 PROCEDURE — 93306 TTE W/DOPPLER COMPLETE: CPT

## 2023-07-03 ENCOUNTER — PHARMACY VISIT (OUTPATIENT)
Dept: PHARMACY | Facility: MEDICAL CENTER | Age: 68
End: 2023-07-03
Payer: COMMERCIAL

## 2023-07-03 PROCEDURE — RXMED WILLOW AMBULATORY MEDICATION CHARGE: Performed by: FAMILY MEDICINE

## 2023-07-10 ENCOUNTER — TELEPHONE (OUTPATIENT)
Dept: CARDIOLOGY | Facility: MEDICAL CENTER | Age: 68
End: 2023-07-10
Payer: MEDICARE

## 2023-07-10 NOTE — TELEPHONE ENCOUNTER
Spoke with pt and confirmed this will be their first time seeing a cardiologist. Pt has seem [facility] in the past and has not seen a provider outside of [facility]. Confirmed that all recent labs, notes, and cardiac testing are in pts chart. Appointment time, date, and location confirmed with patient.

## 2023-07-18 ENCOUNTER — OFFICE VISIT (OUTPATIENT)
Dept: CARDIOLOGY | Facility: MEDICAL CENTER | Age: 68
End: 2023-07-18
Attending: FAMILY MEDICINE
Payer: MEDICARE

## 2023-07-18 VITALS
DIASTOLIC BLOOD PRESSURE: 64 MMHG | BODY MASS INDEX: 23.99 KG/M2 | OXYGEN SATURATION: 95 % | RESPIRATION RATE: 16 BRPM | HEIGHT: 65 IN | SYSTOLIC BLOOD PRESSURE: 112 MMHG | HEART RATE: 71 BPM | WEIGHT: 144 LBS

## 2023-07-18 DIAGNOSIS — I44.4 LEFT ANTERIOR FASCICULAR BLOCK: ICD-10-CM

## 2023-07-18 DIAGNOSIS — I15.2 HYPERTENSION ASSOCIATED WITH TYPE 2 DIABETES MELLITUS (HCC): ICD-10-CM

## 2023-07-18 DIAGNOSIS — I25.10 CORONARY ARTERY CALCIFICATION SEEN ON CAT SCAN: ICD-10-CM

## 2023-07-18 DIAGNOSIS — E11.59 HYPERTENSION ASSOCIATED WITH TYPE 2 DIABETES MELLITUS (HCC): ICD-10-CM

## 2023-07-18 DIAGNOSIS — R07.89 OTHER CHEST PAIN: ICD-10-CM

## 2023-07-18 DIAGNOSIS — I1A.0 RESISTANT HYPERTENSION: ICD-10-CM

## 2023-07-18 LAB — EKG IMPRESSION: NORMAL

## 2023-07-18 PROCEDURE — 93005 ELECTROCARDIOGRAM TRACING: CPT | Performed by: INTERNAL MEDICINE

## 2023-07-18 PROCEDURE — 3078F DIAST BP <80 MM HG: CPT | Performed by: INTERNAL MEDICINE

## 2023-07-18 PROCEDURE — 99213 OFFICE O/P EST LOW 20 MIN: CPT | Performed by: INTERNAL MEDICINE

## 2023-07-18 PROCEDURE — 3074F SYST BP LT 130 MM HG: CPT | Performed by: INTERNAL MEDICINE

## 2023-07-18 PROCEDURE — 99212 OFFICE O/P EST SF 10 MIN: CPT | Performed by: INTERNAL MEDICINE

## 2023-07-18 PROCEDURE — 93010 ELECTROCARDIOGRAM REPORT: CPT | Performed by: INTERNAL MEDICINE

## 2023-07-18 PROCEDURE — 99204 OFFICE O/P NEW MOD 45 MIN: CPT | Mod: 25 | Performed by: INTERNAL MEDICINE

## 2023-07-18 ASSESSMENT — ENCOUNTER SYMPTOMS
FALLS: 0
NAUSEA: 0
BLURRED VISION: 0
DIZZINESS: 0
CLAUDICATION: 0
FOCAL WEAKNESS: 0
ABDOMINAL PAIN: 0
BRUISES/BLEEDS EASILY: 0
SHORTNESS OF BREATH: 0
PALPITATIONS: 0
WEAKNESS: 0
PND: 0
FEVER: 0
CHILLS: 0
COUGH: 0
SORE THROAT: 0

## 2023-07-18 ASSESSMENT — FIBROSIS 4 INDEX: FIB4 SCORE: 1.22

## 2023-07-18 NOTE — PATIENT INSTRUCTIONS
We discussed that you should talk with primary care (or endocrinology) about oral medication: empagliflozin (JARDIANCE®  Preferred for CAD), dapagliflozin (FARXIGA®, preferred for CKD),  there are cardiovascular benefits but there are also risks.  You may also want to consider liraglutide (Victoza®), semaglutide (Ozempic®), dulaglutide (Trulicity®) which are injection with cardiovascular benefits but also risks.    We discussed adding Jardiance (empagliflozin) 10 mg daily to your regimen to prevent complications from heart disease and help with blood sugar.  Based on large clinical trials Jardiance is expected to reduce your risk by 38% of heart attack or dying from heart disease if you have coronary disease and diabetes (EMPA-REG Study) IF YOU HAVE STENT/OR HEART ATTACK.  In patients without diabetes but who have heart failure, there is a 21-25% relatively less likelihood of dying or being hospitalized for heart failure (EMPEROR Study).  While Jardiance has benefits (most importantly reduce risk for hospitalization for heart failure or heart attack, weight loss, blood pressure control and reduces death) it also has risks due to the fact that you will be urinating excess sugar out of your body. There is increased risk for urinary and yeast infection, which can be very serious, so please report any symptoms as soon as possible to urgent care, primary care or our office.  You should stay well-hydrated on this medication and report abdominal pains.  You are expected to lose weight over time with this medication and may need to reduce your other medications.  It is advised to check your chemistries after starting this medication within the month and then regularly as is typically required for your other medications.    We discussed adding Farxiga (dapagliflozin) 10 mg daily to your regimen to prevent complications from heart disease and help with blood sugar.  Based on large clinical trials Farxiga is expected to  reduce your risk by 39% of heart attack or dying from heart disease of kideny disease if you have chronic kidney disease (DAPA CKD Study).  In patients without diabetes but who have heart failure, there is a 26% relatively less likelihood of dying or being hospitalized for heart failure (DAPA HF Study).  While Farxiga has benefits (most importantly reduce risk for hospitalization for heart failure or heart attack, weight loss, blood pressure control and reduces death) it also has risks due to the fact that you will be urinating excess sugar out of your body. There is increased risk for urinary and yeast infection, which can be very serious, so please report any symptoms as soon as possible to urgent care, primary care or our office.  You should stay well-hydrated on this medication and report abdominal pains.  You are expected to lose weight over time with this medication and may need to reduce your other medications.  It is advised to check your chemistries after starting this medication within the month and then regularly as is typically required for your other medications.          Work on at least 1.5 - 5 hours a week of moderate exercise (typical brisk walking or similar activity)    If you have had a heart attack, stent, bypass or reduced heart strength (EF <35%): cardiac rehab may reduce your risk of dying by 13-24% and need to go to the hospital by 30% within the first year (1)    Please look into the following diets and incorporate them into your diet    LOW SALT DIET   KEEP YOUR SODIUM EQUAL TO CALORIES AND NO MORE THAN DOUBLE THE CALORIES FOR A LOW SALT DIET    Cardiosmart.org - great resource for American College of Cardiology on heart disease prevention and treatment    FOR TREATMENT OR PREVENTION OF CORONARY ARTERY DISEASE  These three programs are approved by Medicare/Insurers for those with heart disease  [facility]  Dr. Hurtado's Program for Reversing Heart Disease -  Kimo Curry's Cardiologist vegetarian-based  [facility] - [place]-based mind-body Program    This is a commonly referenced Program  Dr Jeovanny Estrada over [employer] (book and documentary) - vegetarian-based    FOR TREATMENT OF BLOOD PRESSURE  DASH DIET - American Heart Association for treatment of HYPERTENSION    FOR TREATMENT OF BAD CHOLESTEROL/FATS  REDUCE PROCESSED SUGAR AS MUCH AS POSSIBLE  INCREASE WHOLE GRAINS/VEGETABLES  INCREASE FIBER    Lowering total cholesterol and LDL (bad) cholesterol:  - Eat leaner cuts of meat, or eliminate altogether if possible red meat, and frequently substitute fish or chicken.  - Limit saturated fat to no more than 7-10% of total calories no more than 10 g per day is recommended. Some sources of saturated fat include butter, animal fats, hydrogenated vegetable fats and oils, many desserts, whole milk dairy products.  - Replaced saturated fats with polyunsaturated fats and monounsaturated fats. Foods high in monounsaturated fat include nuts, canola oil, avocados, and olives.  - Limit trans fat (processed foods) and replaced with fresh fruits and vegetables  - Recommend nonfat dairy products  - Increase substantially the amount of soluble fiber intake (legumes such as beans, fruit, whole grains).  - Consider nutritional supplements: plant sterile spreads such as Benecol, fish oil,  flaxseed oil, omega-3 acids capsules 1000 mg twice a day, or viscous fiber such as Metamucil  - Attain ideal weight and regular exercise (at least 30 minutes per day of moderate exercise)  ASK ABOUT STATIN OR NON STATIN MEDICATION TO REDUCE YOUR LDL AND HEART RISK    Lowering triglycerides:  - Reduce intake of simple sugar: Desserts, candy, pastries, honey, sodas, sugared cereals, yogurt, Gatorade, sports bars, canned fruit, smoothies, fruit juice, coffee drinks  - Reduced intake of refined starches: Refined Pasta, most bread  - Reduce or abstain from alcohol  -  Increase omega-3 fatty acids: [place], Trout, Mackerel, [place]ring, [place] tuna and supplements  - Attain ideal weight and regular exercise (at least 30 minutes per day of moderate exercise)  ASK ABOUT PURIFIED OMEGA 3 EPA or FISH OIL TO REDUCE YOUR TG AND HEART RISK    Elevating HDL (good) cholesterol:  - Increase physical activity  - Increase omega-3 fatty acids and supplements as listed above  - Incorporating appropriate amounts of monounsaturated fats such as nuts, olive oil, canola oil, avocados, olives  - Stop smoking  - Attain ideal weight and regular exercise (at least 30 minutes per day of moderate exercise)

## 2023-07-18 NOTE — PROGRESS NOTES
Chief Complaint   Patient presents with    Hypertension     F/V Dx: Essential hypertension    New Patient     NP Dx: Other chest pain       Subjective     Nikolas DEXTER is a 67 y.o. male who presents today  in consultation from Dayana Valerio M.D. for evaluation of    Wanted to start on Jardiance      Past Medical History:   Diagnosis Date    BMI 23.0-23.9, adult 2/8/2023    CKD (chronic kidney disease) stage 3, GFR 30-59 ml/min (HCC)     COPD     Diabetes     GERD (gastroesophageal reflux disease)     Hepatitis C     Hyperlipidemia     Hypertension     Psychiatric disorder     bipolar    Schizoaffective disorder (HCC) 10/01/16     Past Surgical History:   Procedure Laterality Date    OTHER ABDOMINAL SURGERY      OTHER ORTHOPEDIC SURGERY       Family History   Problem Relation Age of Onset    Cancer Father         blader ca    Clotting Disorder Neg Hx      Social History     Socioeconomic History    Marital status: Single     Spouse name: Not on file    Number of children: Not on file    Years of education: Not on file    Highest education level: Not on file   Occupational History    Not on file   Tobacco Use    Smoking status: Former     Packs/day: 0.50     Types: Cigarettes     Quit date: 3/6/2013     Years since quitting: 10.3    Smokeless tobacco: Never   Vaping Use    Vaping Use: Never used   Substance and Sexual Activity    Alcohol use: Not Currently     Comment: sober since HCV diagnosis (2018)    Drug use: Yes     Types: Marijuana    Sexual activity: Not Currently     Partners: Female     Comment: retired, no kids.   Other Topics Concern    Not on file   Social History Narrative    Not on file     Social Determinants of Health     Financial Resource Strain: Medium Risk (1/30/2021)    Overall Financial Resource Strain (CARDIA)     Difficulty of Paying Living Expenses: Somewhat hard   Food Insecurity: Food Insecurity Present (1/30/2021)    Hunger Vital Sign     Worried About Running Out of Food in  the Last Year: Sometimes true     Ran Out of Food in the Last Year: Sometimes true   Transportation Needs: No Transportation Needs (1/30/2021)    PRAPARE - Transportation     Lack of Transportation (Medical): No     Lack of Transportation (Non-Medical): No   Physical Activity: Sufficiently Active (1/30/2021)    Exercise Vital Sign     Days of Exercise per Week: 7 days     Minutes of Exercise per Session: 140 min   Stress: Stress Concern Present (1/30/2021)    [employer] [place] [employer] - Occupational Stress Questionnaire     Feeling of Stress : To some extent   Social Connections: Socially Isolated (1/30/2021)    Social Connection and Isolation Panel [NHANES]     Frequency of Communication with Friends and Family: More than three times a week     Frequency of Social Gatherings with Friends and Family: Never     Attends [employer] Services: Never     Active Member of Clubs or Organizations: No     Attends Club or Organization Meetings: Never     Marital Status: Never    Intimate Partner Violence: Not on file   Housing Stability: High Risk (1/30/2021)    Housing Stability Vital Sign     Unable to Pay for Housing in the Last Year: No     Number of Places Lived in the Last Year: 1     Unstable Housing in the Last Year: Yes     No Known Allergies  Outpatient Encounter Medications as of 7/18/2023   Medication Sig Dispense Refill    ketoconazole (NIZORAL) 2 % Cream Apply twice a day to affected toenails. 30 g 2    latanoprost (XALATAN) 0.005 % Solution Instill 1 drop into both eyes at bedtime 7.5 mL 3    latanoprost (XALATAN) 0.005 % Solution Instill 1 drop into both eyes at bedtime 2.5 mL 12    albuterol 108 (90 Base) MCG/ACT Aero Soln inhalation aerosol Inhale 2 Puffs every four hours as needed for Shortness of Breath. 18 g 1    amLODIPine (NORVASC) 10 MG Tab Take 1 Tablet by mouth every day. 100 Tablet 3    atorvastatin (LIPITOR) 40 MG Tab Take 1 Tablet by mouth every day. 100 Tablet 3    cloNIDine  "(CATAPRES) 0.1 MG Tab Take 1 Tablet by mouth 2 times a day. 200 Tablet 3    insulin regular (NOVOLIN R) 100 Unit/mL Solution AS PER SLIDING SCALE 10 mL 3    levothyroxine (SYNTHROID) 25 MCG Tab Take 1 Tablet by mouth every morning on an empty stomach. 100 Tablet 3    lisinopril (PRINIVIL) 40 MG tablet Take 1 Tablet by mouth every day. 100 Tablet 3    metFORMIN (GLUCOPHAGE) 500 MG Tab Take 1 Tablet by mouth 2 times a day with meals. 200 Tablet 3    metoprolol SR (TOPROL XL) 25 MG TABLET SR 24 HR Take 1 Tablet by mouth every day. 100 Tablet 3    traZODone (DESYREL) 100 MG Tab Take 1 Tablet by mouth every evening. 100 Tablet 3    INSULIN SYRINGE .5CC/29G (ULTICARE INSULIN SYRINGE) 29G X 1/2\" 0.5 ML Misc USE AS DIRECTED BY PHYSICIAN 100 Each 3    glucose blood (CONTOUR NEXT TEST) strip Check fingersticks three times a day. E11.9 300 Strip 3    Blood Glucose Monitoring Suppl (CONTOUR NEXT ONE) Device 1 Glucometer 1 Each 0    aspirin (ASA) 81 MG Chew Tab chewable tablet CHEW 1 TABLET ORALLY ONCE DAILY 100 Tab 3     No facility-administered encounter medications on file as of 7/18/2023.     Review of Systems   Constitutional:  Negative for chills and fever.   HENT:  Negative for sore throat.    Eyes:  Negative for blurred vision.   Respiratory:  Negative for cough and shortness of breath.    Cardiovascular:  Negative for chest pain, palpitations, claudication, leg swelling and PND.   Gastrointestinal:  Negative for abdominal pain and nausea.   Musculoskeletal:  Negative for falls and joint pain.   Skin:  Negative for rash.   Neurological:  Negative for dizziness, focal weakness and weakness.   Endo/Heme/Allergies:  Does not bruise/bleed easily.              Objective     /64 (BP Location: Left arm, Patient Position: Sitting, BP Cuff Size: Adult)   Pulse 71   Resp 16   Ht 1.651 m (5' 5\")   Wt 65.3 kg (144 lb)   SpO2 95%   BMI 23.96 kg/m²     Physical Exam  Constitutional:       General: He is not in acute " distress.     Appearance: He is not diaphoretic.   Eyes:      General: No scleral icterus.  Neck:      Vascular: No JVD.   Cardiovascular:      Rate and Rhythm: Normal rate.      Heart sounds: Normal heart sounds. No murmur heard.     No friction rub. No gallop.   Pulmonary:      Effort: No respiratory distress.      Breath sounds: No wheezing or rales.   Abdominal:      General: Bowel sounds are normal.      Palpations: Abdomen is soft.   Musculoskeletal:      Right lower leg: No edema.      Left lower leg: No edema.   Skin:     Findings: No rash.   Neurological:      Mental Status: He is alert. Mental status is at baseline.   Psychiatric:         Mood and Affect: Mood normal.            We reviewed in person the most recent labs  Recent Results (from the past 5040 hour(s))   MICROALBUMIN CREAT RATIO URINE    Collection Time: 12/28/22  6:10 AM   Result Value Ref Range    Creatinine, Urine 81.75 mg/dL    Microalbumin, Urine Random <1.2 mg/dL    Micro Alb Creat Ratio see below 0 - 30 mg/g   Lipid Profile    Collection Time: 12/28/22  6:10 AM   Result Value Ref Range    Cholesterol,Tot 149 100 - 199 mg/dL    Triglycerides 70 0 - 149 mg/dL    HDL 54 >=40 mg/dL    LDL 81 <100 mg/dL   Comp Metabolic Panel    Collection Time: 12/28/22  6:10 AM   Result Value Ref Range    Sodium 138 135 - 145 mmol/L    Potassium 3.4 (L) 3.6 - 5.5 mmol/L    Chloride 105 96 - 112 mmol/L    Co2 23 20 - 33 mmol/L    Anion Gap 10.0 7.0 - 16.0    Glucose 103 (H) 65 - 99 mg/dL    Bun 12 8 - 22 mg/dL    Creatinine 0.78 0.50 - 1.40 mg/dL    Calcium 9.3 8.5 - 10.5 mg/dL    AST(SGOT) 19 12 - 45 U/L    ALT(SGPT) 13 2 - 50 U/L    Alkaline Phosphatase 75 30 - 99 U/L    Total Bilirubin 0.5 0.1 - 1.5 mg/dL    Albumin 4.5 3.2 - 4.9 g/dL    Total Protein 7.1 6.0 - 8.2 g/dL    Globulin 2.6 1.9 - 3.5 g/dL    A-G Ratio 1.7 g/dL   CBC WITH DIFFERENTIAL    Collection Time: 12/28/22  6:10 AM   Result Value Ref Range    WBC 5.9 4.8 - 10.8 K/uL    RBC 5.11 4.70 -  6.10 M/uL    Hemoglobin 14.4 14.0 - 18.0 g/dL    Hematocrit 44.1 42.0 - 52.0 %    MCV 86.3 81.4 - 97.8 fL    MCH 28.2 27.0 - 33.0 pg    MCHC 32.7 (L) 33.7 - 35.3 g/dL    RDW 42.4 35.9 - 50.0 fL    Platelet Count 322 164 - 446 K/uL    MPV 9.8 9.0 - 12.9 fL    Neutrophils-Polys 57.20 44.00 - 72.00 %    Lymphocytes 33.00 22.00 - 41.00 %    Monocytes 6.80 0.00 - 13.40 %    Eosinophils 1.50 0.00 - 6.90 %    Basophils 0.80 0.00 - 1.80 %    Immature Granulocytes 0.70 0.00 - 0.90 %    Nucleated RBC 0.00 /100 WBC    Neutrophils (Absolute) 3.38 1.82 - 7.42 K/uL    Lymphs (Absolute) 1.95 1.00 - 4.80 K/uL    Monos (Absolute) 0.40 0.00 - 0.85 K/uL    Eos (Absolute) 0.09 0.00 - 0.51 K/uL    Baso (Absolute) 0.05 0.00 - 0.12 K/uL    Immature Granulocytes (abs) 0.04 0.00 - 0.11 K/uL    NRBC (Absolute) 0.00 K/uL   HEMOGLOBIN A1C    Collection Time: 12/28/22  6:10 AM   Result Value Ref Range    Glycohemoglobin 6.2 (H) 4.0 - 5.6 %    Est Avg Glucose 131 mg/dL   ESTIMATED GFR    Collection Time: 12/28/22  6:10 AM   Result Value Ref Range    GFR (CKD-EPI) 97 >60 mL/min/1.73 m 2   CORRECTED CALCIUM    Collection Time: 12/28/22  6:10 AM   Result Value Ref Range    Correct Calcium 8.9 8.5 - 10.5 mg/dL   Prostate Specific Ag Screening    Collection Time: 05/01/23  6:21 AM   Result Value Ref Range    Prostatic Specific Antigen Tot 0.99 0.00 - 4.00 ng/mL   TSH WITH REFLEX TO FT4    Collection Time: 05/01/23  6:21 AM   Result Value Ref Range    TSH 1.180 0.380 - 5.330 uIU/mL   VITAMIN B12    Collection Time: 05/01/23  6:21 AM   Result Value Ref Range    Vitamin B12 -True Cobalamin 377 211 - 911 pg/mL   VITAMIN D,25 HYDROXY (DEFICIENCY)    Collection Time: 05/01/23  6:21 AM   Result Value Ref Range    25-Hydroxy   Vitamin D 25 31 30 - 100 ng/mL   MICROALBUMIN CREAT RATIO URINE    Collection Time: 05/01/23  6:21 AM   Result Value Ref Range    Creatinine, Urine 145.88 mg/dL    Microalbumin, Urine Random 5.3 mg/dL    Micro Alb Creat Ratio 36 (H) 0  - 30 mg/g   Lipid Profile    Collection Time: 23  6:21 AM   Result Value Ref Range    Cholesterol,Tot 146 100 - 199 mg/dL    Triglycerides 73 0 - 149 mg/dL    HDL 52 >=40 mg/dL    LDL 79 <100 mg/dL   Comp Metabolic Panel    Collection Time: 23  6:21 AM   Result Value Ref Range    Sodium 141 135 - 145 mmol/L    Potassium 4.6 3.6 - 5.5 mmol/L    Chloride 108 96 - 112 mmol/L    Co2 24 20 - 33 mmol/L    Anion Gap 9.0 7.0 - 16.0    Glucose 108 (H) 65 - 99 mg/dL    Bun 10 8 - 22 mg/dL    Creatinine 0.79 0.50 - 1.40 mg/dL    Calcium 9.2 8.5 - 10.5 mg/dL    AST(SGOT) 20 12 - 45 U/L    ALT(SGPT) 12 2 - 50 U/L    Alkaline Phosphatase 74 30 - 99 U/L    Total Bilirubin 0.5 0.1 - 1.5 mg/dL    Albumin 4.3 3.2 - 4.9 g/dL    Total Protein 6.8 6.0 - 8.2 g/dL    Globulin 2.5 1.9 - 3.5 g/dL    A-G Ratio 1.7 g/dL   HEMOGLOBIN A1C    Collection Time: 23  6:21 AM   Result Value Ref Range    Glycohemoglobin 6.0 (H) 4.0 - 5.6 %    Est Avg Glucose 126 mg/dL   FASTING STATUS    Collection Time: 23  6:21 AM   Result Value Ref Range    Fasting Status Fasting    CORRECTED CALCIUM    Collection Time: 23  6:21 AM   Result Value Ref Range    Correct Calcium 9.0 8.5 - 10.5 mg/dL   ESTIMATED GFR    Collection Time: 23  6:21 AM   Result Value Ref Range    GFR (CKD-EPI) 97 >60 mL/min/1.73 m 2   EC-ECHOCARDIOGRAM COMPLETE W/O CONT    Collection Time: 23  8:01 AM   Result Value Ref Range    Eject.Frac. MOD BP 56.19     Eject.Frac. MOD 4C 42.62     Eject.Frac. MOD 2C 69.19     Left Ventrical Ejection Fraction 65    EKG    Collection Time: 23 10:56 AM   Result Value Ref Range    Report       [facility]    Test Date:  2023  Pt Name:    AKUA DEXTER            Department: [facility]  MRN:        [id number]                      Room:  Gender:     Male                         Technician: MS  :        [id number]                   Requested By:LORI KO  Order #:    [id number]                     Reading MD:    Measurements  Intervals                                Axis  Rate:       71                           P:          22  TN:         189                          QRS:        -76  QRSD:       91                           T:          75  QT:         419  QTc:        456    Interpretive Statements  Sinus rhythm  Left anterior fascicular block  Minimal ST elevation, anterior leads  Baseline wander in lead(s) V1  Compared to ECG 02/13/2017 03:32:37  Left anterior fascicular block now present  ST (T wave) deviation now present                   Assessment & Plan     1. Other chest pain  EKG      2. Left anterior fascicular block            Medical Decision Making: Today's Assessment/Status/Plan:        It was my pleasure to meet with Mr. DEXTER.    We addressed the management of hypertension at today's visit. Blood pressure is well controlled.  We specifically assessed the labs on hypertension treatment    We addressed the management of dyslipidemia and atherosclerosis at today's visit. He is on appropriate statin.    We addressed the management of atherosclerotic artery disease.  He is on proper antiplatelet, cholesterol management and beta-blockers as appropriate.  We addressed the potential side effects and laboratory follow-up for these medications.    I will see Mr. DEXTER back as needed and encouraged him to follow up with us over the phone or electronically using my MyChart as issues arise.    It is my pleasure to participate in the care of Mr. DEXTER.  Please do not hesitate to contact me with questions or concerns.    Suleman Field MD PhD [facility]  Cardiologist [facility]    Please note that this dictation was created using voice recognition software. There may be errors I did not discover before finalizing the note.

## 2023-07-26 ENCOUNTER — PHARMACY VISIT (OUTPATIENT)
Dept: PHARMACY | Facility: MEDICAL CENTER | Age: 68
End: 2023-07-26
Payer: COMMERCIAL

## 2023-07-26 PROCEDURE — RXMED WILLOW AMBULATORY MEDICATION CHARGE: Performed by: FAMILY MEDICINE

## 2023-07-31 PROCEDURE — RXMED WILLOW AMBULATORY MEDICATION CHARGE: Performed by: FAMILY MEDICINE

## 2023-08-01 ENCOUNTER — PHARMACY VISIT (OUTPATIENT)
Dept: PHARMACY | Facility: MEDICAL CENTER | Age: 68
End: 2023-08-01
Payer: COMMERCIAL

## 2023-08-29 PROCEDURE — RXMED WILLOW AMBULATORY MEDICATION CHARGE: Performed by: FAMILY MEDICINE

## 2023-08-29 PROCEDURE — RXMED WILLOW AMBULATORY MEDICATION CHARGE: Performed by: OPHTHALMOLOGY

## 2023-08-30 ENCOUNTER — PHARMACY VISIT (OUTPATIENT)
Dept: PHARMACY | Facility: MEDICAL CENTER | Age: 68
End: 2023-08-30
Payer: COMMERCIAL

## 2023-09-01 ENCOUNTER — PHARMACY VISIT (OUTPATIENT)
Dept: PHARMACY | Facility: MEDICAL CENTER | Age: 68
End: 2023-09-01

## 2023-09-01 ENCOUNTER — HOSPITAL ENCOUNTER (OUTPATIENT)
Dept: LAB | Facility: MEDICAL CENTER | Age: 68
End: 2023-09-01
Attending: FAMILY MEDICINE
Payer: MEDICARE

## 2023-09-01 DIAGNOSIS — Z79.4 TYPE 2 DIABETES MELLITUS WITH RETINOPATHY, WITH LONG-TERM CURRENT USE OF INSULIN, MACULAR EDEMA PRESENCE UNSPECIFIED, UNSPECIFIED LATERALITY, UNSPECIFIED RETINOPATHY SEVERITY (HCC): ICD-10-CM

## 2023-09-01 DIAGNOSIS — E11.319 TYPE 2 DIABETES MELLITUS WITH RETINOPATHY, WITH LONG-TERM CURRENT USE OF INSULIN, MACULAR EDEMA PRESENCE UNSPECIFIED, UNSPECIFIED LATERALITY, UNSPECIFIED RETINOPATHY SEVERITY (HCC): ICD-10-CM

## 2023-09-01 LAB
CREAT UR-MCNC: 129.01 MG/DL
MICROALBUMIN UR-MCNC: 3.1 MG/DL
MICROALBUMIN/CREAT UR: 24 MG/G (ref 0–30)

## 2023-09-01 PROCEDURE — 82570 ASSAY OF URINE CREATININE: CPT

## 2023-09-01 PROCEDURE — 82043 UR ALBUMIN QUANTITATIVE: CPT

## 2023-09-25 ENCOUNTER — OFFICE VISIT (OUTPATIENT)
Dept: MEDICAL GROUP | Facility: PHYSICIAN GROUP | Age: 68
End: 2023-09-25
Payer: MEDICARE

## 2023-09-25 VITALS
BODY MASS INDEX: 23.49 KG/M2 | HEIGHT: 65 IN | HEART RATE: 82 BPM | SYSTOLIC BLOOD PRESSURE: 120 MMHG | OXYGEN SATURATION: 96 % | DIASTOLIC BLOOD PRESSURE: 78 MMHG | TEMPERATURE: 98.2 F | WEIGHT: 141 LBS

## 2023-09-25 DIAGNOSIS — G47.33 OBSTRUCTIVE SLEEP APNEA SYNDROME: ICD-10-CM

## 2023-09-25 DIAGNOSIS — Z23 NEED FOR VACCINATION: ICD-10-CM

## 2023-09-25 DIAGNOSIS — I15.2 HYPERTENSION ASSOCIATED WITH TYPE 2 DIABETES MELLITUS (HCC): ICD-10-CM

## 2023-09-25 DIAGNOSIS — R07.89 OTHER CHEST PAIN: ICD-10-CM

## 2023-09-25 DIAGNOSIS — E11.59 HYPERTENSION ASSOCIATED WITH TYPE 2 DIABETES MELLITUS (HCC): ICD-10-CM

## 2023-09-25 DIAGNOSIS — I70.0 ATHEROSCLEROSIS OF ABDOMINAL AORTA (HCC): ICD-10-CM

## 2023-09-25 DIAGNOSIS — E03.4 HYPOTHYROIDISM DUE TO ACQUIRED ATROPHY OF THYROID: ICD-10-CM

## 2023-09-25 DIAGNOSIS — E11.319 TYPE 2 DIABETES MELLITUS WITH RETINOPATHY, WITH LONG-TERM CURRENT USE OF INSULIN, MACULAR EDEMA PRESENCE UNSPECIFIED, UNSPECIFIED LATERALITY, UNSPECIFIED RETINOPATHY SEVERITY (HCC): ICD-10-CM

## 2023-09-25 DIAGNOSIS — E78.5 DYSLIPIDEMIA DUE TO TYPE 2 DIABETES MELLITUS (HCC): ICD-10-CM

## 2023-09-25 DIAGNOSIS — G47.09 OTHER INSOMNIA: ICD-10-CM

## 2023-09-25 DIAGNOSIS — Z79.4 TYPE 2 DIABETES MELLITUS WITH RETINOPATHY, WITH LONG-TERM CURRENT USE OF INSULIN, MACULAR EDEMA PRESENCE UNSPECIFIED, UNSPECIFIED LATERALITY, UNSPECIFIED RETINOPATHY SEVERITY (HCC): ICD-10-CM

## 2023-09-25 DIAGNOSIS — E11.69 DYSLIPIDEMIA DUE TO TYPE 2 DIABETES MELLITUS (HCC): ICD-10-CM

## 2023-09-25 LAB
HBA1C MFR BLD: 6.1 % (ref ?–5.8)
POCT INT CON NEG: NEGATIVE
POCT INT CON POS: POSITIVE

## 2023-09-25 PROCEDURE — 83036 HEMOGLOBIN GLYCOSYLATED A1C: CPT | Performed by: FAMILY MEDICINE

## 2023-09-25 PROCEDURE — 90662 IIV NO PRSV INCREASED AG IM: CPT | Performed by: FAMILY MEDICINE

## 2023-09-25 PROCEDURE — 3078F DIAST BP <80 MM HG: CPT | Performed by: FAMILY MEDICINE

## 2023-09-25 PROCEDURE — 3074F SYST BP LT 130 MM HG: CPT | Performed by: FAMILY MEDICINE

## 2023-09-25 PROCEDURE — 99214 OFFICE O/P EST MOD 30 MIN: CPT | Mod: 25 | Performed by: FAMILY MEDICINE

## 2023-09-25 PROCEDURE — G0008 ADMIN INFLUENZA VIRUS VAC: HCPCS | Performed by: FAMILY MEDICINE

## 2023-09-25 RX ORDER — METOPROLOL SUCCINATE 25 MG/1
25 TABLET, EXTENDED RELEASE ORAL DAILY
Qty: 100 TABLET | Refills: 3 | Status: SHIPPED | OUTPATIENT
Start: 2023-09-25

## 2023-09-25 RX ORDER — CLONIDINE HYDROCHLORIDE 0.1 MG/1
0.1 TABLET ORAL 2 TIMES DAILY
Qty: 200 TABLET | Refills: 3 | Status: SHIPPED | OUTPATIENT
Start: 2023-09-25

## 2023-09-25 RX ORDER — ATORVASTATIN CALCIUM 40 MG/1
40 TABLET, FILM COATED ORAL DAILY
Qty: 100 TABLET | Refills: 3 | Status: SHIPPED | OUTPATIENT
Start: 2023-09-25

## 2023-09-25 RX ORDER — TRAZODONE HYDROCHLORIDE 100 MG/1
100 TABLET ORAL NIGHTLY
Qty: 100 TABLET | Refills: 3 | Status: SHIPPED | OUTPATIENT
Start: 2023-09-25

## 2023-09-25 RX ORDER — LEVOTHYROXINE SODIUM 0.03 MG/1
25 TABLET ORAL
Qty: 100 TABLET | Refills: 3 | Status: SHIPPED | OUTPATIENT
Start: 2023-09-25

## 2023-09-25 RX ORDER — AMLODIPINE BESYLATE 10 MG/1
10 TABLET ORAL DAILY
Qty: 100 TABLET | Refills: 3 | Status: SHIPPED | OUTPATIENT
Start: 2023-09-25

## 2023-09-25 RX ORDER — LISINOPRIL 40 MG/1
40 TABLET ORAL DAILY
Qty: 100 TABLET | Refills: 3 | Status: SHIPPED | OUTPATIENT
Start: 2023-09-25

## 2023-09-25 ASSESSMENT — FIBROSIS 4 INDEX: FIB4 SCORE: 1.22

## 2023-09-25 ASSESSMENT — ENCOUNTER SYMPTOMS
CHILLS: 0
SHORTNESS OF BREATH: 0
FEVER: 0

## 2023-09-25 NOTE — PROGRESS NOTES
Subjective:     CC:   Chief Complaint   Patient presents with    Follow-Up     Experienced arm pain last week.   Monofilament overdue.    Lab Results     Blood work done on 09/01         HPI:   AKUA presents today for a follow-up visit..  Last seen by me on May 23.    Since our last appointment, has been seen by:  Cardiology-July.  Recommendations to follow-up as needed.      Problem   Atherosclerosis of Abdominal Aorta (Hcc)    New issue.  Noted on aorta ultrasound completed October 14, 2022.  Was noted to have mild atherosclerosis of the abdominal aorta.  Continues to take Lipitor 40 mg and aspirin 81 mg daily     Other Chest Pain    Checks pulse at times at home and has HR in the 47-49s range. Denies any lightheadedness or dizziness during these times. Continues to have intermittent left sided chest pain thruout the day.occurs at rest and with exertion. Had a stress test completed in march 2023- negative. Had a consultation with cards in July. Left dissatisfied as still unsure what's going on with his heart.      Sleep Apnea    Chronic.  Not treating--was on cpap but discontinued.      Type 2 Diabetes Mellitus With Retinopathy, With Long-Term Current Use of Insulin (Hcc)    Chronic.  Metformin 500mg bid  SSI prn- infrequently- about once a month  Next eye appt in NOvember 2023  A1c today was 6.1%       Dyslipidemia Due to Type 2 Diabetes Mellitus (Hcc)    Chronic medical diagnosis.  Continues to take Lipitor 40 mg daily     Latest Reference Range & Units 05/01/23 06:21   Cholesterol,Tot 100 - 199 mg/dL 146   Triglycerides 0 - 149 mg/dL 73   HDL >=40 mg/dL 52   LDL <100 mg/dL 79            Current Outpatient Medications Ordered in Epic   Medication Sig Dispense Refill    lisinopril (PRINIVIL) 40 MG tablet Take 1 Tablet by mouth every day. 100 Tablet 3    cloNIDine (CATAPRES) 0.1 MG Tab Take 1 Tablet by mouth 2 times a day. 200 Tablet 3    metoprolol SR (TOPROL XL) 25 MG TABLET SR 24 HR Take 1 Tablet by mouth  "every day. 100 Tablet 3    metFORMIN (GLUCOPHAGE) 500 MG Tab Take 1 Tablet by mouth 2 times a day with meals. 200 Tablet 3    amLODIPine (NORVASC) 10 MG Tab Take 1 Tablet by mouth every day. 100 Tablet 3    atorvastatin (LIPITOR) 40 MG Tab Take 1 Tablet by mouth every day. 100 Tablet 3    traZODone (DESYREL) 100 MG Tab Take 1 Tablet by mouth every evening. 100 Tablet 3    levothyroxine (SYNTHROID) 25 MCG Tab Take 1 Tablet by mouth every morning on an empty stomach. 100 Tablet 3    ketoconazole (NIZORAL) 2 % Cream Apply twice a day to affected toenails. 30 g 2    latanoprost (XALATAN) 0.005 % Solution Instill 1 drop into both eyes at bedtime 7.5 mL 3    albuterol 108 (90 Base) MCG/ACT Aero Soln inhalation aerosol Inhale 2 Puffs every four hours as needed for Shortness of Breath. 18 g 1    insulin regular (NOVOLIN R) 100 Unit/mL Solution AS PER SLIDING SCALE 10 mL 3    INSULIN SYRINGE .5CC/29G (ULTICARE INSULIN SYRINGE) 29G X 1/2\" 0.5 ML Misc USE AS DIRECTED BY PHYSICIAN 100 Each 3    glucose blood (CONTOUR NEXT TEST) strip Check fingersticks three times a day. E11.9 300 Strip 3    Blood Glucose Monitoring Suppl (CONTOUR NEXT ONE) Device 1 Glucometer 1 Each 0    aspirin (ASA) 81 MG Chew Tab chewable tablet CHEW 1 TABLET ORALLY ONCE DAILY 100 Tab 3     No current Epic-ordered facility-administered medications on file.       Health Maintenance: flu vaccine today  S/p covid vaccine on 9/20/23.     ROS:  Review of Systems   Constitutional:  Positive for malaise/fatigue. Negative for chills and fever.   Respiratory:  Negative for shortness of breath.    Cardiovascular:  Positive for chest pain.       Objective:     Exam:  /78 (BP Location: Right arm, BP Cuff Size: Adult)   Pulse 82   Temp 36.8 °C (98.2 °F) (Temporal)   Ht 1.651 m (5' 5\")   Wt 64 kg (141 lb)   SpO2 96%   BMI 23.46 kg/m²  Body mass index is 23.46 kg/m².    Physical Exam  Constitutional:       Appearance: Normal appearance.   Eyes:      " Extraocular Movements: Extraocular movements intact.   Cardiovascular:      Rate and Rhythm: Normal rate and regular rhythm.   Pulmonary:      Effort: Pulmonary effort is normal.      Breath sounds: Normal breath sounds.   Neurological:      Mental Status: He is alert.   Psychiatric:         Mood and Affect: Mood normal.         Behavior: Behavior normal.         Monofilament examination shows intact sensation in 6 over 6 areas tested bilaterally.   Visual examination revels no lesions, ulcers. Pulses 2+ and symmetrical -DP and PT. Good capillary refill, less than 2 seconds         Labs: See above    Assessment & Plan:     67 y.o. male with the following -     1. Type 2 diabetes mellitus with retinopathy, with long-term current use of insulin, macular edema presence unspecified, unspecified laterality, unspecified retinopathy severity (HCC)  Chronic medical diagnosis.  Stable.  A1c checked in the office today slightly increased to 6.1%.  Continues to take metformin 500 mg twice a day.  States that he uses sliding scale insulin approximately once a month.  Will be following up with his eye doctor in November.  Encouraged to have them send me reports.  - Diabetic Monofilament Lower Extremity Exam  - POCT Hemoglobin A1C  - MICROALBUMIN CREAT RATIO URINE; Future  - VITAMIN D,25 HYDROXY (DEFICIENCY); Future  - VITAMIN B12; Future  - TSH WITH REFLEX TO FT4; Future  - Comp Metabolic Panel; Future  - CBC WITH DIFFERENTIAL; Future  - HEMOGLOBIN A1C; Future  - metFORMIN (GLUCOPHAGE) 500 MG Tab; Take 1 Tablet by mouth 2 times a day with meals.  Dispense: 200 Tablet; Refill: 3    2. Dyslipidemia due to type 2 diabetes mellitus (HCC)  Chronic.  Stable.  Continue with atorvastatin 40 mg daily.  - Lipid Profile; Future  - atorvastatin (LIPITOR) 40 MG Tab; Take 1 Tablet by mouth every day.  Dispense: 100 Tablet; Refill: 3    3. Atherosclerosis of abdominal aorta (HCC)  New medical diagnosis.  Noted on aorta sonogram completed in  October 2022.  Continue with atorvastatin 40 mg daily.    4. Need for vaccination  - Influenza Vaccine, High Dose (65+ Only)    5. Hypertension associated with type 2 diabetes mellitus (HCC)  Chronic medical diagnosis.  Stable.  Blood pressure today in the office 120/78.  Continues to take lisinopril 40 mg daily, clonidine 0.1 mg twice a day, metoprolol SR 25 mg daily, and amlodipine 10 mg daily.  - lisinopril (PRINIVIL) 40 MG tablet; Take 1 Tablet by mouth every day.  Dispense: 100 Tablet; Refill: 3  - cloNIDine (CATAPRES) 0.1 MG Tab; Take 1 Tablet by mouth 2 times a day.  Dispense: 200 Tablet; Refill: 3  - metoprolol SR (TOPROL XL) 25 MG TABLET SR 24 HR; Take 1 Tablet by mouth every day.  Dispense: 100 Tablet; Refill: 3  - amLODIPine (NORVASC) 10 MG Tab; Take 1 Tablet by mouth every day.  Dispense: 100 Tablet; Refill: 3    6. Obstructive sleep apnea syndrome  Chronic medical diagnosis.  Not well controlled.  Does have a diagnosis of sleep apnea but currently not using a CPAP.  We discussed that untreated sleep apnea can contribute to cardiac arrhythmias and fatigue.  He thinks that he may have his machine in boxes.  He will check and let us know.    7. Other chest pain  Chronic medical diagnosis.  Not improving.  Has had his follow-up with cardiology as well as an echocardiogram and a stress test.  Does not feel that all of his questions and concerns were addressed at the last cardiology appointment.  We discussed treating his sleep apnea and then deciding if he would like to pursue further evaluation with a different cardiologist.    8. Other insomnia  Chronic medical diagnosis.  Stable with trazodone 100 mg daily.  - traZODone (DESYREL) 100 MG Tab; Take 1 Tablet by mouth every evening.  Dispense: 100 Tablet; Refill: 3    9. Hypothyroidism due to acquired atrophy of thyroid  Chronic medical diagnosis.  Stable with levothyroxine 25 mcg daily.  - levothyroxine (SYNTHROID) 25 MCG Tab; Take 1 Tablet by mouth every  morning on an empty stomach.  Dispense: 100 Tablet; Refill: 3             Return in about 3 months (around 12/25/2023) for Diabetes.    Please note that this dictation was created using voice recognition software. I have made every reasonable attempt to correct obvious errors, but I expect that there are errors of grammar and possibly content that I did not discover before finalizing the note.

## 2023-09-28 ENCOUNTER — PHARMACY VISIT (OUTPATIENT)
Dept: PHARMACY | Facility: MEDICAL CENTER | Age: 68
End: 2023-09-28
Payer: COMMERCIAL

## 2023-09-28 PROCEDURE — RXMED WILLOW AMBULATORY MEDICATION CHARGE: Performed by: FAMILY MEDICINE

## 2023-09-28 PROCEDURE — RXMED WILLOW AMBULATORY MEDICATION CHARGE: Performed by: OPTOMETRIST

## 2023-10-08 PROCEDURE — RXMED WILLOW AMBULATORY MEDICATION CHARGE: Performed by: FAMILY MEDICINE

## 2023-10-10 ENCOUNTER — PHARMACY VISIT (OUTPATIENT)
Dept: PHARMACY | Facility: MEDICAL CENTER | Age: 68
End: 2023-10-10
Payer: COMMERCIAL

## 2023-10-30 PROCEDURE — RXMED WILLOW AMBULATORY MEDICATION CHARGE: Performed by: FAMILY MEDICINE

## 2023-11-01 ENCOUNTER — PHARMACY VISIT (OUTPATIENT)
Dept: PHARMACY | Facility: MEDICAL CENTER | Age: 68
End: 2023-11-01
Payer: COMMERCIAL

## 2023-12-14 ENCOUNTER — HOSPITAL ENCOUNTER (OUTPATIENT)
Dept: LAB | Facility: MEDICAL CENTER | Age: 68
End: 2023-12-14
Attending: FAMILY MEDICINE
Payer: MEDICARE

## 2023-12-14 DIAGNOSIS — Z79.4 TYPE 2 DIABETES MELLITUS WITH RETINOPATHY, WITH LONG-TERM CURRENT USE OF INSULIN, MACULAR EDEMA PRESENCE UNSPECIFIED, UNSPECIFIED LATERALITY, UNSPECIFIED RETINOPATHY SEVERITY (HCC): ICD-10-CM

## 2023-12-14 DIAGNOSIS — E78.5 DYSLIPIDEMIA DUE TO TYPE 2 DIABETES MELLITUS (HCC): ICD-10-CM

## 2023-12-14 DIAGNOSIS — E11.69 DYSLIPIDEMIA DUE TO TYPE 2 DIABETES MELLITUS (HCC): ICD-10-CM

## 2023-12-14 DIAGNOSIS — E11.319 TYPE 2 DIABETES MELLITUS WITH RETINOPATHY, WITH LONG-TERM CURRENT USE OF INSULIN, MACULAR EDEMA PRESENCE UNSPECIFIED, UNSPECIFIED LATERALITY, UNSPECIFIED RETINOPATHY SEVERITY (HCC): ICD-10-CM

## 2023-12-14 LAB
25(OH)D3 SERPL-MCNC: 28 NG/ML (ref 30–100)
ALBUMIN SERPL BCP-MCNC: 4.5 G/DL (ref 3.2–4.9)
ALBUMIN/GLOB SERPL: 1.6 G/DL
ALP SERPL-CCNC: 78 U/L (ref 30–99)
ALT SERPL-CCNC: 16 U/L (ref 2–50)
ANION GAP SERPL CALC-SCNC: 12 MMOL/L (ref 7–16)
AST SERPL-CCNC: 18 U/L (ref 12–45)
BASOPHILS # BLD AUTO: 1.2 % (ref 0–1.8)
BASOPHILS # BLD: 0.05 K/UL (ref 0–0.12)
BILIRUB SERPL-MCNC: 0.5 MG/DL (ref 0.1–1.5)
BUN SERPL-MCNC: 14 MG/DL (ref 8–22)
CALCIUM ALBUM COR SERPL-MCNC: 9.2 MG/DL (ref 8.5–10.5)
CALCIUM SERPL-MCNC: 9.6 MG/DL (ref 8.5–10.5)
CHLORIDE SERPL-SCNC: 104 MMOL/L (ref 96–112)
CHOLEST SERPL-MCNC: 131 MG/DL (ref 100–199)
CO2 SERPL-SCNC: 25 MMOL/L (ref 20–33)
CREAT SERPL-MCNC: 0.95 MG/DL (ref 0.5–1.4)
CREAT UR-MCNC: 133.41 MG/DL
EOSINOPHIL # BLD AUTO: 0.04 K/UL (ref 0–0.51)
EOSINOPHIL NFR BLD: 0.9 % (ref 0–6.9)
ERYTHROCYTE [DISTWIDTH] IN BLOOD BY AUTOMATED COUNT: 41.1 FL (ref 35.9–50)
EST. AVERAGE GLUCOSE BLD GHB EST-MCNC: 128 MG/DL
FASTING STATUS PATIENT QL REPORTED: NORMAL
GFR SERPLBLD CREATININE-BSD FMLA CKD-EPI: 87 ML/MIN/1.73 M 2
GLOBULIN SER CALC-MCNC: 2.8 G/DL (ref 1.9–3.5)
GLUCOSE SERPL-MCNC: 109 MG/DL (ref 65–99)
HBA1C MFR BLD: 6.1 % (ref 4–5.6)
HCT VFR BLD AUTO: 43 % (ref 42–52)
HDLC SERPL-MCNC: 47 MG/DL
HGB BLD-MCNC: 14.3 G/DL (ref 14–18)
IMM GRANULOCYTES # BLD AUTO: 0.02 K/UL (ref 0–0.11)
IMM GRANULOCYTES NFR BLD AUTO: 0.5 % (ref 0–0.9)
LDLC SERPL CALC-MCNC: 68 MG/DL
LYMPHOCYTES # BLD AUTO: 1.3 K/UL (ref 1–4.8)
LYMPHOCYTES NFR BLD: 30.4 % (ref 22–41)
MCH RBC QN AUTO: 28.1 PG (ref 27–33)
MCHC RBC AUTO-ENTMCNC: 33.3 G/DL (ref 32.3–36.5)
MCV RBC AUTO: 84.6 FL (ref 81.4–97.8)
MICROALBUMIN UR-MCNC: 6 MG/DL
MICROALBUMIN/CREAT UR: 45 MG/G (ref 0–30)
MONOCYTES # BLD AUTO: 0.3 K/UL (ref 0–0.85)
MONOCYTES NFR BLD AUTO: 7 % (ref 0–13.4)
NEUTROPHILS # BLD AUTO: 2.57 K/UL (ref 1.82–7.42)
NEUTROPHILS NFR BLD: 60 % (ref 44–72)
NRBC # BLD AUTO: 0 K/UL
NRBC BLD-RTO: 0 /100 WBC (ref 0–0.2)
PLATELET # BLD AUTO: 278 K/UL (ref 164–446)
PMV BLD AUTO: 9.7 FL (ref 9–12.9)
POTASSIUM SERPL-SCNC: 3.9 MMOL/L (ref 3.6–5.5)
PROT SERPL-MCNC: 7.3 G/DL (ref 6–8.2)
RBC # BLD AUTO: 5.08 M/UL (ref 4.7–6.1)
SODIUM SERPL-SCNC: 141 MMOL/L (ref 135–145)
TRIGL SERPL-MCNC: 79 MG/DL (ref 0–149)
TSH SERPL DL<=0.005 MIU/L-ACNC: 1.08 UIU/ML (ref 0.38–5.33)
VIT B12 SERPL-MCNC: 484 PG/ML (ref 211–911)
WBC # BLD AUTO: 4.3 K/UL (ref 4.8–10.8)

## 2023-12-14 PROCEDURE — 80053 COMPREHEN METABOLIC PANEL: CPT

## 2023-12-14 PROCEDURE — 82043 UR ALBUMIN QUANTITATIVE: CPT

## 2023-12-14 PROCEDURE — 85025 COMPLETE CBC W/AUTO DIFF WBC: CPT

## 2023-12-14 PROCEDURE — 82570 ASSAY OF URINE CREATININE: CPT

## 2023-12-14 PROCEDURE — 82607 VITAMIN B-12: CPT

## 2023-12-14 PROCEDURE — 83036 HEMOGLOBIN GLYCOSYLATED A1C: CPT

## 2023-12-14 PROCEDURE — 80061 LIPID PANEL: CPT

## 2023-12-14 PROCEDURE — 82306 VITAMIN D 25 HYDROXY: CPT

## 2023-12-14 PROCEDURE — 36415 COLL VENOUS BLD VENIPUNCTURE: CPT

## 2023-12-14 PROCEDURE — 84443 ASSAY THYROID STIM HORMONE: CPT

## 2023-12-15 DIAGNOSIS — Z79.4 TYPE 2 DIABETES MELLITUS WITH RETINOPATHY, WITH LONG-TERM CURRENT USE OF INSULIN, MACULAR EDEMA PRESENCE UNSPECIFIED, UNSPECIFIED LATERALITY, UNSPECIFIED RETINOPATHY SEVERITY (HCC): ICD-10-CM

## 2023-12-15 DIAGNOSIS — E11.319 TYPE 2 DIABETES MELLITUS WITH RETINOPATHY, WITH LONG-TERM CURRENT USE OF INSULIN, MACULAR EDEMA PRESENCE UNSPECIFIED, UNSPECIFIED LATERALITY, UNSPECIFIED RETINOPATHY SEVERITY (HCC): ICD-10-CM

## 2023-12-26 DIAGNOSIS — Z79.4 CONTROLLED TYPE 2 DIABETES MELLITUS WITHOUT COMPLICATION, WITH LONG-TERM CURRENT USE OF INSULIN (HCC): ICD-10-CM

## 2023-12-26 DIAGNOSIS — E11.9 CONTROLLED TYPE 2 DIABETES MELLITUS WITHOUT COMPLICATION, WITH LONG-TERM CURRENT USE OF INSULIN (HCC): ICD-10-CM

## 2023-12-26 PROCEDURE — RXMED WILLOW AMBULATORY MEDICATION CHARGE: Performed by: OPTOMETRIST

## 2023-12-26 PROCEDURE — RXMED WILLOW AMBULATORY MEDICATION CHARGE: Performed by: INTERNAL MEDICINE

## 2023-12-26 NOTE — TELEPHONE ENCOUNTER
Received request via: Pharmacy    Was the patient seen in the last year in this department? Yes  9/25/2023  Does the patient have an active prescription (recently filled or refills available) for medication(s) requested? No    Does the patient have residential Plus and need 100 day supply (blood pressure, diabetes and cholesterol meds only)? Yes, quantity updated to 100 days

## 2023-12-28 ENCOUNTER — PHARMACY VISIT (OUTPATIENT)
Dept: PHARMACY | Facility: MEDICAL CENTER | Age: 68
End: 2023-12-28
Payer: COMMERCIAL

## 2024-01-10 ENCOUNTER — HOSPITAL ENCOUNTER (OUTPATIENT)
Dept: LAB | Facility: MEDICAL CENTER | Age: 69
End: 2024-01-10
Attending: FAMILY MEDICINE
Payer: MEDICARE

## 2024-01-10 LAB
BASOPHILS # BLD AUTO: 1.3 % (ref 0–1.8)
BASOPHILS # BLD: 0.08 K/UL (ref 0–0.12)
EOSINOPHIL # BLD AUTO: 0.03 K/UL (ref 0–0.51)
EOSINOPHIL NFR BLD: 0.5 % (ref 0–6.9)
ERYTHROCYTE [DISTWIDTH] IN BLOOD BY AUTOMATED COUNT: 39.5 FL (ref 35.9–50)
HCT VFR BLD AUTO: 39.6 % (ref 42–52)
HGB BLD-MCNC: 13.4 G/DL (ref 14–18)
IMM GRANULOCYTES # BLD AUTO: 0.08 K/UL (ref 0–0.11)
IMM GRANULOCYTES NFR BLD AUTO: 1.3 % (ref 0–0.9)
LYMPHOCYTES # BLD AUTO: 1.26 K/UL (ref 1–4.8)
LYMPHOCYTES NFR BLD: 19.8 % (ref 22–41)
MCH RBC QN AUTO: 28 PG (ref 27–33)
MCHC RBC AUTO-ENTMCNC: 33.8 G/DL (ref 32.3–36.5)
MCV RBC AUTO: 82.7 FL (ref 81.4–97.8)
MONOCYTES # BLD AUTO: 0.39 K/UL (ref 0–0.85)
MONOCYTES NFR BLD AUTO: 6.1 % (ref 0–13.4)
NEUTROPHILS # BLD AUTO: 4.51 K/UL (ref 1.82–7.42)
NEUTROPHILS NFR BLD: 71 % (ref 44–72)
NRBC # BLD AUTO: 0 K/UL
NRBC BLD-RTO: 0 /100 WBC (ref 0–0.2)
PLATELET # BLD AUTO: 268 K/UL (ref 164–446)
PMV BLD AUTO: 9.7 FL (ref 9–12.9)
RBC # BLD AUTO: 4.79 M/UL (ref 4.7–6.1)
WBC # BLD AUTO: 6.4 K/UL (ref 4.8–10.8)

## 2024-01-10 PROCEDURE — 85025 COMPLETE CBC W/AUTO DIFF WBC: CPT

## 2024-01-10 PROCEDURE — 36415 COLL VENOUS BLD VENIPUNCTURE: CPT

## 2024-01-11 ASSESSMENT — ENCOUNTER SYMPTOMS: GENERAL WELL-BEING: FAIR

## 2024-01-11 ASSESSMENT — ACTIVITIES OF DAILY LIVING (ADL): BATHING_REQUIRES_ASSISTANCE: 0

## 2024-01-11 ASSESSMENT — PATIENT HEALTH QUESTIONNAIRE - PHQ9: CLINICAL INTERPRETATION OF PHQ2 SCORE: 4

## 2024-01-12 NOTE — ASSESSMENT & PLAN NOTE
Chronic, stable. Diagnosis made following abdominal US from October 2022. Associated with HLD, DM2. Continue atorvastatin 40mg daily and ASA 81mg. Encouraged Mediterranean diet and regular physical exercise. Follow up with PCP at least annually for continued monitoring.

## 2024-01-12 NOTE — ASSESSMENT & PLAN NOTE
Chronic, stable. Maintains on statin therapy without issue. Most recent lipid panel from 5/2023 WNL. Continue atorvastatin 40mg daily. Recommend Mediterranean diet and regular physical activity. Follow up with PCP at least annually for continued monitoring and management.

## 2024-01-12 NOTE — ASSESSMENT & PLAN NOTE
Chronic, stable. BP today 122/70. Pt does monitor BP at home which he reports have been within goal. Denies dizziness/lightheadedness. Continue current treatment regime: amlodipine 10mg, lisinopril 40mg, metoprolol SR 25mg daily, and clonidine 0.1mg BID. Follow up with PCP at least annually for continued monitoring and management.

## 2024-01-12 NOTE — ASSESSMENT & PLAN NOTE
Chronic, stable. Maintains on only PRN use of albuterol which he reports is seldom. Last PFT 12/2022 which was normal but there was some evidence of airtrapping. Follow up with PCP for continued monitoring and management.

## 2024-01-12 NOTE — ASSESSMENT & PLAN NOTE
Chronic condition, likely uncontrolled but patient reluctant to discuss diagnosis in any great detail. Pt has previously required psychiatric pharmacotherapy as well as 5150 but now maintains only on medical marijuana. His PHQ9 today is 13/27. He does endorse thoughts of hurting himself but denies any active plan. He is predominantly concerned with government and the state of our [place]. He refuses medication and psychiatry referral. He did see his PCP yesterday. Advise follow up with PCP for further discussion and management.

## 2024-01-12 NOTE — ASSESSMENT & PLAN NOTE
Chronic, stable. He discontinued illicit drug use in 2017. Follow up with PCP for continued monitoring and management.

## 2024-01-12 NOTE — ASSESSMENT & PLAN NOTE
Chronic condition. Pt has previously used CPAP to manage disease but discontinued this several years ago. He denies daytime somnolence, snoring. Discussed untreated RAJWINDER is associated with increased risk of cardiovascular disease and arrhythmia. Follow up with PCP for continued monitoring.

## 2024-01-15 ENCOUNTER — OFFICE VISIT (OUTPATIENT)
Dept: MEDICAL GROUP | Facility: PHYSICIAN GROUP | Age: 69
End: 2024-01-15
Payer: MEDICARE

## 2024-01-15 VITALS
HEIGHT: 65 IN | OXYGEN SATURATION: 95 % | WEIGHT: 140.4 LBS | DIASTOLIC BLOOD PRESSURE: 78 MMHG | TEMPERATURE: 98.4 F | HEART RATE: 98 BPM | BODY MASS INDEX: 23.39 KG/M2 | SYSTOLIC BLOOD PRESSURE: 122 MMHG | RESPIRATION RATE: 14 BRPM

## 2024-01-15 DIAGNOSIS — E11.319 TYPE 2 DIABETES MELLITUS WITH RETINOPATHY, WITH LONG-TERM CURRENT USE OF INSULIN, MACULAR EDEMA PRESENCE UNSPECIFIED, UNSPECIFIED LATERALITY, UNSPECIFIED RETINOPATHY SEVERITY (HCC): ICD-10-CM

## 2024-01-15 DIAGNOSIS — N63.21 MASS OF UPPER OUTER QUADRANT OF LEFT BREAST: ICD-10-CM

## 2024-01-15 DIAGNOSIS — E55.9 VITAMIN D DEFICIENCY: ICD-10-CM

## 2024-01-15 DIAGNOSIS — E11.29 TYPE 2 DIABETES MELLITUS WITH MICROALBUMINURIA, WITHOUT LONG-TERM CURRENT USE OF INSULIN (HCC): ICD-10-CM

## 2024-01-15 DIAGNOSIS — I15.2 HYPERTENSION ASSOCIATED WITH TYPE 2 DIABETES MELLITUS (HCC): ICD-10-CM

## 2024-01-15 DIAGNOSIS — E11.59 HYPERTENSION ASSOCIATED WITH TYPE 2 DIABETES MELLITUS (HCC): ICD-10-CM

## 2024-01-15 DIAGNOSIS — E11.29 MICROALBUMINURIA DUE TO TYPE 2 DIABETES MELLITUS (HCC): ICD-10-CM

## 2024-01-15 DIAGNOSIS — R80.9 MICROALBUMINURIA DUE TO TYPE 2 DIABETES MELLITUS (HCC): ICD-10-CM

## 2024-01-15 DIAGNOSIS — F25.9 SCHIZOAFFECTIVE DISORDER, UNSPECIFIED TYPE (HCC): ICD-10-CM

## 2024-01-15 DIAGNOSIS — E78.5 DYSLIPIDEMIA DUE TO TYPE 2 DIABETES MELLITUS (HCC): ICD-10-CM

## 2024-01-15 DIAGNOSIS — R80.9 TYPE 2 DIABETES MELLITUS WITH MICROALBUMINURIA, WITHOUT LONG-TERM CURRENT USE OF INSULIN (HCC): ICD-10-CM

## 2024-01-15 DIAGNOSIS — D64.89 ANEMIA DUE TO OTHER CAUSE, NOT CLASSIFIED: ICD-10-CM

## 2024-01-15 DIAGNOSIS — I70.0 ATHEROSCLEROSIS OF ABDOMINAL AORTA (HCC): ICD-10-CM

## 2024-01-15 DIAGNOSIS — E11.69 DYSLIPIDEMIA DUE TO TYPE 2 DIABETES MELLITUS (HCC): ICD-10-CM

## 2024-01-15 DIAGNOSIS — Z79.4 TYPE 2 DIABETES MELLITUS WITH RETINOPATHY, WITH LONG-TERM CURRENT USE OF INSULIN, MACULAR EDEMA PRESENCE UNSPECIFIED, UNSPECIFIED LATERALITY, UNSPECIFIED RETINOPATHY SEVERITY (HCC): ICD-10-CM

## 2024-01-15 DIAGNOSIS — F19.11 HISTORY OF SUBSTANCE ABUSE (HCC): ICD-10-CM

## 2024-01-15 DIAGNOSIS — J41.0 SIMPLE CHRONIC BRONCHITIS (HCC): ICD-10-CM

## 2024-01-15 PROCEDURE — 3074F SYST BP LT 130 MM HG: CPT | Performed by: FAMILY MEDICINE

## 2024-01-15 PROCEDURE — 99215 OFFICE O/P EST HI 40 MIN: CPT | Performed by: FAMILY MEDICINE

## 2024-01-15 PROCEDURE — 3078F DIAST BP <80 MM HG: CPT | Performed by: FAMILY MEDICINE

## 2024-01-15 ASSESSMENT — ENCOUNTER SYMPTOMS
SHORTNESS OF BREATH: 0
CHILLS: 0
ROS SKIN COMMENTS: LEFT BREAST LUMP
FEVER: 0

## 2024-01-15 ASSESSMENT — FIBROSIS 4 INDEX: FIB4 SCORE: 1.16

## 2024-01-15 NOTE — PROGRESS NOTES
Subjective:     CC:   Chief Complaint   Patient presents with    Diabetes Follow-up     Left breast lump     Lump         HPI:   Nikolas presents today for a f/u visit.  Last seen by me on September 25,2023..        Problem   Mass of Upper Outer Quadrant of Left Breast    New medical problem.  Mentions that over the last month he has noticed a left breast lump.  Unsure if it has changed in size or not.  Mentions that he did have a left breast biopsy in 2018 and since then continues to have annual mammograms.  Mentions that he does have a sister who was diagnosed with breast cancer and had several paternal aunts also been diagnosed with this.     Vitamin D Deficiency    Chronic.  Vit D level at 28  Hasn't been taking any Vit D.     Other Specified Anemias    New medical diagnosis.  Recent labs with hemoglobin hematocrit decreased to 13.4 and 39.6.  Lymphocytes were also decreased to 19.8 and immature granulocytes elevated at 1.3.  Patient mentions that this happened several years ago when he needed a breast biopsy.  Denies any fevers or chills.  His last colonoscopy was in December 2022 with recommendations to follow-up in 10 years.     Microalbuminuria Due to Type 2 Diabetes Mellitus (Hcc)    Chronic.  Recent labs with increase in microalbumin ratio to 45   Latest Reference Range & Units 12/14/23 06:11   Micro Alb Creat Ratio 0 - 30 mg/g 45 (H)   Creatinine, Urine mg/dL 133.41   Microalbumin, Urine Random mg/dL 6.0   (H): Data is abnormally high     Type 2 Diabetes Mellitus With Retinopathy, With Long-Term Current Use of Insulin (Hcc)    Chroiinc  Fastings 90-120s  Metformin 500mg bid and SSI    9/25/23Chronic.  Metformin 500mg bid  SSI prn- infrequently- about once a month  Next eye appt in NOvember 2023  A1c today was 6.1%           Current Outpatient Medications Ordered in Epic   Medication Sig Dispense Refill    glucose blood (CONTOUR NEXT TEST) strip Check fingersticks three times a day. E11.9 300 Strip 3     "lisinopril (PRINIVIL) 40 MG tablet Take 1 Tablet by mouth every day. 100 Tablet 3    cloNIDine (CATAPRES) 0.1 MG Tab Take 1 Tablet by mouth 2 times a day. 200 Tablet 3    metoprolol SR (TOPROL XL) 25 MG TABLET SR 24 HR Take 1 Tablet by mouth every day. 100 Tablet 3    metFORMIN (GLUCOPHAGE) 500 MG Tab Take 1 Tablet by mouth 2 times a day with meals. 200 Tablet 3    amLODIPine (NORVASC) 10 MG Tab Take 1 Tablet by mouth every day. 100 Tablet 3    atorvastatin (LIPITOR) 40 MG Tab Take 1 Tablet by mouth every day. 100 Tablet 3    traZODone (DESYREL) 100 MG Tab Take 1 Tablet by mouth every evening. 100 Tablet 3    levothyroxine (SYNTHROID) 25 MCG Tab Take 1 Tablet by mouth every morning on an empty stomach. 100 Tablet 3    ketoconazole (NIZORAL) 2 % Cream Apply twice a day to affected toenails. 30 g 2    latanoprost (XALATAN) 0.005 % Solution Instill 1 drop into both eyes at bedtime 7.5 mL 3    albuterol 108 (90 Base) MCG/ACT Aero Soln inhalation aerosol Inhale 2 Puffs every four hours as needed for Shortness of Breath. 18 g 1    insulin regular (NOVOLIN R) 100 Unit/mL Solution AS PER SLIDING SCALE 10 mL 3    INSULIN SYRINGE .5CC/29G (ULTICARE INSULIN SYRINGE) 29G X 1/2\" 0.5 ML Misc USE AS DIRECTED BY PHYSICIAN 100 Each 3    Blood Glucose Monitoring Suppl (CONTOUR NEXT ONE) Device 1 Glucometer 1 Each 0    aspirin (ASA) 81 MG Chew Tab chewable tablet CHEW 1 TABLET ORALLY ONCE DAILY 100 Tab 3     No current Epic-ordered facility-administered medications on file.           ROS:  Review of Systems   Constitutional:  Negative for chills and fever.   Respiratory:  Negative for shortness of breath.    Skin:         Left breast lump   All other systems reviewed and are negative.      Objective:     Exam:  /78 (BP Location: Right arm, Patient Position: Sitting, BP Cuff Size: Adult)   Pulse 98   Temp 36.9 °C (98.4 °F) (Temporal)   Resp 14   Ht 1.651 m (5' 5\")   Wt 63.7 kg (140 lb 6.4 oz)   SpO2 95%   BMI 23.36 kg/m²  " Body mass index is 23.36 kg/m².    Physical Exam  Constitutional:       Appearance: Normal appearance.   Eyes:      Extraocular Movements: Extraocular movements intact.   Cardiovascular:      Rate and Rhythm: Normal rate and regular rhythm.   Pulmonary:      Effort: Pulmonary effort is normal.      Breath sounds: Normal breath sounds.   Chest:   Breasts:     Left: Mass present. No tenderness.       Lymphadenopathy:      Upper Body:      Left upper body: No axillary adenopathy.   Neurological:      Mental Status: He is alert.   Psychiatric:         Mood and Affect: Mood normal.         Behavior: Behavior normal.         A chaperone was offered to the patient during today's exam.: Patient declined.    Labs: see above    Assessment & Plan:     68 y.o. male with the following -       HCC Gap Form    Diagnosis to address: I70.0 - Atherosclerosis of abdominal aorta (HCC)  Assessment and plan: Chronic, stable.  Noted to have mild atherosclerosis of the abdominal aorta on aortic ultrasound completed October 2022.  Continue with atorvastatin 40 mg and aspirin 81 mg daily.  Diagnosis: J41.0 - Simple chronic bronchitis ([facility])  Assessment and plan: Chronic, stable.  Takes albuterol infrequently.  Last taken 6 months ago.  PFTs ordered  Diagnosis: E11.29, R80.9 - Microalbuminuria due to type 2 diabetes mellitus ([facility])  Assessment and plan: Chronic, exacerbated.   Recent labs with ratio increased to 45.  Takes lisinoprol 40mg daily.   Diagnosis: E11.69, E78.5 - Dyslipidemia due to type 2 diabetes mellitus ([facility])  Assessment and plan: Chronic, stable.  Recent labs with LDL level improved to 68.  Continue with atorvastatin 40 mg daily  Diagnosis: E11.29, R80.9 - Type 2 diabetes mellitus with microalbuminuria, without long-term current use of insulin ([facility])  Assessment and plan: Chronic, stable,   Recent labs with A1c at 6.1%.  Had his last eye exam in November 2023.  Dr. Manuel's Notes mention that he does not have retinopathy but  does have glaucoma.  Diagnosis: E11.59, I15.2 - Hypertension associated with type 2 diabetes mellitus (HCC)  Assessment and plan: Chronic, stable.  Blood pressure today in office 122/78.  Continue with metformin 500 mg twice a day.  Diagnosis: F19.11 - History of substance abuse (HCC)  Assessment and plan: Chronic, stable.  History of.    Diagnosis: F25.9 - Schizoaffective disorder, unspecified type (HCC)  Assessment and plan: Chronic, stable.  Had been on medications in the past.  Decided to discontinue all of these.  Last edited 01/15/24 10:38 PST by Dayana Valerio M.D.       1. Mass of upper outer quadrant of left breast  New medical problem.  Not well-controlled.  Mentions that he continues to have annual mammograms.  His last left breast biopsy was in 2018.  - US-BREAST COMPLETE-LEFT; Future  - MA-DIAGNOSTIC MAMMO BILAT W/TOMOSYNTHESIS W/CAD; Future    2. Type 2 diabetes mellitus with retinopathy, with long-term current use of insulin, macular edema presence unspecified, unspecified laterality, unspecified retinopathy severity ([facility])  - CBC WITH DIFFERENTIAL; Future  3. Simple chronic bronchitis ([facility])  - PULMONARY FUNCTION TESTS -Test requested: Complete Pulmonary Function Test; Include MIPS/MEPS? Yes; Future  4. Microalbuminuria due to type 2 diabetes mellitus (HCC)  5. Atherosclerosis of abdominal aorta (HCC)  6. Dyslipidemia due to type 2 diabetes mellitus (HCC)  7. Hypertension associated with type 2 diabetes mellitus (HCC)  8. History of substance abuse (HCC)  9. Schizoaffective disorder, unspecified type (HCC)  10. Type 2 diabetes mellitus with microalbuminuria, without long-term current use of insulin (HCC)  HCC Gap Form    Diagnosis to address: I70.0 - Atherosclerosis of abdominal aorta (HCC)  Assessment and plan: Chronic, stable.  Noted to have mild atherosclerosis of the abdominal aorta on aortic ultrasound completed October 2022.  Continue with atorvastatin 40 mg and aspirin 81 mg daily.  Diagnosis:  J41.0 - Simple chronic bronchitis (HCC)  Assessment and plan: Chronic, stable.  Takes albuterol infrequently.  Last taken 6 months ago.  PFTs ordered  Diagnosis: E11.29, R80.9 - Microalbuminuria due to type 2 diabetes mellitus (HCC)  Assessment and plan: Chronic, exacerbated.   Recent labs with ratio increased to 45.  Takes lisinoprol 40mg daily.   Diagnosis: E11.69, E78.5 - Dyslipidemia due to type 2 diabetes mellitus (HCC)  Assessment and plan: Chronic, stable.  Recent labs with LDL level improved to 68.  Continue with atorvastatin 40 mg daily  Diagnosis: E11.29, R80.9 - Type 2 diabetes mellitus with microalbuminuria, without long-term current use of insulin ([facility])  Assessment and plan: Chronic, stable,   Recent labs with A1c at 6.1%.  Had his last eye exam in November 2023.  Dr. Manuel's Notes mention that he does not have retinopathy but does have glaucoma.  Diagnosis: E11.59, I15.2 - Hypertension associated with type 2 diabetes mellitus (HCC)  Assessment and plan: Chronic, stable.  Blood pressure today in office 122/78.  Continue with metformin 500 mg twice a day.  Diagnosis: F19.11 - History of substance abuse ([facility])  Assessment and plan: Chronic, stable.  History of.    Diagnosis: F25.9 - Schizoaffective disorder, unspecified type ([facility])  Assessment and plan: Chronic, stable.  Had been on medications in the past.  Decided to discontinue all of these.  Last edited 01/15/24 10:38 PST by Dayana Valerio M.D.          11. Vitamin D deficiency  Chronic medical diagnosis.  Not well-controlled.  Encouraged to start over-the-counter vitamin D    12. Anemia  Medical diagnosis.  Will repeat CBC with differential    I spent a total of 46 minutes which included time preparing to see the patient (reviewing my last note, records and recent lab results)  I also performed a medically appropriate examination, counseled and educated the patient, ordered medications and tests.   and documentation of this encounter.      Return in  about 6 weeks (around 2/26/2024).    Please note that this dictation was created using voice recognition software. I have made every reasonable attempt to correct obvious errors, but I expect that there are errors of grammar and possibly content that I did not discover before finalizing the note.

## 2024-01-16 ENCOUNTER — OFFICE VISIT (OUTPATIENT)
Dept: MEDICAL GROUP | Facility: PHYSICIAN GROUP | Age: 69
End: 2024-01-16
Attending: FAMILY MEDICINE
Payer: MEDICARE

## 2024-01-16 VITALS
BODY MASS INDEX: 23.29 KG/M2 | HEIGHT: 65 IN | DIASTOLIC BLOOD PRESSURE: 70 MMHG | SYSTOLIC BLOOD PRESSURE: 122 MMHG | WEIGHT: 139.8 LBS

## 2024-01-16 DIAGNOSIS — I1A.0 RESISTANT HYPERTENSION: ICD-10-CM

## 2024-01-16 DIAGNOSIS — J41.0 SIMPLE CHRONIC BRONCHITIS (HCC): ICD-10-CM

## 2024-01-16 DIAGNOSIS — E11.59 HYPERTENSION ASSOCIATED WITH TYPE 2 DIABETES MELLITUS (HCC): ICD-10-CM

## 2024-01-16 DIAGNOSIS — F25.9 SCHIZOAFFECTIVE DISORDER, UNSPECIFIED TYPE (HCC): ICD-10-CM

## 2024-01-16 DIAGNOSIS — R80.9 MICROALBUMINURIA DUE TO TYPE 2 DIABETES MELLITUS (HCC): ICD-10-CM

## 2024-01-16 DIAGNOSIS — Z79.4 TYPE 2 DIABETES MELLITUS WITH RETINOPATHY, WITH LONG-TERM CURRENT USE OF INSULIN, MACULAR EDEMA PRESENCE UNSPECIFIED, UNSPECIFIED LATERALITY, UNSPECIFIED RETINOPATHY SEVERITY (HCC): ICD-10-CM

## 2024-01-16 DIAGNOSIS — I70.0 ATHEROSCLEROSIS OF ABDOMINAL AORTA (HCC): ICD-10-CM

## 2024-01-16 DIAGNOSIS — G47.33 OBSTRUCTIVE SLEEP APNEA SYNDROME: ICD-10-CM

## 2024-01-16 DIAGNOSIS — E11.69 DYSLIPIDEMIA DUE TO TYPE 2 DIABETES MELLITUS (HCC): ICD-10-CM

## 2024-01-16 DIAGNOSIS — E11.319 TYPE 2 DIABETES MELLITUS WITH RETINOPATHY, WITH LONG-TERM CURRENT USE OF INSULIN, MACULAR EDEMA PRESENCE UNSPECIFIED, UNSPECIFIED LATERALITY, UNSPECIFIED RETINOPATHY SEVERITY (HCC): ICD-10-CM

## 2024-01-16 DIAGNOSIS — E78.5 DYSLIPIDEMIA DUE TO TYPE 2 DIABETES MELLITUS (HCC): ICD-10-CM

## 2024-01-16 DIAGNOSIS — E11.29 MICROALBUMINURIA DUE TO TYPE 2 DIABETES MELLITUS (HCC): ICD-10-CM

## 2024-01-16 DIAGNOSIS — I15.2 HYPERTENSION ASSOCIATED WITH TYPE 2 DIABETES MELLITUS (HCC): ICD-10-CM

## 2024-01-16 DIAGNOSIS — F19.11 HISTORY OF SUBSTANCE ABUSE (HCC): ICD-10-CM

## 2024-01-16 PROCEDURE — 1126F AMNT PAIN NOTED NONE PRSNT: CPT | Performed by: PHYSICIAN ASSISTANT

## 2024-01-16 PROCEDURE — 3074F SYST BP LT 130 MM HG: CPT | Performed by: PHYSICIAN ASSISTANT

## 2024-01-16 PROCEDURE — G0439 PPPS, SUBSEQ VISIT: HCPCS | Performed by: PHYSICIAN ASSISTANT

## 2024-01-16 PROCEDURE — 3078F DIAST BP <80 MM HG: CPT | Performed by: PHYSICIAN ASSISTANT

## 2024-01-16 SDOH — ECONOMIC STABILITY: FOOD INSECURITY: WITHIN THE PAST 12 MONTHS, THE FOOD YOU BOUGHT JUST DIDN'T LAST AND YOU DIDN'T HAVE MONEY TO GET MORE.: NEVER TRUE

## 2024-01-16 SDOH — ECONOMIC STABILITY: INCOME INSECURITY: HOW HARD IS IT FOR YOU TO PAY FOR THE VERY BASICS LIKE FOOD, HOUSING, MEDICAL CARE, AND HEATING?: NOT HARD AT ALL

## 2024-01-16 SDOH — ECONOMIC STABILITY: TRANSPORTATION INSECURITY
IN THE PAST 12 MONTHS, HAS LACK OF TRANSPORTATION KEPT YOU FROM MEETINGS, WORK, OR FROM GETTING THINGS NEEDED FOR DAILY LIVING?: NO

## 2024-01-16 SDOH — ECONOMIC STABILITY: FOOD INSECURITY: WITHIN THE PAST 12 MONTHS, YOU WORRIED THAT YOUR FOOD WOULD RUN OUT BEFORE YOU GOT MONEY TO BUY MORE.: NEVER TRUE

## 2024-01-16 ASSESSMENT — PAIN SCALES - GENERAL: PAINLEVEL: NO PAIN

## 2024-01-16 ASSESSMENT — PATIENT HEALTH QUESTIONNAIRE - PHQ9
5. POOR APPETITE OR OVEREATING: 0 - NOT AT ALL
SUM OF ALL RESPONSES TO PHQ QUESTIONS 1-9: 13

## 2024-01-16 ASSESSMENT — FIBROSIS 4 INDEX: FIB4 SCORE: 1.16

## 2024-01-16 NOTE — PROGRESS NOTES
Comprehensive Health Assessment Program     Nikolas Rico is a 68 y.o. here for his comprehensive health assessment.    Patient Active Problem List    Diagnosis Date Noted    Mass of upper outer quadrant of left breast 01/15/2024    Vitamin D deficiency 01/15/2024    Other specified anemias 01/15/2024    Atherosclerosis of abdominal aorta (HCC) 09/25/2023    Resistant hypertension 07/18/2023    Degenerative arthritis of thumb, left 05/23/2023    Onychomycosis 05/23/2023    Groin pain, left 05/23/2023    Microalbuminuria due to type 2 diabetes mellitus ([facility]) 05/23/2023    Other chest pain 02/22/2023    Glaucoma 02/08/2023    Hypothyroidism 02/08/2023    Chronic right-sided low back pain without sciatica 09/22/2022    Other insomnia 02/02/2022    Chronic pain of right knee 02/02/2022    Chronic pain of left ankle 02/02/2022    History of substance abuse ([facility]) 11/10/2021    Presence of IVC filter 02/07/2020    Abdominal wall mass of epigastric region 02/07/2020    Coronary artery calcification seen on CAT scan 02/07/2020    History of hepatitis C 01/30/2020    Thyroid nodule 04/19/2018    Hx pulmonary embolism 08/30/2017    Sleep apnea 04/28/2017    Schizoaffective disorder ([facility]) 04/14/2017    Hypokalemia 02/13/2017    Left anterior fascicular block 11/10/2016    Hypertension associated with type 2 diabetes mellitus ([facility]) 07/21/2016    Type 2 diabetes mellitus with retinopathy, with long-term current use of insulin ([facility]) 01/26/2016    Dyslipidemia due to type 2 diabetes mellitus ([facility]) 04/23/2015    COPD (chronic obstructive pulmonary disease) ([facility]) 12/19/2014    Psoriasis 10/23/2012    Left foot pain 09/11/2012    History of DVT (deep vein thrombosis) 11/22/2011       Current Outpatient Medications   Medication Sig Dispense Refill    glucose blood (CONTOUR NEXT TEST) strip Check fingersticks three times a day. E11.9 300 Strip 3    lisinopril (PRINIVIL) 40 MG tablet Take 1 Tablet by mouth every day. 100  "Tablet 3    cloNIDine (CATAPRES) 0.1 MG Tab Take 1 Tablet by mouth 2 times a day. 200 Tablet 3    metoprolol SR (TOPROL XL) 25 MG TABLET SR 24 HR Take 1 Tablet by mouth every day. 100 Tablet 3    metFORMIN (GLUCOPHAGE) 500 MG Tab Take 1 Tablet by mouth 2 times a day with meals. 200 Tablet 3    amLODIPine (NORVASC) 10 MG Tab Take 1 Tablet by mouth every day. 100 Tablet 3    atorvastatin (LIPITOR) 40 MG Tab Take 1 Tablet by mouth every day. 100 Tablet 3    traZODone (DESYREL) 100 MG Tab Take 1 Tablet by mouth every evening. 100 Tablet 3    levothyroxine (SYNTHROID) 25 MCG Tab Take 1 Tablet by mouth every morning on an empty stomach. 100 Tablet 3    ketoconazole (NIZORAL) 2 % Cream Apply twice a day to affected toenails. 30 g 2    latanoprost (XALATAN) 0.005 % Solution Instill 1 drop into both eyes at bedtime 7.5 mL 3    albuterol 108 (90 Base) MCG/ACT Aero Soln inhalation aerosol Inhale 2 Puffs every four hours as needed for Shortness of Breath. 18 g 1    insulin regular (NOVOLIN R) 100 Unit/mL Solution AS PER SLIDING SCALE 10 mL 3    INSULIN SYRINGE .5CC/29G (ULTICARE INSULIN SYRINGE) 29G X 1/2\" 0.5 ML Misc USE AS DIRECTED BY PHYSICIAN 100 Each 3    Blood Glucose Monitoring Suppl (CONTOUR NEXT ONE) Device 1 Glucometer 1 Each 0    aspirin (ASA) 81 MG Chew Tab chewable tablet CHEW 1 TABLET ORALLY ONCE DAILY 100 Tab 3     No current facility-administered medications for this visit.          Current supplements as per medication list.     Allergies:   Patient has no known allergies.  Social History     Tobacco Use    Smoking status: Former     Current packs/day: 0.00     Types: Cigarettes     Quit date: 3/6/2013     Years since quitting: 10.8    Smokeless tobacco: Never   Vaping Use    Vaping Use: Never used   Substance Use Topics    Alcohol use: Not Currently     Comment: sober since HCV diagnosis (2018)    Drug use: Yes     Types: Marijuana     Comment: medical marijuana     Family History   Problem Relation Age of " Onset    Cancer Father         tee ca    Clotting Disorder Neg Madhuri Connor  has a past medical history of BMI 23.0-23.9, adult (2/8/2023), CKD (chronic kidney disease) stage 3, GFR 30-59 ml/min (HCC), COPD, Diabetes, GERD (gastroesophageal reflux disease), Hepatitis C, Hyperlipidemia, Hypertension, Psychiatric disorder, and Schizoaffective disorder (HCC) (10/01/16).   Past Surgical History:   Procedure Laterality Date    OTHER ABDOMINAL SURGERY      OTHER ORTHOPEDIC SURGERY         Screening:  In the last six months have you experienced any leakage of urine? No    Depression Screening  Little interest or pleasure in doing things?  2 - more than half the days  Feeling down, depressed , or hopeless? 2 - more than half the days  Trouble falling or staying asleep, or sleeping too much?  0 - not at all  Feeling tired or having little energy?  3 - nearly every day  Poor appetite or overeating?  0 - not at all  Feeling bad about yourself - or that you are a failure or have let yourself or your family down? 2 - more than half the days  Trouble concentrating on things, such as reading the newspaper or watching television? 2 - more than half the days  Moving or speaking so slowly that other people could have noticed.  Or the opposite - being so fidgety or restless that you have been moving around a lot more than usual?  0 - not at all  Thoughts that you would be better off dead, or of hurting yourself?  2 - more than half the days  Patient Health Questionnaire Score: 13    If depressive symptoms identified deferred to follow up visit unless specifically addressed in assessment and plan.    Interpretation of PHQ-9 Total Score   Score Severity   1-4 No Depression   5-9 Mild Depression   10-14 Moderate Depression   15-19 Moderately Severe Depression   20-27 Severe Depression    Screening for Cognitive Impairment  Do you or any of your friends or family members have any concern about your memory? No  Three Minute Recall  (Banana, Sunrise, Chair) 3/3    Jasper clock face with all 12 numbers and set the hands to show 20 past 8.  Yes 5/5  Cognitive concerns identified deferred for follow up unless specifically addressed in assessment and plan.    Fall Risk Assessment  Has the patient had two or more falls in the last year or any fall with injury in the last year?  No    Safety Assessment  Do you always wear your seatbelt?  Yes  Any changes to home needed to function safely? No  Difficulty hearing.  Yes, has hearing aides  Patient counseled about all safety risks that were identified.    Functional Assessment ADLs  Are there any barriers preventing you from cooking for yourself or meeting nutritional needs?  No.    Are there any barriers preventing you from driving safely or obtaining transportation?  No.    Are there any barriers preventing you from using a telephone or calling for help?  No    Are there any barriers preventing you from shopping?  No.    Are there any barriers preventing you from taking care of your own finances?  No    Are there any barriers preventing you from managing your medications?  No    Are there any barriers preventing you from showering, bathing or dressing yourself? No    Are there any barriers preventing you from doing housework or laundry? No    Are there any barriers preventing you from using the toilet?No    Are you currently engaging in any exercise or physical activity?  No.      Self-Assessment of Health  What is your perception of your health? Fair    Do you sleep more than six hours a night? No Between 5 and 6  In the past 7 days, how much did pain keep you from doing your normal work? Some    Do you spend quality time with family or friends (virtually or in person)? Yes    Do you usually eat a heart healthy diet that constists of a variety of fruits, vegetables, whole grains and fiber? Yes    Do you eat foods high in fat and/or Fast Food more than three times per week? No    How concerned are you  that your medical conditions are not being well managed? a little    Are you worried that in the next 2 months, you may not have stable housing that you own, rent, or stay in as part of a household? No        Advance Care Planning  Do you have an Advance Directive, Living Will, Durable Power of , or POLST? No                 Health Maintenance Summary            Scheduled - Annual Pulmonary Function Test / Spirometry (Yearly) Scheduled for 1/19/2024      10/10/2022  PFT DICTATED RESULTS    12/01/2011  PFT DICTATED RESULTS              Postponed - Hepatitis B Vaccine (Hep B) (4 of 4 - Hep B Twinrix risk 4-dose series) Postponed until 6/15/2024      11/14/2013  Imm Admin: Hep A/HEP B Combined Vaccine (TwinRix)    10/31/2013  Imm Admin: Hep A/HEP B Combined Vaccine (TwinRix)    10/24/2013  Imm Admin: Hep A/HEP B Combined Vaccine (TwinRix)              Diabetes: Retinopathy Screening (Every 6 Months) Next due on 5/13/2024 11/13/2023  AMB EXTERNAL RETINAL SCREENING RESULTS    05/08/2023  AMB EXTERNAL RETINAL SCREENING RESULTS    11/22/2022  AMB EXTERNAL RETINAL SCREENING RESULTS    11/10/2021  POCT Retinal Eye Exam    08/28/2019  REFERRAL FOR RETINAL SCREENING EXAM    Only the first 5 history entries have been loaded, but more history exists.              A1c Screening (Every 6 Months) Next due on 6/14/2024 12/14/2023  HEMOGLOBIN A1C    09/25/2023  POCT Hemoglobin A1C    05/01/2023  HEMOGLOBIN A1C    12/28/2022  HEMOGLOBIN A1C    09/22/2022  POCT A1C    Only the first 5 history entries have been loaded, but more history exists.              Diabetes: Monofilament / LE Exam (Yearly) Tentatively due on 9/25/2024 09/25/2023  Diabetic Monofilament Lower Extremity Exam    09/22/2022  Diabetic Monofilament LE Exam    11/10/2021  Diabetic Monofilament Lower Extremity Exam    01/30/2020  Diabetic Monofilament LE Exam    04/19/2018  Diabetic Monofilament Lower Extremity Exam    Only the first 5 history  entries have been loaded, but more history exists.              Fasting Lipid Profile (Yearly) Next due on 12/14/2024 12/14/2023  Lipid Profile    05/01/2023  Lipid Profile    12/28/2022  Lipid Profile    06/03/2022  Lipid Profile    06/08/2021  Lipid Profile    Only the first 5 history entries have been loaded, but more history exists.              Diabetes: Urine Protein Screening (Yearly) Next due on 12/14/2024 12/14/2023  MICROALBUMIN CREAT RATIO URINE    09/01/2023  MICROALBUMIN CREAT RATIO URINE    05/01/2023  MICROALBUMIN CREAT RATIO URINE    12/28/2022  MICROALBUMIN CREAT RATIO URINE    06/03/2022  MICROALBUMIN CREAT RATIO URINE    Only the first 5 history entries have been loaded, but more history exists.              SERUM CREATININE (Yearly) Next due on 12/14/2024 12/14/2023  Comp Metabolic Panel    05/01/2023  Comp Metabolic Panel    12/28/2022  Comp Metabolic Panel    06/03/2022  Comp Metabolic Panel    11/12/2021  Basic Metabolic Panel    Only the first 5 history entries have been loaded, but more history exists.              Annual Wellness Visit (Every 366 Days) Next due on 1/16/2025 01/16/2024  Level of Service: CA ANNUAL WELLNESS VISIT-INCLUDES PPPS SUBSEQUE*    02/08/2023  Level of Service: CA ANNUAL WELLNESS VISIT-INCLUDES PPPS SUBSEQUE*    03/09/2022  Level of Service: CA ANNUAL WELLNESS VISIT-INCLUDES PPPS SUBSEQUE*    07/23/2021  Level of Service: CA ANNUAL WELLNESS VISIT-INCLUDES PPPS SUBSEQUE*    02/01/2021  Subsequent Annual Wellness Visit - Includes PPPS ()    Only the first 5 history entries have been loaded, but more history exists.              IMM DTaP/Tdap/Td Vaccine (2 - Td or Tdap) Next due on 11/10/2031      11/10/2021  Imm Admin: Tdap Vaccine              Colorectal Cancer Screening (Colonoscopy - Preferred) Next due on 12/30/2032 12/30/2022  AMB EXTERNAL COLONOSCOPY RESULTS    12/13/2022  AMB EXTERNAL COLONOSCOPY RESULTS    10/09/2017  REFERRAL TO GI  FOR COLONOSCOPY    03/19/2017  OCCULT BLOOD FECES IMMUNOASSAY    02/28/2016  OCCULT BLOOD FECES IMMUNOASSAY    Only the first 5 history entries have been loaded, but more history exists.              Hepatitis A Vaccine (Hep A) (Series Information) Aged Out      11/14/2013  Imm Admin: Hep A/HEP B Combined Vaccine (TwinRix)    10/31/2013  Imm Admin: Hep A/HEP B Combined Vaccine (TwinRix)    10/24/2013  Imm Admin: Hep A/HEP B Combined Vaccine (TwinRix)              Pneumococcal Vaccine: 65+ Years (Series Information) Completed      02/04/2021  Imm Admin: Pneumococcal polysaccharide vaccine (PPSV-23)    01/30/2020  Imm Admin: Pneumococcal Conjugate Vaccine (Prevnar/PCV-13)    12/11/2011  Imm Admin: Pneumococcal polysaccharide vaccine (PPSV-23)    08/05/2011  Imm Admin: Pneumococcal polysaccharide vaccine (PPSV-23)              Abdominal Aortic Aneurysm (AAA) Screening  Tentatively Complete      10/14/2022  US-ABDOMINAL AORTA W/O DOPPLER              COVID-19 Vaccine (Series Information) Completed      09/20/2023  Imm Admin: Comirnaty (Covid-19 Vaccine, Mrna, 9670-8750 Formula)    05/09/2023  Imm Admin: PFIZER BIVALENT SARS-COV-2 VACCINE (12+)    09/26/2022  Imm Admin: PFIZER BIVALENT SARS-COV-2 VACCINE (12+)    04/20/2022  Imm Admin: PFIZER THOMPSON CAP SARS-COV-2 VACCINATION (12+)    10/08/2021  Imm Admin: PFIZER PURPLE CAP SARS-COV-2 VACCINATION (12+)    Only the first 5 history entries have been loaded, but more history exists.              Influenza Vaccine (Series Information) Completed      09/25/2023  Imm Admin: Influenza Vaccine Adult HD    09/22/2022  Imm Admin: Influenza Vaccine Adult HD    09/24/2021  Imm Admin: Influenza Vaccine Adult HD    09/11/2020  Imm Admin: Influenza Vaccine Adult HD    10/01/2019  Imm Admin: Influenza Vaccine Adult HD    Only the first 5 history entries have been loaded, but more history exists.              HPV Vaccines (Series Information) Aged Out      No completion history exists for  "this topic.              Polio Vaccine (Inactivated Polio) (Series Information) Aged Out      No completion history exists for this topic.              Meningococcal Immunization (Series Information) Aged Out      No completion history exists for this topic.              Discontinued - Zoster (Shingles) Vaccines  Discontinued      No completion history exists for this topic.              Discontinued - Hepatitis C Screening  Discontinued        Frequency changed to Never automatically (Topic No Longer Applies)    08/21/2017  HEP C VIRUS ANTIBODY    11/05/2014  Hepatitis C Genotyping component of HCV RNA PCR-GENOTYPE REFLEX    04/14/2014  Hepatitis C Antibody component of HEP C VIRUS ANTIBODY                    Patient Care Team:  Dayana Valerio M.D. as PCP - General (Family Medicine)  Jes Trent O.D. as Consulting Physician (Optometry)  Stephen López [employer] (Inactive) as [facility]   CARLOS Perez.P.MOleg (Podiatry)  Federico Malik M.D. (Family Medicine)  Ryan Calvert M.D. (Gastroenterology)  Kristine Manuel M.D. (Ophthalmology)  Suleman Field M.D. (Cardiovascular Disease (Cardiology))    Financial Resource Strain: Low Risk  (1/16/2024)    Overall Financial Resource Strain (CARDIA)     Difficulty of Paying Living Expenses: Not hard at all      Transportation Needs: No Transportation Needs (1/16/2024)    PRAPARE - Transportation     Lack of Transportation (Medical): No     Lack of Transportation (Non-Medical): No      Food Insecurity: No Food Insecurity (1/16/2024)    Hunger Vital Sign     Worried About Running Out of Food in the Last Year: Never true     Ran Out of Food in the Last Year: Never true        Encounter Vitals  Blood Pressure : 122/70  Weight: 63.4 kg (139 lb 12.8 oz)  Height: 165.1 cm (5' 5\")  BMI (Calculated): 23.26  Pain Score: No pain     Alert, oriented in no acute distress.  Eye contact is good, speech goal directed, affect " calm.    Assessment and Plan. The following treatment and monitoring plan is recommended:  Simple chronic bronchitis  COPD (chronic obstructive pulmonary disease) ([facility])  Chronic, stable. Maintains on only PRN use of albuterol which he reports is seldom. Last PFT 12/2022 which was normal but there was some evidence of airtrapping. Follow up with PCP for continued monitoring and management.    Dyslipidemia due to type 2 diabetes mellitus ([facility])  Chronic, stable. Maintains on statin therapy without issue. Most recent lipid panel from 5/2023 WNL. Continue atorvastatin 40mg daily. Recommend Mediterranean diet and regular physical activity. Follow up with PCP at least annually for continued monitoring and management.     Resistant Hypertension  Hypertension associated with type 2 diabetes mellitus ([facility])  Chronic, stable. BP today 122/70. Pt does monitor BP at home which he reports have been within goal. Denies dizziness/lightheadedness. Continue current treatment regime: amlodipine 10mg, lisinopril 40mg, metoprolol SR 25mg daily, and clonidine 0.1mg BID. Follow up with PCP at least annually for continued monitoring and management.    Type 2 diabetes mellitus with retinopathy, with long-term current use of insulin ([facility])  Chronic, stable. Last A1c from 12/2023 6.1. Last retinal exam 11/2023 with evidence of ocular HTN. Continue current treatment regime: metformin 500mg BID with sliding scale insulin with Xalatan 0.005% soln. Follow up with PCP at least annually for continued monitoring and management.    Microalbuminuria due to type 2 diabetes mellitus ([facility])  Chronic, stable. He has elevated microalbumin/creat ratio on December 2023 urine. GFR 87. Continue to monitor with PCP. Follow up with PCP per routine.    History of substance abuse ([facility])  Chronic, stable. He discontinued illicit drug use in 2017. Follow up with PCP for continued monitoring and management.    Schizoaffective disorder ([facility])  Chronic condition, likely  uncontrolled but patient reluctant to discuss diagnosis in any great detail. Pt has previously required psychiatric pharmacotherapy as well as 5150 but now maintains only on medical marijuana. His PHQ9 today is 13/27. He does endorse thoughts of hurting himself but denies any active plan. He is predominantly concerned with government and the state of our country. He refuses medication and psychiatry referral. He did see his PCP yesterday. Advise follow up with PCP for further discussion and management. Advised of suicide hotline and ED precautions should symptoms worsen.    Atherosclerosis of abdominal aorta (HCC)  Chronic, stable. Diagnosis made following abdominal US from October 2022. Associated with HLD, DM2. Continue atorvastatin 40mg daily and ASA 81mg. Encouraged Mediterranean diet and regular physical exercise. Follow up with PCP at least annually for continued monitoring.     Sleep apnea  Chronic condition. Pt has previously used CPAP to manage disease but discontinued this several years ago. He denies daytime somnolence, snoring. Discussed untreated RAJWINDER is associated with increased risk of cardiovascular disease and arrhythmia. Follow up with PCP for continued monitoring.    Services suggested: No services needed at this time  Health Care Screening: Age-appropriate preventive services recommended by USPTF and ACIP covered by Medicare were discussed today. Services ordered if indicated and agreed upon by the patient.  Referrals offered: Community-based lifestyle interventions to reduce health risks and promote self-management and wellness, fall prevention, nutrition, physical activity, tobacco-use cessation, weight loss, and mental health services as per orders if indicated.    Discussion today about general wellness and lifestyle habits:    Prevent falls and reduce trip hazards; Cautioned about securing or removing rugs.  Have a working fire alarm and carbon monoxide detector.  Engage in regular physical  activity and social activities.    Follow-up: Return for follow up visit with PCP as previously scheduled.

## 2024-01-16 NOTE — ASSESSMENT & PLAN NOTE
Chronic, stable. Last A1c from 12/2023 6.1. Continue current treatment regime: metformin 500mg BID with sliding scale insulin. Follow up with PCP at least annually for continued monitoring and management.

## 2024-01-16 NOTE — ASSESSMENT & PLAN NOTE
Chronic, stable. He has elevated microalbumin/creat ratio on December 2023 urine. GFR 87. Continue to monitor with PCP. Follow up with PCP per routine.

## 2024-01-16 NOTE — Clinical Note
"I saw Nikolas today for his RAUL. I wanted to let you know that his PHQ9 was elevated at 13/27 and he endorsed thoughts of hurting himself without active plan. He states he does not want to live under an \"authoritative government which is the direction we are heading.\" He continues to deny medications and psychiatric intervention. He is aware of ED precautions and suicide hotline. I did advise him to follow up with you to discuss further."

## 2024-01-17 ENCOUNTER — HOSPITAL ENCOUNTER (OUTPATIENT)
Dept: RADIOLOGY | Facility: MEDICAL CENTER | Age: 69
End: 2024-01-17
Attending: FAMILY MEDICINE
Payer: MEDICARE

## 2024-01-17 DIAGNOSIS — N63.21 MASS OF UPPER OUTER QUADRANT OF LEFT BREAST: ICD-10-CM

## 2024-01-17 PROCEDURE — 76642 ULTRASOUND BREAST LIMITED: CPT | Mod: LT

## 2024-01-17 PROCEDURE — G0279 TOMOSYNTHESIS, MAMMO: HCPCS

## 2024-01-19 ENCOUNTER — NON-PROVIDER VISIT (OUTPATIENT)
Dept: SLEEP MEDICINE | Facility: MEDICAL CENTER | Age: 69
End: 2024-01-19
Attending: FAMILY MEDICINE
Payer: MEDICARE

## 2024-01-19 VITALS — HEIGHT: 66 IN | BODY MASS INDEX: 23.38 KG/M2 | WEIGHT: 145.5 LBS

## 2024-01-19 DIAGNOSIS — J41.0 SIMPLE CHRONIC BRONCHITIS (HCC): ICD-10-CM

## 2024-01-19 PROCEDURE — 94060 EVALUATION OF WHEEZING: CPT | Performed by: STUDENT IN AN ORGANIZED HEALTH CARE EDUCATION/TRAINING PROGRAM

## 2024-01-19 PROCEDURE — 94726 PLETHYSMOGRAPHY LUNG VOLUMES: CPT | Performed by: STUDENT IN AN ORGANIZED HEALTH CARE EDUCATION/TRAINING PROGRAM

## 2024-01-19 PROCEDURE — 94729 DIFFUSING CAPACITY: CPT | Performed by: STUDENT IN AN ORGANIZED HEALTH CARE EDUCATION/TRAINING PROGRAM

## 2024-01-19 PROCEDURE — 94726 PLETHYSMOGRAPHY LUNG VOLUMES: CPT | Mod: 26 | Performed by: STUDENT IN AN ORGANIZED HEALTH CARE EDUCATION/TRAINING PROGRAM

## 2024-01-19 PROCEDURE — 94729 DIFFUSING CAPACITY: CPT | Mod: 26 | Performed by: STUDENT IN AN ORGANIZED HEALTH CARE EDUCATION/TRAINING PROGRAM

## 2024-01-19 PROCEDURE — 94060 EVALUATION OF WHEEZING: CPT | Mod: 26 | Performed by: STUDENT IN AN ORGANIZED HEALTH CARE EDUCATION/TRAINING PROGRAM

## 2024-01-19 ASSESSMENT — PULMONARY FUNCTION TESTS
FEV1/FVC_PERCENT_PREDICTED: 90
FVC_PREDICTED: 3.72
FEV1_LLN: 2.38
FEV1/FVC_PERCENT_LLN: 64
FEV1/FVC_PERCENT_PREDICTED: 77
FEV1/FVC: 69
FEV1_PERCENT_PREDICTED: 90
FEV1/FVC: 69.25
FEV1_PERCENT_PREDICTED: 85
FEV1_PERCENT_CHANGE: 5
FEV1: 2.59
FEV1/FVC_PERCENT_PREDICTED: 82
FVC_PERCENT_PREDICTED: 100
FEV1/FVC_PERCENT_CHANGE: -250
FVC: 3.84
FVC: 3.74
FEV1/FVC_PERCENT_PREDICTED: 90
FEV1/FVC_PREDICTED: 77
FEV1/FVC: 64
FEV1/FVC_PERCENT_CHANGE: 9
FEV1_PREDICTED: 2.85
FEV1: 2.44
FVC_LLN: 3.10
FVC_PERCENT_PREDICTED: 103
FEV1/FVC: 63
FEV1_PERCENT_CHANGE: -2
FEV1/FVC_PERCENT_PREDICTED: 83

## 2024-01-19 ASSESSMENT — FIBROSIS 4 INDEX: FIB4 SCORE: 1.16

## 2024-01-19 NOTE — PROCEDURES
Tech: Tiffani Agee, RT  Good patient effort & cooperation.  Test was performed on the [employer] Body Plethysmograph- Elite DX system.  The predicted sets used for Spirometry are GLI-2012, for Lung Volumes are ITS, and for DLCO is GLI 2017.  The results of this test meet the ATS standards for acceptability and repeatability.  The DLCO was uncorrected for Hb.  A bronchodilator of Ventolin HFA 2 puffs via spacer was administered.  DLCO was performed during dilation period.  MIP/MEP performed    Interpretation:    There is mild obstruction.  No significant bronchodilator response.  The increase in RV with normal TLC is suggestive of air trapping.  Normal diffusion capacity.  Normal maximal inspiratory and maximal expiratory pressures.

## 2024-01-24 ENCOUNTER — PHARMACY VISIT (OUTPATIENT)
Dept: PHARMACY | Facility: MEDICAL CENTER | Age: 69
End: 2024-01-24
Payer: COMMERCIAL

## 2024-01-24 PROCEDURE — RXMED WILLOW AMBULATORY MEDICATION CHARGE: Performed by: FAMILY MEDICINE

## 2024-02-13 PROCEDURE — RXMED WILLOW AMBULATORY MEDICATION CHARGE: Performed by: FAMILY MEDICINE

## 2024-02-14 ENCOUNTER — PHARMACY VISIT (OUTPATIENT)
Dept: PHARMACY | Facility: MEDICAL CENTER | Age: 69
End: 2024-02-14
Payer: COMMERCIAL

## 2024-02-16 ENCOUNTER — HOSPITAL ENCOUNTER (OUTPATIENT)
Dept: LAB | Facility: MEDICAL CENTER | Age: 69
End: 2024-02-16
Attending: FAMILY MEDICINE
Payer: MEDICARE

## 2024-02-16 DIAGNOSIS — Z79.4 TYPE 2 DIABETES MELLITUS WITH RETINOPATHY, WITH LONG-TERM CURRENT USE OF INSULIN, MACULAR EDEMA PRESENCE UNSPECIFIED, UNSPECIFIED LATERALITY, UNSPECIFIED RETINOPATHY SEVERITY (HCC): ICD-10-CM

## 2024-02-16 DIAGNOSIS — E11.319 TYPE 2 DIABETES MELLITUS WITH RETINOPATHY, WITH LONG-TERM CURRENT USE OF INSULIN, MACULAR EDEMA PRESENCE UNSPECIFIED, UNSPECIFIED LATERALITY, UNSPECIFIED RETINOPATHY SEVERITY (HCC): ICD-10-CM

## 2024-02-16 LAB
BASOPHILS # BLD AUTO: 0.6 % (ref 0–1.8)
BASOPHILS # BLD: 0.04 K/UL (ref 0–0.12)
EOSINOPHIL # BLD AUTO: 0.03 K/UL (ref 0–0.51)
EOSINOPHIL NFR BLD: 0.4 % (ref 0–6.9)
ERYTHROCYTE [DISTWIDTH] IN BLOOD BY AUTOMATED COUNT: 41.3 FL (ref 35.9–50)
HCT VFR BLD AUTO: 41 % (ref 42–52)
HGB BLD-MCNC: 13.6 G/DL (ref 14–18)
IMM GRANULOCYTES # BLD AUTO: 0.04 K/UL (ref 0–0.11)
IMM GRANULOCYTES NFR BLD AUTO: 0.6 % (ref 0–0.9)
LYMPHOCYTES # BLD AUTO: 1.39 K/UL (ref 1–4.8)
LYMPHOCYTES NFR BLD: 20.6 % (ref 22–41)
MCH RBC QN AUTO: 27.8 PG (ref 27–33)
MCHC RBC AUTO-ENTMCNC: 33.2 G/DL (ref 32.3–36.5)
MCV RBC AUTO: 83.7 FL (ref 81.4–97.8)
MONOCYTES # BLD AUTO: 0.46 K/UL (ref 0–0.85)
MONOCYTES NFR BLD AUTO: 6.8 % (ref 0–13.4)
NEUTROPHILS # BLD AUTO: 4.8 K/UL (ref 1.82–7.42)
NEUTROPHILS NFR BLD: 71 % (ref 44–72)
NRBC # BLD AUTO: 0 K/UL
NRBC BLD-RTO: 0 /100 WBC (ref 0–0.2)
PLATELET # BLD AUTO: 276 K/UL (ref 164–446)
PMV BLD AUTO: 9.7 FL (ref 9–12.9)
RBC # BLD AUTO: 4.9 M/UL (ref 4.7–6.1)
WBC # BLD AUTO: 6.8 K/UL (ref 4.8–10.8)

## 2024-02-16 PROCEDURE — 36415 COLL VENOUS BLD VENIPUNCTURE: CPT

## 2024-02-16 PROCEDURE — 85025 COMPLETE CBC W/AUTO DIFF WBC: CPT

## 2024-02-26 ENCOUNTER — OFFICE VISIT (OUTPATIENT)
Dept: MEDICAL GROUP | Facility: PHYSICIAN GROUP | Age: 69
End: 2024-02-26
Payer: MEDICARE

## 2024-02-26 VITALS
BODY MASS INDEX: 22.34 KG/M2 | HEART RATE: 75 BPM | OXYGEN SATURATION: 96 % | HEIGHT: 66 IN | TEMPERATURE: 97.2 F | WEIGHT: 139 LBS | DIASTOLIC BLOOD PRESSURE: 70 MMHG | SYSTOLIC BLOOD PRESSURE: 110 MMHG

## 2024-02-26 DIAGNOSIS — Z79.4 TYPE 2 DIABETES MELLITUS WITH DIABETIC NEPHROPATHY, WITH LONG-TERM CURRENT USE OF INSULIN (HCC): ICD-10-CM

## 2024-02-26 DIAGNOSIS — E11.59 HYPERTENSION ASSOCIATED WITH TYPE 2 DIABETES MELLITUS (HCC): ICD-10-CM

## 2024-02-26 DIAGNOSIS — D64.89 ANEMIA DUE TO OTHER CAUSE, NOT CLASSIFIED: ICD-10-CM

## 2024-02-26 DIAGNOSIS — I15.2 HYPERTENSION ASSOCIATED WITH TYPE 2 DIABETES MELLITUS (HCC): ICD-10-CM

## 2024-02-26 DIAGNOSIS — F25.9 SCHIZOAFFECTIVE DISORDER, UNSPECIFIED TYPE (HCC): ICD-10-CM

## 2024-02-26 DIAGNOSIS — Z79.4 TYPE 2 DIABETES MELLITUS WITH RETINOPATHY, WITH LONG-TERM CURRENT USE OF INSULIN, MACULAR EDEMA PRESENCE UNSPECIFIED, UNSPECIFIED LATERALITY, UNSPECIFIED RETINOPATHY SEVERITY (HCC): ICD-10-CM

## 2024-02-26 DIAGNOSIS — E11.319 TYPE 2 DIABETES MELLITUS WITH RETINOPATHY, WITH LONG-TERM CURRENT USE OF INSULIN, MACULAR EDEMA PRESENCE UNSPECIFIED, UNSPECIFIED LATERALITY, UNSPECIFIED RETINOPATHY SEVERITY (HCC): ICD-10-CM

## 2024-02-26 DIAGNOSIS — E11.21 TYPE 2 DIABETES MELLITUS WITH DIABETIC NEPHROPATHY, WITH LONG-TERM CURRENT USE OF INSULIN (HCC): ICD-10-CM

## 2024-02-26 PROCEDURE — 3078F DIAST BP <80 MM HG: CPT | Performed by: FAMILY MEDICINE

## 2024-02-26 PROCEDURE — 99215 OFFICE O/P EST HI 40 MIN: CPT | Performed by: FAMILY MEDICINE

## 2024-02-26 PROCEDURE — 3074F SYST BP LT 130 MM HG: CPT | Performed by: FAMILY MEDICINE

## 2024-02-26 ASSESSMENT — ENCOUNTER SYMPTOMS
SHORTNESS OF BREATH: 0
HALLUCINATIONS: 0
FEVER: 0
DEPRESSION: 0

## 2024-02-26 ASSESSMENT — FIBROSIS 4 INDEX: FIB4 SCORE: 1.125

## 2024-02-26 NOTE — PROGRESS NOTES
Verbal consent was acquired by the patient to use [employer] ambient listening note generation during this visit         History of Present Illness  The patient is a 68-year-old male who presents today for a follow-up visit. He was last seen by me on 01/15/2024. Since that appointment, he did have a complete health assessment and was noted to have a PHQ-9. PHQ-9 score was 13.    Schizoaffective disorder  He was last seen 1 month ago. Since his last appointment, he also had a complete health assessment. PHQ9 was 13 at that time.  He has been f/u  mental health his whole life. He does not know if he is suicidal. Doesn't have a plan. Just very concerned about the state of the world, war in [place] and [place], upcoming election and the thought of Trump being elected.    He has been to [facility] in the past.  Has taken citalopram, Seroquel, and Zoloft in the past. days in 1977. His mother thought he was schizophrenic, but he was diagnosed with schizoaffective disorder. He had been on lithium intermittently for many years. He was then started on Risperdal. He has been off of his medications for about 4 years. He smokes marijuana daily. He does not want any more medications or referrals to the specialist    Hypertension  Chronic.  Blood pressure today is 110/70  He is taking lisinopril 40 mg, clonidine 0.1 mg twice a day, metoprolol 25 mg, and amlodipine 10 mg.    Diabetes  He is taking metformin 500 mg twice a day. He infrequently administers insulin.  Last time he took his insulin was over a month ago he checks his blood sugars at home. He had a couple of readings over 200. Yesterday, it was 108.     anemia  His hemoglobin and hematocrit are low. His lymphocyte count is elevated. He wonders if this could be a problem with his chronic kidney disease.  His last colonoscopy was done in December 2022 with recommendations to follow-up in 10 years    He had a diagnostic mammogram, which was normal. There was no scar tissue. He had a  "left breast ultrasound, which was normal.      Review of Systems   Constitutional:  Negative for fever.   Respiratory:  Negative for shortness of breath.    Cardiovascular:  Negative for chest pain.   Psychiatric/Behavioral:  Negative for depression and hallucinations.         Concerned about state of the world and upcoming presidential election       Outpatient Medications Marked as Taking for the 2/26/24 encounter (Office Visit) with Dayana Valerio M.D.   Medication Sig Dispense Refill    glucose blood (CONTOUR NEXT TEST) strip Check fingersticks three times a day. E11.9 300 Strip 3    lisinopril (PRINIVIL) 40 MG tablet Take 1 Tablet by mouth every day. 100 Tablet 3    cloNIDine (CATAPRES) 0.1 MG Tab Take 1 Tablet by mouth 2 times a day. 200 Tablet 3    metoprolol SR (TOPROL XL) 25 MG TABLET SR 24 HR Take 1 Tablet by mouth every day. 100 Tablet 3    metFORMIN (GLUCOPHAGE) 500 MG Tab Take 1 Tablet by mouth 2 times a day with meals. 200 Tablet 3    amLODIPine (NORVASC) 10 MG Tab Take 1 Tablet by mouth every day. 100 Tablet 3    atorvastatin (LIPITOR) 40 MG Tab Take 1 Tablet by mouth every day. 100 Tablet 3    traZODone (DESYREL) 100 MG Tab Take 1 Tablet by mouth every evening. 100 Tablet 3    levothyroxine (SYNTHROID) 25 MCG Tab Take 1 Tablet by mouth every morning on an empty stomach. 100 Tablet 3    ketoconazole (NIZORAL) 2 % Cream Apply twice a day to affected toenails. 30 g 2    latanoprost (XALATAN) 0.005 % Solution Instill 1 drop into both eyes at bedtime 7.5 mL 3    albuterol 108 (90 Base) MCG/ACT Aero Soln inhalation aerosol Inhale 2 Puffs every four hours as needed for Shortness of Breath. 18 g 1    insulin regular (NOVOLIN R) 100 Unit/mL Solution AS PER SLIDING SCALE 10 mL 3    INSULIN SYRINGE .5CC/29G (ULTICARE INSULIN SYRINGE) 29G X 1/2\" 0.5 ML Misc USE AS DIRECTED BY PHYSICIAN 100 Each 3    Blood Glucose Monitoring Suppl (CONTOUR NEXT ONE) Device 1 Glucometer 1 Each 0    aspirin (ASA) 81 MG Chew Tab " "chewable tablet CHEW 1 TABLET ORALLY ONCE DAILY 100 Tab 3       /70 (BP Location: Left arm, Patient Position: Sitting, BP Cuff Size: Adult)   Pulse 75   Temp 36.2 °C (97.2 °F) (Temporal)   Ht 1.676 m (5' 6\")   Wt 63 kg (139 lb)   SpO2 96% , Body mass index is 22.44 kg/m².        Physical Exam  Constitutional:       Appearance: Normal appearance.   Eyes:      Extraocular Movements: Extraocular movements intact.   Pulmonary:      Effort: Pulmonary effort is normal.   Chest:   Breasts:     Breasts are symmetrical.      Right: No tenderness.      Left: No tenderness.   Musculoskeletal:      Cervical back: Neck supple.   Neurological:      Mental Status: He is alert.   Psychiatric:         Mood and Affect: Mood normal.         Behavior: Behavior normal.         Thought Content: Thought content normal.         Judgment: Judgment normal.             Assessment & Plan  1.  Schizoaffective disorder (HCC)   Chronic medical diagnoses.  Overall stable.  Declining further evaluation with a therapist or a psychiatrist.  It has been 4 years since he has been on any medications . Referral to therapy/psych d/w him. Declines. Just remains very upset about state of the world and possibility of having an authoritative leader.  Will have him follow-up with me in 1 month    2. Hypertension.  Chronic medical diagnosis.  Stable.    His blood pressure looks great. He will continue lisinopril 40 mg, clonidine 0.1 mg twice a day, metoprolol 25 mg, and amlodipine 10 mg.    3. Diabetes.  Chronic medical diagnosis.  Stable.    His last A1c from 12/2023 was stable and well controlled at 6.1. He will continue metformin 500 mg twice a day.    4. Anemia.  Chronic medical diagnoses.  He is still anemic, but it is improving. His kidney function labs have been normal. He has a little more protein in the urine. We will repeat his blood work in 3 months. I will recheck his urine for protein and kidney function. I will check his vitamin levels " and iron studies.    5. Breast swelling.  His left breast looks normal. He was recommended to follow up with a breast surgeon specialist.    Follow-up  The patient will follow up in 4 to 6 weeks.    Orders Placed This Encounter    CBC WITH DIFFERENTIAL    MICROALBUMIN CREAT RATIO URINE    Comp Metabolic Panel    IRON/TOTAL IRON BIND    FERRITIN    HEMOGLOBIN A1C    VITAMIN D,25 HYDROXY (DEFICIENCY)             I spent a total of 40 minutes which included time preparing to see the patient (reviewing my last note, records and recent lab results)  I also performed a medically appropriate examination, counseled and educated the patient, ordered tests.   and documentation of this encounter.     This note was created using voice recognition software (Dragon). The accuracy of the dictation is limited by the abilities of the software. I have reviewed the note prior to signing, however some errors in grammar and context are still possible. If you have any questions related to this note please do not hesitate to contact our office.

## 2024-03-22 ENCOUNTER — DOCUMENTATION (OUTPATIENT)
Dept: HEALTH INFORMATION MANAGEMENT | Facility: OTHER | Age: 69
End: 2024-03-22
Payer: MEDICARE

## 2024-03-22 PROCEDURE — RXMED WILLOW AMBULATORY MEDICATION CHARGE: Performed by: OPTOMETRIST

## 2024-03-25 ENCOUNTER — PHARMACY VISIT (OUTPATIENT)
Dept: PHARMACY | Facility: MEDICAL CENTER | Age: 69
End: 2024-03-25
Payer: COMMERCIAL

## 2024-03-29 ENCOUNTER — HOSPITAL ENCOUNTER (OUTPATIENT)
Dept: LAB | Facility: MEDICAL CENTER | Age: 69
End: 2024-03-29
Attending: FAMILY MEDICINE
Payer: MEDICARE

## 2024-03-29 DIAGNOSIS — E11.319 TYPE 2 DIABETES MELLITUS WITH RETINOPATHY, WITH LONG-TERM CURRENT USE OF INSULIN, MACULAR EDEMA PRESENCE UNSPECIFIED, UNSPECIFIED LATERALITY, UNSPECIFIED RETINOPATHY SEVERITY (HCC): ICD-10-CM

## 2024-03-29 DIAGNOSIS — D64.89 ANEMIA DUE TO OTHER CAUSE, NOT CLASSIFIED: ICD-10-CM

## 2024-03-29 DIAGNOSIS — E11.21 TYPE 2 DIABETES MELLITUS WITH DIABETIC NEPHROPATHY, WITH LONG-TERM CURRENT USE OF INSULIN (HCC): ICD-10-CM

## 2024-03-29 DIAGNOSIS — Z79.4 TYPE 2 DIABETES MELLITUS WITH DIABETIC NEPHROPATHY, WITH LONG-TERM CURRENT USE OF INSULIN (HCC): ICD-10-CM

## 2024-03-29 DIAGNOSIS — Z79.4 TYPE 2 DIABETES MELLITUS WITH RETINOPATHY, WITH LONG-TERM CURRENT USE OF INSULIN, MACULAR EDEMA PRESENCE UNSPECIFIED, UNSPECIFIED LATERALITY, UNSPECIFIED RETINOPATHY SEVERITY (HCC): ICD-10-CM

## 2024-03-29 LAB
25(OH)D3 SERPL-MCNC: 20 NG/ML (ref 30–100)
ALBUMIN SERPL BCP-MCNC: 5 G/DL (ref 3.2–4.9)
ALBUMIN/GLOB SERPL: 1.9 G/DL
ALP SERPL-CCNC: 83 U/L (ref 30–99)
ALT SERPL-CCNC: 9 U/L (ref 2–50)
ANION GAP SERPL CALC-SCNC: 14 MMOL/L (ref 7–16)
AST SERPL-CCNC: 15 U/L (ref 12–45)
BASOPHILS # BLD AUTO: 0.6 % (ref 0–1.8)
BASOPHILS # BLD: 0.03 K/UL (ref 0–0.12)
BILIRUB SERPL-MCNC: 0.4 MG/DL (ref 0.1–1.5)
BUN SERPL-MCNC: 11 MG/DL (ref 8–22)
CALCIUM ALBUM COR SERPL-MCNC: 8.8 MG/DL (ref 8.5–10.5)
CALCIUM SERPL-MCNC: 9.6 MG/DL (ref 8.5–10.5)
CHLORIDE SERPL-SCNC: 104 MMOL/L (ref 96–112)
CO2 SERPL-SCNC: 23 MMOL/L (ref 20–33)
CREAT SERPL-MCNC: 0.66 MG/DL (ref 0.5–1.4)
CREAT UR-MCNC: 59.69 MG/DL
EOSINOPHIL # BLD AUTO: 0.04 K/UL (ref 0–0.51)
EOSINOPHIL NFR BLD: 0.8 % (ref 0–6.9)
ERYTHROCYTE [DISTWIDTH] IN BLOOD BY AUTOMATED COUNT: 41.3 FL (ref 35.9–50)
EST. AVERAGE GLUCOSE BLD GHB EST-MCNC: 128 MG/DL
FERRITIN SERPL-MCNC: 79.6 NG/ML (ref 22–322)
GFR SERPLBLD CREATININE-BSD FMLA CKD-EPI: 101 ML/MIN/1.73 M 2
GLOBULIN SER CALC-MCNC: 2.7 G/DL (ref 1.9–3.5)
GLUCOSE SERPL-MCNC: 112 MG/DL (ref 65–99)
HBA1C MFR BLD: 6.1 % (ref 4–5.6)
HCT VFR BLD AUTO: 46.5 % (ref 42–52)
HGB BLD-MCNC: 15.1 G/DL (ref 14–18)
IMM GRANULOCYTES # BLD AUTO: 0.03 K/UL (ref 0–0.11)
IMM GRANULOCYTES NFR BLD AUTO: 0.6 % (ref 0–0.9)
IRON SATN MFR SERPL: 16 % (ref 15–55)
IRON SERPL-MCNC: 50 UG/DL (ref 50–180)
LYMPHOCYTES # BLD AUTO: 1.45 K/UL (ref 1–4.8)
LYMPHOCYTES NFR BLD: 30 % (ref 22–41)
MCH RBC QN AUTO: 27.9 PG (ref 27–33)
MCHC RBC AUTO-ENTMCNC: 32.5 G/DL (ref 32.3–36.5)
MCV RBC AUTO: 85.8 FL (ref 81.4–97.8)
MICROALBUMIN UR-MCNC: 1.7 MG/DL
MICROALBUMIN/CREAT UR: 28 MG/G (ref 0–30)
MONOCYTES # BLD AUTO: 0.33 K/UL (ref 0–0.85)
MONOCYTES NFR BLD AUTO: 6.8 % (ref 0–13.4)
NEUTROPHILS # BLD AUTO: 2.96 K/UL (ref 1.82–7.42)
NEUTROPHILS NFR BLD: 61.2 % (ref 44–72)
NRBC # BLD AUTO: 0 K/UL
NRBC BLD-RTO: 0 /100 WBC (ref 0–0.2)
PLATELET # BLD AUTO: 333 K/UL (ref 164–446)
PMV BLD AUTO: 9.8 FL (ref 9–12.9)
POTASSIUM SERPL-SCNC: 3.9 MMOL/L (ref 3.6–5.5)
PROT SERPL-MCNC: 7.7 G/DL (ref 6–8.2)
RBC # BLD AUTO: 5.42 M/UL (ref 4.7–6.1)
SODIUM SERPL-SCNC: 141 MMOL/L (ref 135–145)
TIBC SERPL-MCNC: 307 UG/DL (ref 250–450)
UIBC SERPL-MCNC: 257 UG/DL (ref 110–370)
WBC # BLD AUTO: 4.8 K/UL (ref 4.8–10.8)

## 2024-03-29 PROCEDURE — 83036 HEMOGLOBIN GLYCOSYLATED A1C: CPT

## 2024-03-29 PROCEDURE — 85025 COMPLETE CBC W/AUTO DIFF WBC: CPT

## 2024-03-29 PROCEDURE — 82043 UR ALBUMIN QUANTITATIVE: CPT

## 2024-03-29 PROCEDURE — 83540 ASSAY OF IRON: CPT

## 2024-03-29 PROCEDURE — 36415 COLL VENOUS BLD VENIPUNCTURE: CPT

## 2024-03-29 PROCEDURE — 82306 VITAMIN D 25 HYDROXY: CPT

## 2024-03-29 PROCEDURE — 82728 ASSAY OF FERRITIN: CPT

## 2024-03-29 PROCEDURE — 80053 COMPREHEN METABOLIC PANEL: CPT

## 2024-03-29 PROCEDURE — 82570 ASSAY OF URINE CREATININE: CPT

## 2024-03-29 PROCEDURE — 83550 IRON BINDING TEST: CPT

## 2024-04-08 ENCOUNTER — APPOINTMENT (OUTPATIENT)
Dept: MEDICAL GROUP | Facility: PHYSICIAN GROUP | Age: 69
End: 2024-04-08
Payer: MEDICARE

## 2024-04-08 VITALS
OXYGEN SATURATION: 96 % | HEART RATE: 79 BPM | HEIGHT: 66 IN | TEMPERATURE: 98 F | DIASTOLIC BLOOD PRESSURE: 72 MMHG | BODY MASS INDEX: 21.69 KG/M2 | SYSTOLIC BLOOD PRESSURE: 128 MMHG | WEIGHT: 135 LBS | RESPIRATION RATE: 18 BRPM

## 2024-04-08 DIAGNOSIS — E55.9 VITAMIN D DEFICIENCY: ICD-10-CM

## 2024-04-08 DIAGNOSIS — Z79.4 TYPE 2 DIABETES MELLITUS WITH RETINOPATHY, WITH LONG-TERM CURRENT USE OF INSULIN, MACULAR EDEMA PRESENCE UNSPECIFIED, UNSPECIFIED LATERALITY, UNSPECIFIED RETINOPATHY SEVERITY (HCC): ICD-10-CM

## 2024-04-08 DIAGNOSIS — G47.34 NOCTURNAL HYPOXIA: ICD-10-CM

## 2024-04-08 DIAGNOSIS — E11.319 TYPE 2 DIABETES MELLITUS WITH RETINOPATHY, WITH LONG-TERM CURRENT USE OF INSULIN, MACULAR EDEMA PRESENCE UNSPECIFIED, UNSPECIFIED LATERALITY, UNSPECIFIED RETINOPATHY SEVERITY (HCC): ICD-10-CM

## 2024-04-08 DIAGNOSIS — M25.562 ACUTE PAIN OF LEFT KNEE: ICD-10-CM

## 2024-04-08 DIAGNOSIS — G47.33 OBSTRUCTIVE SLEEP APNEA SYNDROME: ICD-10-CM

## 2024-04-08 PROBLEM — I1A.0 RESISTANT HYPERTENSION: Status: RESOLVED | Noted: 2023-07-18 | Resolved: 2024-04-08

## 2024-04-08 PROCEDURE — 99214 OFFICE O/P EST MOD 30 MIN: CPT | Performed by: FAMILY MEDICINE

## 2024-04-08 PROCEDURE — 3074F SYST BP LT 130 MM HG: CPT | Performed by: FAMILY MEDICINE

## 2024-04-08 PROCEDURE — 3078F DIAST BP <80 MM HG: CPT | Performed by: FAMILY MEDICINE

## 2024-04-08 ASSESSMENT — FIBROSIS 4 INDEX: FIB4 SCORE: 1.04

## 2024-04-08 NOTE — PROGRESS NOTES
Verbal consent was acquired by the patient to use [employer] ambient listening note generation during this visit         History of Present Illness  The patient presents for evaluation of multiple medical concerns.      Left knee pain  . Recently, he has been experiencing pain in his left knee, which intensified approximately 10 days ago and persisted for 3 days. He denies any precipitating injury, such as falls, twisting, or popping sounds. He applies active pressure to the area, which provides relief. Additionally, he reports a palpable nodule or ligament pain. He manages the pain with Tylenol, with his last dose taken at 2:00 AM today.    Diabetes  The patient monitors his blood glucose levels at least once a week. He has used 200 test strips in the past. He monitors his blood glucose levels 3 to 4 times a day when his blood glucose levels are elevated, which concerns him. His current medication regimen includes Novolin as needed, with only one injection since his last visit, (6 weeks ago) Lipitor for cholesterol, amlodipine, metoprolol, clonidine, lisinopril, metformin 500 mg twice a day, albuterol inhaler as needed, and Xalatan eyedrops.        The patient's oxygen levels decrease at night, prompting him to request an oxygen test at home. He was previously on oxygen from 2013 to 2018, but was transitioned to CPAP in 2018. He has discontinued CPAP use for the past 2 years. His pulse oximeter readings range from 95 to 96 percent when he is mobile, and 87 to 88 percent when he is at home.       Review of Systems   Musculoskeletal:  Positive for joint pain (left knee pain).       Outpatient Medications Marked as Taking for the 4/8/24 encounter (Office Visit) with Dayana Valerio M.D.   Medication Sig Dispense Refill    glucose blood (CONTOUR NEXT TEST) strip Check fingersticks three times a day. E11.9 300 Strip 3    lisinopril (PRINIVIL) 40 MG tablet Take 1 Tablet by mouth every day. 100 Tablet 3    cloNIDine  "(CATAPRES) 0.1 MG Tab Take 1 Tablet by mouth 2 times a day. 200 Tablet 3    metoprolol SR (TOPROL XL) 25 MG TABLET SR 24 HR Take 1 Tablet by mouth every day. 100 Tablet 3    metFORMIN (GLUCOPHAGE) 500 MG Tab Take 1 Tablet by mouth 2 times a day with meals. 200 Tablet 3    amLODIPine (NORVASC) 10 MG Tab Take 1 Tablet by mouth every day. 100 Tablet 3    atorvastatin (LIPITOR) 40 MG Tab Take 1 Tablet by mouth every day. 100 Tablet 3    traZODone (DESYREL) 100 MG Tab Take 1 Tablet by mouth every evening. 100 Tablet 3    levothyroxine (SYNTHROID) 25 MCG Tab Take 1 Tablet by mouth every morning on an empty stomach. 100 Tablet 3    ketoconazole (NIZORAL) 2 % Cream Apply twice a day to affected toenails. 30 g 2    latanoprost (XALATAN) 0.005 % Solution Instill 1 drop into both eyes at bedtime 7.5 mL 3    albuterol 108 (90 Base) MCG/ACT Aero Soln inhalation aerosol Inhale 2 Puffs every four hours as needed for Shortness of Breath. 18 g 1    insulin regular (NOVOLIN R) 100 Unit/mL Solution AS PER SLIDING SCALE 10 mL 3    INSULIN SYRINGE .5CC/29G (ULTICARE INSULIN SYRINGE) 29G X 1/2\" 0.5 ML Misc USE AS DIRECTED BY PHYSICIAN 100 Each 3    Blood Glucose Monitoring Suppl (CONTOUR NEXT ONE) Device 1 Glucometer 1 Each 0    aspirin (ASA) 81 MG Chew Tab chewable tablet CHEW 1 TABLET ORALLY ONCE DAILY 100 Tab 3       /72 (BP Location: Right arm, Patient Position: Sitting, BP Cuff Size: Adult)   Pulse 79   Temp 36.7 °C (98 °F) (Temporal)   Resp 18   Ht 1.676 m (5' 6\")   Wt 61.2 kg (135 lb)   SpO2 96% , Body mass index is 21.79 kg/m².        Physical Exam  Constitutional:       Appearance: Normal appearance.   Eyes:      Extraocular Movements: Extraocular movements intact.   Cardiovascular:      Rate and Rhythm: Normal rate and regular rhythm.   Pulmonary:      Effort: Pulmonary effort is normal.      Breath sounds: Normal breath sounds.   Musculoskeletal:      Left knee: No swelling, deformity, effusion or erythema. Normal " range of motion. Tenderness present.      Comments: Left medial knee tenderness to palpation   Neurological:      Mental Status: He is alert.   Psychiatric:         Mood and Affect: Mood normal.         Behavior: Behavior normal.         Results  Laboratory Studies  Hemoglobin and hematocrit had dropped, but numbers have normalized. Albumin is a little bit higher than normal. GFR has been normal. Iron levels are normal. Average blood sugar is in the prediabetic range at 6.1. Urine test was normalized. Vitamin D level was lower.       Assessment & Plan  1. Acute pain in the left knee.  This is a new medical diagnosis and is showing signs of improvement. The patient has been experiencing this discomfort for the past 10 days. He denies any history of injury or trauma. The pain has shown improvement with as-needed Tylenol. A left knee x-ray is planned.    2. Vitamin D deficiency.  This is a chronic medical diagnosis. Recent labs show a vitamin D level of 20. The patient has increased his outdoor activities with the weather warming up. This will be repeated in the future.    3. Type 2 diabetes.  This is a chronic diagnosis, but it remains stable. Recent labs indicate an A1c level of 6.1 percent. The patient continues to take metformin 500 mg twice a day and as-needed Novolin.    4. Obstructive sleep apnea   5. nocturnal hypoxia.  This is a chronic diagnosis. The patient has observed that his nighttime oxygen saturations are below 88 percent. He had been on nighttime oxygen prior to initiating CPAP in 2018 and has discontinued this for some time now. The patient is requesting a nocturnal oxygen study, and new orders have been placed today.    Orders Placed This Encounter    DX-KNEE 2- LEFT    Nocturnal Oximetry Study             This note was created using voice recognition software (Dragon). The accuracy of the dictation is limited by the abilities of the software. I have reviewed the note prior to signing, however some  errors in grammar and context are still possible. If you have any questions related to this note please do not hesitate to contact our office.

## 2024-04-09 ENCOUNTER — APPOINTMENT (OUTPATIENT)
Dept: RADIOLOGY | Facility: MEDICAL CENTER | Age: 69
End: 2024-04-09
Attending: FAMILY MEDICINE
Payer: MEDICARE

## 2024-04-09 DIAGNOSIS — M25.562 ACUTE PAIN OF LEFT KNEE: ICD-10-CM

## 2024-04-09 PROCEDURE — 73560 X-RAY EXAM OF KNEE 1 OR 2: CPT | Mod: LT

## 2024-04-11 DIAGNOSIS — M25.562 ACUTE PAIN OF LEFT KNEE: ICD-10-CM

## 2024-04-17 ENCOUNTER — PATIENT MESSAGE (OUTPATIENT)
Dept: MEDICAL GROUP | Facility: PHYSICIAN GROUP | Age: 69
End: 2024-04-17
Payer: MEDICARE

## 2024-04-26 PROCEDURE — RXMED WILLOW AMBULATORY MEDICATION CHARGE: Performed by: FAMILY MEDICINE

## 2024-04-30 ENCOUNTER — APPOINTMENT (OUTPATIENT)
Dept: RADIOLOGY | Facility: IMAGING CENTER | Age: 69
End: 2024-04-30
Attending: ORTHOPAEDIC SURGERY
Payer: MEDICARE

## 2024-04-30 ENCOUNTER — OFFICE VISIT (OUTPATIENT)
Dept: SURGICAL ONCOLOGY | Facility: MEDICAL CENTER | Age: 69
End: 2024-04-30
Payer: MEDICARE

## 2024-04-30 VITALS
OXYGEN SATURATION: 96 % | HEIGHT: 65 IN | SYSTOLIC BLOOD PRESSURE: 140 MMHG | TEMPERATURE: 98.1 F | DIASTOLIC BLOOD PRESSURE: 76 MMHG | WEIGHT: 134 LBS | HEART RATE: 85 BPM | BODY MASS INDEX: 22.33 KG/M2

## 2024-04-30 DIAGNOSIS — M25.562 ACUTE PAIN OF LEFT KNEE: ICD-10-CM

## 2024-04-30 DIAGNOSIS — M23.232 DERANG OF MEDIAL MENISCUS DUE TO OLD TEAR/INJ, LEFT KNEE: ICD-10-CM

## 2024-04-30 PROCEDURE — 3078F DIAST BP <80 MM HG: CPT | Performed by: ORTHOPAEDIC SURGERY

## 2024-04-30 PROCEDURE — 3077F SYST BP >= 140 MM HG: CPT | Performed by: ORTHOPAEDIC SURGERY

## 2024-04-30 PROCEDURE — 99204 OFFICE O/P NEW MOD 45 MIN: CPT | Performed by: ORTHOPAEDIC SURGERY

## 2024-04-30 PROCEDURE — 73560 X-RAY EXAM OF KNEE 1 OR 2: CPT | Mod: TC,LT | Performed by: ORTHOPAEDIC SURGERY

## 2024-04-30 ASSESSMENT — FIBROSIS 4 INDEX: FIB4 SCORE: 1.04

## 2024-05-01 ASSESSMENT — ENCOUNTER SYMPTOMS
WEIGHT LOSS: 0
CHILLS: 0
MYALGIAS: 0
WEAKNESS: 0
SHORTNESS OF BREATH: 0
FEVER: 0
SENSORY CHANGE: 0

## 2024-05-01 NOTE — PROGRESS NOTES
Subjective:   4/30/2024  1:51 PM  Primary care physician:Dayana Valerio M.D.    Chief Complaint:   Left knee pain    History of presenting illness:  Nikolas Rico  is a pleasant 68 y.o. male who was referred for evaluation of left knee pain.  He reports feeling a pain in the medial aspect of the left knee several weeks ago when he was doing a deep knee bend.  He noticed some clicking in the subsequent days, but gradually over the last several weeks his pain has improved and the clicking has stopped.  He still has some discomfort in the area, but it is tolerable.  He denies any locking or giving way episodes.  He denies any numbness or paresthesias.  He took some Tylenol and that provided some relief.  He also has a knee sleeve that he thinks helps a bit.  He denies any prior surgeries on the left knee.        Past Medical History:   Diagnosis Date    BMI 23.0-23.9, adult 02/08/2023    CKD (chronic kidney disease) stage 3, GFR 30-59 ml/min     COPD     Diabetes     GERD (gastroesophageal reflux disease)     Hepatitis C     Hyperlipidemia     Hypertension     Psychiatric disorder     bipolar    Resistant hypertension     Schizoaffective disorder (HCC) 10/01/2016     Past Surgical History:   Procedure Laterality Date    OTHER ABDOMINAL SURGERY      OTHER ORTHOPEDIC SURGERY       No Known Allergies  Outpatient Encounter Medications as of 4/30/2024   Medication Sig Dispense Refill    glucose blood (CONTOUR NEXT TEST) strip Check fingersticks three times a day. E11.9 300 Strip 3    lisinopril (PRINIVIL) 40 MG tablet Take 1 Tablet by mouth every day. 100 Tablet 3    cloNIDine (CATAPRES) 0.1 MG Tab Take 1 Tablet by mouth 2 times a day. 200 Tablet 3    metoprolol SR (TOPROL XL) 25 MG TABLET SR 24 HR Take 1 Tablet by mouth every day. 100 Tablet 3    metFORMIN (GLUCOPHAGE) 500 MG Tab Take 1 Tablet by mouth 2 times a day with meals. 200 Tablet 3    amLODIPine (NORVASC) 10 MG Tab Take 1 Tablet by mouth every day. 100 Tablet  "3    atorvastatin (LIPITOR) 40 MG Tab Take 1 Tablet by mouth every day. 100 Tablet 3    traZODone (DESYREL) 100 MG Tab Take 1 Tablet by mouth every evening. 100 Tablet 3    levothyroxine (SYNTHROID) 25 MCG Tab Take 1 Tablet by mouth every morning on an empty stomach. 100 Tablet 3    ketoconazole (NIZORAL) 2 % Cream Apply twice a day to affected toenails. 30 g 2    latanoprost (XALATAN) 0.005 % Solution Instill 1 drop into both eyes at bedtime 7.5 mL 3    albuterol 108 (90 Base) MCG/ACT Aero Soln inhalation aerosol Inhale 2 Puffs every four hours as needed for Shortness of Breath. 18 g 1    insulin regular (NOVOLIN R) 100 Unit/mL Solution AS PER SLIDING SCALE 10 mL 3    INSULIN SYRINGE .5CC/29G (ULTICARE INSULIN SYRINGE) 29G X 1/2\" 0.5 ML Misc USE AS DIRECTED BY PHYSICIAN 100 Each 3    Blood Glucose Monitoring Suppl (CONTOUR NEXT ONE) Device 1 Glucometer 1 Each 0    aspirin (ASA) 81 MG Chew Tab chewable tablet CHEW 1 TABLET ORALLY ONCE DAILY 100 Tab 3     No facility-administered encounter medications on file as of 2024.     Social History     Socioeconomic History    Marital status: Single     Spouse name: Not on file    Number of children: Not on file    Years of education: Not on file    Highest education level: Not on file   Occupational History    Not on file   Tobacco Use    Smoking status: Former     Current packs/day: 0.00     Types: Cigarettes     Quit date: 3/6/2013     Years since quittin.1    Smokeless tobacco: Never   Vaping Use    Vaping Use: Never used   Substance and Sexual Activity    Alcohol use: Not Currently     Comment: sober since HCV diagnosis (2018)    Drug use: Yes     Types: Marijuana     Comment: medical marijuana    Sexual activity: Not Currently     Partners: Female     Comment: retired, no kids.   Other Topics Concern    Not on file   Social History Narrative    Not on file     Social Determinants of Health     Financial Resource Strain: Low Risk  (2024)    Overall " Financial Resource Strain (CARDIA)     Difficulty of Paying Living Expenses: Not hard at all   Food Insecurity: No Food Insecurity (2024)    Hunger Vital Sign     Worried About Running Out of Food in the Last Year: Never true     Ran Out of Food in the Last Year: Never true   Transportation Needs: No Transportation Needs (2024)    PRAPARE - Transportation     Lack of Transportation (Medical): No     Lack of Transportation (Non-Medical): No   Physical Activity: Sufficiently Active (2021)    Exercise Vital Sign     Days of Exercise per Week: 7 days     Minutes of Exercise per Session: 140 min   Stress: Stress Concern Present (2021)    [employer] - Occupational Stress Questionnaire     Feeling of Stress : To some extent   Social Connections: Socially Isolated (2021)    Social Connection and Isolation Panel [NHANES]     Frequency of Communication with Friends and Family: More than three times a week     Frequency of Social Gatherings with Friends and Family: Never     Attends Evangelical Services: Never     Active Member of Clubs or Organizations: No     Attends Club or Organization Meetings: Never     Marital Status: Never    Intimate Partner Violence: Not on file   Housing Stability: High Risk (2021)    Housing Stability Vital Sign     Unable to Pay for Housing in the Last Year: No     Number of Places Lived in the Last Year: 1     Unstable Housing in the Last Year: Yes      Social History     Tobacco Use   Smoking Status Former    Current packs/day: 0.00    Types: Cigarettes    Quit date: 3/6/2013    Years since quittin.1   Smokeless Tobacco Never     Social History     Substance and Sexual Activity   Alcohol Use Not Currently    Comment: sober since HCV diagnosis (2018)     Social History     Substance and Sexual Activity   Drug Use Yes    Types: Marijuana    Comment: medical marijuana        Family History   Problem Relation Age of Onset     "Cancer Father         blader [place]    Clotting Disorder Neg Hx        Review of Systems   Constitutional:  Negative for chills, fever and weight loss.   Respiratory:  Negative for shortness of breath.    Cardiovascular:  Negative for chest pain.   Musculoskeletal:  Positive for joint pain. Negative for myalgias.   Neurological:  Negative for sensory change and weakness.        Objective:   BP (!) 140/76 (BP Location: Left arm, Patient Position: Sitting, BP Cuff Size: Adult)   Pulse 85   Temp 36.7 °C (98.1 °F) (Temporal)   Ht 1.651 m (5' 5\")   Wt 60.8 kg (134 lb)   SpO2 96%   BMI 22.30 kg/m²     Physical Exam  Constitutional:       General: He is not in acute distress.     Appearance: Normal appearance.   HENT:      Head: Normocephalic and atraumatic.      Right Ear: External ear normal.      Left Ear: External ear normal.      Nose: Nose normal.      Mouth/Throat:      Mouth: Mucous membranes are moist.   Eyes:      Extraocular Movements: Extraocular movements intact.      Conjunctiva/sclera: Conjunctivae normal.   Cardiovascular:      Rate and Rhythm: Normal rate.      Pulses: Normal pulses.   Pulmonary:      Effort: Pulmonary effort is normal. No respiratory distress.      Breath sounds: No wheezing.   Abdominal:      General: Abdomen is flat. There is no distension.   Musculoskeletal:      Cervical back: Normal range of motion and neck supple.      Comments: left knee: Overlying skin demonstrates no erythema, ecchymoses or wounds. Knee ROM is full and symmetric.  SILT spn, dpn, plantar and sural nerves. Motor intact to knee extension, ankle dorsiflexion, ankle plantar flexion, and eversion. DP/PT pulse 2+. There is tenderness at the medial joint line. There is not tenderness elsewhere. negative Lachman. negative Shayy. stable varus/valgus stress.       Skin:     General: Skin is warm and dry.      Capillary Refill: Capillary refill takes less than 2 seconds.   Neurological:      Mental Status: He is alert " and oriented to person, place, and time.   Psychiatric:         Mood and Affect: Mood normal.         Behavior: Behavior normal.           Imaging:  Per my read, Adames and sunrise view obtained today along with AP and lateral views of the left knee from April 10 demonstrate no significant degenerative changes with the exception of mild joint space narrowing of the medial compartment bilateral knees.  There is evidence of prior old healed fracture of the right proximal fibula.  No destructive osseous lesions or acute fractures.      Diagnosis:     1. Derang of medial meniscus due to old tear/inj, left knee                Assessment/Plan:   I counseled the patient on the clinical and radiographic findings.  I suspect he may have had a medial meniscus tear, but his symptoms have continued to improve over the last several weeks.  He is not having any mechanical symptoms such as giving way episodes or locking.  We discussed options including physical therapy and MRI.  He would like to continue doing his own home therapy and did not wish to pursue MRI at this time.  I think that is reasonable given the improvement in his symptoms.  He will follow-up as needed if symptoms worsen or if he develops mechanical symptoms.      Referrals: none  Restrictions: none  New medications/refills: none  Imaging studies: none  Follow-up: jovani Pineda,   Orth[facility] Oncology and [facility]

## 2024-05-03 ENCOUNTER — PHARMACY VISIT (OUTPATIENT)
Dept: PHARMACY | Facility: MEDICAL CENTER | Age: 69
End: 2024-05-03
Payer: COMMERCIAL

## 2024-05-06 DIAGNOSIS — M23.232 DERANG OF MEDIAL MENISCUS DUE TO OLD TEAR/INJ, LEFT KNEE: ICD-10-CM

## 2024-05-06 NOTE — PROGRESS NOTES
Patient messaged that his left knee improvement hit a plateau and he would like to get an MRI now to further evaluate the meniscus.

## 2024-05-08 PROCEDURE — RXMED WILLOW AMBULATORY MEDICATION CHARGE: Performed by: INTERNAL MEDICINE

## 2024-05-10 ENCOUNTER — PATIENT MESSAGE (OUTPATIENT)
Dept: MEDICAL GROUP | Facility: PHYSICIAN GROUP | Age: 69
End: 2024-05-10
Payer: MEDICARE

## 2024-05-13 PROCEDURE — RXMED WILLOW AMBULATORY MEDICATION CHARGE: Performed by: FAMILY MEDICINE

## 2024-05-13 RX ORDER — DIPHENHYDRAMINE HYDROCHLORIDE 25 MG/1
CAPSULE, LIQUID FILLED ORAL
Qty: 1 KIT | Refills: 0 | Status: SHIPPED | OUTPATIENT
Start: 2024-05-13

## 2024-05-13 NOTE — TELEPHONE ENCOUNTER
Requested Prescriptions     Pending Prescriptions Disp Refills    Blood Glucose Monitoring Suppl Device 1 Each 0     Sig: Meter: Dispense Device of Insurance Preference. Sig. Use as directed for blood sugar monitoring. #1. NR.    Blood Glucose Test Strips 100 Strip 3     Sig: Test strips order: Test strips for insurance covered meter. Sig: use twice a day high and prn ssx high or low sugar #100 RF x 3     Dayana Valerio M.D.

## 2024-05-16 ENCOUNTER — PHARMACY VISIT (OUTPATIENT)
Dept: PHARMACY | Facility: MEDICAL CENTER | Age: 69
End: 2024-05-16
Payer: COMMERCIAL

## 2024-05-30 PROCEDURE — RXMED WILLOW AMBULATORY MEDICATION CHARGE: Performed by: FAMILY MEDICINE

## 2024-05-31 ENCOUNTER — PHARMACY VISIT (OUTPATIENT)
Dept: PHARMACY | Facility: MEDICAL CENTER | Age: 69
End: 2024-05-31
Payer: COMMERCIAL

## 2024-06-12 ENCOUNTER — APPOINTMENT (OUTPATIENT)
Dept: RADIOLOGY | Facility: MEDICAL CENTER | Age: 69
End: 2024-06-12
Attending: ORTHOPAEDIC SURGERY
Payer: MEDICARE

## 2024-06-12 PROCEDURE — 73721 MRI JNT OF LWR EXTRE W/O DYE: CPT | Mod: LT

## 2024-06-20 ENCOUNTER — OFFICE VISIT (OUTPATIENT)
Dept: SURGICAL ONCOLOGY | Facility: MEDICAL CENTER | Age: 69
End: 2024-06-20
Payer: MEDICARE

## 2024-06-20 VITALS
TEMPERATURE: 97.7 F | BODY MASS INDEX: 22.16 KG/M2 | HEART RATE: 77 BPM | DIASTOLIC BLOOD PRESSURE: 74 MMHG | HEIGHT: 65 IN | OXYGEN SATURATION: 98 % | SYSTOLIC BLOOD PRESSURE: 124 MMHG | WEIGHT: 133 LBS

## 2024-06-20 DIAGNOSIS — M23.232 DERANG OF MEDIAL MENISCUS DUE TO OLD TEAR/INJ, LEFT KNEE: ICD-10-CM

## 2024-06-20 PROCEDURE — 3078F DIAST BP <80 MM HG: CPT | Performed by: ORTHOPAEDIC SURGERY

## 2024-06-20 PROCEDURE — 99214 OFFICE O/P EST MOD 30 MIN: CPT | Performed by: ORTHOPAEDIC SURGERY

## 2024-06-20 PROCEDURE — 3074F SYST BP LT 130 MM HG: CPT | Performed by: ORTHOPAEDIC SURGERY

## 2024-06-20 ASSESSMENT — FIBROSIS 4 INDEX: FIB4 SCORE: 1.04

## 2024-06-20 NOTE — PROGRESS NOTES
Diagnosis:  left knee medial meniscus tear    Surgical Interventions: none    HPI: Nikolas returns for follow-up of his MRI left knee.  He reports he continues to have knee pain specifically in the medial compartment of the left knee.  He has clicking and popping and occasionally feels like the knee wants to give way on him.  He continues to use his knee sleeve with minimal benefit and relief.  He denies any numbness or paresthesias.    Past Medical History:   Past Medical History:   Diagnosis Date    BMI 23.0-23.9, adult 02/08/2023    CKD (chronic kidney disease) stage 3, GFR 30-59 ml/min     COPD     Diabetes     GERD (gastroesophageal reflux disease)     Hepatitis C     Hyperlipidemia     Hypertension     Psychiatric disorder     bipolar    Resistant hypertension     Schizoaffective disorder (HCC) 10/01/2016       Past Surgical History:  Past Surgical History:   Procedure Laterality Date    OTHER ABDOMINAL SURGERY      OTHER ORTHOPEDIC SURGERY         Outpatient Encounter Medications as of 6/20/2024   Medication Sig Dispense Refill    Blood Glucose Monitoring Suppl (BLOOD GLUCOSE MONITOR SYSTEM) w/Device Kit Meter: Dispense Device of Insurance Preference. Sig. Use as directed for blood sugar monitoring. #1. NR. 1 Kit 0    Blood Glucose Test Strips Test strips order: Test strips for insurance covered meter. Sig: use twice a day high and prn ssx high or low sugar #100 RF x 3 100 Strip 3    glucose blood (CONTOUR NEXT TEST) strip Check fingersticks three times a day. E11.9 300 Strip 3    lisinopril (PRINIVIL) 40 MG tablet Take 1 Tablet by mouth every day. 100 Tablet 3    cloNIDine (CATAPRES) 0.1 MG Tab Take 1 Tablet by mouth 2 times a day. 200 Tablet 3    metoprolol SR (TOPROL XL) 25 MG TABLET SR 24 HR Take 1 Tablet by mouth every day. 100 Tablet 3    metFORMIN (GLUCOPHAGE) 500 MG Tab Take 1 Tablet by mouth 2 times a day with meals. 200 Tablet 3    amLODIPine (NORVASC) 10 MG Tab Take 1 Tablet by mouth  "every day. 100 Tablet 3    atorvastatin (LIPITOR) 40 MG Tab Take 1 Tablet by mouth every day. 100 Tablet 3    traZODone (DESYREL) 100 MG Tab Take 1 Tablet by mouth every evening. 100 Tablet 3    levothyroxine (SYNTHROID) 25 MCG Tab Take 1 Tablet by mouth every morning on an empty stomach. 100 Tablet 3    ketoconazole (NIZORAL) 2 % Cream Apply twice a day to affected toenails. 30 g 2    latanoprost (XALATAN) 0.005 % Solution Instill 1 drop into both eyes at bedtime 7.5 mL 3    albuterol 108 (90 Base) MCG/ACT Aero Soln inhalation aerosol Inhale 2 Puffs every four hours as needed for Shortness of Breath. 18 g 1    insulin regular (NOVOLIN R) 100 Unit/mL Solution AS PER SLIDING SCALE 10 mL 3    INSULIN SYRINGE .5CC/29G (ULTICARE INSULIN SYRINGE) 29G X 1/2\" 0.5 ML Misc USE AS DIRECTED BY PHYSICIAN 100 Each 3    Blood Glucose Monitoring Suppl (CONTOUR NEXT ONE) Device 1 Glucometer 1 Each 0    aspirin (ASA) 81 MG Chew Tab chewable tablet CHEW 1 TABLET ORALLY ONCE DAILY 100 Tab 3     No facility-administered encounter medications on file as of 2024.        Allergies:   No Known Allergies    Family History:   Family History   Problem Relation Age of Onset    Cancer Father         blader ca    Clotting Disorder Neg Hx        Social History:   Social History     Tobacco Use   Smoking Status Former    Current packs/day: 0.00    Types: Cigarettes    Quit date: 3/6/2013    Years since quittin.2   Smokeless Tobacco Never     Social History     Substance and Sexual Activity   Alcohol Use Not Currently    Comment: sober since HCV diagnosis ()     Social History     Substance and Sexual Activity   Drug Use Yes    Types: Marijuana    Comment: medical marijuana     Social History     Socioeconomic History    Marital status: Single     Spouse name: Not on file    Number of children: Not on file    Years of education: Not on file    Highest education level: Not on file   Occupational History    Not on file   Tobacco Use    " Smoking status: Former     Current packs/day: 0.00     Types: Cigarettes     Quit date: 3/6/2013     Years since quittin.2    Smokeless tobacco: Never   Vaping Use    Vaping status: Never Used   Substance and Sexual Activity    Alcohol use: Not Currently     Comment: sober since HCV diagnosis (2018)    Drug use: Yes     Types: Marijuana     Comment: medical marijuana    Sexual activity: Not Currently     Partners: Female     Comment: retired, no kids.   Other Topics Concern    Not on file   Social History Narrative    Not on file     Social Determinants of Health     Financial Resource Strain: Low Risk  (2024)    Overall Financial Resource Strain (CARDIA)     Difficulty of Paying Living Expenses: Not hard at all   Food Insecurity: No Food Insecurity (2024)    Hunger Vital Sign     Worried About Running Out of Food in the Last Year: Never true     Ran Out of Food in the Last Year: Never true   Transportation Needs: No Transportation Needs (2024)    PRAPARE - Transportation     Lack of Transportation (Medical): No     Lack of Transportation (Non-Medical): No   Physical Activity: Sufficiently Active (2021)    Exercise Vital Sign     Days of Exercise per Week: 7 days     Minutes of Exercise per Session: 140 min   Stress: Stress Concern Present (2021)    [place] [employer] - Occupational Stress Questionnaire     Feeling of Stress : To some extent   Social Connections: Socially Isolated (2021)    Social Connection and Isolation Panel [NHANES]     Frequency of Communication with Friends and Family: More than three times a week     Frequency of Social Gatherings with Friends and Family: Never     Attends Evangelical Services: Never     Active Member of Clubs or Organizations: No     Attends Club or Organization Meetings: Never     Marital Status: Never    Intimate Partner Violence: Not on file   Housing Stability: High Risk (2021)    Housing Stability Vital  "Sign     Unable to Pay for Housing in the Last Year: No     Number of Places Lived in the Last Year: 1     Unstable Housing in the Last Year: Yes       Review of Systems:  Review of Systems   Constitutional:  Negative for chills and fever.   Respiratory:  Negative for shortness of breath.    Cardiovascular:  Negative for chest pain.   Musculoskeletal:  Positive for joint pain.   Neurological:  Negative for tingling and sensory change.       Physical Exam:  /74 (BP Location: Left arm, Patient Position: Sitting)   Pulse 77   Temp 36.5 °C (97.7 °F) (Temporal)   Ht 1.651 m (5' 5\")   Wt 60.3 kg (133 lb)   SpO2 98%   BMI 22.13 kg/m²     Physical Exam  Constitutional:       Appearance: Normal appearance.   HENT:      Head: Normocephalic and atraumatic.   Cardiovascular:      Pulses: Normal pulses.   Pulmonary:      Effort: Pulmonary effort is normal. No respiratory distress.      Breath sounds: No stridor. No wheezing.   Abdominal:      General: Abdomen is flat. There is no distension.      Palpations: Abdomen is soft.   Musculoskeletal:      Cervical back: Normal range of motion and neck supple.      Comments: Overlying skin demonstrates no erythema, ecchymoses or wounds. Knee ROM is full and symmetric.  SILT spn, dpn, plantar and sural nerves. Motor intact to knee extension, ankle dorsiflexion, ankle plantar flexion, and eversion. DP/PT pulse 2+. There is tenderness at the medial joint line. There is not tenderness elsewhere. negative Lachman. negative Shayy. stable varus/valgus stress.   Skin:     General: Skin is warm and dry.      Capillary Refill: Capillary refill takes less than 2 seconds.   Neurological:      General: No focal deficit present.      Mental Status: He is alert and oriented to person, place, and time.   Psychiatric:         Mood and Affect: Mood normal.         Behavior: Behavior normal.         Imaging:     MRI of the left knee was reviewed and demonstrates complex tearing of the " medial meniscus.  There is some mild edema within the proximal medial tibial plateau.    Assessment and Plan:  Left knee medial meniscus tear  -I counseled the patient on the diagnosis and we discussed treatment options including conservative and surgical treatments in detail.  After discussion of options he would like to proceed with a left knee arthroscopy and partial medial meniscectomy. A full PARQ was held with the patient. Both surgical and non-operative options were discussed. They decided on surgery through shared decision making. We discussed risks include infection, blood loss, DVT/PE, damage to neurovascular structures, chronic pain, recurrence, need for further surgery and unforeseeable events inherent to medical care. They also understand anesthesia will do a separate consent discussing risks inherent to anesthesia.They verbalized understanding and were in agreement to proceed with left knee arthroscopy and partial medial meniscectomy.        Referrals: none  Restrictions: none  New medications/refills: none  Imaging studies: none  Follow-up: post op    Elliot Pineda DO  Orthopedics and Orthopedic Oncology

## 2024-06-21 ASSESSMENT — ENCOUNTER SYMPTOMS
SHORTNESS OF BREATH: 0
CHILLS: 0
SENSORY CHANGE: 0
FEVER: 0
TINGLING: 0

## 2024-06-24 PROCEDURE — RXMED WILLOW AMBULATORY MEDICATION CHARGE: Performed by: OPTOMETRIST

## 2024-06-25 ENCOUNTER — APPOINTMENT (OUTPATIENT)
Dept: ADMISSIONS | Facility: MEDICAL CENTER | Age: 69
End: 2024-06-25
Attending: ORTHOPAEDIC SURGERY
Payer: MEDICARE

## 2024-06-27 ENCOUNTER — PHARMACY VISIT (OUTPATIENT)
Dept: PHARMACY | Facility: MEDICAL CENTER | Age: 69
End: 2024-06-27
Payer: COMMERCIAL

## 2024-07-01 ENCOUNTER — APPOINTMENT (OUTPATIENT)
Dept: ADMISSIONS | Facility: MEDICAL CENTER | Age: 69
End: 2024-07-01
Attending: ORTHOPAEDIC SURGERY
Payer: MEDICARE

## 2024-07-01 VITALS — BODY MASS INDEX: 22.13 KG/M2 | HEIGHT: 65 IN

## 2024-07-01 DIAGNOSIS — Z01.810 PRE-OPERATIVE CARDIOVASCULAR EXAMINATION: ICD-10-CM

## 2024-07-01 DIAGNOSIS — Z01.812 PRE-OPERATIVE LABORATORY EXAMINATION: ICD-10-CM

## 2024-07-02 ENCOUNTER — PRE-ADMISSION TESTING (OUTPATIENT)
Dept: ADMISSIONS | Facility: MEDICAL CENTER | Age: 69
End: 2024-07-02
Attending: ORTHOPAEDIC SURGERY
Payer: MEDICARE

## 2024-07-02 DIAGNOSIS — Z01.812 PRE-OPERATIVE LABORATORY EXAMINATION: ICD-10-CM

## 2024-07-02 DIAGNOSIS — Z01.810 PRE-OPERATIVE CARDIOVASCULAR EXAMINATION: ICD-10-CM

## 2024-07-02 LAB
ALBUMIN SERPL BCP-MCNC: 4.3 G/DL (ref 3.2–4.9)
ALBUMIN/GLOB SERPL: 1.7 G/DL
ALP SERPL-CCNC: 65 U/L (ref 30–99)
ALT SERPL-CCNC: 16 U/L (ref 2–50)
ANION GAP SERPL CALC-SCNC: 10 MMOL/L (ref 7–16)
AST SERPL-CCNC: 17 U/L (ref 12–45)
BILIRUB SERPL-MCNC: 0.4 MG/DL (ref 0.1–1.5)
BUN SERPL-MCNC: 10 MG/DL (ref 8–22)
CALCIUM ALBUM COR SERPL-MCNC: 9.2 MG/DL (ref 8.5–10.5)
CALCIUM SERPL-MCNC: 9.4 MG/DL (ref 8.4–10.2)
CHLORIDE SERPL-SCNC: 107 MMOL/L (ref 96–112)
CO2 SERPL-SCNC: 25 MMOL/L (ref 20–33)
CREAT SERPL-MCNC: 0.8 MG/DL (ref 0.5–1.4)
ERYTHROCYTE [DISTWIDTH] IN BLOOD BY AUTOMATED COUNT: 39.8 FL (ref 35.9–50)
EST. AVERAGE GLUCOSE BLD GHB EST-MCNC: 128 MG/DL
GFR SERPLBLD CREATININE-BSD FMLA CKD-EPI: 95 ML/MIN/1.73 M 2
GLOBULIN SER CALC-MCNC: 2.5 G/DL (ref 1.9–3.5)
GLUCOSE SERPL-MCNC: 92 MG/DL (ref 65–99)
HBA1C MFR BLD: 6.1 % (ref 4–5.6)
HCT VFR BLD AUTO: 39.7 % (ref 42–52)
HGB BLD-MCNC: 13.4 G/DL (ref 14–18)
MCH RBC QN AUTO: 28.9 PG (ref 27–33)
MCHC RBC AUTO-ENTMCNC: 33.8 G/DL (ref 32.3–36.5)
MCV RBC AUTO: 85.6 FL (ref 81.4–97.8)
PLATELET # BLD AUTO: 256 K/UL (ref 164–446)
PMV BLD AUTO: 10.2 FL (ref 9–12.9)
POTASSIUM SERPL-SCNC: 3.6 MMOL/L (ref 3.6–5.5)
PROT SERPL-MCNC: 6.8 G/DL (ref 6–8.2)
RBC # BLD AUTO: 4.64 M/UL (ref 4.7–6.1)
SODIUM SERPL-SCNC: 142 MMOL/L (ref 135–145)
WBC # BLD AUTO: 5.1 K/UL (ref 4.8–10.8)

## 2024-07-02 PROCEDURE — 93005 ELECTROCARDIOGRAM TRACING: CPT

## 2024-07-02 PROCEDURE — 83036 HEMOGLOBIN GLYCOSYLATED A1C: CPT

## 2024-07-02 PROCEDURE — 85027 COMPLETE CBC AUTOMATED: CPT

## 2024-07-02 PROCEDURE — 36415 COLL VENOUS BLD VENIPUNCTURE: CPT

## 2024-07-02 PROCEDURE — 80053 COMPREHEN METABOLIC PANEL: CPT

## 2024-07-03 LAB — EKG IMPRESSION: NORMAL

## 2024-07-03 PROCEDURE — 93010 ELECTROCARDIOGRAM REPORT: CPT | Performed by: STUDENT IN AN ORGANIZED HEALTH CARE EDUCATION/TRAINING PROGRAM

## 2024-07-08 ENCOUNTER — TELEPHONE (OUTPATIENT)
Dept: MEDICAL GROUP | Facility: PHYSICIAN GROUP | Age: 69
End: 2024-07-08

## 2024-07-08 ENCOUNTER — APPOINTMENT (OUTPATIENT)
Dept: MEDICAL GROUP | Facility: PHYSICIAN GROUP | Age: 69
End: 2024-07-08
Payer: MEDICARE

## 2024-07-08 VITALS
DIASTOLIC BLOOD PRESSURE: 66 MMHG | HEIGHT: 65 IN | TEMPERATURE: 97.5 F | BODY MASS INDEX: 21.33 KG/M2 | SYSTOLIC BLOOD PRESSURE: 128 MMHG | WEIGHT: 128 LBS | RESPIRATION RATE: 14 BRPM | HEART RATE: 60 BPM | OXYGEN SATURATION: 98 %

## 2024-07-08 DIAGNOSIS — D64.89 ANEMIA DUE TO OTHER CAUSE, NOT CLASSIFIED: ICD-10-CM

## 2024-07-08 DIAGNOSIS — G89.29 CHRONIC PAIN OF LEFT KNEE: ICD-10-CM

## 2024-07-08 DIAGNOSIS — Z87.891 HISTORY OF NICOTINE DEPENDENCE: ICD-10-CM

## 2024-07-08 DIAGNOSIS — E78.5 DYSLIPIDEMIA DUE TO TYPE 2 DIABETES MELLITUS (HCC): ICD-10-CM

## 2024-07-08 DIAGNOSIS — E11.69 DYSLIPIDEMIA DUE TO TYPE 2 DIABETES MELLITUS (HCC): ICD-10-CM

## 2024-07-08 DIAGNOSIS — M25.562 CHRONIC PAIN OF LEFT KNEE: ICD-10-CM

## 2024-07-08 DIAGNOSIS — E03.9 HYPOTHYROIDISM, UNSPECIFIED TYPE: ICD-10-CM

## 2024-07-08 PROCEDURE — 3078F DIAST BP <80 MM HG: CPT | Performed by: FAMILY MEDICINE

## 2024-07-08 PROCEDURE — 99214 OFFICE O/P EST MOD 30 MIN: CPT | Performed by: FAMILY MEDICINE

## 2024-07-08 PROCEDURE — 3074F SYST BP LT 130 MM HG: CPT | Performed by: FAMILY MEDICINE

## 2024-07-08 ASSESSMENT — ENCOUNTER SYMPTOMS
NAUSEA: 0
HEARTBURN: 0
SHORTNESS OF BREATH: 0
VOMITING: 0
WHEEZING: 0
FALLS: 0

## 2024-07-08 ASSESSMENT — FIBROSIS 4 INDEX: FIB4 SCORE: 1.15

## 2024-07-10 ENCOUNTER — ANESTHESIA EVENT (OUTPATIENT)
Dept: SURGERY | Facility: MEDICAL CENTER | Age: 69
End: 2024-07-10
Payer: MEDICARE

## 2024-07-10 ENCOUNTER — ANESTHESIA (OUTPATIENT)
Dept: SURGERY | Facility: MEDICAL CENTER | Age: 69
End: 2024-07-10
Payer: MEDICARE

## 2024-07-10 ENCOUNTER — HOSPITAL ENCOUNTER (OUTPATIENT)
Facility: MEDICAL CENTER | Age: 69
End: 2024-07-10
Attending: ORTHOPAEDIC SURGERY | Admitting: ORTHOPAEDIC SURGERY
Payer: MEDICARE

## 2024-07-10 ENCOUNTER — PHARMACY VISIT (OUTPATIENT)
Dept: PHARMACY | Facility: MEDICAL CENTER | Age: 69
End: 2024-07-10
Payer: COMMERCIAL

## 2024-07-10 VITALS
TEMPERATURE: 97.7 F | BODY MASS INDEX: 21.67 KG/M2 | RESPIRATION RATE: 16 BRPM | WEIGHT: 130.07 LBS | OXYGEN SATURATION: 94 % | HEIGHT: 65 IN | DIASTOLIC BLOOD PRESSURE: 94 MMHG | HEART RATE: 65 BPM | SYSTOLIC BLOOD PRESSURE: 152 MMHG

## 2024-07-10 DIAGNOSIS — Z98.890 S/P LEFT KNEE ARTHROSCOPY: ICD-10-CM

## 2024-07-10 DIAGNOSIS — G89.18 POST-OP PAIN: ICD-10-CM

## 2024-07-10 LAB — GLUCOSE BLD STRIP.AUTO-MCNC: 78 MG/DL (ref 65–99)

## 2024-07-10 PROCEDURE — 82962 GLUCOSE BLOOD TEST: CPT

## 2024-07-10 PROCEDURE — 160048 HCHG OR STATISTICAL LEVEL 1-5: Performed by: ORTHOPAEDIC SURGERY

## 2024-07-10 PROCEDURE — 700105 HCHG RX REV CODE 258: Performed by: ORTHOPAEDIC SURGERY

## 2024-07-10 PROCEDURE — 160009 HCHG ANES TIME/MIN: Performed by: ORTHOPAEDIC SURGERY

## 2024-07-10 PROCEDURE — RXMED WILLOW AMBULATORY MEDICATION CHARGE: Performed by: ORTHOPAEDIC SURGERY

## 2024-07-10 PROCEDURE — 160035 HCHG PACU - 1ST 60 MINS PHASE I: Performed by: ORTHOPAEDIC SURGERY

## 2024-07-10 PROCEDURE — 160041 HCHG SURGERY MINUTES - EA ADDL 1 MIN LEVEL 4: Performed by: ORTHOPAEDIC SURGERY

## 2024-07-10 PROCEDURE — 160002 HCHG RECOVERY MINUTES (STAT): Performed by: ORTHOPAEDIC SURGERY

## 2024-07-10 PROCEDURE — 29880 ARTHRS KNE SRG MNISECTMY M&L: CPT | Mod: LT | Performed by: ORTHOPAEDIC SURGERY

## 2024-07-10 PROCEDURE — 700111 HCHG RX REV CODE 636 W/ 250 OVERRIDE (IP): Performed by: ORTHOPAEDIC SURGERY

## 2024-07-10 PROCEDURE — A9270 NON-COVERED ITEM OR SERVICE: HCPCS | Performed by: ANESTHESIOLOGY

## 2024-07-10 PROCEDURE — 700102 HCHG RX REV CODE 250 W/ 637 OVERRIDE(OP): Performed by: ANESTHESIOLOGY

## 2024-07-10 PROCEDURE — 160046 HCHG PACU - 1ST 60 MINS PHASE II: Performed by: ORTHOPAEDIC SURGERY

## 2024-07-10 PROCEDURE — 700111 HCHG RX REV CODE 636 W/ 250 OVERRIDE (IP): Performed by: ANESTHESIOLOGY

## 2024-07-10 PROCEDURE — 160036 HCHG PACU - EA ADDL 30 MINS PHASE I: Performed by: ORTHOPAEDIC SURGERY

## 2024-07-10 PROCEDURE — 160025 RECOVERY II MINUTES (STATS): Performed by: ORTHOPAEDIC SURGERY

## 2024-07-10 PROCEDURE — 160029 HCHG SURGERY MINUTES - 1ST 30 MINS LEVEL 4: Performed by: ORTHOPAEDIC SURGERY

## 2024-07-10 PROCEDURE — 700101 HCHG RX REV CODE 250: Performed by: ANESTHESIOLOGY

## 2024-07-10 RX ORDER — MIDAZOLAM HYDROCHLORIDE 1 MG/ML
INJECTION INTRAMUSCULAR; INTRAVENOUS PRN
Status: DISCONTINUED | OUTPATIENT
Start: 2024-07-10 | End: 2024-07-10 | Stop reason: SURG

## 2024-07-10 RX ORDER — HALOPERIDOL 5 MG/ML
1 INJECTION INTRAMUSCULAR
Status: DISCONTINUED | OUTPATIENT
Start: 2024-07-10 | End: 2024-07-10 | Stop reason: HOSPADM

## 2024-07-10 RX ORDER — KETOROLAC TROMETHAMINE 15 MG/ML
INJECTION, SOLUTION INTRAMUSCULAR; INTRAVENOUS PRN
Status: DISCONTINUED | OUTPATIENT
Start: 2024-07-10 | End: 2024-07-10 | Stop reason: SURG

## 2024-07-10 RX ORDER — DEXAMETHASONE SODIUM PHOSPHATE 4 MG/ML
INJECTION, SOLUTION INTRA-ARTICULAR; INTRALESIONAL; INTRAMUSCULAR; INTRAVENOUS; SOFT TISSUE PRN
Status: DISCONTINUED | OUTPATIENT
Start: 2024-07-10 | End: 2024-07-10 | Stop reason: SURG

## 2024-07-10 RX ORDER — SODIUM CHLORIDE, SODIUM LACTATE, POTASSIUM CHLORIDE, CALCIUM CHLORIDE 600; 310; 30; 20 MG/100ML; MG/100ML; MG/100ML; MG/100ML
INJECTION, SOLUTION INTRAVENOUS CONTINUOUS
Status: DISCONTINUED | OUTPATIENT
Start: 2024-07-10 | End: 2024-07-10 | Stop reason: HOSPADM

## 2024-07-10 RX ORDER — ASPIRIN 81 MG/1
81 TABLET, CHEWABLE ORAL 2 TIMES DAILY
Qty: 56 TABLET | Refills: 0 | Status: SHIPPED | OUTPATIENT
Start: 2024-07-10 | End: 2024-08-07

## 2024-07-10 RX ORDER — SODIUM CHLORIDE, SODIUM LACTATE, POTASSIUM CHLORIDE, CALCIUM CHLORIDE 600; 310; 30; 20 MG/100ML; MG/100ML; MG/100ML; MG/100ML
INJECTION, SOLUTION INTRAVENOUS CONTINUOUS
Status: ACTIVE | OUTPATIENT
Start: 2024-07-10 | End: 2024-07-10

## 2024-07-10 RX ORDER — OXYCODONE HCL 5 MG/5 ML
5 SOLUTION, ORAL ORAL
Status: DISCONTINUED | OUTPATIENT
Start: 2024-07-10 | End: 2024-07-10 | Stop reason: HOSPADM

## 2024-07-10 RX ORDER — ROPIVACAINE HYDROCHLORIDE 5 MG/ML
INJECTION, SOLUTION EPIDURAL; INFILTRATION; PERINEURAL
Status: DISCONTINUED | OUTPATIENT
Start: 2024-07-10 | End: 2024-07-10 | Stop reason: HOSPADM

## 2024-07-10 RX ORDER — HYDROMORPHONE HYDROCHLORIDE 1 MG/ML
0.1 INJECTION, SOLUTION INTRAMUSCULAR; INTRAVENOUS; SUBCUTANEOUS
Status: DISCONTINUED | OUTPATIENT
Start: 2024-07-10 | End: 2024-07-10 | Stop reason: HOSPADM

## 2024-07-10 RX ORDER — OXYCODONE HCL 5 MG/5 ML
10 SOLUTION, ORAL ORAL
Status: DISCONTINUED | OUTPATIENT
Start: 2024-07-10 | End: 2024-07-10 | Stop reason: HOSPADM

## 2024-07-10 RX ORDER — HYDRALAZINE HYDROCHLORIDE 20 MG/ML
5 INJECTION INTRAMUSCULAR; INTRAVENOUS
Status: DISCONTINUED | OUTPATIENT
Start: 2024-07-10 | End: 2024-07-10 | Stop reason: HOSPADM

## 2024-07-10 RX ORDER — LABETALOL HYDROCHLORIDE 5 MG/ML
5 INJECTION, SOLUTION INTRAVENOUS
Status: DISCONTINUED | OUTPATIENT
Start: 2024-07-10 | End: 2024-07-10 | Stop reason: HOSPADM

## 2024-07-10 RX ORDER — ONDANSETRON 2 MG/ML
4 INJECTION INTRAMUSCULAR; INTRAVENOUS
Status: DISCONTINUED | OUTPATIENT
Start: 2024-07-10 | End: 2024-07-10 | Stop reason: HOSPADM

## 2024-07-10 RX ORDER — HYDROMORPHONE HYDROCHLORIDE 1 MG/ML
0.4 INJECTION, SOLUTION INTRAMUSCULAR; INTRAVENOUS; SUBCUTANEOUS
Status: DISCONTINUED | OUTPATIENT
Start: 2024-07-10 | End: 2024-07-10 | Stop reason: HOSPADM

## 2024-07-10 RX ORDER — HYDROMORPHONE HYDROCHLORIDE 2 MG/ML
INJECTION, SOLUTION INTRAMUSCULAR; INTRAVENOUS; SUBCUTANEOUS PRN
Status: DISCONTINUED | OUTPATIENT
Start: 2024-07-10 | End: 2024-07-10 | Stop reason: SURG

## 2024-07-10 RX ORDER — ACETAMINOPHEN 500 MG
1000 TABLET ORAL ONCE
Status: COMPLETED | OUTPATIENT
Start: 2024-07-10 | End: 2024-07-10

## 2024-07-10 RX ORDER — LIDOCAINE HYDROCHLORIDE 20 MG/ML
INJECTION, SOLUTION EPIDURAL; INFILTRATION; INTRACAUDAL; PERINEURAL PRN
Status: DISCONTINUED | OUTPATIENT
Start: 2024-07-10 | End: 2024-07-10 | Stop reason: SURG

## 2024-07-10 RX ORDER — OXYCODONE HYDROCHLORIDE 5 MG/1
5 TABLET ORAL EVERY 4 HOURS PRN
Qty: 15 TABLET | Refills: 0 | Status: SHIPPED | OUTPATIENT
Start: 2024-07-10 | End: 2024-07-13

## 2024-07-10 RX ORDER — MEPERIDINE HYDROCHLORIDE 25 MG/ML
12.5 INJECTION INTRAMUSCULAR; INTRAVENOUS; SUBCUTANEOUS
Status: DISCONTINUED | OUTPATIENT
Start: 2024-07-10 | End: 2024-07-10 | Stop reason: HOSPADM

## 2024-07-10 RX ORDER — CEFAZOLIN SODIUM 1 G/3ML
INJECTION, POWDER, FOR SOLUTION INTRAMUSCULAR; INTRAVENOUS PRN
Status: DISCONTINUED | OUTPATIENT
Start: 2024-07-10 | End: 2024-07-10 | Stop reason: SURG

## 2024-07-10 RX ORDER — ONDANSETRON 2 MG/ML
INJECTION INTRAMUSCULAR; INTRAVENOUS PRN
Status: DISCONTINUED | OUTPATIENT
Start: 2024-07-10 | End: 2024-07-10 | Stop reason: SURG

## 2024-07-10 RX ORDER — HYDROMORPHONE HYDROCHLORIDE 1 MG/ML
0.2 INJECTION, SOLUTION INTRAMUSCULAR; INTRAVENOUS; SUBCUTANEOUS
Status: DISCONTINUED | OUTPATIENT
Start: 2024-07-10 | End: 2024-07-10 | Stop reason: HOSPADM

## 2024-07-10 RX ADMIN — DEXAMETHASONE SODIUM PHOSPHATE 4 MG: 4 INJECTION INTRA-ARTICULAR; INTRALESIONAL; INTRAMUSCULAR; INTRAVENOUS; SOFT TISSUE at 12:05

## 2024-07-10 RX ADMIN — ONDANSETRON 4 MG: 2 INJECTION INTRAMUSCULAR; INTRAVENOUS at 12:31

## 2024-07-10 RX ADMIN — LIDOCAINE HYDROCHLORIDE 60 MG: 20 INJECTION, SOLUTION EPIDURAL; INFILTRATION; INTRACAUDAL at 12:00

## 2024-07-10 RX ADMIN — FENTANYL CITRATE 50 MCG: 50 INJECTION, SOLUTION INTRAMUSCULAR; INTRAVENOUS at 12:00

## 2024-07-10 RX ADMIN — KETOROLAC TROMETHAMINE 15 MG: 15 INJECTION, SOLUTION INTRAMUSCULAR; INTRAVENOUS at 12:31

## 2024-07-10 RX ADMIN — MIDAZOLAM HYDROCHLORIDE 1 MG: 2 INJECTION, SOLUTION INTRAMUSCULAR; INTRAVENOUS at 11:56

## 2024-07-10 RX ADMIN — FENTANYL CITRATE 50 MCG: 50 INJECTION, SOLUTION INTRAMUSCULAR; INTRAVENOUS at 12:12

## 2024-07-10 RX ADMIN — HYDROMORPHONE HYDROCHLORIDE 1 MG: 2 INJECTION INTRAMUSCULAR; INTRAVENOUS; SUBCUTANEOUS at 12:20

## 2024-07-10 RX ADMIN — PROPOFOL 100 MG: 10 INJECTION, EMULSION INTRAVENOUS at 12:00

## 2024-07-10 RX ADMIN — ACETAMINOPHEN 1000 MG: 500 TABLET ORAL at 10:29

## 2024-07-10 RX ADMIN — CEFAZOLIN 2 G: 1 INJECTION, POWDER, FOR SOLUTION INTRAMUSCULAR; INTRAVENOUS at 12:00

## 2024-07-10 RX ADMIN — SODIUM CHLORIDE, POTASSIUM CHLORIDE, SODIUM LACTATE AND CALCIUM CHLORIDE: 600; 310; 30; 20 INJECTION, SOLUTION INTRAVENOUS at 10:13

## 2024-07-10 ASSESSMENT — PAIN SCALES - GENERAL: PAIN_LEVEL: 2

## 2024-07-10 ASSESSMENT — FIBROSIS 4 INDEX: FIB4 SCORE: 1.15

## 2024-07-10 ASSESSMENT — PAIN DESCRIPTION - PAIN TYPE: TYPE: ACUTE PAIN;CHRONIC PAIN

## 2024-07-19 ENCOUNTER — TELEPHONE (OUTPATIENT)
Dept: HEALTH INFORMATION MANAGEMENT | Facility: OTHER | Age: 69
End: 2024-07-19
Payer: MEDICARE

## 2024-07-22 ENCOUNTER — OFFICE VISIT (OUTPATIENT)
Dept: SLEEP MEDICINE | Facility: MEDICAL CENTER | Age: 69
End: 2024-07-22
Attending: FAMILY MEDICINE
Payer: MEDICARE

## 2024-07-22 VITALS
RESPIRATION RATE: 18 BRPM | WEIGHT: 129 LBS | OXYGEN SATURATION: 95 % | HEART RATE: 89 BPM | DIASTOLIC BLOOD PRESSURE: 70 MMHG | BODY MASS INDEX: 21.49 KG/M2 | HEIGHT: 65 IN | SYSTOLIC BLOOD PRESSURE: 118 MMHG

## 2024-07-22 DIAGNOSIS — Z87.891 PERSONAL HISTORY OF TOBACCO USE, PRESENTING HAZARDS TO HEALTH: ICD-10-CM

## 2024-07-22 PROCEDURE — G0296 VISIT TO DETERM LDCT ELIG: HCPCS | Performed by: FAMILY MEDICINE

## 2024-07-22 PROCEDURE — 3074F SYST BP LT 130 MM HG: CPT | Performed by: FAMILY MEDICINE

## 2024-07-22 PROCEDURE — 3078F DIAST BP <80 MM HG: CPT | Performed by: FAMILY MEDICINE

## 2024-07-22 ASSESSMENT — FIBROSIS 4 INDEX: FIB4 SCORE: 1.15

## 2024-07-22 ASSESSMENT — ENCOUNTER SYMPTOMS
WEIGHT LOSS: 1
CHILLS: 0
SPUTUM PRODUCTION: 0
SHORTNESS OF BREATH: 1
HEMOPTYSIS: 0
WHEEZING: 0
PALPITATIONS: 0
FEVER: 0
COUGH: 0

## 2024-07-30 ENCOUNTER — OFFICE VISIT (OUTPATIENT)
Dept: SURGICAL ONCOLOGY | Facility: MEDICAL CENTER | Age: 69
End: 2024-07-30
Payer: MEDICARE

## 2024-07-30 VITALS
TEMPERATURE: 97.9 F | WEIGHT: 130 LBS | DIASTOLIC BLOOD PRESSURE: 70 MMHG | BODY MASS INDEX: 21.66 KG/M2 | OXYGEN SATURATION: 96 % | HEART RATE: 84 BPM | HEIGHT: 65 IN | SYSTOLIC BLOOD PRESSURE: 122 MMHG

## 2024-07-30 DIAGNOSIS — M23.232 DERANG OF MEDIAL MENISCUS DUE TO OLD TEAR/INJ, LEFT KNEE: ICD-10-CM

## 2024-07-30 ASSESSMENT — FIBROSIS 4 INDEX: FIB4 SCORE: 1.15

## 2024-07-31 ASSESSMENT — ENCOUNTER SYMPTOMS
TINGLING: 0
SHORTNESS OF BREATH: 0
CHILLS: 0
SENSORY CHANGE: 0
FEVER: 0

## 2024-08-06 DIAGNOSIS — B35.1 ONYCHOMYCOSIS: ICD-10-CM

## 2024-08-06 PROCEDURE — RXMED WILLOW AMBULATORY MEDICATION CHARGE: Performed by: FAMILY MEDICINE

## 2024-08-07 PROCEDURE — RXMED WILLOW AMBULATORY MEDICATION CHARGE: Performed by: FAMILY MEDICINE

## 2024-08-07 RX ORDER — KETOCONAZOLE 20 MG/G
CREAM TOPICAL
Qty: 30 G | Refills: 2 | Status: SHIPPED | OUTPATIENT
Start: 2024-08-07

## 2024-08-07 NOTE — TELEPHONE ENCOUNTER
Requested Prescriptions     Pending Prescriptions Disp Refills    ketoconazole (NIZORAL) 2 % Cream 30 g 2     Sig: Apply twice a day to affected toenails.     Dayaan Valerio M.D.

## 2024-08-08 ENCOUNTER — PHARMACY VISIT (OUTPATIENT)
Dept: PHARMACY | Facility: MEDICAL CENTER | Age: 69
End: 2024-08-08
Payer: COMMERCIAL

## 2024-08-12 ENCOUNTER — HOSPITAL ENCOUNTER (OUTPATIENT)
Dept: RADIOLOGY | Facility: MEDICAL CENTER | Age: 69
End: 2024-08-12
Attending: FAMILY MEDICINE
Payer: MEDICARE

## 2024-08-12 DIAGNOSIS — Z87.891 PERSONAL HISTORY OF TOBACCO USE, PRESENTING HAZARDS TO HEALTH: ICD-10-CM

## 2024-08-12 PROCEDURE — 71271 CT THORAX LUNG CANCER SCR C-: CPT

## 2024-08-13 ENCOUNTER — TELEPHONE (OUTPATIENT)
Dept: SLEEP MEDICINE | Facility: MEDICAL CENTER | Age: 69
End: 2024-08-13
Payer: MEDICARE

## 2024-08-13 DIAGNOSIS — I77.819 ECTATIC AORTA (HCC): ICD-10-CM

## 2024-08-13 NOTE — TELEPHONE ENCOUNTER
Phoned patient with results of LDCT exam performed 8/12/24.    Notified him that the results showed no concerning lung nodules  Recommend follow up CT in 12 months.    Informed patient of incidental findings of lung base atelectasis, coronary artery calcifications, small pericardial effusion similar to imaging from 2017, **ectatic ascending aorta** with recommendation to follow up with PCP. Of note, he reports SOB, cough, chest pain or palpitations.    Patient agrees to all recommendations.    His PCP, Dr. Valerio, was notified of results and incidental findings via this communication.    [facility] maintenance updated and patient sent lung cancer screening result letter.        CT-LUNG CANCER-SCREENING    Result Date: 8/12/2024 8/12/2024 8:32 AM HISTORY/REASON FOR EXAM:  Lung Cancer Screening. History of smoking TECHNIQUE/EXAM DESCRIPTION AND NUMBER OF VIEWS: Lung cancer screening without contrast. Low dose noncontrast helical images were obtained of the chest from the lung apices through the costophrenic sulci utilizing thin collimation and intervals with reconstructed images sent to PACS in axial, coronal and sagittal planes. Low dose optimization technique was utilized for this CT exam including automated exposure control and adjustment of the mA and/or kV according to patient size. COMPARISON: 8/30/2017 FINDINGS: Lungs: Hyperexpanded lungs. Small apical blebs. No pulmonary nodules or masses identified. Atelectasis within the lung bases similar to previous.. Mediastinum/Eusebia: No significant adenopathy. Pleura: No pleural effusion. Cardiac: Heart is normal in size. Small pericardial effusion similar to previous.. There is coronary artery calcification. Vascular: Ectatic ascending aorta measuring 3.7 x 3.4 cm. Mild calcified plaque.. Soft tissues: Unremarkable. Bones: No acute or destructive process. An IVC filter is incompletely imaged. There is a 4.9 mm nonobstructing stone upper pole right kidney. Incompletely  imaged. The pancreas is poorly delineated.     1.  No pulmonary nodules or masses identified. 2.  Lung base atelectasis. No pleural effusions. 3.  Coronary artery calcifications. 4.  Small pericardial effusion similar to previous. Lung RADS: 1 - Negative: No nodules and definitely benign nodules Findings: no lung nodules nodule(s) with specific calcifications: complete; central; popcorn; concentric rings and fat containing nodules Management: Continue annual screening with LDCT in 12 months

## 2024-08-29 DIAGNOSIS — Z76.0 PRESCRIPTION REFILL: ICD-10-CM

## 2024-08-29 PROCEDURE — RXMED WILLOW AMBULATORY MEDICATION CHARGE: Performed by: FAMILY MEDICINE

## 2024-08-29 PROCEDURE — RXMED WILLOW AMBULATORY MEDICATION CHARGE: Performed by: INTERNAL MEDICINE

## 2024-08-30 ENCOUNTER — PHARMACY VISIT (OUTPATIENT)
Dept: PHARMACY | Facility: MEDICAL CENTER | Age: 69
End: 2024-08-30
Payer: COMMERCIAL

## 2024-08-30 PROCEDURE — RXMED WILLOW AMBULATORY MEDICATION CHARGE: Performed by: FAMILY MEDICINE

## 2024-08-30 NOTE — TELEPHONE ENCOUNTER
Received request via: Patient    Was the patient seen in the last year in this department? Yes    Does the patient have an active prescription (recently filled or refills available) for medication(s) requested? No    Pharmacy Name: [facility]    Does the patient have long-term Plus and need 100-day supply? (This applies to ALL medications) Yes, quantity updated to 100 days

## 2024-09-08 PROCEDURE — RXMED WILLOW AMBULATORY MEDICATION CHARGE: Performed by: FAMILY MEDICINE

## 2024-09-08 PROCEDURE — RXMED WILLOW AMBULATORY MEDICATION CHARGE: Performed by: OPTOMETRIST

## 2024-09-11 ENCOUNTER — PHARMACY VISIT (OUTPATIENT)
Dept: PHARMACY | Facility: MEDICAL CENTER | Age: 69
End: 2024-09-11
Payer: COMMERCIAL

## 2024-10-08 ENCOUNTER — HOSPITAL ENCOUNTER (OUTPATIENT)
Dept: LAB | Facility: MEDICAL CENTER | Age: 69
End: 2024-10-08
Attending: FAMILY MEDICINE
Payer: MEDICARE

## 2024-10-08 DIAGNOSIS — E78.5 DYSLIPIDEMIA DUE TO TYPE 2 DIABETES MELLITUS (HCC): ICD-10-CM

## 2024-10-08 DIAGNOSIS — E11.69 DYSLIPIDEMIA DUE TO TYPE 2 DIABETES MELLITUS (HCC): ICD-10-CM

## 2024-10-08 DIAGNOSIS — D64.89 ANEMIA DUE TO OTHER CAUSE, NOT CLASSIFIED: ICD-10-CM

## 2024-10-08 DIAGNOSIS — E03.9 HYPOTHYROIDISM, UNSPECIFIED TYPE: ICD-10-CM

## 2024-10-08 LAB
BASOPHILS # BLD AUTO: 0.8 % (ref 0–1.8)
BASOPHILS # BLD: 0.04 K/UL (ref 0–0.12)
CHOLEST SERPL-MCNC: 167 MG/DL (ref 100–199)
EOSINOPHIL # BLD AUTO: 0.06 K/UL (ref 0–0.51)
EOSINOPHIL NFR BLD: 1.3 % (ref 0–6.9)
ERYTHROCYTE [DISTWIDTH] IN BLOOD BY AUTOMATED COUNT: 42.9 FL (ref 35.9–50)
FASTING STATUS PATIENT QL REPORTED: NORMAL
FERRITIN SERPL-MCNC: 89.4 NG/ML (ref 22–322)
FOLATE SERPL-MCNC: 11.3 NG/ML
HCT VFR BLD AUTO: 44.1 % (ref 42–52)
HDLC SERPL-MCNC: 61 MG/DL
HGB BLD-MCNC: 14.3 G/DL (ref 14–18)
IMM GRANULOCYTES # BLD AUTO: 0.03 K/UL (ref 0–0.11)
IMM GRANULOCYTES NFR BLD AUTO: 0.6 % (ref 0–0.9)
IRON SATN MFR SERPL: 34 % (ref 15–55)
IRON SERPL-MCNC: 117 UG/DL (ref 50–180)
LDLC SERPL CALC-MCNC: 89 MG/DL
LYMPHOCYTES # BLD AUTO: 1.07 K/UL (ref 1–4.8)
LYMPHOCYTES NFR BLD: 22.6 % (ref 22–41)
MCH RBC QN AUTO: 28.7 PG (ref 27–33)
MCHC RBC AUTO-ENTMCNC: 32.4 G/DL (ref 32.3–36.5)
MCV RBC AUTO: 88.4 FL (ref 81.4–97.8)
MONOCYTES # BLD AUTO: 0.27 K/UL (ref 0–0.85)
MONOCYTES NFR BLD AUTO: 5.7 % (ref 0–13.4)
NEUTROPHILS # BLD AUTO: 3.26 K/UL (ref 1.82–7.42)
NEUTROPHILS NFR BLD: 69 % (ref 44–72)
NRBC # BLD AUTO: 0 K/UL
NRBC BLD-RTO: 0 /100 WBC (ref 0–0.2)
PLATELET # BLD AUTO: 301 K/UL (ref 164–446)
PMV BLD AUTO: 10.4 FL (ref 9–12.9)
RBC # BLD AUTO: 4.99 M/UL (ref 4.7–6.1)
TIBC SERPL-MCNC: 344 UG/DL (ref 250–450)
TRIGL SERPL-MCNC: 85 MG/DL (ref 0–149)
TSH SERPL DL<=0.005 MIU/L-ACNC: 1.16 UIU/ML (ref 0.38–5.33)
UIBC SERPL-MCNC: 227 UG/DL (ref 110–370)
VIT B12 SERPL-MCNC: 479 PG/ML (ref 211–911)
WBC # BLD AUTO: 4.7 K/UL (ref 4.8–10.8)

## 2024-10-08 PROCEDURE — 83550 IRON BINDING TEST: CPT

## 2024-10-08 PROCEDURE — 36415 COLL VENOUS BLD VENIPUNCTURE: CPT

## 2024-10-08 PROCEDURE — 84443 ASSAY THYROID STIM HORMONE: CPT

## 2024-10-08 PROCEDURE — 80061 LIPID PANEL: CPT

## 2024-10-08 PROCEDURE — 82728 ASSAY OF FERRITIN: CPT

## 2024-10-08 PROCEDURE — 85025 COMPLETE CBC W/AUTO DIFF WBC: CPT

## 2024-10-08 PROCEDURE — 82746 ASSAY OF FOLIC ACID SERUM: CPT

## 2024-10-08 PROCEDURE — 82607 VITAMIN B-12: CPT

## 2024-10-08 PROCEDURE — 83540 ASSAY OF IRON: CPT

## 2024-10-09 ENCOUNTER — APPOINTMENT (OUTPATIENT)
Dept: LAB | Facility: MEDICAL CENTER | Age: 69
End: 2024-10-09
Payer: MEDICARE

## 2024-10-18 PROCEDURE — RXMED WILLOW AMBULATORY MEDICATION CHARGE: Performed by: OPTOMETRIST

## 2024-10-22 ENCOUNTER — PHARMACY VISIT (OUTPATIENT)
Dept: PHARMACY | Facility: MEDICAL CENTER | Age: 69
End: 2024-10-22
Payer: COMMERCIAL

## 2024-10-22 ENCOUNTER — OFFICE VISIT (OUTPATIENT)
Dept: MEDICAL GROUP | Facility: PHYSICIAN GROUP | Age: 69
End: 2024-10-22
Payer: MEDICARE

## 2024-10-22 VITALS
OXYGEN SATURATION: 99 % | SYSTOLIC BLOOD PRESSURE: 118 MMHG | BODY MASS INDEX: 21.79 KG/M2 | WEIGHT: 130.8 LBS | DIASTOLIC BLOOD PRESSURE: 70 MMHG | HEART RATE: 60 BPM | HEIGHT: 65 IN | TEMPERATURE: 97.7 F

## 2024-10-22 DIAGNOSIS — E11.69 DYSLIPIDEMIA DUE TO TYPE 2 DIABETES MELLITUS (HCC): ICD-10-CM

## 2024-10-22 DIAGNOSIS — I47.9 TACHYCARDIA, PAROXYSMAL (HCC): ICD-10-CM

## 2024-10-22 DIAGNOSIS — I77.819 ECTATIC AORTA (HCC): ICD-10-CM

## 2024-10-22 DIAGNOSIS — E55.9 VITAMIN D DEFICIENCY: ICD-10-CM

## 2024-10-22 DIAGNOSIS — I15.2 HYPERTENSION ASSOCIATED WITH TYPE 2 DIABETES MELLITUS (HCC): ICD-10-CM

## 2024-10-22 DIAGNOSIS — Z79.4 TYPE 2 DIABETES MELLITUS WITH RETINOPATHY, WITH LONG-TERM CURRENT USE OF INSULIN, MACULAR EDEMA PRESENCE UNSPECIFIED, UNSPECIFIED LATERALITY, UNSPECIFIED RETINOPATHY SEVERITY (HCC): ICD-10-CM

## 2024-10-22 DIAGNOSIS — E11.319 TYPE 2 DIABETES MELLITUS WITH RETINOPATHY, WITH LONG-TERM CURRENT USE OF INSULIN, MACULAR EDEMA PRESENCE UNSPECIFIED, UNSPECIFIED LATERALITY, UNSPECIFIED RETINOPATHY SEVERITY (HCC): ICD-10-CM

## 2024-10-22 DIAGNOSIS — E11.59 HYPERTENSION ASSOCIATED WITH TYPE 2 DIABETES MELLITUS (HCC): ICD-10-CM

## 2024-10-22 DIAGNOSIS — E78.5 DYSLIPIDEMIA DUE TO TYPE 2 DIABETES MELLITUS (HCC): ICD-10-CM

## 2024-10-22 LAB
HBA1C MFR BLD: 5.9 % (ref ?–5.8)
POCT INT CON NEG: NEGATIVE
POCT INT CON POS: POSITIVE

## 2024-10-22 PROCEDURE — 3078F DIAST BP <80 MM HG: CPT | Performed by: FAMILY MEDICINE

## 2024-10-22 PROCEDURE — 99215 OFFICE O/P EST HI 40 MIN: CPT | Performed by: FAMILY MEDICINE

## 2024-10-22 PROCEDURE — 3074F SYST BP LT 130 MM HG: CPT | Performed by: FAMILY MEDICINE

## 2024-10-22 PROCEDURE — 83036 HEMOGLOBIN GLYCOSYLATED A1C: CPT | Performed by: FAMILY MEDICINE

## 2024-10-22 RX ORDER — ATORVASTATIN CALCIUM 80 MG/1
80 TABLET, FILM COATED ORAL NIGHTLY
Qty: 100 TABLET | Refills: 3 | Status: SHIPPED | OUTPATIENT
Start: 2024-10-22

## 2024-10-22 ASSESSMENT — FIBROSIS 4 INDEX: FIB4 SCORE: 0.97

## 2024-10-24 PROCEDURE — RXMED WILLOW AMBULATORY MEDICATION CHARGE: Performed by: FAMILY MEDICINE

## 2024-10-25 ENCOUNTER — TELEPHONE (OUTPATIENT)
Dept: HEALTH INFORMATION MANAGEMENT | Facility: OTHER | Age: 69
End: 2024-10-25
Payer: MEDICARE

## 2024-10-29 ENCOUNTER — PHARMACY VISIT (OUTPATIENT)
Dept: PHARMACY | Facility: MEDICAL CENTER | Age: 69
End: 2024-10-29
Payer: COMMERCIAL

## 2024-10-31 ENCOUNTER — HOSPITAL ENCOUNTER (OUTPATIENT)
Dept: RADIOLOGY | Facility: MEDICAL CENTER | Age: 69
End: 2024-10-31
Attending: NURSE PRACTITIONER
Payer: MEDICARE

## 2024-10-31 ENCOUNTER — HOSPITAL ENCOUNTER (EMERGENCY)
Facility: MEDICAL CENTER | Age: 69
End: 2024-10-31
Attending: EMERGENCY MEDICINE
Payer: MEDICARE

## 2024-10-31 ENCOUNTER — OFFICE VISIT (OUTPATIENT)
Dept: URGENT CARE | Facility: CLINIC | Age: 69
End: 2024-10-31
Payer: MEDICARE

## 2024-10-31 VITALS
SYSTOLIC BLOOD PRESSURE: 138 MMHG | RESPIRATION RATE: 18 BRPM | WEIGHT: 135 LBS | BODY MASS INDEX: 21.69 KG/M2 | HEIGHT: 66 IN | HEART RATE: 75 BPM | TEMPERATURE: 97.4 F | DIASTOLIC BLOOD PRESSURE: 88 MMHG | OXYGEN SATURATION: 94 %

## 2024-10-31 VITALS
RESPIRATION RATE: 17 BRPM | SYSTOLIC BLOOD PRESSURE: 154 MMHG | HEIGHT: 66 IN | WEIGHT: 135.36 LBS | TEMPERATURE: 97.6 F | BODY MASS INDEX: 21.75 KG/M2 | HEART RATE: 60 BPM | OXYGEN SATURATION: 97 % | DIASTOLIC BLOOD PRESSURE: 82 MMHG

## 2024-10-31 DIAGNOSIS — R10.32 LEFT INGUINAL PAIN: ICD-10-CM

## 2024-10-31 DIAGNOSIS — R18.8 INGUINAL FLUID COLLECTION: ICD-10-CM

## 2024-10-31 LAB
ALBUMIN SERPL BCP-MCNC: 4.6 G/DL (ref 3.2–4.9)
ALBUMIN/GLOB SERPL: 1.6 G/DL
ALP SERPL-CCNC: 73 U/L (ref 30–99)
ALT SERPL-CCNC: 28 U/L (ref 2–50)
ANION GAP SERPL CALC-SCNC: 10 MMOL/L (ref 7–16)
AST SERPL-CCNC: 32 U/L (ref 12–45)
BASOPHILS # BLD AUTO: 0.7 % (ref 0–1.8)
BASOPHILS # BLD: 0.04 K/UL (ref 0–0.12)
BILIRUB SERPL-MCNC: 0.4 MG/DL (ref 0.1–1.5)
BUN SERPL-MCNC: 10 MG/DL (ref 8–22)
CALCIUM ALBUM COR SERPL-MCNC: 9.1 MG/DL (ref 8.5–10.5)
CALCIUM SERPL-MCNC: 9.6 MG/DL (ref 8.5–10.5)
CHLORIDE SERPL-SCNC: 108 MMOL/L (ref 96–112)
CO2 SERPL-SCNC: 26 MMOL/L (ref 20–33)
CREAT SERPL-MCNC: 0.75 MG/DL (ref 0.5–1.4)
EOSINOPHIL # BLD AUTO: 0.04 K/UL (ref 0–0.51)
EOSINOPHIL NFR BLD: 0.7 % (ref 0–6.9)
ERYTHROCYTE [DISTWIDTH] IN BLOOD BY AUTOMATED COUNT: 41.6 FL (ref 35.9–50)
GFR SERPLBLD CREATININE-BSD FMLA CKD-EPI: 97 ML/MIN/1.73 M 2
GLOBULIN SER CALC-MCNC: 2.8 G/DL (ref 1.9–3.5)
GLUCOSE SERPL-MCNC: 102 MG/DL (ref 65–99)
HCT VFR BLD AUTO: 43.3 % (ref 42–52)
HGB BLD-MCNC: 14.4 G/DL (ref 14–18)
IMM GRANULOCYTES # BLD AUTO: 0.03 K/UL (ref 0–0.11)
IMM GRANULOCYTES NFR BLD AUTO: 0.6 % (ref 0–0.9)
LIPASE SERPL-CCNC: 63 U/L (ref 11–82)
LYMPHOCYTES # BLD AUTO: 1.77 K/UL (ref 1–4.8)
LYMPHOCYTES NFR BLD: 32.8 % (ref 22–41)
MCH RBC QN AUTO: 28.9 PG (ref 27–33)
MCHC RBC AUTO-ENTMCNC: 33.3 G/DL (ref 32.3–36.5)
MCV RBC AUTO: 86.8 FL (ref 81.4–97.8)
MONOCYTES # BLD AUTO: 0.32 K/UL (ref 0–0.85)
MONOCYTES NFR BLD AUTO: 5.9 % (ref 0–13.4)
NEUTROPHILS # BLD AUTO: 3.2 K/UL (ref 1.82–7.42)
NEUTROPHILS NFR BLD: 59.3 % (ref 44–72)
NRBC # BLD AUTO: 0 K/UL
NRBC BLD-RTO: 0 /100 WBC (ref 0–0.2)
PLATELET # BLD AUTO: 293 K/UL (ref 164–446)
PMV BLD AUTO: 9.7 FL (ref 9–12.9)
POTASSIUM SERPL-SCNC: 3.8 MMOL/L (ref 3.6–5.5)
PROT SERPL-MCNC: 7.4 G/DL (ref 6–8.2)
RBC # BLD AUTO: 4.99 M/UL (ref 4.7–6.1)
SODIUM SERPL-SCNC: 144 MMOL/L (ref 135–145)
WBC # BLD AUTO: 5.4 K/UL (ref 4.8–10.8)

## 2024-10-31 PROCEDURE — 83690 ASSAY OF LIPASE: CPT

## 2024-10-31 PROCEDURE — 36415 COLL VENOUS BLD VENIPUNCTURE: CPT

## 2024-10-31 PROCEDURE — 99283 EMERGENCY DEPT VISIT LOW MDM: CPT

## 2024-10-31 PROCEDURE — 76857 US EXAM PELVIC LIMITED: CPT

## 2024-10-31 PROCEDURE — 80053 COMPREHEN METABOLIC PANEL: CPT

## 2024-10-31 PROCEDURE — 85025 COMPLETE CBC W/AUTO DIFF WBC: CPT

## 2024-10-31 RX ORDER — ASPIRIN 81 MG/1
81 TABLET ORAL DAILY
COMMUNITY

## 2024-10-31 ASSESSMENT — FIBROSIS 4 INDEX
FIB4 SCORE: 0.97
FIB4 SCORE: 0.97

## 2024-10-31 ASSESSMENT — PAIN DESCRIPTION - PAIN TYPE: TYPE: ACUTE PAIN

## 2024-10-31 NOTE — DISCHARGE INSTRUCTIONS
You have evidence of a fluid collection and possible lesion in your inguinal region or need further evaluation.  I have discussed your condition with our surgical oncologist they recommend you follow-up with them.  I referred you to their clinic and they will be giving you a call.  Return to the emergency department if you have severe pain, discoloration in the inguinal region evidence of a hernia that is not reducible.    Follow up with your primary care physician for re-evaluation of your blood pressure.

## 2024-10-31 NOTE — ED PROVIDER NOTES
ED Provider Note    CHIEF COMPLAINT  Chief Complaint   Patient presents with    Groin Pain    Lump       EXTERNAL RECORDS REVIEWED  Reviewed the ultrasound results from today     1. No left inguinal hernia.  2. Small fluid collection in the left inguinal region with surrounding edema, without hypervascularity to suggest abscess.  HPI/ROS      Nikolas Rico is a 68 y.o. male who presents with complaint of left groin pain.  He states that yesterday he was sitting on his chair and leaned back and put his hands in his pants and noticed there is a lesion in the left groin area and thought was an inguinal hernia.  Not painful but just the first time he noticed it.  Denies dysuria, fever, shakes, chills, sweats.  Went to urgent care ultrasound was completed and they were concerned he might have an abscess therefore he was sent to our facility.  Patient states he has had no dysuria or difficulty urinating.    PAST MEDICAL HISTORY   has a past medical history of Anginal syndrome (HCC), Arthritis, Blood clotting disorder (HCC), BMI 23.0-23.9, adult (02/08/2023), Breath shortness, Bronchitis, Cataract, CKD (chronic kidney disease) stage 3, GFR 30-59 ml/min, COPD, Diabetes, Disorder of thyroid, GERD (gastroesophageal reflux disease), Glaucoma, Heart burn, Hepatitis A, Hepatitis C, High cholesterol, Hyperlipidemia, Hypertension, Indigestion, Jaundice, Pneumonia, Psychiatric disorder, Resistant hypertension, Schizoaffective disorder (HCC) (10/01/2016), and Sleep apnea.    SURGICAL HISTORY   has a past surgical history that includes other orthopedic surgery (Right); other abdominal surgery; other; knee scope,diagnostic (Left, 7/10/2024); and meniscectomy, knee, medial (Left, 7/10/2024).    FAMILY HISTORY  Family History   Problem Relation Age of Onset    Hyperlipidemia Mother         under control    Cancer Father         bladder    Heart Disease Paternal Grandmother     Cancer Sister         breast    Psychiatric  "Illness Sister         Bi-Polar Depressive stage    Diabetes Sister         diabetes type II    Heart Disease Paternal Uncle     Clotting Disorder Neg Hx        SOCIAL HISTORY  Social History     Tobacco Use    Smoking status: Former     Current packs/day: 0.00     Average packs/day: 0.5 packs/day for 41.2 years (20.6 ttl pk-yrs)     Types: Cigarettes     Start date: 1972     Quit date: 3/6/2013     Years since quittin.6    Smokeless tobacco: Never   Vaping Use    Vaping status: Never Used   Substance and Sexual Activity    Alcohol use: Not Currently     Comment: sober since HCV diagnosis (2018)    Drug use: Yes     Types: Inhaled, Oral     Comment: medical marijuana    Sexual activity: Not Currently     Partners: Female     Comment: retired, no kids.       CURRENT MEDICATIONS  Home Medications       Reviewed by Richi Zapata R.N. (Registered Nurse) on 10/31/24 at 1202  Med List Status: Partial     Medication Last Dose Status   albuterol 108 (90 Base) MCG/ACT Aero Soln inhalation aerosol  Active   amLODIPine (NORVASC) 10 MG Tab  Active   aspirin 81 MG EC tablet  Active   atorvastatin (LIPITOR) 80 MG tablet  Active   Blood Glucose Monitoring Suppl (BLOOD GLUCOSE MONITOR SYSTEM) w/Device Kit  Active   cloNIDine (CATAPRES) 0.1 MG Tab  Active   cycloSPORINE (RESTASIS) 0.05 % ophthalmic emulsion  Active   glucose blood (CONTOUR NEXT TEST) strip  Active   insulin regular (NOVOLIN R) 100 Unit/mL Solution  Active   INSULIN SYRINGE .5CC/29G (ULTICARE INSULIN SYRINGE) 29G X 1/2\" 0.5 ML Misc  Active   ketoconazole (NIZORAL) 2 % Cream  Active   latanoprost (XALATAN) 0.005 % Solution  Active   levothyroxine (SYNTHROID) 25 MCG Tab  Active   lisinopril (PRINIVIL) 40 MG tablet  Active   metFORMIN (GLUCOPHAGE) 500 MG Tab  Active   metoprolol SR (TOPROL XL) 25 MG TABLET SR 24 HR  Active   traZODone (DESYREL) 100 MG Tab  Active                    ALLERGIES  No Known Allergies    PHYSICAL EXAM  VITAL SIGNS: BP (!) 154/82   " "Pulse 60   Temp 36.4 °C (97.6 °F) (Temporal)   Resp 17   Ht 1.676 m (5' 6\")   Wt 61.4 kg (135 lb 5.8 oz)   SpO2 97%   BMI 21.85 kg/m²      Nursing notes and vitals reviewed.  Constitutional: Well developed, Well nourished, No acute distress, Non-toxic appearance.     : No scrotal edema, erythema or fullness, no evidence of direct or indirect hernia  Abdomen: Bowel sounds normal, Soft, No tenderness, No guarding, No rebound, No masses, No pulsatile masses, left inguinal area there is evidence of slight small discrete mobile structure, no evidence of hernia, no evidence of fluctuance or discharge nontender,.   Skin: Warm, Dry, No erythema, No rash.     EKG/LABS  Results for orders placed or performed during the hospital encounter of 10/31/24   CBC WITH DIFFERENTIAL    Collection Time: 10/31/24 12:19 PM   Result Value Ref Range    WBC 5.4 4.8 - 10.8 K/uL    RBC 4.99 4.70 - 6.10 M/uL    Hemoglobin 14.4 14.0 - 18.0 g/dL    Hematocrit 43.3 42.0 - 52.0 %    MCV 86.8 81.4 - 97.8 fL    MCH 28.9 27.0 - 33.0 pg    MCHC 33.3 32.3 - 36.5 g/dL    RDW 41.6 35.9 - 50.0 fL    Platelet Count 293 164 - 446 K/uL    MPV 9.7 9.0 - 12.9 fL    Neutrophils-Polys 59.30 44.00 - 72.00 %    Lymphocytes 32.80 22.00 - 41.00 %    Monocytes 5.90 0.00 - 13.40 %    Eosinophils 0.70 0.00 - 6.90 %    Basophils 0.70 0.00 - 1.80 %    Immature Granulocytes 0.60 0.00 - 0.90 %    Nucleated RBC 0.00 0.00 - 0.20 /100 WBC    Neutrophils (Absolute) 3.20 1.82 - 7.42 K/uL    Lymphs (Absolute) 1.77 1.00 - 4.80 K/uL    Monos (Absolute) 0.32 0.00 - 0.85 K/uL    Eos (Absolute) 0.04 0.00 - 0.51 K/uL    Baso (Absolute) 0.04 0.00 - 0.12 K/uL    Immature Granulocytes (abs) 0.03 0.00 - 0.11 K/uL    NRBC (Absolute) 0.00 K/uL   COMP METABOLIC PANEL    Collection Time: 10/31/24 12:19 PM   Result Value Ref Range    Sodium 144 135 - 145 mmol/L    Potassium 3.8 3.6 - 5.5 mmol/L    Chloride 108 96 - 112 mmol/L    Co2 26 20 - 33 mmol/L    Anion Gap 10.0 7.0 - 16.0    " Glucose 102 (H) 65 - 99 mg/dL    Bun 10 8 - 22 mg/dL    Creatinine 0.75 0.50 - 1.40 mg/dL    Calcium 9.6 8.5 - 10.5 mg/dL    Correct Calcium 9.1 8.5 - 10.5 mg/dL    AST(SGOT) 32 12 - 45 U/L    ALT(SGPT) 28 2 - 50 U/L    Alkaline Phosphatase 73 30 - 99 U/L    Total Bilirubin 0.4 0.1 - 1.5 mg/dL    Albumin 4.6 3.2 - 4.9 g/dL    Total Protein 7.4 6.0 - 8.2 g/dL    Globulin 2.8 1.9 - 3.5 g/dL    A-G Ratio 1.6 g/dL   LIPASE    Collection Time: 10/31/24 12:19 PM   Result Value Ref Range    Lipase 63 11 - 82 U/L   ESTIMATED GFR    Collection Time: 10/31/24 12:19 PM   Result Value Ref Range    GFR (CKD-EPI) 97 >60 mL/min/1.73 m 2       I have independently interpreted this EKG    RADIOLOGY/PROCEDURES   I have independently interpreted the diagnostic imaging associated with this visit and am waiting the final reading from the radiologist.   My preliminary interpretation is as follows: Reviewed the results of the ultrasound completed earlier today  No inguinal hernia. Within the left inguinal subcutaneous soft tissues, there is an ovoid avascular hypoechoic structure measuring 1.5 x 0.7 x 1.8 cm in diameter. There is surrounding increased echogenicity. Favored etiology is a focal area of fluid   associated with edema in the inguinal region.    COURSE & MEDICAL DECISION MAKING    ASSESSMENT, COURSE AND PLAN  Care Narrative: This is a pleasant 15-year-old male presents with lesion in the left inguinal region with surrounding edema.  The patient does not appear of infection, does have a leukocytosis, the ultrasound does not indicate abscess and do not believe requires emergent surgical intervention.  I discussed the patient Dr. Carrion, surgical oncology, recommends patient follow-up with them for working it up for possible cancerous lesion.  Patient has no evidence of direct or indirect hernia here and no evidence of emergent surgical condition.  Strict return precautions have been given to be following with Dr. Carrion as  outpatient.        DISPOSITION AND DISCUSSIONS      Decision tools and prescription drugs considered including, but not limited to: Consider CT scan but I believe ultrasound was appropriate and further imaging can be completed as an outpatient.    FINAL DIAGNOSIS  1. Left inguinal pain    2. Inguinal fluid collection        DISPOSITION:  Patient will be discharged home in stable condition.    FOLLOW UP:  [facility], Emergency Dept  [address] [address]  [phone]    If symptoms worsen    Jt Mixon M.D.  [facility] [address] [place] [id number]  [phone]    Schedule an appointment as soon as possible for a visit         OUTPATIENT MEDICATIONS:  Discharge Medication List as of 10/31/2024  1:40 PM          Electronically signed by: Junior Marie D.O., 10/31/2024 1:43 PM

## 2024-10-31 NOTE — ED NOTES
Pt discharged, all appropriate hospital equipment removed (IV, monitor, pulse ox, etc.). Pt left unit via walking with stable gait to ED lobby for transport home via self. Personal belongings with pt when leaving unit. Pt given discharge instructions prior to leaving unit including where to  prescriptions and when to follow-up; verbalizes understanding. Pt informed to return to ED if symptoms worsen/return or altered status develop. Copy of discharge instructions signed and turned into DC basket and copy sent with pt.

## 2024-10-31 NOTE — ED TRIAGE NOTES
"Chief Complaint   Patient presents with    Groin Pain    Lump   BP (!) 160/80   Pulse 66   Temp 36.3 °C (97.3 °F) (Temporal)   Resp 16   Ht 1.676 m (5' 6\")   Wt 61.4 kg (135 lb 5.8 oz)   SpO2 98% Comment: RA  BMI 21.85 kg/m²     Pt reports he was seen at urgent care for a lump to the L side of the groin. Pt reports he was told that the lump \"is not a hernia but could be an abscess\" and was told to come to the ED for evaluation.     Pt reports pain as 3/10. Denies fever, chills, nausea or vomiting.     Pt is AAOx4 gcs 15 Ambulatory to triage.   "

## 2024-11-05 NOTE — PROGRESS NOTES
Subjective:   11/6/2024  9:48 AM  Primary care physician: Dayana Valerio M.D.  Referring Provider: Junior Marie D.O.    Chief Complaint:   Chief Complaint   Patient presents with    New Patient     ED CONSULT  L GROIN FLUID COLLECTION  US 10/31: 1.5 x 0.7 x 1.8 CM        Diagnosis:   1. Groin fluid collection        2. Left groin pain            History of presenting illness:    Nikolas Rico is a pleasant 68 y.o. male who was sent to the ED from urgent care on 10/31/24 for left groin pain. US INGUINAL HERNIA on 10/31/24 noted no left inguinal hernia and small fluid collection in the left inguinal region measuring 1.5 x 0.7 x 1.8 cm in diameter with surrounding edema without hypervascularity to suggest abscess. He eventually discharged later that same day with instructions to follow up outpatient with surgical oncology for further workup.    He is here today for surgical evaluation.  He noted the area in left groin develop acutely.  It is painful.  He has noted more of a bruising like change over the skin the area.  He has no history of melanoma or other cancers but does have a family history of cancer.  Denies weight loss, no abnormal skin lesions.    Past Medical History:   Diagnosis Date    Anginal syndrome (HCC)     Arthritis     Blood clotting disorder (HCC)     PE, has IVC filter    BMI 23.0-23.9, adult 02/08/2023    Breath shortness     comes and goes    Bronchitis     Cataract     repair, bilateral    CKD (chronic kidney disease) stage 3, GFR 30-59 ml/min     COPD     Diabetes     Disorder of thyroid     nodule    GERD (gastroesophageal reflux disease)     Glaucoma     Heart burn     Hepatitis A     Hepatitis C     High cholesterol     Hyperlipidemia     Hypertension     Indigestion     Jaundice     hx of    Pneumonia     Psychiatric disorder     bipolar    Resistant hypertension     Schizoaffective disorder (HCC) 10/01/2016    Sleep apnea   "    Past Surgical History:   Procedure Laterality Date    PB KNEE SCOPE,DIAGNOSTIC Left 7/10/2024    Procedure: LEFT KNEE ARTHROSCOPY AND PARTIAL MEDIAL MENISCECTOMY;  Surgeon: Elliot Pineda D.O.;  Location: SURGERY [facility];  Service: Orthopedics    MENISCECTOMY, KNEE, MEDIAL Left 7/10/2024    Procedure: MENISCECTOMY, KNEE, MEDIAL;  Surgeon: Elliot Pineda D.O.;  Location: SURGERY [facility];  Service: Orthopedics    OTHER      lower eyelid ripped    OTHER ABDOMINAL SURGERY      exploratory- \"not sure why\"    OTHER ORTHOPEDIC SURGERY Right     right foot, beam fell on it. repair     No Known Allergies  Outpatient Encounter Medications as of 11/6/2024   Medication Sig Dispense Refill    aspirin 81 MG EC tablet Take 81 mg by mouth every day.      atorvastatin (LIPITOR) 80 MG tablet Take 1 Tablet by mouth every evening. 100 Tablet 3    metFORMIN (GLUCOPHAGE) 500 MG Tab Take 1 Tablet by mouth every day. 100 Tablet 3    cycloSPORINE (RESTASIS) 0.05 % ophthalmic emulsion Instill 1 drop into both eyes twice daily 180 Each 3    insulin regular (NOVOLIN R) 100 Unit/mL Solution AS PER SLIDING SCALE 10 mL 3    ketoconazole (NIZORAL) 2 % Cream Apply twice a day to affected toenails. 30 g 2    latanoprost (XALATAN) 0.005 % Solution Instill one drop at night to BOTH EYES daily (Patient taking differently: MEDICATION INSTRUCTIONS FOR SURGERY/PROCEDURE SCHEDULED FOR 7/10   CONTINUE TAKING MED PRIOR TO SURGERY) 7.5 mL 3    Blood Glucose Monitoring Suppl (BLOOD GLUCOSE MONITOR SYSTEM) w/Device Kit Meter: Dispense Device of Insurance Preference. Sig. Use as directed for blood sugar monitoring. #1. NR. 1 Kit 0    glucose blood (CONTOUR NEXT TEST) strip Check fingersticks three times a day. E11.9 300 Strip 3    lisinopril (PRINIVIL) 40 MG tablet Take 1 Tablet by mouth every day. (Patient taking differently: Take 40 mg by mouth every evening. Hold 24 hrs prior to surgery) 100 Tablet 3    cloNIDine (CATAPRES) 0.1 MG Tab Take " "1 Tablet by mouth 2 times a day. (Patient taking differently: Take 0.1 mg by mouth 2 times a day. MEDICATION INSTRUCTIONS FOR SURGERY/PROCEDURE SCHEDULED FOR 7/10   OK TO CONTINUE TAKING PRIOR TO SURGERY AND DAY OF SURGERY) 200 Tablet 3    metoprolol SR (TOPROL XL) 25 MG TABLET SR 24 HR Take 1 Tablet by mouth every day. (Patient taking differently: Take 25 mg by mouth every evening. MEDICATION INSTRUCTIONS FOR SURGERY/PROCEDURE SCHEDULED FOR 7/10   CONTINUE TAKING MED PRIOR TO SURGERY) 100 Tablet 3    amLODIPine (NORVASC) 10 MG Tab Take 1 Tablet by mouth every day. (Patient taking differently: Take 10 mg by mouth every evening. MEDICATION INSTRUCTIONS FOR SURGERY/PROCEDURE SCHEDULED FOR 7/10   CONTINUE TAKING MED PRIOR TO SURGERY) 100 Tablet 3    traZODone (DESYREL) 100 MG Tab Take 1 Tablet by mouth every evening. (Patient taking differently: Take 100 mg by mouth every evening. MEDICATION INSTRUCTIONS FOR SURGERY/PROCEDURE SCHEDULED FOR 7/10   CONTINUE TAKING MED PRIOR TO SURGERY) 100 Tablet 3    levothyroxine (SYNTHROID) 25 MCG Tab Take 1 Tablet by mouth every morning on an empty stomach. (Patient taking differently: Take 25 mcg by mouth every morning on an empty stomach. MEDICATION INSTRUCTIONS FOR SURGERY/PROCEDURE SCHEDULED FOR 7/10   OK TO CONTINUE TAKING PRIOR TO SURGERY AND DAY OF SURGERY) 100 Tablet 3    albuterol 108 (90 Base) MCG/ACT Aero Soln inhalation aerosol Inhale 2 Puffs every four hours as needed for Shortness of Breath. (Patient taking differently: Inhale 2 Puffs every four hours as needed for Shortness of Breath. MEDICATION INSTRUCTIONS FOR SURGERY/PROCEDURE SCHEDULED FOR 7/10   OK TO CONTINUE TAKING PRIOR TO SURGERY AND DAY OF SURGERY) 18 g 1    INSULIN SYRINGE .5CC/29G (ULTICARE INSULIN SYRINGE) 29G X 1/2\" 0.5 ML Misc USE AS DIRECTED BY PHYSICIAN 100 Each 3     No facility-administered encounter medications on file as of 11/6/2024.     Social History     Socioeconomic History    Marital status: " Single     Spouse name: Not on file    Number of children: Not on file    Years of education: Not on file    Highest education level: Not on file   Occupational History    Not on file   Tobacco Use    Smoking status: Former     Current packs/day: 0.00     Average packs/day: 0.5 packs/day for 41.2 years (20.6 ttl pk-yrs)     Types: Cigarettes     Start date: 1972     Quit date: 3/6/2013     Years since quittin.6    Smokeless tobacco: Never   Vaping Use    Vaping status: Never Used   Substance and Sexual Activity    Alcohol use: Not Currently     Comment: sober since HCV diagnosis (2018)    Drug use: Yes     Types: Inhaled, Oral     Comment: medical marijuana    Sexual activity: Not Currently     Partners: Female     Comment: retired, no kids.   Other Topics Concern    Not on file   Social History Narrative    Not on file     Social Drivers of Health     Financial Resource Strain: Low Risk  (2024)    Overall Financial Resource Strain (CARDIA)     Difficulty of Paying Living Expenses: Not hard at all   Food Insecurity: No Food Insecurity (2024)    Hunger Vital Sign     Worried About Running Out of Food in the Last Year: Never true     Ran Out of Food in the Last Year: Never true   Transportation Needs: No Transportation Needs (2024)    PRAPARE - Transportation     Lack of Transportation (Medical): No     Lack of Transportation (Non-Medical): No   Physical Activity: Sufficiently Active (2021)    Exercise Vital Sign     Days of Exercise per Week: 7 days     Minutes of Exercise per Session: 140 min   Stress: Stress Concern Present (2021)    [employer] [place] [employer] - Occupational Stress Questionnaire     Feeling of Stress : To some extent   Social Connections: Socially Isolated (2021)    Social Connection and Isolation Panel [NHANES]     Frequency of Communication with Friends and Family: More than three times a week     Frequency of Social Gatherings with Friends and  Family: Never     Attends [employer] Services: Never     Active Member of Clubs or Organizations: No     Attends Club or Organization Meetings: Never     Marital Status: Never    Intimate Partner Violence: Not on file   Housing Stability: High Risk (2021)    Housing Stability Vital Sign     Unable to Pay for Housing in the Last Year: No     Number of Places Lived in the Last Year: 1     Unstable Housing in the Last Year: Yes      Social History     Tobacco Use   Smoking Status Former    Current packs/day: 0.00    Average packs/day: 0.5 packs/day for 41.2 years (20.6 ttl pk-yrs)    Types: Cigarettes    Start date: 1972    Quit date: 3/6/2013    Years since quittin.6   Smokeless Tobacco Never     Social History     Substance and Sexual Activity   Alcohol Use Not Currently    Comment: sober since HCV diagnosis ()     Social History     Substance and Sexual Activity   Drug Use Yes    Types: Inhaled, Oral    Comment: medical marijuana      Family History   Problem Relation Age of Onset    Hyperlipidemia Mother         under control    Cancer Father         bladder    Heart Disease Paternal Grandmother     Cancer Sister         breast    Psychiatric Illness Sister         Bi-Polar Depressive stage    Diabetes Sister         diabetes type II    Heart Disease Paternal Uncle     Clotting Disorder Neg Hx        Review of Systems   Constitutional:  Negative for chills, fever, malaise/fatigue and weight loss.   HENT:  Negative for congestion, ear discharge, ear pain, hearing loss and nosebleeds.    Eyes:  Negative for blurred vision, double vision, photophobia, pain and discharge.   Respiratory:  Negative for cough, hemoptysis and sputum production.    Cardiovascular:  Negative for chest pain, palpitations, orthopnea and claudication.   Gastrointestinal:  Negative for abdominal pain, constipation, diarrhea, heartburn, nausea and vomiting.   Genitourinary:  Negative for dysuria, frequency, hematuria and  "urgency.   Musculoskeletal:  Negative for back pain, joint pain, myalgias and neck pain.   Skin:  Negative for itching and rash.   Neurological:  Negative for dizziness, tingling, tremors, sensory change, speech change, focal weakness and headaches.   Endo/Heme/Allergies:  Negative for environmental allergies. Does not bruise/bleed easily.   Psychiatric/Behavioral:  Negative for depression, hallucinations, substance abuse and suicidal ideas. The patient is not nervous/anxious.         Objective:   BP (!) 148/76 (BP Location: Left arm, Patient Position: Sitting)   Pulse 68   Temp 36.3 °C (97.3 °F) (Temporal)   Resp 15   Ht 1.651 m (5' 5\")   Wt 60.3 kg (133 lb)   SpO2 96%   BMI 22.13 kg/m²     Physical Exam  Constitutional:       General: He is not in acute distress.     Appearance: He is not ill-appearing.   HENT:      Head: Normocephalic.   Eyes:      General: No scleral icterus.     Pupils: Pupils are equal, round, and reactive to light.   Cardiovascular:      Rate and Rhythm: Normal rate and regular rhythm.   Pulmonary:      Effort: Pulmonary effort is normal. No respiratory distress.   Abdominal:      General: Abdomen is flat. There is no distension.      Palpations: Abdomen is soft.      Tenderness: There is no abdominal tenderness.   Lymphadenopathy:      Comments: Palpable area of fluid/mass in the left groin area just above the pubic symphysis.  Overlying skin with purplish/greenish skin change consistent with a  bruise over the area.  Minimally tender to palpation.   Skin:     Coloration: Skin is not jaundiced.      Comments: No suspicious cutaneous lesions over the lower extremities, abdomen or trunk   Neurological:      General: No focal deficit present.      Mental Status: He is alert and oriented to person, place, and time.         Labs   Latest Reference Range & Units 10/31/24 12:19   WBC 4.8 - 10.8 K/uL 5.4   RBC 4.70 - 6.10 M/uL 4.99   Hemoglobin 14.0 - 18.0 g/dL 14.4   Hematocrit 42.0 - 52.0 % " 43.3   MCV 81.4 - 97.8 fL 86.8   MCH 27.0 - 33.0 pg 28.9   MCHC 32.3 - 36.5 g/dL 33.3   RDW 35.9 - 50.0 fL 41.6   Platelet Count 164 - 446 K/uL 293   MPV 9.0 - 12.9 fL 9.7      Latest Reference Range & Units 10/31/24 12:19   Sodium 135 - 145 mmol/L 144   Potassium 3.6 - 5.5 mmol/L 3.8   Chloride 96 - 112 mmol/L 108   Co2 20 - 33 mmol/L 26   Anion Gap 7.0 - 16.0  10.0   Glucose 65 - 99 mg/dL 102 (H)   Bun 8 - 22 mg/dL 10   Creatinine 0.50 - 1.40 mg/dL 0.75   GFR (CKD-EPI) >60 mL/min/1.73 m 2 97   Calcium 8.5 - 10.5 mg/dL 9.6   Correct Calcium 8.5 - 10.5 mg/dL 9.1   AST(SGOT) 12 - 45 U/L 32   ALT(SGPT) 2 - 50 U/L 28   Alkaline Phosphatase 30 - 99 U/L 73   Total Bilirubin 0.1 - 1.5 mg/dL 0.4   Albumin 3.2 - 4.9 g/dL 4.6   Total Protein 6.0 - 8.2 g/dL 7.4   Globulin 1.9 - 3.5 g/dL 2.8   A-G Ratio g/dL 1.6   Lipase 11 - 82 U/L 63   (H): Data is abnormally high     Imaging  US INGUINAL HERNIA 10/31/24  IMPRESSION:  1. No left inguinal hernia.  2. Small fluid collection in the left inguinal region with surrounding edema, without hypervascularity to suggest abscess.    Pathology  N/A    Procedures  N/A    Diagnosis:     1. Groin fluid collection        2. Left groin pain            Medical Decision Making:  Today's Assessment / Status / Plan:     68M with acute onset left groin pain.  US shows a fluid collection in the area.  There are some skin changes suggesting this may have been a small hematoma.  The area is little more medial than where the inguinal nodes live.      We will get an US guided needle aspiration/FNA to better assess what is going in this area.  It doesn't feel overly concerning for malignancy but would like to rule out prior to definitive plan for management of this problem.    I, Jt Mixon MD have entered, reviewed and confirmed the above diagnosis related to this patient on this date of service, November 6, 2024     Jt Mixon M.D.

## 2024-11-06 ENCOUNTER — OFFICE VISIT (OUTPATIENT)
Dept: SURGICAL ONCOLOGY | Facility: MEDICAL CENTER | Age: 69
End: 2024-11-06
Payer: MEDICARE

## 2024-11-06 VITALS
HEIGHT: 65 IN | TEMPERATURE: 97.3 F | RESPIRATION RATE: 15 BRPM | HEART RATE: 68 BPM | WEIGHT: 133 LBS | DIASTOLIC BLOOD PRESSURE: 76 MMHG | BODY MASS INDEX: 22.16 KG/M2 | OXYGEN SATURATION: 96 % | SYSTOLIC BLOOD PRESSURE: 148 MMHG

## 2024-11-06 DIAGNOSIS — R18.8 GROIN FLUID COLLECTION: ICD-10-CM

## 2024-11-06 DIAGNOSIS — R10.32 LEFT GROIN PAIN: ICD-10-CM

## 2024-11-06 PROCEDURE — 99214 OFFICE O/P EST MOD 30 MIN: CPT | Performed by: SURGERY

## 2024-11-06 PROCEDURE — 3078F DIAST BP <80 MM HG: CPT | Performed by: SURGERY

## 2024-11-06 PROCEDURE — 3077F SYST BP >= 140 MM HG: CPT | Performed by: SURGERY

## 2024-11-06 ASSESSMENT — FIBROSIS 4 INDEX: FIB4 SCORE: 1.42

## 2024-11-08 ASSESSMENT — ENCOUNTER SYMPTOMS
FEVER: 0
SPUTUM PRODUCTION: 0
TREMORS: 0
MYALGIAS: 0
FOCAL WEAKNESS: 0
HEADACHES: 0
NAUSEA: 0
DIARRHEA: 0
DOUBLE VISION: 0
WEIGHT LOSS: 0
HALLUCINATIONS: 0
BACK PAIN: 0
NECK PAIN: 0
CLAUDICATION: 0
CHILLS: 0
HEMOPTYSIS: 0
TINGLING: 0
BRUISES/BLEEDS EASILY: 0
PALPITATIONS: 0
HEARTBURN: 0
CONSTIPATION: 0
DEPRESSION: 0
COUGH: 0
SPEECH CHANGE: 0
ABDOMINAL PAIN: 0
ORTHOPNEA: 0
VOMITING: 0
DIZZINESS: 0
EYE PAIN: 0
BLURRED VISION: 0
PHOTOPHOBIA: 0
NERVOUS/ANXIOUS: 0
SENSORY CHANGE: 0
EYE DISCHARGE: 0

## 2024-11-08 ASSESSMENT — LIFESTYLE VARIABLES: SUBSTANCE_ABUSE: 0

## 2024-11-12 ENCOUNTER — NON-PROVIDER VISIT (OUTPATIENT)
Dept: CARDIOLOGY | Facility: MEDICAL CENTER | Age: 69
End: 2024-11-12
Attending: FAMILY MEDICINE
Payer: MEDICARE

## 2024-11-12 DIAGNOSIS — I47.9 TACHYCARDIA, PAROXYSMAL (HCC): ICD-10-CM

## 2024-11-12 PROCEDURE — 93246 EXT ECG>7D<15D RECORDING: CPT

## 2024-11-12 NOTE — PROGRESS NOTES
Patient enrolled in the 14 day o Holter monitoring program per Dayana Valerio MD.  >Office hook-up, serial # [id number].  >Currently pending EOS.

## 2024-11-19 ENCOUNTER — HOSPITAL ENCOUNTER (OUTPATIENT)
Dept: RADIOLOGY | Facility: MEDICAL CENTER | Age: 69
End: 2024-11-19
Attending: SURGERY
Payer: MEDICARE

## 2024-11-19 DIAGNOSIS — R18.8 GROIN FLUID COLLECTION: ICD-10-CM

## 2024-11-19 DIAGNOSIS — R10.32 LEFT GROIN PAIN: ICD-10-CM

## 2024-11-19 PROCEDURE — 88173 CYTOPATH EVAL FNA REPORT: CPT

## 2024-11-19 PROCEDURE — RXMED WILLOW AMBULATORY MEDICATION CHARGE: Performed by: FAMILY MEDICINE

## 2024-11-19 PROCEDURE — RXMED WILLOW AMBULATORY MEDICATION CHARGE: Performed by: OPTOMETRIST

## 2024-11-19 PROCEDURE — 10005 FNA BX W/US GDN 1ST LES: CPT

## 2024-11-19 NOTE — PROGRESS NOTES
US guided left groin mass nodule fine needle aspiration done by Dr. Graves; NON-SEDATION (no H&P required as this is a NON SEDATION procedure) left groin mass, dressing CDI; 3 washings 1 jar of cytolyt obtained, obtained and sent to lab. Pt tolerated the procedure well. Pt hemodynamically stable pre/intra/post procedure; all questions and concerns answered prior to being d/c; patient provided with appropriate education for procedure; pt d/c home.

## 2024-11-20 DIAGNOSIS — G47.09 OTHER INSOMNIA: ICD-10-CM

## 2024-11-20 LAB — PATHOLOGY CONSULT NOTE: NORMAL

## 2024-11-21 ENCOUNTER — PHARMACY VISIT (OUTPATIENT)
Dept: PHARMACY | Facility: MEDICAL CENTER | Age: 69
End: 2024-11-21
Payer: COMMERCIAL

## 2024-11-21 PROCEDURE — RXMED WILLOW AMBULATORY MEDICATION CHARGE: Performed by: FAMILY MEDICINE

## 2024-11-21 RX ORDER — TRAZODONE HYDROCHLORIDE 100 MG/1
100 TABLET ORAL NIGHTLY
Qty: 100 TABLET | Refills: 3 | Status: SHIPPED | OUTPATIENT
Start: 2024-11-21

## 2024-12-02 ENCOUNTER — TELEPHONE (OUTPATIENT)
Dept: CARDIOLOGY | Facility: MEDICAL CENTER | Age: 69
End: 2024-12-02
Payer: MEDICARE

## 2024-12-02 NOTE — TELEPHONE ENCOUNTER
TABITHA EOS to 's nurse, Jeanette, on 12/2/2024    Established patient    Preliminary findings:    1 episode of -164 with an avg rate of 159 bpm    1 episode of SVT  with an avg rate of 119 bpm  Possible Atrial Tachycardia with variable block    Sinus rhythm  with an avg rate of 73 bpm    Rare isolated SVE(s) and SVE couplets    Rare ventricular ectopics    No patient events recorded

## 2024-12-02 NOTE — TELEPHONE ENCOUNTER
To CW, pt of yours.  Primary care ordered testing, unable to route via result note.  Please sign EOS, thank you!

## 2024-12-03 NOTE — TELEPHONE ENCOUNTER
Upon chart review, pt is active on [facility].  Last login 12/2/2024.  [facility] message sent regarding CW recommendations

## 2024-12-18 ENCOUNTER — HOSPITAL ENCOUNTER (OUTPATIENT)
Dept: LAB | Facility: MEDICAL CENTER | Age: 69
End: 2024-12-18
Attending: FAMILY MEDICINE
Payer: MEDICARE

## 2024-12-18 DIAGNOSIS — E11.69 DYSLIPIDEMIA DUE TO TYPE 2 DIABETES MELLITUS (HCC): ICD-10-CM

## 2024-12-18 DIAGNOSIS — E78.5 DYSLIPIDEMIA DUE TO TYPE 2 DIABETES MELLITUS (HCC): ICD-10-CM

## 2024-12-18 DIAGNOSIS — I47.9 TACHYCARDIA, PAROXYSMAL (HCC): ICD-10-CM

## 2024-12-18 DIAGNOSIS — E55.9 VITAMIN D DEFICIENCY: ICD-10-CM

## 2024-12-18 LAB
25(OH)D3 SERPL-MCNC: 30 NG/ML (ref 30–100)
ALBUMIN SERPL BCP-MCNC: 4.3 G/DL (ref 3.2–4.9)
ALBUMIN/GLOB SERPL: 1.7 G/DL
ALP SERPL-CCNC: 77 U/L (ref 30–99)
ALT SERPL-CCNC: 29 U/L (ref 2–50)
ANION GAP SERPL CALC-SCNC: 9 MMOL/L (ref 7–16)
AST SERPL-CCNC: 28 U/L (ref 12–45)
BILIRUB SERPL-MCNC: 0.4 MG/DL (ref 0.1–1.5)
BUN SERPL-MCNC: 15 MG/DL (ref 8–22)
CALCIUM ALBUM COR SERPL-MCNC: 9.1 MG/DL (ref 8.5–10.5)
CALCIUM SERPL-MCNC: 9.3 MG/DL (ref 8.5–10.5)
CHLORIDE SERPL-SCNC: 108 MMOL/L (ref 96–112)
CHOLEST SERPL-MCNC: 152 MG/DL (ref 100–199)
CO2 SERPL-SCNC: 25 MMOL/L (ref 20–33)
CREAT SERPL-MCNC: 0.92 MG/DL (ref 0.5–1.4)
FASTING STATUS PATIENT QL REPORTED: NORMAL
GFR SERPLBLD CREATININE-BSD FMLA CKD-EPI: 89 ML/MIN/1.73 M 2
GLOBULIN SER CALC-MCNC: 2.5 G/DL (ref 1.9–3.5)
GLUCOSE SERPL-MCNC: 115 MG/DL (ref 65–99)
HDLC SERPL-MCNC: 60 MG/DL
LDLC SERPL CALC-MCNC: 83 MG/DL
POTASSIUM SERPL-SCNC: 3.7 MMOL/L (ref 3.6–5.5)
PROT SERPL-MCNC: 6.8 G/DL (ref 6–8.2)
SODIUM SERPL-SCNC: 142 MMOL/L (ref 135–145)
TRIGL SERPL-MCNC: 47 MG/DL (ref 0–149)
TSH SERPL DL<=0.005 MIU/L-ACNC: 0.9 UIU/ML (ref 0.38–5.33)

## 2024-12-18 PROCEDURE — 82306 VITAMIN D 25 HYDROXY: CPT

## 2024-12-18 PROCEDURE — 80053 COMPREHEN METABOLIC PANEL: CPT

## 2024-12-18 PROCEDURE — 80061 LIPID PANEL: CPT

## 2024-12-18 PROCEDURE — 84443 ASSAY THYROID STIM HORMONE: CPT

## 2024-12-18 PROCEDURE — 36415 COLL VENOUS BLD VENIPUNCTURE: CPT

## 2024-12-24 ENCOUNTER — OFFICE VISIT (OUTPATIENT)
Dept: MEDICAL GROUP | Facility: PHYSICIAN GROUP | Age: 69
End: 2024-12-24
Payer: MEDICARE

## 2024-12-24 VITALS
HEIGHT: 65 IN | TEMPERATURE: 98 F | DIASTOLIC BLOOD PRESSURE: 72 MMHG | BODY MASS INDEX: 22.26 KG/M2 | OXYGEN SATURATION: 98 % | SYSTOLIC BLOOD PRESSURE: 130 MMHG | HEART RATE: 75 BPM | WEIGHT: 133.6 LBS

## 2024-12-24 DIAGNOSIS — I15.2 HYPERTENSION ASSOCIATED WITH TYPE 2 DIABETES MELLITUS (HCC): ICD-10-CM

## 2024-12-24 DIAGNOSIS — R00.0 TACHYCARDIA: ICD-10-CM

## 2024-12-24 DIAGNOSIS — E78.5 DYSLIPIDEMIA DUE TO TYPE 2 DIABETES MELLITUS (HCC): ICD-10-CM

## 2024-12-24 DIAGNOSIS — E11.319 TYPE 2 DIABETES MELLITUS WITH RETINOPATHY, WITH LONG-TERM CURRENT USE OF INSULIN, MACULAR EDEMA PRESENCE UNSPECIFIED, UNSPECIFIED LATERALITY, UNSPECIFIED RETINOPATHY SEVERITY (HCC): ICD-10-CM

## 2024-12-24 DIAGNOSIS — E11.59 HYPERTENSION ASSOCIATED WITH TYPE 2 DIABETES MELLITUS (HCC): ICD-10-CM

## 2024-12-24 DIAGNOSIS — R10.32 LEFT INGUINAL PAIN: ICD-10-CM

## 2024-12-24 DIAGNOSIS — E55.9 VITAMIN D DEFICIENCY: ICD-10-CM

## 2024-12-24 DIAGNOSIS — E11.69 DYSLIPIDEMIA DUE TO TYPE 2 DIABETES MELLITUS (HCC): ICD-10-CM

## 2024-12-24 DIAGNOSIS — J41.0 SIMPLE CHRONIC BRONCHITIS (HCC): ICD-10-CM

## 2024-12-24 DIAGNOSIS — Z79.4 TYPE 2 DIABETES MELLITUS WITH RETINOPATHY, WITH LONG-TERM CURRENT USE OF INSULIN, MACULAR EDEMA PRESENCE UNSPECIFIED, UNSPECIFIED LATERALITY, UNSPECIFIED RETINOPATHY SEVERITY (HCC): ICD-10-CM

## 2024-12-24 DIAGNOSIS — Z12.31 ENCOUNTER FOR SCREENING MAMMOGRAM FOR MALIGNANT NEOPLASM OF BREAST: ICD-10-CM

## 2024-12-24 DIAGNOSIS — Z98.890 HISTORY OF BREAST LUMP/MASS EXCISION: ICD-10-CM

## 2024-12-24 PROCEDURE — RXMED WILLOW AMBULATORY MEDICATION CHARGE: Performed by: FAMILY MEDICINE

## 2024-12-24 PROCEDURE — 3078F DIAST BP <80 MM HG: CPT | Performed by: FAMILY MEDICINE

## 2024-12-24 PROCEDURE — 99214 OFFICE O/P EST MOD 30 MIN: CPT | Performed by: FAMILY MEDICINE

## 2024-12-24 PROCEDURE — 3075F SYST BP GE 130 - 139MM HG: CPT | Performed by: FAMILY MEDICINE

## 2024-12-24 PROCEDURE — RXMED WILLOW AMBULATORY MEDICATION CHARGE: Performed by: INTERNAL MEDICINE

## 2024-12-24 RX ORDER — EZETIMIBE 10 MG/1
10 TABLET ORAL DAILY
Qty: 100 TABLET | Refills: 3 | Status: SHIPPED | OUTPATIENT
Start: 2024-12-24

## 2024-12-24 ASSESSMENT — ENCOUNTER SYMPTOMS
WHEEZING: 0
PALPITATIONS: 1
COUGH: 1

## 2024-12-24 ASSESSMENT — FIBROSIS 4 INDEX: FIB4 SCORE: 1.24

## 2024-12-24 NOTE — PROGRESS NOTES
Verbal consent was acquired by the patient to use [employer] listening note generation during this visit         History of Present Illness  The patient presents today for a follow-up visit. He was last seen on 10/22/2024.    He had his follow-up with Dr. Mixon in 11/2024 for further evaluation of his left groin hematoma. An ultrasound-guided needle aspiration (FNA) was ordered. He sought urgent care for a hematoma in his left groin, which was subsequently biopsied and found to be benign. He reports a decrease in the size of the bulge, although he occasionally experiences pain.    He has a history of tachycardia and underwent a ZIO patch test last month. He reports experiencing palpitations and chest pain intermittently. He expresses concern about potential plaque in his veins, as indicated by a CAT scan in 2017 or 2019, and a subsequent lung scan. He is currently on atorvastatin 80 mg and baby aspirin. He is uncertain about the necessity of a cardiology consultation at this time.    He has a history of hypertension and is currently on amlodipine and lisinopril. His blood pressure readings at home are consistent with today's office reading of 130/72. He reports no abnormal headaches.    He has a history of type 2 diabetes and is currently taking metformin 500 mg daily. He has administered a few insulin injections over the past 2 months when his blood sugar levels were elevated, with the highest recorded level being 189. He reports that his blood sugar levels have never dropped below 60. He maintains an active lifestyle, including walking 4 miles each morning, although he has reduced his activity in recent weeks due to cold weather. He experiences pain in his fingertips during his morning walks, which he attributes to his diabetes. He does not experience similar symptoms in his toes.    He received a letter regarding his mammogram, which is due after 01/17/2025. He does not report any palpable lumps. He  had a lump and a biopsy in 2018, which was benign. He is supposed to get yearly mammogram. He has not examined himself for any lumps on the left side.    He has been experiencing coughing and phlegm production, but does not consider these symptoms to be severe. He has an albuterol inhaler for use as needed, but has not used it recently due to perceived lack of efficacy. He has a history of COPD.    He is not currently taking any vitamin D supplements or multivitamins. He has resumed milk consumption and attempts to increase his sun exposure.    FAMILY HISTORY  His sisters had both breasts removed due to breast cancer.    MEDICATIONS  Current: metformin, amlodipine, lisinopril, atorvastatin, baby aspirin, albuterol (as needed)      Review of Systems   Respiratory:  Positive for cough. Negative for wheezing.    Cardiovascular:  Positive for chest pain and palpitations.       Outpatient Medications Marked as Taking for the 12/24/24 encounter (Office Visit) with Dayana Valerio M.D.   Medication Sig Dispense Refill    ezetimibe (ZETIA) 10 MG Tab Take 1 Tablet by mouth every day. 100 Tablet 3    traZODone (DESYREL) 100 MG Tab Take 1 tablet by mouth every evening. 100 Tablet 3    aspirin 81 MG EC tablet Take 81 mg by mouth every day.      atorvastatin (LIPITOR) 80 MG tablet Take 1 Tablet by mouth every evening. 100 Tablet 3    metFORMIN (GLUCOPHAGE) 500 MG Tab Take 1 Tablet by mouth every day. 100 Tablet 3    cycloSPORINE (RESTASIS) 0.05 % ophthalmic emulsion Instill 1 drop into both eyes twice daily 180 Each 3    insulin regular (NOVOLIN R) 100 Unit/mL Solution AS PER SLIDING SCALE 10 mL 3    ketoconazole (NIZORAL) 2 % Cream Apply twice a day to affected toenails. 30 g 2    latanoprost (XALATAN) 0.005 % Solution Instill one drop at night to BOTH EYES daily (Patient taking differently: MEDICATION INSTRUCTIONS FOR SURGERY/PROCEDURE SCHEDULED FOR 7/10   CONTINUE TAKING MED PRIOR TO SURGERY) 7.5 mL 3    Blood Glucose  Monitoring Suppl (BLOOD GLUCOSE MONITOR SYSTEM) w/Device Kit Meter: Dispense Device of Insurance Preference. Sig. Use as directed for blood sugar monitoring. #1. NR. 1 Kit 0    glucose blood (CONTOUR NEXT TEST) strip Check fingersticks three times a day. E11.9 300 Strip 3    lisinopril (PRINIVIL) 40 MG tablet Take 1 Tablet by mouth every day. (Patient taking differently: Take 40 mg by mouth every evening. Hold 24 hrs prior to surgery) 100 Tablet 3    cloNIDine (CATAPRES) 0.1 MG Tab Take 1 Tablet by mouth 2 times a day. (Patient taking differently: Take 0.1 mg by mouth 2 times a day. MEDICATION INSTRUCTIONS FOR SURGERY/PROCEDURE SCHEDULED FOR 7/10   OK TO CONTINUE TAKING PRIOR TO SURGERY AND DAY OF SURGERY) 200 Tablet 3    metoprolol SR (TOPROL XL) 25 MG TABLET SR 24 HR Take 1 Tablet by mouth every day. (Patient taking differently: Take 25 mg by mouth every evening. MEDICATION INSTRUCTIONS FOR SURGERY/PROCEDURE SCHEDULED FOR 7/10   CONTINUE TAKING MED PRIOR TO SURGERY) 100 Tablet 3    amLODIPine (NORVASC) 10 MG Tab Take 1 Tablet by mouth every day. (Patient taking differently: Take 10 mg by mouth every evening. MEDICATION INSTRUCTIONS FOR SURGERY/PROCEDURE SCHEDULED FOR 7/10   CONTINUE TAKING MED PRIOR TO SURGERY) 100 Tablet 3    levothyroxine (SYNTHROID) 25 MCG Tab Take 1 Tablet by mouth every morning on an empty stomach. (Patient taking differently: Take 25 mcg by mouth every morning on an empty stomach. MEDICATION INSTRUCTIONS FOR SURGERY/PROCEDURE SCHEDULED FOR 7/10   OK TO CONTINUE TAKING PRIOR TO SURGERY AND DAY OF SURGERY) 100 Tablet 3    albuterol 108 (90 Base) MCG/ACT Aero Soln inhalation aerosol Inhale 2 Puffs every four hours as needed for Shortness of Breath. (Patient taking differently: Inhale 2 Puffs every four hours as needed for Shortness of Breath. MEDICATION INSTRUCTIONS FOR SURGERY/PROCEDURE SCHEDULED FOR 7/10   OK TO CONTINUE TAKING PRIOR TO SURGERY AND DAY OF SURGERY) 18 g 1    INSULIN SYRINGE  ".5CC/29G ([employer] INSULIN SYRINGE) 29G X 1/2\" 0.5 ML Misc USE AS DIRECTED BY PHYSICIAN 100 Each 3       /72   Pulse 75   Temp 36.7 °C (98 °F) (Temporal)   Ht 1.651 m (5' 5\")   Wt 60.6 kg (133 lb 9.6 oz)   SpO2 98% , Body mass index is 22.23 kg/m².        Physical Exam  Constitutional:       Appearance: Normal appearance.   Eyes:      Extraocular Movements: Extraocular movements intact.   Cardiovascular:      Rate and Rhythm: Normal rate and regular rhythm.   Pulmonary:      Effort: Pulmonary effort is normal.      Breath sounds: Normal breath sounds.   Neurological:      Mental Status: He is alert.   Psychiatric:         Mood and Affect: Mood normal.         Behavior: Behavior normal.         Results  Laboratory Studies  A1c was stable at 5.9% in October. Cholesterol levels improved, LDL at 83. Electrolytes, kidney function, liver function labs were normal. Thyroid levels were normal. Vitamin D level increased from 20 to 30.    Imaging  Biopsy of left groin hematoma showed benign results. Lung cancer screening test in August showed some calcifications.       Assessment & Plan  1. Left groin pain.  Chronic issue.  Improving.  The patient had a follow-up with Dr. Mixon in November for further evaluation of his left groin hematoma. An ultrasound-guided needle aspiration (FNA) was ordered. The biopsy results were benign. He reports that the bulge is not as bad and sometimes feels pain.    2. Tachycardia.  Chronic medical diagnosis.  Not well-controlled.  Mentions that he had some technical issues and was not documenting when he had symptoms starting his Zio patch last month.   He reports experiencing rapid heartbeats and chest pain at times. He was advised to consider a cardiology consultation which he currently declines.    3. Hypertension.  Chronic medical diagnosis.  Stable.  His blood pressure today is 130/72 mmHg. He is currently taking clonidine 0.1 mg twice a day, metoprolol 25 mg daily, amlodipine " 10 mg and lisinopril 40 mg. He reports that his blood pressure readings at home are similar to today's reading.    4. Type 2 diabetes mellitus.  Chronic medical diagnosis.  Stable.  His last A1c from October was stable at 5.9%. He is currently taking metformin 500 mg daily and has taken a couple of insulin shots over the last 2 months when his blood sugars were high. He was advised to monitor his blood sugar levels closely to avoid hypoglycemia. A fasting A1c test and a urine test will be ordered before his next visit.    5. Hyperlipidemia.  Chronic medical diagnosis.  LDL levels not at goal.  His cholesterol levels have improved, but his LDL remains at 83 mg/dL. He is on the maximum dose of atorvastatin (80 mg) and baby aspirin. He was advised to continue his current medication regimen and consider adding ezetimibe to further lower his LDL levels. A prescription for ezetimibe was provided, to be taken in the evening along with atorvastatin. A fasting cholesterol test will be ordered before his next visit.    6. COPD.  Chronic medical diagnosis.  Not well-controlled.  He reports occasional coughing and phlegm production. He has an albuterol inhaler but has not used it recently as it does not seem to help. A pulmonary function test will be ordered to assess his current lung function.  PFTs were last completed in 2022.    7. Vitamin D deficiency.  Chronic medical diagnosis.  Improving.  His vitamin D levels have increased from 20 to 30 ng/mL. He was advised to aim for a vitamin D level in the 50-60 ng/mL range. He was encouraged to take a vitamin D supplement and continue drinking milk and getting sunlight exposure.    8.  Encounter for screening mammogram for malignant neoplasm of breast  9.  History of breast lab/mass  He was advised to schedule a mammogram after January 17th, 2025, due to a previous benign biopsy and family history of breast cancer. Orders for the mammogram were provided.    Follow-up  The patient  will follow up in 3 months.    PROCEDURE  The patient underwent a biopsy of a left groin hematoma, which returned benign results.    Orders Placed This Encounter    MA-SCREENING MAMMO BILAT W/TOMOSYNTHESIS W/CAD    MICROALBUMIN CREAT RATIO URINE    HEMOGLOBIN A1C    Lipid Profile    Comp Metabolic Panel    PULMONARY FUNCTION TESTS -Test requested: Complete Pulmonary Function Test; Include MIPS/MEPS? Yes    ezetimibe (ZETIA) 10 MG Tab           This note was created using voice recognition software (Dragon). The accuracy of the dictation is limited by the abilities of the software. I have reviewed the note prior to signing, however some errors in grammar and context are still possible. If you have any questions related to this note please do not hesitate to contact our office.

## 2024-12-25 DIAGNOSIS — I15.2 HYPERTENSION ASSOCIATED WITH TYPE 2 DIABETES MELLITUS (HCC): ICD-10-CM

## 2024-12-25 DIAGNOSIS — E03.4 HYPOTHYROIDISM DUE TO ACQUIRED ATROPHY OF THYROID: ICD-10-CM

## 2024-12-25 DIAGNOSIS — E11.59 HYPERTENSION ASSOCIATED WITH TYPE 2 DIABETES MELLITUS (HCC): ICD-10-CM

## 2024-12-26 ENCOUNTER — PATIENT MESSAGE (OUTPATIENT)
Dept: MEDICAL GROUP | Facility: PHYSICIAN GROUP | Age: 69
End: 2024-12-26
Payer: MEDICARE

## 2024-12-26 ENCOUNTER — PHARMACY VISIT (OUTPATIENT)
Dept: PHARMACY | Facility: MEDICAL CENTER | Age: 69
End: 2024-12-26
Payer: COMMERCIAL

## 2024-12-26 DIAGNOSIS — I47.9 TACHYCARDIA, PAROXYSMAL (HCC): ICD-10-CM

## 2024-12-26 PROCEDURE — RXMED WILLOW AMBULATORY MEDICATION CHARGE: Performed by: FAMILY MEDICINE

## 2024-12-26 RX ORDER — CLONIDINE HYDROCHLORIDE 0.1 MG/1
0.1 TABLET ORAL 2 TIMES DAILY
Qty: 200 TABLET | Refills: 3 | Status: SHIPPED | OUTPATIENT
Start: 2024-12-26

## 2024-12-26 RX ORDER — AMLODIPINE BESYLATE 10 MG/1
10 TABLET ORAL DAILY
Qty: 100 TABLET | Refills: 3 | Status: SHIPPED | OUTPATIENT
Start: 2024-12-26

## 2024-12-26 RX ORDER — LEVOTHYROXINE SODIUM 25 UG/1
25 TABLET ORAL
Qty: 100 TABLET | Refills: 3 | Status: SHIPPED | OUTPATIENT
Start: 2024-12-26

## 2024-12-26 RX ORDER — METOPROLOL SUCCINATE 25 MG/1
25 TABLET, EXTENDED RELEASE ORAL DAILY
Qty: 100 TABLET | Refills: 3 | Status: SHIPPED | OUTPATIENT
Start: 2024-12-26

## 2024-12-26 RX ORDER — LISINOPRIL 40 MG/1
40 TABLET ORAL DAILY
Qty: 100 TABLET | Refills: 3 | Status: SHIPPED | OUTPATIENT
Start: 2024-12-26

## 2024-12-26 NOTE — TELEPHONE ENCOUNTER
Received request via: Patient    Was the patient seen in the last year in this department? Yes    Does the patient have an active prescription (recently filled or refills available) for medication(s) requested? No    Pharmacy Name: [facility] [place]    Does the patient have USP Plus and need 100-day supply? (This applies to ALL medications) Yes, quantity updated to 100 days

## 2024-12-31 ENCOUNTER — TELEPHONE (OUTPATIENT)
Dept: HEALTH INFORMATION MANAGEMENT | Facility: OTHER | Age: 69
End: 2024-12-31
Payer: MEDICARE

## 2025-01-21 ENCOUNTER — HOSPITAL ENCOUNTER (OUTPATIENT)
Dept: RADIOLOGY | Facility: MEDICAL CENTER | Age: 70
End: 2025-01-21
Attending: FAMILY MEDICINE
Payer: MEDICARE

## 2025-01-21 DIAGNOSIS — Z12.31 ENCOUNTER FOR SCREENING MAMMOGRAM FOR MALIGNANT NEOPLASM OF BREAST: ICD-10-CM

## 2025-01-21 DIAGNOSIS — Z98.890 HISTORY OF BREAST LUMP/MASS EXCISION: ICD-10-CM

## 2025-01-21 PROCEDURE — 77067 SCR MAMMO BI INCL CAD: CPT

## 2025-01-22 PROCEDURE — RXMED WILLOW AMBULATORY MEDICATION CHARGE: Performed by: OPTOMETRIST

## 2025-01-23 ENCOUNTER — PHARMACY VISIT (OUTPATIENT)
Dept: PHARMACY | Facility: MEDICAL CENTER | Age: 70
End: 2025-01-23
Payer: COMMERCIAL

## 2025-01-24 ENCOUNTER — OFFICE VISIT (OUTPATIENT)
Dept: CARDIOLOGY | Facility: MEDICAL CENTER | Age: 70
End: 2025-01-24
Attending: FAMILY MEDICINE
Payer: MEDICARE

## 2025-01-24 VITALS
DIASTOLIC BLOOD PRESSURE: 78 MMHG | HEART RATE: 69 BPM | BODY MASS INDEX: 22.66 KG/M2 | HEIGHT: 65 IN | WEIGHT: 136 LBS | RESPIRATION RATE: 18 BRPM | OXYGEN SATURATION: 97 % | SYSTOLIC BLOOD PRESSURE: 130 MMHG

## 2025-01-24 DIAGNOSIS — E11.59 HYPERTENSION ASSOCIATED WITH TYPE 2 DIABETES MELLITUS (HCC): ICD-10-CM

## 2025-01-24 DIAGNOSIS — E11.69 DYSLIPIDEMIA DUE TO TYPE 2 DIABETES MELLITUS (HCC): ICD-10-CM

## 2025-01-24 DIAGNOSIS — I77.819 ECTATIC AORTA (HCC): ICD-10-CM

## 2025-01-24 DIAGNOSIS — E78.5 DYSLIPIDEMIA DUE TO TYPE 2 DIABETES MELLITUS (HCC): ICD-10-CM

## 2025-01-24 DIAGNOSIS — I25.10 CORONARY ARTERY CALCIFICATION SEEN ON CAT SCAN: ICD-10-CM

## 2025-01-24 DIAGNOSIS — I70.0 ATHEROSCLEROSIS OF ABDOMINAL AORTA (HCC): ICD-10-CM

## 2025-01-24 DIAGNOSIS — I15.2 HYPERTENSION ASSOCIATED WITH TYPE 2 DIABETES MELLITUS (HCC): ICD-10-CM

## 2025-01-24 DIAGNOSIS — I44.4 LEFT ANTERIOR FASCICULAR BLOCK: ICD-10-CM

## 2025-01-24 PROCEDURE — 99213 OFFICE O/P EST LOW 20 MIN: CPT | Performed by: INTERNAL MEDICINE

## 2025-01-24 PROCEDURE — 99214 OFFICE O/P EST MOD 30 MIN: CPT | Performed by: INTERNAL MEDICINE

## 2025-01-24 PROCEDURE — 3078F DIAST BP <80 MM HG: CPT | Performed by: INTERNAL MEDICINE

## 2025-01-24 PROCEDURE — 3075F SYST BP GE 130 - 139MM HG: CPT | Performed by: INTERNAL MEDICINE

## 2025-01-24 ASSESSMENT — FIBROSIS 4 INDEX: FIB4 SCORE: 1.24

## 2025-01-24 NOTE — PROGRESS NOTES
"Chief Complaint   Patient presents with    Hypertension    Tachycardia       Subjective     Nikolas Rico is a 69 y.o. male who presents today of atherosclerosis aortic ectasia    Had high pulse on overnight OPO so did a monitor and was normal    Past Medical History:   Diagnosis Date    Anginal syndrome (HCC)     Arthritis     Blood clotting disorder (HCC)     PE, has IVC filter    BMI 23.0-23.9, adult 02/08/2023    Breath shortness     comes and goes    Bronchitis     Cataract     repair, bilateral    CKD (chronic kidney disease) stage 3, GFR 30-59 ml/min     COPD     Diabetes     Disorder of thyroid     nodule    GERD (gastroesophageal reflux disease)     Glaucoma     Heart burn     Hepatitis A     Hepatitis C     High cholesterol     Hyperlipidemia     Hypertension     Indigestion     Jaundice     hx of    Pneumonia     Psychiatric disorder     bipolar    Resistant hypertension     Schizoaffective disorder (HCC) 10/01/2016    Sleep apnea      Past Surgical History:   Procedure Laterality Date    PB KNEE SCOPE,DIAGNOSTIC Left 7/10/2024    Procedure: LEFT KNEE ARTHROSCOPY AND PARTIAL MEDIAL MENISCECTOMY;  Surgeon: Elliot Pineda D.O.;  Location: SURGERY [facility];  Service: Orthopedics    MENISCECTOMY, KNEE, MEDIAL Left 7/10/2024    Procedure: MENISCECTOMY, KNEE, MEDIAL;  Surgeon: Elliot Pineda D.O.;  Location: SURGERY [facility];  Service: Orthopedics    OTHER      lower eyelid ripped    OTHER ABDOMINAL SURGERY      exploratory- \"not sure why\"    OTHER ORTHOPEDIC SURGERY Right     right foot, beam fell on it. repair     Family History   Problem Relation Age of Onset    Hyperlipidemia Mother         under control    Cancer Father         bladder    Heart Disease Paternal Grandmother     Cancer Sister         breast    Psychiatric Illness Sister         Bi-Polar Depressive stage    Diabetes Sister         diabetes type II    Heart Disease Paternal Uncle     Clotting Disorder Neg Hx  "     Social History     Socioeconomic History    Marital status: Single     Spouse name: Not on file    Number of children: Not on file    Years of education: Not on file    Highest education level: Not on file   Occupational History    Not on file   Tobacco Use    Smoking status: Former     Current packs/day: 0.00     Average packs/day: 0.5 packs/day for 41.2 years (20.6 ttl pk-yrs)     Types: Cigarettes     Start date: 1972     Quit date: 3/6/2013     Years since quittin.8    Smokeless tobacco: Never   Vaping Use    Vaping status: Never Used   Substance and Sexual Activity    Alcohol use: Not Currently     Comment: sober since HCV diagnosis (2018)    Drug use: Yes     Types: Inhaled, Oral     Comment: medical marijuana    Sexual activity: Not Currently     Partners: Female     Comment: retired, no kids.   Other Topics Concern    Not on file   Social History Narrative    Not on file     Social Drivers of Health     Financial Resource Strain: Low Risk  (2024)    Overall Financial Resource Strain (CARDIA)     Difficulty of Paying Living Expenses: Not hard at all   Food Insecurity: No Food Insecurity (2024)    Hunger Vital Sign     Worried About Running Out of Food in the Last Year: Never true     Ran Out of Food in the Last Year: Never true   Transportation Needs: No Transportation Needs (2024)    PRAPARE - Transportation     Lack of Transportation (Medical): No     Lack of Transportation (Non-Medical): No   Physical Activity: Sufficiently Active (2021)    Exercise Vital Sign     Days of Exercise per Week: 7 days     Minutes of Exercise per Session: 140 min   Stress: Stress Concern Present (2021)    Citizen of [place] of Occupational Health - Occupational Stress Questionnaire     Feeling of Stress : To some extent   Social Connections: Socially Isolated (2021)    Social Connection and Isolation Panel [NHANES]     Frequency of Communication with Friends and Family: More than three  times a week     Frequency of Social Gatherings with Friends and Family: Never     Attends Anabaptism Services: Never     Active Member of Clubs or Organizations: No     Attends Club or Organization Meetings: Never     Marital Status: Never    Intimate Partner Violence: Not on file   Housing Stability: High Risk (1/30/2021)    Housing Stability Vital Sign     Unable to Pay for Housing in the Last Year: No     Number of Places Lived in the Last Year: 1     Unstable Housing in the Last Year: Yes     No Known Allergies  Outpatient Encounter Medications as of 1/24/2025   Medication Sig Dispense Refill    lisinopril (PRINIVIL) 40 MG tablet Take 1 Tablet by mouth every day. 100 Tablet 3    cloNIDine (CATAPRES) 0.1 MG Tab Take 1 Tablet by mouth 2 times a day. 200 Tablet 3    metoprolol SR (TOPROL XL) 25 MG TABLET SR 24 HR Take 1 Tablet by mouth every day. 100 Tablet 3    amLODIPine (NORVASC) 10 MG Tab Take 1 Tablet by mouth every day. 100 Tablet 3    levothyroxine (SYNTHROID) 25 MCG Tab Take 1 Tablet by mouth every morning on an empty stomach. 100 Tablet 3    ezetimibe (ZETIA) 10 MG Tab Take 1 Tablet by mouth every day. 100 Tablet 3    traZODone (DESYREL) 100 MG Tab Take 1 tablet by mouth every evening. 100 Tablet 3    aspirin 81 MG EC tablet Take 81 mg by mouth every day.      atorvastatin (LIPITOR) 80 MG tablet Take 1 Tablet by mouth every evening. 100 Tablet 3    metFORMIN (GLUCOPHAGE) 500 MG Tab Take 1 Tablet by mouth every day. 100 Tablet 3    cycloSPORINE (RESTASIS) 0.05 % ophthalmic emulsion Instill 1 drop into both eyes twice daily 180 Each 3    insulin regular (NOVOLIN R) 100 Unit/mL Solution AS PER SLIDING SCALE 10 mL 3    ketoconazole (NIZORAL) 2 % Cream Apply twice a day to affected toenails. 30 g 2    latanoprost (XALATAN) 0.005 % Solution Instill one drop at night to BOTH EYES daily (Patient taking differently: MEDICATION INSTRUCTIONS FOR SURGERY/PROCEDURE SCHEDULED FOR 7/10   CONTINUE TAKING MED PRIOR  "TO SURGERY) 7.5 mL 3    Blood Glucose Monitoring Suppl (BLOOD GLUCOSE MONITOR SYSTEM) w/Device Kit Meter: Dispense Device of Insurance Preference. Sig. Use as directed for blood sugar monitoring. #1. NR. 1 Kit 0    glucose blood (CONTOUR NEXT TEST) strip Check fingersticks three times a day. E11.9 300 Strip 3    albuterol 108 (90 Base) MCG/ACT Aero Soln inhalation aerosol Inhale 2 Puffs every four hours as needed for Shortness of Breath. (Patient taking differently: Inhale 2 Puffs every four hours as needed for Shortness of Breath. MEDICATION INSTRUCTIONS FOR SURGERY/PROCEDURE SCHEDULED FOR 7/10   OK TO CONTINUE TAKING PRIOR TO SURGERY AND DAY OF SURGERY) 18 g 1    INSULIN SYRINGE .5CC/29G (ULTICARE INSULIN SYRINGE) 29G X 1/2\" 0.5 ML Misc USE AS DIRECTED BY PHYSICIAN 100 Each 3     No facility-administered encounter medications on file as of 1/24/2025.     ROS           Objective     /78 (BP Location: Right arm, Patient Position: Sitting, BP Cuff Size: Adult)   Pulse 69   Resp 18   Ht 1.651 m (5' 5\")   Wt 61.7 kg (136 lb)   SpO2 97%   BMI 22.63 kg/m²     Physical Exam  Constitutional:       General: He is not in acute distress.     Appearance: He is not diaphoretic.   Eyes:      General: No scleral icterus.  Neck:      Vascular: No JVD.   Cardiovascular:      Rate and Rhythm: Normal rate.      Heart sounds: Normal heart sounds. No murmur heard.     No friction rub. No gallop.   Pulmonary:      Effort: No respiratory distress.      Breath sounds: No wheezing or rales.   Abdominal:      General: Bowel sounds are normal.      Palpations: Abdomen is soft.   Musculoskeletal:      Right lower leg: No edema.      Left lower leg: No edema.   Skin:     Findings: No rash.   Neurological:      Mental Status: He is alert. Mental status is at baseline.   Psychiatric:         Mood and Affect: Mood normal.            We reviewed in person the most recent labs  Recent Results (from the past 30 weeks)   CBC Without " Differential    Collection Time: 24  8:38 AM   Result Value Ref Range    WBC 5.1 4.8 - 10.8 K/uL    RBC 4.64 (L) 4.70 - 6.10 M/uL    Hemoglobin 13.4 (L) 14.0 - 18.0 g/dL    Hematocrit 39.7 (L) 42.0 - 52.0 %    MCV 85.6 81.4 - 97.8 fL    MCH 28.9 27.0 - 33.0 pg    MCHC 33.8 32.3 - 36.5 g/dL    RDW 39.8 35.9 - 50.0 fL    Platelet Count 256 164 - 446 K/uL    MPV 10.2 9.0 - 12.9 fL   Hemoglobin A1c    Collection Time: 24  8:38 AM   Result Value Ref Range    Glycohemoglobin 6.1 (H) 4.0 - 5.6 %    Est Avg Glucose 128 mg/dL   Comp Metabolic Panel    Collection Time: 24  8:38 AM   Result Value Ref Range    Sodium 142 135 - 145 mmol/L    Potassium 3.6 3.6 - 5.5 mmol/L    Chloride 107 96 - 112 mmol/L    Co2 25 20 - 33 mmol/L    Anion Gap 10.0 7.0 - 16.0    Glucose 92 65 - 99 mg/dL    Bun 10 8 - 22 mg/dL    Creatinine 0.80 0.50 - 1.40 mg/dL    Calcium 9.4 8.4 - 10.2 mg/dL    Correct Calcium 9.2 8.5 - 10.5 mg/dL    AST(SGOT) 17 12 - 45 U/L    ALT(SGPT) 16 2 - 50 U/L    Alkaline Phosphatase 65 30 - 99 U/L    Total Bilirubin 0.4 0.1 - 1.5 mg/dL    Albumin 4.3 3.2 - 4.9 g/dL    Total Protein 6.8 6.0 - 8.2 g/dL    Globulin 2.5 1.9 - 3.5 g/dL    A-G Ratio 1.7 g/dL   ESTIMATED GFR    Collection Time: 24  8:38 AM   Result Value Ref Range    GFR (CKD-EPI) 95 >60 mL/min/1.73 m 2   ECG    Collection Time: 24  8:48 AM   Result Value Ref Range    Report       [facility]    Test Date:  2024  Pt Name:    AKUA DEXTER            Department: [facility]  MRN:        [id number]                      Room:  Gender:     Male                         Technician: LEE  :        1955                   Requested By:SHABBIR LARIOS  Order #:    [id number]                    Reading MD: Amber Guthrie MD    Measurements  Intervals                                Axis  Rate:       68                           P:          30  UT:         189                          QRS:        -62  QRSD:       86                            T:          60  QT:         414  QTc:        439    Interpretive Statements  Sinus rhythm  Inferior infarct, old  Electronically Signed On 07- 08:39:46 PDT by Amber Guthrie MD     POCT glucose device results    Collection Time: 07/10/24 10:09 AM   Result Value Ref Range    POC Glucose, Blood 78 65 - 99 mg/dL   Lipid Profile    Collection Time: 10/08/24  6:13 AM   Result Value Ref Range    Cholesterol,Tot 167 100 - 199 mg/dL    Triglycerides 85 0 - 149 mg/dL    HDL 61 >=40 mg/dL    LDL 89 <100 mg/dL   IRON/TOTAL IRON BIND    Collection Time: 10/08/24  6:13 AM   Result Value Ref Range    Iron 117 50 - 180 ug/dL    Total Iron Binding 344 250 - 450 ug/dL    Unsat Iron Binding 227 110 - 370 ug/dL    % Saturation 34 15 - 55 %   FERRITIN    Collection Time: 10/08/24  6:13 AM   Result Value Ref Range    Ferritin 89.4 22.0 - 322.0 ng/mL   CBC WITH DIFFERENTIAL    Collection Time: 10/08/24  6:13 AM   Result Value Ref Range    WBC 4.7 (L) 4.8 - 10.8 K/uL    RBC 4.99 4.70 - 6.10 M/uL    Hemoglobin 14.3 14.0 - 18.0 g/dL    Hematocrit 44.1 42.0 - 52.0 %    MCV 88.4 81.4 - 97.8 fL    MCH 28.7 27.0 - 33.0 pg    MCHC 32.4 32.3 - 36.5 g/dL    RDW 42.9 35.9 - 50.0 fL    Platelet Count 301 164 - 446 K/uL    MPV 10.4 9.0 - 12.9 fL    Neutrophils-Polys 69.00 44.00 - 72.00 %    Lymphocytes 22.60 22.00 - 41.00 %    Monocytes 5.70 0.00 - 13.40 %    Eosinophils 1.30 0.00 - 6.90 %    Basophils 0.80 0.00 - 1.80 %    Immature Granulocytes 0.60 0.00 - 0.90 %    Nucleated RBC 0.00 0.00 - 0.20 /100 WBC    Neutrophils (Absolute) 3.26 1.82 - 7.42 K/uL    Lymphs (Absolute) 1.07 1.00 - 4.80 K/uL    Monos (Absolute) 0.27 0.00 - 0.85 K/uL    Eos (Absolute) 0.06 0.00 - 0.51 K/uL    Baso (Absolute) 0.04 0.00 - 0.12 K/uL    Immature Granulocytes (abs) 0.03 0.00 - 0.11 K/uL    NRBC (Absolute) 0.00 K/uL   VITAMIN B12    Collection Time: 10/08/24  6:13 AM   Result Value Ref Range    Vitamin B12 -True Cobalamin 479 211 - 911 pg/mL   FOLATE     Collection Time: 10/08/24  6:13 AM   Result Value Ref Range    Folate -Folic Acid 11.3 >4.0 ng/mL   TSH WITH REFLEX TO FT4    Collection Time: 10/08/24  6:13 AM   Result Value Ref Range    TSH 1.160 0.380 - 5.330 uIU/mL   FASTING STATUS    Collection Time: 10/08/24  6:13 AM   Result Value Ref Range    Fasting Status Fasting    POCT Hemoglobin A1C    Collection Time: 10/22/24  8:20 AM   Result Value Ref Range    Glycohemoglobin 5.9 (A) <=5.8 %    Internal Control Positive Positive     Internal Control Negative Negative    CBC WITH DIFFERENTIAL    Collection Time: 10/31/24 12:19 PM   Result Value Ref Range    WBC 5.4 4.8 - 10.8 K/uL    RBC 4.99 4.70 - 6.10 M/uL    Hemoglobin 14.4 14.0 - 18.0 g/dL    Hematocrit 43.3 42.0 - 52.0 %    MCV 86.8 81.4 - 97.8 fL    MCH 28.9 27.0 - 33.0 pg    MCHC 33.3 32.3 - 36.5 g/dL    RDW 41.6 35.9 - 50.0 fL    Platelet Count 293 164 - 446 K/uL    MPV 9.7 9.0 - 12.9 fL    Neutrophils-Polys 59.30 44.00 - 72.00 %    Lymphocytes 32.80 22.00 - 41.00 %    Monocytes 5.90 0.00 - 13.40 %    Eosinophils 0.70 0.00 - 6.90 %    Basophils 0.70 0.00 - 1.80 %    Immature Granulocytes 0.60 0.00 - 0.90 %    Nucleated RBC 0.00 0.00 - 0.20 /100 WBC    Neutrophils (Absolute) 3.20 1.82 - 7.42 K/uL    Lymphs (Absolute) 1.77 1.00 - 4.80 K/uL    Monos (Absolute) 0.32 0.00 - 0.85 K/uL    Eos (Absolute) 0.04 0.00 - 0.51 K/uL    Baso (Absolute) 0.04 0.00 - 0.12 K/uL    Immature Granulocytes (abs) 0.03 0.00 - 0.11 K/uL    NRBC (Absolute) 0.00 K/uL   COMP METABOLIC PANEL    Collection Time: 10/31/24 12:19 PM   Result Value Ref Range    Sodium 144 135 - 145 mmol/L    Potassium 3.8 3.6 - 5.5 mmol/L    Chloride 108 96 - 112 mmol/L    Co2 26 20 - 33 mmol/L    Anion Gap 10.0 7.0 - 16.0    Glucose 102 (H) 65 - 99 mg/dL    Bun 10 8 - 22 mg/dL    Creatinine 0.75 0.50 - 1.40 mg/dL    Calcium 9.6 8.5 - 10.5 mg/dL    Correct Calcium 9.1 8.5 - 10.5 mg/dL    AST(SGOT) 32 12 - 45 U/L    ALT(SGPT) 28 2 - 50 U/L    Alkaline  Phosphatase 73 30 - 99 U/L    Total Bilirubin 0.4 0.1 - 1.5 mg/dL    Albumin 4.6 3.2 - 4.9 g/dL    Total Protein 7.4 6.0 - 8.2 g/dL    Globulin 2.8 1.9 - 3.5 g/dL    A-G Ratio 1.6 g/dL   LIPASE    Collection Time: 10/31/24 12:19 PM   Result Value Ref Range    Lipase 63 11 - 82 U/L   ESTIMATED GFR    Collection Time: 10/31/24 12:19 PM   Result Value Ref Range    GFR (CKD-EPI) 97 >60 mL/min/1.73 m 2   Histology Request    Collection Time: 11/19/24  1:05 PM   Result Value Ref Range    Pathology Request Sent to Histo    Lipid Profile    Collection Time: 12/18/24  6:09 AM   Result Value Ref Range    Cholesterol,Tot 152 100 - 199 mg/dL    Triglycerides 47 0 - 149 mg/dL    HDL 60 >=40 mg/dL    LDL 83 <100 mg/dL   VITAMIN D,25 HYDROXY (DEFICIENCY)    Collection Time: 12/18/24  6:09 AM   Result Value Ref Range    25-Hydroxy   Vitamin D 25 30 30 - 100 ng/mL   Comp Metabolic Panel    Collection Time: 12/18/24  6:09 AM   Result Value Ref Range    Sodium 142 135 - 145 mmol/L    Potassium 3.7 3.6 - 5.5 mmol/L    Chloride 108 96 - 112 mmol/L    Co2 25 20 - 33 mmol/L    Anion Gap 9.0 7.0 - 16.0    Glucose 115 (H) 65 - 99 mg/dL    Bun 15 8 - 22 mg/dL    Creatinine 0.92 0.50 - 1.40 mg/dL    Calcium 9.3 8.5 - 10.5 mg/dL    Correct Calcium 9.1 8.5 - 10.5 mg/dL    AST(SGOT) 28 12 - 45 U/L    ALT(SGPT) 29 2 - 50 U/L    Alkaline Phosphatase 77 30 - 99 U/L    Total Bilirubin 0.4 0.1 - 1.5 mg/dL    Albumin 4.3 3.2 - 4.9 g/dL    Total Protein 6.8 6.0 - 8.2 g/dL    Globulin 2.5 1.9 - 3.5 g/dL    A-G Ratio 1.7 g/dL   TSH WITH REFLEX TO FT4    Collection Time: 12/18/24  6:09 AM   Result Value Ref Range    TSH 0.895 0.380 - 5.330 uIU/mL   FASTING STATUS    Collection Time: 12/18/24  6:09 AM   Result Value Ref Range    Fasting Status Fasting    ESTIMATED GFR    Collection Time: 12/18/24  6:09 AM   Result Value Ref Range    GFR (CKD-EPI) 89 >60 mL/min/1.73 m 2         Assessment & Plan     1. Hypertension associated with type 2 diabetes mellitus  (HCC)        2. Left anterior fascicular block        3. Atherosclerosis of abdominal aorta (HCC)        4. Dyslipidemia due to type 2 diabetes mellitus (HCC)        5. Ectatic aorta (HCC)        6. Coronary artery calcification seen on CAT scan            Medical Decision Making: Today's Assessment/Status/Plan:        It was my pleasure to meet with Mr. Rico.    We addressed the management of hypertension at today's visit. Blood pressure is well controlled.  We specifically assessed the labs on hypertension treatment    We addressed the management of dyslipidemia at today's visit. He is on appropriate lipid lowering medication.    Mr. Rico does not require regular cardiology follow up, I have advised him to call our office or e-mail using my [employer] if needed.    It is my pleasure to participate in the care of Mr. Rico.  Please do not hesitate to contact me with questions or concerns.    Suleman Field MD PhD [facility]  Cardiologist [facility]    Please note that this dictation was created using voice recognition software. There may be errors I did not discover before finalizing the note.

## 2025-01-31 ENCOUNTER — NON-PROVIDER VISIT (OUTPATIENT)
Dept: SLEEP MEDICINE | Facility: MEDICAL CENTER | Age: 70
End: 2025-01-31
Attending: FAMILY MEDICINE
Payer: MEDICARE

## 2025-01-31 VITALS — BODY MASS INDEX: 21.53 KG/M2 | HEIGHT: 66 IN | WEIGHT: 134 LBS

## 2025-01-31 DIAGNOSIS — J41.0 SIMPLE CHRONIC BRONCHITIS (HCC): ICD-10-CM

## 2025-01-31 PROCEDURE — 94726 PLETHYSMOGRAPHY LUNG VOLUMES: CPT | Mod: 26 | Performed by: INTERNAL MEDICINE

## 2025-01-31 PROCEDURE — 94729 DIFFUSING CAPACITY: CPT | Mod: 26 | Performed by: INTERNAL MEDICINE

## 2025-01-31 PROCEDURE — 94729 DIFFUSING CAPACITY: CPT | Performed by: FAMILY MEDICINE

## 2025-01-31 PROCEDURE — 94726 PLETHYSMOGRAPHY LUNG VOLUMES: CPT | Performed by: FAMILY MEDICINE

## 2025-01-31 PROCEDURE — 94060 EVALUATION OF WHEEZING: CPT | Performed by: FAMILY MEDICINE

## 2025-01-31 PROCEDURE — 94060 EVALUATION OF WHEEZING: CPT | Mod: 26 | Performed by: INTERNAL MEDICINE

## 2025-01-31 ASSESSMENT — FIBROSIS 4 INDEX: FIB4 SCORE: 1.24

## 2025-01-31 NOTE — PROCEDURES
Technician: DALY Alexandra    Technician Comment:  Good patient effort & cooperation.  The results of this test meet the ATS/ERS standards for acceptability & reproducibility.  Test was performed on the ServiceMesh Body Plethysmograph-Elite DX system.  Predicted values were GLI-2012 for spirometry, GLI-2020 for DLCO, ITS for Lung Volumes.  The DLCO was uncorrected for Hgb.  A bronchodilator of Albuterol HFA -2puffs via spacer administered.  DLCO performed during dilation period.    Interpretation:

## 2025-02-05 PROCEDURE — RXMED WILLOW AMBULATORY MEDICATION CHARGE: Performed by: OPTOMETRIST

## 2025-02-05 PROCEDURE — RXMED WILLOW AMBULATORY MEDICATION CHARGE: Performed by: FAMILY MEDICINE

## 2025-02-06 ENCOUNTER — PHARMACY VISIT (OUTPATIENT)
Dept: PHARMACY | Facility: MEDICAL CENTER | Age: 70
End: 2025-02-06
Payer: COMMERCIAL

## 2025-02-19 DIAGNOSIS — E11.319 TYPE 2 DIABETES MELLITUS WITH RETINOPATHY, WITH LONG-TERM CURRENT USE OF INSULIN, MACULAR EDEMA PRESENCE UNSPECIFIED, UNSPECIFIED LATERALITY, UNSPECIFIED RETINOPATHY SEVERITY (HCC): ICD-10-CM

## 2025-02-19 DIAGNOSIS — Z79.4 TYPE 2 DIABETES MELLITUS WITH RETINOPATHY, WITH LONG-TERM CURRENT USE OF INSULIN, MACULAR EDEMA PRESENCE UNSPECIFIED, UNSPECIFIED LATERALITY, UNSPECIFIED RETINOPATHY SEVERITY (HCC): ICD-10-CM

## 2025-02-19 PROCEDURE — RXMED WILLOW AMBULATORY MEDICATION CHARGE: Performed by: FAMILY MEDICINE

## 2025-02-21 ENCOUNTER — PHARMACY VISIT (OUTPATIENT)
Dept: PHARMACY | Facility: MEDICAL CENTER | Age: 70
End: 2025-02-21
Payer: COMMERCIAL

## 2025-02-25 ASSESSMENT — PATIENT HEALTH QUESTIONNAIRE - PHQ9
1. LITTLE INTEREST OR PLEASURE IN DOING THINGS: SEVERAL DAYS
2. FEELING DOWN, DEPRESSED, IRRITABLE, OR HOPELESS: SEVERAL DAYS

## 2025-02-25 ASSESSMENT — ENCOUNTER SYMPTOMS: GENERAL WELL-BEING: GOOD

## 2025-02-25 ASSESSMENT — ACTIVITIES OF DAILY LIVING (ADL): BATHING_REQUIRES_ASSISTANCE: 0

## 2025-02-25 NOTE — ASSESSMENT & PLAN NOTE
Chronic, stable. He has a hx of smoking. He maintains on albuterol PRN. Denies recent exacerbations. Denies current SOB. He has a chronic cough. He is not on oxygen therapy. Follows with PCP as previously scheduled. Had a recent PFT showing obstruction and air trapping

## 2025-02-26 NOTE — ASSESSMENT & PLAN NOTE
Stable. Currently taking levothyroxine 25 mcg daily as prescribed. Denies complications. TSH stable.

## 2025-02-26 NOTE — ASSESSMENT & PLAN NOTE
Chronic, Stable. BP in office today is 114/76. He does check his BP at home. He currently takes lisinopril 40 mg daily, amlodipine 10 mg daily, metoprolol 25 mg daily, and clonidine 0.1 mg BID. Follow up with PCP for continued monitoring and management.

## 2025-02-26 NOTE — ASSESSMENT & PLAN NOTE
Chronic, stable. He discontinued illicit drug use in 2017. Was treated for hep C in 2018. Follow up with PCP for continued monitoring and management.

## 2025-02-26 NOTE — ASSESSMENT & PLAN NOTE
Chronic, stable. Has RAJWINDER and is on CPAP at night with ___L of supplemental O2 at night. Follows with pulmonology.

## 2025-02-26 NOTE — ASSESSMENT & PLAN NOTE
Chronic, stable. He currently takes zetia 10 mg daily and atorvastatin 80 mg daily. We discussed his dietary/lifestyle regimen. Follow up with PCP for continued monitoring and management.  Lab Results   Component Value Date/Time    CHOLSTRLTOT 152 12/18/2024 06:09 AM    TRIGLYCERIDE 47 12/18/2024 06:09 AM    HDL 60 12/18/2024 06:09 AM    LDL 83 12/18/2024 06:09 AM

## 2025-02-26 NOTE — ASSESSMENT & PLAN NOTE
Chronic condition, appears stable. He has a lot of stress about the government and the state of the country at this time. His PHQ-9 in office today is 3. Denies SI. He refuses medications, though he has used them in the past. Refuses psych or therapy referral at this time. Follow up with PCP as needed.

## 2025-02-26 NOTE — ASSESSMENT & PLAN NOTE
Chronic, stable. Last A1c in Oct 2024 was 5.9. He does check his blood sugar at home. Currently takes metformin 500 mg daily and insulin. Last retinal screening was Oct 2024 with known diabetic retinopathy. Last microalbumin creat ratio in March was improved. He has labs coming up to have these rechecked before his PCP appt. His PCP does his feet exams. He is on a statin. We discussed diet and lifestyle habits. Follow up with PCP for continued monitoring and management.  Lab Results   Component Value Date/Time    HBA1C 5.9 (A) 10/22/2024 0820    AVGLUC 128 07/02/2024 0838

## 2025-02-27 ENCOUNTER — OFFICE VISIT (OUTPATIENT)
Dept: FAMILY PLANNING/WOMEN'S HEALTH CLINIC | Facility: PHYSICIAN GROUP | Age: 70
End: 2025-02-27
Attending: FAMILY MEDICINE
Payer: MEDICARE

## 2025-02-27 VITALS
OXYGEN SATURATION: 96 % | SYSTOLIC BLOOD PRESSURE: 114 MMHG | DIASTOLIC BLOOD PRESSURE: 76 MMHG | BODY MASS INDEX: 22.66 KG/M2 | WEIGHT: 136 LBS | HEART RATE: 60 BPM | HEIGHT: 65 IN

## 2025-02-27 DIAGNOSIS — E78.5 DYSLIPIDEMIA DUE TO TYPE 2 DIABETES MELLITUS (HCC): ICD-10-CM

## 2025-02-27 DIAGNOSIS — F19.11 HISTORY OF SUBSTANCE ABUSE (HCC): ICD-10-CM

## 2025-02-27 DIAGNOSIS — E03.9 HYPOTHYROIDISM, UNSPECIFIED TYPE: ICD-10-CM

## 2025-02-27 DIAGNOSIS — J41.0 SIMPLE CHRONIC BRONCHITIS (HCC): ICD-10-CM

## 2025-02-27 DIAGNOSIS — R80.9 MICROALBUMINURIA DUE TO TYPE 2 DIABETES MELLITUS (HCC): ICD-10-CM

## 2025-02-27 DIAGNOSIS — I25.10 CORONARY ARTERY CALCIFICATION SEEN ON CAT SCAN: ICD-10-CM

## 2025-02-27 DIAGNOSIS — E11.69 DYSLIPIDEMIA DUE TO TYPE 2 DIABETES MELLITUS (HCC): ICD-10-CM

## 2025-02-27 DIAGNOSIS — F25.9 SCHIZOAFFECTIVE DISORDER, UNSPECIFIED TYPE (HCC): ICD-10-CM

## 2025-02-27 DIAGNOSIS — E11.29 MICROALBUMINURIA DUE TO TYPE 2 DIABETES MELLITUS (HCC): ICD-10-CM

## 2025-02-27 DIAGNOSIS — E11.59 HYPERTENSION ASSOCIATED WITH TYPE 2 DIABETES MELLITUS (HCC): ICD-10-CM

## 2025-02-27 DIAGNOSIS — I15.2 HYPERTENSION ASSOCIATED WITH TYPE 2 DIABETES MELLITUS (HCC): ICD-10-CM

## 2025-02-27 DIAGNOSIS — E11.319 TYPE 2 DIABETES MELLITUS WITH RETINOPATHY, WITH LONG-TERM CURRENT USE OF INSULIN, MACULAR EDEMA PRESENCE UNSPECIFIED, UNSPECIFIED LATERALITY, UNSPECIFIED RETINOPATHY SEVERITY (HCC): ICD-10-CM

## 2025-02-27 DIAGNOSIS — L40.9 PSORIASIS: ICD-10-CM

## 2025-02-27 DIAGNOSIS — Z79.4 TYPE 2 DIABETES MELLITUS WITH RETINOPATHY, WITH LONG-TERM CURRENT USE OF INSULIN, MACULAR EDEMA PRESENCE UNSPECIFIED, UNSPECIFIED LATERALITY, UNSPECIFIED RETINOPATHY SEVERITY (HCC): ICD-10-CM

## 2025-02-27 SDOH — ECONOMIC STABILITY: HOUSING INSECURITY: AT ANY TIME IN THE PAST 12 MONTHS, WERE YOU HOMELESS OR LIVING IN A SHELTER (INCLUDING NOW)?: NO

## 2025-02-27 SDOH — ECONOMIC STABILITY: FOOD INSECURITY: WITHIN THE PAST 12 MONTHS, YOU WORRIED THAT YOUR FOOD WOULD RUN OUT BEFORE YOU GOT THE MONEY TO BUY MORE.: NEVER TRUE

## 2025-02-27 SDOH — ECONOMIC STABILITY: FOOD INSECURITY: WITHIN THE PAST 12 MONTHS, THE FOOD YOU BOUGHT JUST DIDN'T LAST AND YOU DIDN'T HAVE MONEY TO GET MORE.: NEVER TRUE

## 2025-02-27 SDOH — ECONOMIC STABILITY: TRANSPORTATION INSECURITY: IN THE PAST 12 MONTHS, HAS LACK OF TRANSPORTATION KEPT YOU FROM MEDICAL APPOINTMENTS OR FROM GETTING MEDICATIONS?: NO

## 2025-02-27 SDOH — ECONOMIC STABILITY: HOUSING INSECURITY: IN THE PAST 12 MONTHS, HOW MANY TIMES HAVE YOU MOVED WHERE YOU WERE LIVING?: 1

## 2025-02-27 SDOH — ECONOMIC STABILITY: HOUSING INSECURITY: IN THE LAST 12 MONTHS, WAS THERE A TIME WHEN YOU WERE NOT ABLE TO PAY THE MORTGAGE OR RENT ON TIME?: NO

## 2025-02-27 SDOH — ECONOMIC STABILITY: FOOD INSECURITY: HOW HARD IS IT FOR YOU TO PAY FOR THE VERY BASICS LIKE FOOD, HOUSING, MEDICAL CARE, AND HEATING?: NOT HARD AT ALL

## 2025-02-27 ASSESSMENT — ACTIVITIES OF DAILY LIVING (ADL): LACK_OF_TRANSPORTATION: NO

## 2025-02-27 ASSESSMENT — PATIENT HEALTH QUESTIONNAIRE - PHQ9
5. POOR APPETITE OR OVEREATING: 0 - NOT AT ALL
CLINICAL INTERPRETATION OF PHQ2 SCORE: 2
SUM OF ALL RESPONSES TO PHQ QUESTIONS 1-9: 3

## 2025-02-27 ASSESSMENT — PAIN SCALES - GENERAL: PAINLEVEL_OUTOF10: NO PAIN

## 2025-02-27 ASSESSMENT — FIBROSIS 4 INDEX: FIB4 SCORE: 1.24

## 2025-02-27 NOTE — PROGRESS NOTES
Comprehensive Health Assessment Program     Nikolas Rico is a 69 y.o. here for his comprehensive health assessment.    Patient Active Problem List    Diagnosis Date Noted    Ectatic aorta (HCC) 08/13/2024    Nocturnal hypoxia 04/08/2024    Mass of upper outer quadrant of left breast 01/15/2024    Vitamin D deficiency 01/15/2024    Other specified anemias 01/15/2024    Atherosclerosis of abdominal aorta (HCC) 09/25/2023    Degenerative arthritis of thumb, left 05/23/2023    Onychomycosis 05/23/2023    Groin pain, left 05/23/2023    Microalbuminuria due to type 2 diabetes mellitus (HCC) 05/23/2023    Other chest pain 02/22/2023    Glaucoma 02/08/2023    Hypothyroidism 02/08/2023    Chronic right-sided low back pain without sciatica 09/22/2022    Other insomnia 02/02/2022    Chronic pain of left knee 02/02/2022    Chronic pain of left ankle 02/02/2022    History of substance abuse ([facility]) 11/10/2021    Presence of IVC filter 02/07/2020    Abdominal wall mass of epigastric region 02/07/2020    Coronary artery calcification seen on CAT scan 02/07/2020    History of hepatitis C 01/30/2020    Thyroid nodule 04/19/2018    Hx pulmonary embolism 08/30/2017    Sleep apnea 04/28/2017    Schizoaffective disorder ([facility]) 04/14/2017    Tachycardia 03/07/2017    Hypokalemia 02/13/2017    Left anterior fascicular block 11/10/2016    Hypertension associated with type 2 diabetes mellitus ([facility]) 07/21/2016    Type 2 diabetes mellitus with retinopathy, with long-term current use of insulin ([facility]) 01/26/2016    Dyslipidemia due to type 2 diabetes mellitus ([facility]) 04/23/2015    COPD (chronic obstructive pulmonary disease) ([facility]) 12/19/2014    Psoriasis 10/23/2012    Left foot pain 09/11/2012    History of DVT (deep vein thrombosis) 11/22/2011       Current Outpatient Medications   Medication Sig Dispense Refill    metFORMIN (GLUCOPHAGE) 500 MG Tab Take 1 Tablet by mouth every day. 100 Tablet 3    lisinopril (PRINIVIL) 40 MG tablet  Take 1 Tablet by mouth every day. 100 Tablet 3    cloNIDine (CATAPRES) 0.1 MG Tab Take 1 Tablet by mouth 2 times a day. 200 Tablet 3    metoprolol SR (TOPROL XL) 25 MG TABLET SR 24 HR Take 1 Tablet by mouth every day. 100 Tablet 3    amLODIPine (NORVASC) 10 MG Tab Take 1 Tablet by mouth every day. 100 Tablet 3    levothyroxine (SYNTHROID) 25 MCG Tab Take 1 Tablet by mouth every morning on an empty stomach. 100 Tablet 3    ezetimibe (ZETIA) 10 MG Tab Take 1 Tablet by mouth every day. 100 Tablet 3    traZODone (DESYREL) 100 MG Tab Take 1 tablet by mouth every evening. 100 Tablet 3    aspirin 81 MG EC tablet Take 81 mg by mouth every day.      atorvastatin (LIPITOR) 80 MG tablet Take 1 Tablet by mouth every evening. 100 Tablet 3    cycloSPORINE (RESTASIS) 0.05 % ophthalmic emulsion Instill 1 drop into both eyes twice daily 180 Each 3    insulin regular (NOVOLIN R) 100 Unit/mL Solution AS PER SLIDING SCALE 10 mL 3    ketoconazole (NIZORAL) 2 % Cream Apply twice a day to affected toenails. 30 g 2    latanoprost (XALATAN) 0.005 % Solution Instill one drop at night to BOTH EYES daily (Patient taking differently: MEDICATION INSTRUCTIONS FOR SURGERY/PROCEDURE SCHEDULED FOR 7/10   CONTINUE TAKING MED PRIOR TO SURGERY) 7.5 mL 3    Blood Glucose Monitoring Suppl (BLOOD GLUCOSE MONITOR SYSTEM) w/Device Kit Meter: Dispense Device of Insurance Preference. Sig. Use as directed for blood sugar monitoring. #1. NR. 1 Kit 0    glucose blood (CONTOUR NEXT TEST) strip Check fingersticks three times a day. E11.9 300 Strip 3    albuterol 108 (90 Base) MCG/ACT Aero Soln inhalation aerosol Inhale 2 Puffs every four hours as needed for Shortness of Breath. (Patient taking differently: Inhale 2 Puffs every four hours as needed for Shortness of Breath. MEDICATION INSTRUCTIONS FOR SURGERY/PROCEDURE SCHEDULED FOR 7/10   OK TO CONTINUE TAKING PRIOR TO SURGERY AND DAY OF SURGERY) 18 g 1    INSULIN SYRINGE .5CC/29G (ULTICARE INSULIN SYRINGE) 29G X  "1/2\" 0.5 ML Misc USE AS DIRECTED BY PHYSICIAN 100 Each 3     No current facility-administered medications for this visit.          Current supplements as per medication list.     Allergies:   Patient has no known allergies.  Social History     Tobacco Use    Smoking status: Former     Current packs/day: 0.00     Average packs/day: 0.5 packs/day for 41.2 years (20.6 ttl pk-yrs)     Types: Cigarettes     Start date: 1972     Quit date: 3/6/2013     Years since quittin.9    Smokeless tobacco: Never   Vaping Use    Vaping status: Never Used   Substance Use Topics    Alcohol use: Not Currently     Comment: sober since HCV diagnosis (2018)    Drug use: Yes     Types: Inhaled, Oral     Comment: medical marijuana     Family History   Problem Relation Age of Onset    Hyperlipidemia Mother         under control    Cancer Father         bladder    Heart Disease Paternal Grandmother     Cancer Sister         breast    Psychiatric Illness Sister         Bi-Polar Depressive stage    Diabetes Sister         diabetes type II    Heart Disease Paternal Uncle     Clotting Disorder Neg Hx      Nikolas  has a past medical history of Anginal syndrome (HCC), Arthritis, Blood clotting disorder (HCC), BMI 23.0-23.9, adult (2023), Breath shortness, Bronchitis, Cataract, CKD (chronic kidney disease) stage 3, GFR 30-59 ml/min, COPD, Diabetes, Disorder of thyroid, GERD (gastroesophageal reflux disease), Glaucoma, Heart burn, Hepatitis A, Hepatitis C, High cholesterol, Hyperlipidemia, Hypertension, Indigestion, Jaundice, Pneumonia, Psychiatric disorder, Resistant hypertension (2023), Schizoaffective disorder (HCC) (10/01/2016), and Sleep apnea.    He has no past medical history of Acute nasopharyngitis, Anesthesia, Arrhythmia, Asthma, Bowel habit changes, Cancer (HCC), Carcinoma in situ of respiratory system, Congestive heart failure (HCC), Continuous ambulatory peritoneal dialysis status (HCC), Coughing blood, Dental " "disorder, Gynecological disorder, Heart murmur, Heart valve disease, Hepatitis B, Hiatus hernia syndrome, Myocardial infarct (HCC), Pacemaker, Pain, Pregnant, Rheumatic fever, Seizure (HCC), Snoring, Stroke (HCC), Tuberculosis, Urinary bladder disorder, or Urinary incontinence.   Past Surgical History:   Procedure Laterality Date    PB KNEE SCOPE,DIAGNOSTIC Left 7/10/2024    Procedure: LEFT KNEE ARTHROSCOPY AND PARTIAL MEDIAL MENISCECTOMY;  Surgeon: Elliot Pineda D.O.;  Location: SURGERY [facility];  Service: Orthopedics    MENISCECTOMY, KNEE, MEDIAL Left 7/10/2024    Procedure: MENISCECTOMY, KNEE, MEDIAL;  Surgeon: Elliot Pineda D.O.;  Location: SURGERY [facility];  Service: Orthopedics    OTHER      lower eyelid ripped    OTHER ABDOMINAL SURGERY      exploratory- \"not sure why\"    OTHER ORTHOPEDIC SURGERY Right     right foot, beam fell on it. repair       Screening:  In the last six months have you experienced any leakage of urine? No    Depression Screening  Little interest or pleasure in doing things?  1 - several days  Feeling down, depressed , or hopeless? 1 - several days  Trouble falling or staying asleep, or sleeping too much?  0 - not at all  Feeling tired or having little energy?  1 - several days  Poor appetite or overeating?  0 - not at all  Feeling bad about yourself - or that you are a failure or have let yourself or your family down? 0 - not at all  Trouble concentrating on things, such as reading the newspaper or watching television? 0 - not at all  Moving or speaking so slowly that other people could have noticed.  Or the opposite - being so fidgety or restless that you have been moving around a lot more than usual?  0 - not at all  Thoughts that you would be better off dead, or of hurting yourself?  0 - not at all  Patient Health Questionnaire Score: 3    If depressive symptoms identified deferred to follow up visit unless specifically addressed in assessment and " plan.    Interpretation of PHQ-9 Total Score   Score Severity   1-4 No Depression   5-9 Mild Depression   10-14 Moderate Depression   15-19 Moderately Severe Depression   20-27 Severe Depression    Screening for Cognitive Impairment  Do you or any of your friends or family members have any concern about your memory? No  Three Minute Recall (Village, Kitchen, Baby) 3/3    [place]asper clock face with all 12 numbers and set the hands to show 10 minutes past 11.  Yes    Cognitive concerns identified deferred for follow up unless specifically addressed in assessment and plan.    Fall Risk Assessment  Has the patient had two or more falls in the last year or any fall with injury in the last year?  No    Safety Assessment  Do you always wear your seatbelt?  Yes  Any changes to home needed to function safely? No  Difficulty hearing.  Yes does not wear hearing aids.   Patient counseled about all safety risks that were identified.    Functional Assessment ADLs  Are there any barriers preventing you from cooking for yourself or meeting nutritional needs?  No.    Are there any barriers preventing you from driving safely or obtaining transportation?  No.  He does not drive. He rides an E_bike or walks to where he needs to go.  Are there any barriers preventing you from using a telephone or calling for help?  No    Are there any barriers preventing you from shopping?  No.    Are there any barriers preventing you from taking care of your own finances?  No    Are there any barriers preventing you from managing your medications?  No    Are there any barriers preventing you from showering, bathing or dressing yourself? No    Are there any barriers preventing you from doing housework or laundry? No    Are there any barriers preventing you from using the toilet?No    Are you currently engaging in any exercise or physical activity?  No.  Walking    Self-Assessment of Health  What is your perception of your health? Good    Do you sleep more  than six hours a night? No   4-6 hours a night.   In the past 7 days, how much did pain keep you from doing your normal work? None    Do you spend quality time with family or friends (virtually or in person)? Yes    Do you usually eat a heart healthy diet that constists of a variety of fruits, vegetables, whole grains and fiber? Yes    Do you eat foods high in fat and/or Fast Food more than three times per week? No    How concerned are you that your medical conditions are not being well managed? Not at all    Are you worried that in the next 2 months, you may not have stable housing that you own, rent, or stay in as part of a household? Choose not to answer        Advance Care Planning  Do you have an Advance Directive, Living Will, Durable Power of , or POLST? No                 Health Maintenance Summary            Current Care Gaps       Hepatitis B Vaccine (Hep B) (4 of 4 - Hep B Twinrix risk 4-dose series) Overdue since 10/24/2014      11/14/2013  Imm Admin: Hep A/HEP B Combined Vaccine (TwinRix)    10/31/2013  Imm Admin: Hep A/HEP B Combined Vaccine (TwinRix)    10/24/2013  Imm Admin: Hep A/HEP B Combined Vaccine (TwinRix)                      Needs Review       Abdominal Aortic Aneurysm (AAA) Screening  Tentatively Complete      10/14/2022  US-ABDOMINAL AORTA W/O DOPPLER              Diabetes: Monofilament / LE Exam (Yearly) Tentatively due on 10/22/2025      10/22/2024  Diabetic Monofilament Lower Extremity Exam    09/25/2023  Diabetic Monofilament Lower Extremity Exam    09/22/2022  Diabetic Monofilament LE Exam    11/10/2021  Diabetic Monofilament Lower Extremity Exam    01/30/2020  Diabetic Monofilament LE Exam     Only the first 5 history entries have been loaded, but more history exists.            Fasting Lipid Profile (Yearly) Tentatively due on 12/18/2025 12/24/2024  Order placed for Lipid Profile by Dayana Valerio M.D.    12/18/2024  Lipid Profile    10/08/2024  Lipid Profile     12/14/2023  Lipid Profile    05/01/2023  Lipid Profile      Only the first 5 history entries have been loaded, but more history exists.                      Upcoming       Diabetes: Urine Protein Screening (Yearly) Next due on 3/29/2025      12/24/2024  Order placed for MICROALBUMIN CREAT RATIO URINE by Dayana Valerio M.D.    03/29/2024  MICROALBUMIN CREAT RATIO URINE    12/14/2023  MICROALBUMIN CREAT RATIO URINE    09/01/2023  MICROALBUMIN CREAT RATIO URINE    05/01/2023  MICROALBUMIN CREAT RATIO URINE      Only the first 5 history entries have been loaded, but more history exists.              Diabetes: Retinopathy Screening (Every 6 Months) Next due on 4/18/2025      10/18/2024  RETINAL SCREENING RESULTS    05/13/2024  RETINAL SCREENING RESULTS    11/13/2023  AMB EXTERNAL RETINAL SCREENING RESULTS    05/08/2023  AMB EXTERNAL RETINAL SCREENING RESULTS    11/22/2022  AMB EXTERNAL RETINAL SCREENING RESULTS      Only the first 5 history entries have been loaded, but more history exists.              A1c Screening (Every 6 Months) Next due on 4/22/2025 12/24/2024  Order placed for HEMOGLOBIN A1C by Dayana Valerio M.D.    10/22/2024  POCT Hemoglobin A1C    07/02/2024  Hemoglobin A1c    03/29/2024  HEMOGLOBIN A1C    12/14/2023  HEMOGLOBIN A1C      Only the first 5 history entries have been loaded, but more history exists.              Lung Cancer Screening (Yearly) Next due on 8/12/2025 08/12/2024  CT-LUNG CANCER-SCREENING    08/30/2017  CT-CTA CHEST PULMONARY ARTERY W/ RECONS    01/17/2008  CT-CHEST (THORAX) WITH              SERUM CREATININE (Yearly) Next due on 12/18/2025 12/24/2024  Order placed for Comp Metabolic Panel by Dayana Valerio M.D.    12/18/2024  Comp Metabolic Panel    10/31/2024  COMP METABOLIC PANEL    07/02/2024  Comp Metabolic Panel    03/29/2024  Comp Metabolic Panel      Only the first 5 history entries have been loaded, but more history exists.              Annual Pulmonary Function  Test / Spirometry (Yearly) Next due on 1/31/2026 01/31/2025  PULMONARY FUNCTION TESTS -Test requested: Complete Pulmonary Function Test; Include MIPS/MEPS? Yes    01/19/2024  PULMONARY FUNCTION TESTS -Test requested: Complete Pulmonary Function Test; Include MIPS/MEPS? Yes    10/10/2022  PFT DICTATED RESULTS    12/01/2011  PFT DICTATED RESULTS              Annual Wellness Visit (Yearly) Next due on 2/27/2026 02/27/2025  Level of Service: SD ANNUAL WELLNESS VISIT-INCLUDES PPPS SUBSEQUE*    01/16/2024  Level of Service: SD ANNUAL WELLNESS VISIT-INCLUDES PPPS SUBSEQUE*    02/08/2023  Level of Service: SD ANNUAL WELLNESS VISIT-INCLUDES PPPS SUBSEQUE*    03/09/2022  Level of Service: SD ANNUAL WELLNESS VISIT-INCLUDES PPPS SUBSEQUE*    07/23/2021  Level of Service: SD ANNUAL WELLNESS VISIT-INCLUDES PPPS SUBSEQUE*      Only the first 5 history entries have been loaded, but more history exists.              IMM DTaP/Tdap/Td Vaccine (2 - Td or Tdap) Next due on 11/10/2031      11/10/2021  Imm Admin: Tdap Vaccine              Colorectal Cancer Screening (Colonoscopy - Preferred) Next due on 12/30/2032 12/30/2022  AMB EXTERNAL COLONOSCOPY RESULTS    12/13/2022  AMB EXTERNAL COLONOSCOPY RESULTS    10/09/2017  REFERRAL TO GI FOR COLONOSCOPY    03/19/2017  OCCULT BLOOD FECES IMMUNOASSAY    02/28/2016  OCCULT BLOOD FECES IMMUNOASSAY      Only the first 5 history entries have been loaded, but more history exists.                      Completed or No Longer Recommended       Influenza Vaccine (Series Information) Completed      09/11/2024  Imm Admin: Influenza high-dose trivalent (PF)    09/25/2023  Imm Admin: Influenza Vaccine Adult HD    09/22/2022  Imm Admin: Influenza Vaccine Adult HD    09/24/2021  Imm Admin: Influenza Vaccine Adult HD    09/11/2020  Imm Admin: Influenza Vaccine Adult HD      Only the first 5 history entries have been loaded, but more history exists.              COVID-19 Vaccine (Series  Information) Completed      09/11/2024  Imm Admin: Comirnaty (Covid-19 Vaccine, Mrna, 5083-0074 Formula)    09/20/2023  Imm Admin: Comirnaty (Covid-19 Vaccine, Mrna, [id number] Formula)    05/09/2023  Imm Admin: PFIZER BIVALENT SARS-COV-2 VACCINE (12+)    09/26/2022  Imm Admin: PFIZER BIVALENT SARS-COV-2 VACCINE (12+)    04/20/2022  Imm Admin: PFIZER THOMPSON CAP SARS-COV-2 VACCINATION (12+)      Only the first 5 history entries have been loaded, but more history exists.              Lung Cancer Screening Shared Decision Making  Completed      07/22/2024  Registry Metric: Lung Cancer Shared Decision Making Conversation Date    07/08/2024  SmartData: FINDINGS - TESTS - IMAGING - CT SCAN - LOW DOSE CT LUNG SCREENING - LOW-DOSE CT SHARED DECISION MAKING OUTCOME - PATIENT ELECTS SCREENING              Pneumococcal Vaccine: 50+ Years (Series Information) Completed      02/04/2021  Imm Admin: Pneumococcal polysaccharide vaccine (PPSV-23)    01/30/2020  Imm Admin: Pneumococcal Conjugate Vaccine (Prevnar/PCV-13)    12/11/2011  Imm Admin: Pneumococcal polysaccharide vaccine (PPSV-23)    08/05/2011  Imm Admin: Pneumococcal polysaccharide vaccine (PPSV-23)              Hepatitis A Vaccine (Hep A) (Series Information) Aged Out      11/14/2013  Imm Admin: Hep A/HEP B Combined Vaccine (TwinRix)    10/31/2013  Imm Admin: Hep A/HEP B Combined Vaccine (TwinRix)    10/24/2013  Imm Admin: Hep A/HEP B Combined Vaccine (TwinRix)              HPV Vaccines (Series Information) Aged Out      No completion history exists for this topic.              Polio Vaccine (Inactivated Polio) (Series Information) Aged Out     No completion history exists for this topic.              Meningococcal Immunization (Series Information) Aged Out     No completion history exists for this topic.              Zoster (Shingles) Vaccines  Discontinued     No completion history exists for this topic.              Hepatitis C Screening  Discontinued        Frequency  "changed to Never automatically (Topic No Longer Applies)    08/21/2017  HEP C VIRUS ANTIBODY    11/05/2014  Hepatitis C Genotyping component of HCV RNA PCR-GENOTYPE REFLEX    04/14/2014  Hepatitis C Antibody component of HEP C VIRUS ANTIBODY                            Patient Care Team:  Dayana Valerio M.D. as PCP - General (Family Medicine)  Jes Trent O.D. as Consulting Physician (Optometry)  Stephen L[employer]pez [employer] (Inactive) as [facility]   DAIANA PerezP.RAMA (Podiatry)  Federico Malik M.D. (Family Medicine)  Ryan Calvert M.D. (Gastroenterology)  Kristine Manuel O.D. (Ophthalmology)  Suleman Field M.D. (Cardiovascular Disease (Cardiology))    Financial Resource Strain: Low Risk  (2/27/2025)    Overall Financial Resource Strain (CARDIA)     Difficulty of Paying Living Expenses: Not hard at all      Transportation Needs: No Transportation Needs (2/27/2025)    PRAPARE - Transportation     Lack of Transportation (Medical): No     Lack of Transportation (Non-Medical): No      Food Insecurity: No Food Insecurity (2/27/2025)    Hunger Vital Sign     Worried About Running Out of Food in the Last Year: Never true     Ran Out of Food in the Last Year: Never true        Encounter Vitals  Blood Pressure : 114/76  Pulse: 60  Pulse Oximetry: 96 %  Weight: 61.7 kg (136 lb)  Height: 165.1 cm (5' 5\")  BMI (Calculated): 22.63  Pain Score: No pain     Physical Exam:  Constitutional: NAD  HENMT: NC/AT, EOMI  Cardiovascular: RRR, No m/r/g  Lungs: rhonchi bilaterally  Extremities: No c/c/e  Skin: No lesions, normal turgor  Neurologic: Alert & oriented x3, CN II-XII grossly intact    Assessment and Plan. The following treatment and monitoring plan is recommended:  COPD (chronic obstructive pulmonary disease) (HCC)  Chronic, stable. He has a hx of smoking. He maintains on albuterol PRN. Denies recent exacerbations. Denies current SOB. He has a chronic cough. He is not on " oxygen therapy. Follows with PCP as previously scheduled. Had a recent PFT showing obstruction and air trapping    Coronary artery calcification seen on CAT scan  Dyslipidemia due to type 2 diabetes mellitus (HCC)  Chronic, stable. He currently takes zetia 10 mg daily and atorvastatin 80 mg daily. We discussed his dietary/lifestyle regimen. Follow up with PCP for continued monitoring and management.  Lab Results   Component Value Date/Time    CHOLSTRLTOT 152 12/18/2024 06:09 AM    TRIGLYCERIDE 47 12/18/2024 06:09 AM    HDL 60 12/18/2024 06:09 AM    LDL 83 12/18/2024 06:09 AM     History of substance abuse (HCC)  Chronic, stable. He discontinued illicit drug use in 2017. Was treated for hep C in 2018. Follow up with PCP for continued monitoring and management.     Hypertension associated with type 2 diabetes mellitus (HCC)  Chronic, Stable. BP in office today is 114/76. He does check his BP at home. He currently takes lisinopril 40 mg daily, amlodipine 10 mg daily, metoprolol 25 mg daily, and clonidine 0.1 mg BID. Follow up with PCP for continued monitoring and management.    Hypothyroidism  Stable. Currently taking levothyroxine 25 mcg daily as prescribed. Denies complications. TSH stable.    Psoriasis  Chronic, untreated. Not life altering; no recent flares. Does not follow up with derm.     Schizoaffective disorder (HCC)  Chronic condition, appears stable. He has a lot of stress about the government and the state of the country at this time. His PHQ-9 in office today is 3. Denies SI. He refuses medications, though he has used them in the past. Refuses psych or therapy referral at this time. Follow up with PCP as needed.    Type 2 diabetes mellitus with retinopathy, with long-term current use of insulin (HCC)  Microalbuminuria due to type 2 diabetes mellitus (HCC)  Chronic, stable. Last A1c in Oct 2024 was 5.9. He does check his blood sugar at home. Currently takes metformin 500 mg daily and insulin. Last retinal  screening was Oct 2024 with known diabetic retinopathy. Last microalbumin creat ratio in March was improved. He has labs coming up to have these rechecked before his PCP appt. His PCP does his feet exams. He is on a statin. We discussed diet and lifestyle habits. Follow up with PCP for continued monitoring and management.  Lab Results   Component Value Date/Time    HBA1C 5.9 (A) 10/22/2024 0820    AVGLUC 128 07/02/2024 0838       Services suggested: No services needed at this time  Health Care Screening: Age-appropriate preventive services recommended by USPTF and ACIP covered by Medicare were discussed today. Services ordered if indicated and agreed upon by the patient.  Referrals offered: Community-based lifestyle interventions to reduce health risks and promote self-management and wellness, fall prevention, nutrition, physical activity, tobacco-use cessation, weight loss, and mental health services as per orders if indicated.    Discussion today about general wellness and lifestyle habits:    Prevent falls and reduce trip hazards; Cautioned about securing or removing rugs.  Have a working fire alarm and carbon monoxide detector.  Engage in regular physical activity and social activities.    Follow-up: Return for appointment with Primary Care Provider as needed..

## 2025-03-04 PROCEDURE — RXMED WILLOW AMBULATORY MEDICATION CHARGE: Performed by: FAMILY MEDICINE

## 2025-03-05 ENCOUNTER — APPOINTMENT (OUTPATIENT)
Dept: MEDICAL GROUP | Facility: PHYSICIAN GROUP | Age: 70
End: 2025-03-05
Payer: MEDICARE

## 2025-03-05 VITALS
SYSTOLIC BLOOD PRESSURE: 120 MMHG | HEIGHT: 65 IN | BODY MASS INDEX: 22.36 KG/M2 | HEART RATE: 79 BPM | OXYGEN SATURATION: 97 % | TEMPERATURE: 97.9 F | DIASTOLIC BLOOD PRESSURE: 72 MMHG | WEIGHT: 134.2 LBS

## 2025-03-05 DIAGNOSIS — Z00.00 MEDICARE ANNUAL WELLNESS VISIT, SUBSEQUENT: ICD-10-CM

## 2025-03-05 DIAGNOSIS — F25.9 SCHIZOAFFECTIVE DISORDER, UNSPECIFIED TYPE (HCC): ICD-10-CM

## 2025-03-05 DIAGNOSIS — Z79.4 TYPE 2 DIABETES MELLITUS WITH RETINOPATHY, WITH LONG-TERM CURRENT USE OF INSULIN, MACULAR EDEMA PRESENCE UNSPECIFIED, UNSPECIFIED LATERALITY, UNSPECIFIED RETINOPATHY SEVERITY (HCC): ICD-10-CM

## 2025-03-05 DIAGNOSIS — L40.9 PSORIASIS: ICD-10-CM

## 2025-03-05 DIAGNOSIS — J41.0 SIMPLE CHRONIC BRONCHITIS (HCC): ICD-10-CM

## 2025-03-05 DIAGNOSIS — E03.9 HYPOTHYROIDISM, UNSPECIFIED TYPE: ICD-10-CM

## 2025-03-05 DIAGNOSIS — Z95.828 PRESENCE OF IVC FILTER: ICD-10-CM

## 2025-03-05 DIAGNOSIS — G47.09 OTHER INSOMNIA: ICD-10-CM

## 2025-03-05 DIAGNOSIS — E11.29 MICROALBUMINURIA DUE TO TYPE 2 DIABETES MELLITUS (HCC): ICD-10-CM

## 2025-03-05 DIAGNOSIS — E04.1 THYROID NODULE: ICD-10-CM

## 2025-03-05 DIAGNOSIS — E11.69 DYSLIPIDEMIA DUE TO TYPE 2 DIABETES MELLITUS (HCC): ICD-10-CM

## 2025-03-05 DIAGNOSIS — R80.9 MICROALBUMINURIA DUE TO TYPE 2 DIABETES MELLITUS (HCC): ICD-10-CM

## 2025-03-05 DIAGNOSIS — E78.5 DYSLIPIDEMIA DUE TO TYPE 2 DIABETES MELLITUS (HCC): ICD-10-CM

## 2025-03-05 DIAGNOSIS — E11.319 TYPE 2 DIABETES MELLITUS WITH RETINOPATHY, WITH LONG-TERM CURRENT USE OF INSULIN, MACULAR EDEMA PRESENCE UNSPECIFIED, UNSPECIFIED LATERALITY, UNSPECIFIED RETINOPATHY SEVERITY (HCC): ICD-10-CM

## 2025-03-05 DIAGNOSIS — I15.2 HYPERTENSION ASSOCIATED WITH TYPE 2 DIABETES MELLITUS (HCC): ICD-10-CM

## 2025-03-05 DIAGNOSIS — R00.0 TACHYCARDIA: ICD-10-CM

## 2025-03-05 DIAGNOSIS — M18.12 DEGENERATIVE ARTHRITIS OF THUMB, LEFT: ICD-10-CM

## 2025-03-05 DIAGNOSIS — E11.59 HYPERTENSION ASSOCIATED WITH TYPE 2 DIABETES MELLITUS (HCC): ICD-10-CM

## 2025-03-05 PROBLEM — M25.562 CHRONIC PAIN OF LEFT KNEE: Status: RESOLVED | Noted: 2022-02-02 | Resolved: 2025-03-05

## 2025-03-05 PROBLEM — R19.06 ABDOMINAL WALL MASS OF EPIGASTRIC REGION: Status: RESOLVED | Noted: 2020-02-07 | Resolved: 2025-03-05

## 2025-03-05 PROBLEM — G89.29 CHRONIC PAIN OF LEFT KNEE: Status: RESOLVED | Noted: 2022-02-02 | Resolved: 2025-03-05

## 2025-03-05 PROCEDURE — 99397 PER PM REEVAL EST PAT 65+ YR: CPT | Performed by: FAMILY MEDICINE

## 2025-03-05 PROCEDURE — G2211 COMPLEX E/M VISIT ADD ON: HCPCS | Performed by: FAMILY MEDICINE

## 2025-03-05 PROCEDURE — 3078F DIAST BP <80 MM HG: CPT | Performed by: FAMILY MEDICINE

## 2025-03-05 PROCEDURE — 3074F SYST BP LT 130 MM HG: CPT | Performed by: FAMILY MEDICINE

## 2025-03-05 ASSESSMENT — ACTIVITIES OF DAILY LIVING (ADL): BATHING_REQUIRES_ASSISTANCE: 0

## 2025-03-05 ASSESSMENT — FIBROSIS 4 INDEX: FIB4 SCORE: 1.24

## 2025-03-05 ASSESSMENT — PATIENT HEALTH QUESTIONNAIRE - PHQ9: CLINICAL INTERPRETATION OF PHQ2 SCORE: 0

## 2025-03-05 ASSESSMENT — ENCOUNTER SYMPTOMS: GENERAL WELL-BEING: GOOD

## 2025-03-05 NOTE — PROGRESS NOTES
Chief Complaint   Patient presents with    Annual Exam       HPI:  Nikolas Rico is a 69 y.o. here for Medicare Annual Wellness Visit     History of Present Illness  The patient presents today for an annual wellness visit. He was last seen on 12/24/2024.      He has not yet completed his blood work, which is scheduled for 04/02/2025. He has a follow-up appointment with me on 04/09/2025. He has an eye appointment on 04/16/2025.    He has a history of schizoaffective disorder but is not currently under the care of a therapist or psychiatrist. He is not on any medications for this condition. He weaned himself off Seroquel when he transitioned from Medicaid to Medicare as there was no mental health coverage.    He has a history of pulmonary embolism and a blood clot in the legs, for which an IVC filter was placed in 2011 at [facility]. He still has the filter in place.  Dr. Field recommended getting the IVC filter removed.    He has a history of thyroid nodule. He had a biopsy in 11/2024. His thyroid ultrasound was in 04/2022. He had a 1.4 cm thyroid nodule and that was highly suspicious. He had an aspiration right after that.  Pathology was benign.  We discussed repeating a sonogram towards the end of this year.    He has a history of psoriasis, which is currently localized to one spot. He is considering a referral to a dermatologist. He uses ketoconazole cream to manage the condition, applying it 3 to 4 times daily after showers. If he does not apply the cream for a few days, the condition worsens, leading to flaking and itching. Scratching results in tenderness.    He has been experiencing pain in the middle of his chest, which he attributes to scarring from previous breast surgery. He had a mammogram since his last appointment, which showed fatty tissue.    He has a history of smoking but has been abstinent since 2013. He is not currently on oxygen therapy at night. He monitors his oxygen levels at  home, which vary, but typically range between 95 and 97 once he becomes active.    He has a history of glaucoma and dry eye. He is on Restasis and latanoprost. He has an eye appointment on 04/16/2025 with Dr. Johnson.    He has a history of left groin bulge, which was drained by Dr. Mixon and has since resolved.    He has a history of diabetes. He uses insulin as needed. He tests his blood sugar before breakfast, lunch, and dinner. If it is over 150, he will give himself 3 cc of insulin. It might be up to 190 and it will bring it down to 120 or 118. He uses very little insulin. Sometimes he does not use anything at all. Sometimes he may use 1 or 2. He feels comfortable with the insulin. He was on insulin when he needed it. He was doing 3 shots a day. He would rather have this insulin just so that he can use it.    Supplemental Information   He has stiffness in both thumbs, but his right thumb is worse than his left. He is right-handed. He has stiffness and movement. He notices it almost all the time. He does half-hour deep massages a day for somebody.     He has a history of falls. He had a couple of falls after July. One was probably in June. He was walking his dog and fell backward. He was fine. The second fall was probably at the end of August. He was running and did not have his proper shoes. He slipped and tripped and went down. He was okay.     He has no teeth and does not want dentures. He can eat, bite, and chew. All his teeth fell out after he came out of the hospital with his diabetes. He lost one tooth when they put a tube down his throat and did a tracheostomy on him. After that, his teeth disintegrated      SOCIAL HISTORY  He has a history of smoking but has been clean since 2013.    MEDICATIONS  metoprolol, lisinopril, clonidine, amlodipine, Lipitor, Zetia, baby aspirin, levothyroxine, trazodone, albuterol inhaler, Restasis, latanoprost       Patient Active Problem List    Diagnosis Date Noted     Ectatic aorta (HCC) 08/13/2024    Nocturnal hypoxia 04/08/2024    Mass of upper outer quadrant of left breast 01/15/2024    Vitamin D deficiency 01/15/2024    Other specified anemias 01/15/2024    Atherosclerosis of abdominal aorta (HCC) 09/25/2023    Degenerative arthritis of thumb, left 05/23/2023    Onychomycosis 05/23/2023    Groin pain, left 05/23/2023    Microalbuminuria due to type 2 diabetes mellitus ([facility]) 05/23/2023    Other chest pain 02/22/2023    Glaucoma 02/08/2023    Hypothyroidism 02/08/2023    Chronic right-sided low back pain without sciatica 09/22/2022    Other insomnia 02/02/2022    Chronic pain of left ankle 02/02/2022    History of substance abuse ([facility]) 11/10/2021    Presence of IVC filter 02/07/2020    Coronary artery calcification seen on CAT scan 02/07/2020    History of hepatitis C 01/30/2020    Thyroid nodule 04/19/2018    Hx pulmonary embolism 08/30/2017    Sleep apnea 04/28/2017    Schizoaffective disorder ([facility]) 04/14/2017    Tachycardia 03/07/2017    Hypokalemia 02/13/2017    Left anterior fascicular block 11/10/2016    Hypertension associated with type 2 diabetes mellitus ([facility]) 07/21/2016    Type 2 diabetes mellitus with retinopathy, with long-term current use of insulin ([facility]) 01/26/2016    Dyslipidemia due to type 2 diabetes mellitus ([facility]) 04/23/2015    COPD (chronic obstructive pulmonary disease) ([facility]) 12/19/2014    Psoriasis 10/23/2012    Left foot pain 09/11/2012    History of DVT (deep vein thrombosis) 11/22/2011       Current Outpatient Medications   Medication Sig Dispense Refill    metFORMIN (GLUCOPHAGE) 500 MG Tab Take 1 Tablet by mouth every day. 100 Tablet 3    lisinopril (PRINIVIL) 40 MG tablet Take 1 Tablet by mouth every day. 100 Tablet 3    cloNIDine (CATAPRES) 0.1 MG Tab Take 1 Tablet by mouth 2 times a day. 200 Tablet 3    metoprolol SR (TOPROL XL) 25 MG TABLET SR 24 HR Take 1 Tablet by mouth every day. 100 Tablet 3    amLODIPine (NORVASC) 10 MG Tab Take 1 Tablet by  "mouth every day. 100 Tablet 3    levothyroxine (SYNTHROID) 25 MCG Tab Take 1 Tablet by mouth every morning on an empty stomach. 100 Tablet 3    ezetimibe (ZETIA) 10 MG Tab Take 1 Tablet by mouth every day. 100 Tablet 3    traZODone (DESYREL) 100 MG Tab Take 1 tablet by mouth every evening. 100 Tablet 3    aspirin 81 MG EC tablet Take 81 mg by mouth every day.      atorvastatin (LIPITOR) 80 MG tablet Take 1 Tablet by mouth every evening. 100 Tablet 3    cycloSPORINE (RESTASIS) 0.05 % ophthalmic emulsion Instill 1 drop into both eyes twice daily 180 Each 3    insulin regular (NOVOLIN R) 100 Unit/mL Solution AS PER SLIDING SCALE 10 mL 3    ketoconazole (NIZORAL) 2 % Cream Apply twice a day to affected toenails. 30 g 2    latanoprost (XALATAN) 0.005 % Solution Instill one drop at night to BOTH EYES daily (Patient taking differently: MEDICATION INSTRUCTIONS FOR SURGERY/PROCEDURE SCHEDULED FOR 7/10   CONTINUE TAKING MED PRIOR TO SURGERY) 7.5 mL 3    Blood Glucose Monitoring Suppl (BLOOD GLUCOSE MONITOR SYSTEM) w/Device Kit Meter: Dispense Device of Insurance Preference. Sig. Use as directed for blood sugar monitoring. #1. NR. 1 Kit 0    glucose blood (CONTOUR NEXT TEST) strip Check fingersticks three times a day. E11.9 300 Strip 3    albuterol 108 (90 Base) MCG/ACT Aero Soln inhalation aerosol Inhale 2 Puffs every four hours as needed for Shortness of Breath. (Patient taking differently: Inhale 2 Puffs every four hours as needed for Shortness of Breath. MEDICATION INSTRUCTIONS FOR SURGERY/PROCEDURE SCHEDULED FOR 7/10   OK TO CONTINUE TAKING PRIOR TO SURGERY AND DAY OF SURGERY) 18 g 1    INSULIN SYRINGE .5CC/29G (ULTICARE INSULIN SYRINGE) 29G X 1/2\" 0.5 ML Misc USE AS DIRECTED BY PHYSICIAN 100 Each 3     No current facility-administered medications for this visit.          Current supplements as per medication list.     Allergies: Patient has no known allergies.    Current social contact/activities: [facility] home a  He  reports that he quit smoking about 12 years ago. His smoking use included cigarettes. He started smoking about 53 years ago. He has a 20.6 pack-year smoking history. He has never used smokeless tobacco. He reports that he does not currently use alcohol. He reports current drug use. Drugs: Inhaled and Oral.  Counseling given: Not Answered      ROS:    Gait: Uses no assistive device  Ostomy: No  Other tubes: No  Amputations: No  Chronic oxygen use: No  Last eye exam: [id number], next appt with Kaleb 4/16/25  Wears hearing aids: No   : Denies any urinary leakage during the last 6 months      Depression Screening  Little interest or pleasure in doing things?  0 - not at all  Feeling down, depressed , or hopeless? 0 - not at all  Patient Health Questionnaire Score: 0     If depressive symptoms identified deferred to follow up visit unless specifically addressed in assessment and plan.    Interpretation of PHQ-9 Total Score   Score Severity   1-4 No Depression   5-9 Mild Depression   10-14 Moderate Depression   15-19 Moderately Severe Depression   20-27 Severe Depression    Screening for Cognitive Impairment  Do you or any of your friends or family members have any concern about your memory? No  Three Minute Recall (Village, Kitchen, Baby) 3/3    Jasper clock face with all 12 numbers and set the hands to show 10 minutes past 11.  Yes    Cognitive concerns identified deferred for follow up unless specifically addressed in assessment and plan.    Fall Risk Assessment  Has the patient had two or more falls in the last year or any fall with injury in the last year?  No    Safety Assessment  Do you always wear your seatbelt?  Yes  Any changes to home needed to function safely? No  Difficulty hearing.  No  Patient counseled about all safety risks that were identified.    Functional Assessment ADLs  Are there any barriers preventing you from cooking for yourself or meeting nutritional needs?  No.    Are there any  barriers preventing you from driving safely or obtaining transportation?  No.    Are there any barriers preventing you from using a telephone or calling for help?  No    Are there any barriers preventing you from shopping?  No.    Are there any barriers preventing you from taking care of your own finances?  No    Are there any barriers preventing you from managing your medications?  No    Are there any barriers preventing you from showering, bathing or dressing yourself? No    Are there any barriers preventing you from doing housework or laundry? No  Are there any barriers preventing you from using the toilet?No  Are you currently engaging in any exercise or physical activity?  Yes.      Self-Assessment of Health  What is your perception of your health? Good    Do you sleep more than six hours a night? No    In the past 7 days, how much did pain keep you from doing your normal work? None    Do you spend quality time with family or friends (virtually or in person)? Yes    Do you usually eat a heart healthy diet that constists of a variety of fruits, vegetables, whole grains and fiber? Yes    Do you eat foods high in fat and/or Fast Food more than three times per week? No    How concerned are you that your medical conditions are not being well managed? Not at all    Are you worried that in the next 2 months, you may not have stable housing that you own, rent, or stay in as part of a household? No      Advance Care Planning  Do you have an Advance Directive, Living Will, Durable Power of , or POLST? No                 Health Maintenance Summary            Current Care Gaps       Hepatitis B Vaccine (Hep B) (4 of 4 - Hep B Twinrix risk 4-dose series) Overdue since 10/24/2014      11/14/2013  Imm Admin: Hep A/HEP B Combined Vaccine (TwinRix)    10/31/2013  Imm Admin: Hep A/HEP B Combined Vaccine (TwinRix)    10/24/2013  Imm Admin: Hep A/HEP B Combined Vaccine (TwinRix)              Diabetes: Urine Protein  Screening (Yearly) Due soon on 3/29/2025      12/24/2024  Order placed for MICROALBUMIN CREAT RATIO URINE by Dayana Valerio M.D.    03/29/2024  MICROALBUMIN CREAT RATIO URINE    12/14/2023  MICROALBUMIN CREAT RATIO URINE    09/01/2023  MICROALBUMIN CREAT RATIO URINE    05/01/2023  MICROALBUMIN CREAT RATIO URINE     Only the first 5 history entries have been loaded, but more history exists.                    Needs Review       Abdominal Aortic Aneurysm (AAA) Screening  Tentatively Complete      10/14/2022  US-ABDOMINAL AORTA W/O DOPPLER              Diabetes: Monofilament / LE Exam (Yearly) Tentatively due on 10/22/2025      10/22/2024  Diabetic Monofilament Lower Extremity Exam    09/25/2023  Diabetic Monofilament Lower Extremity Exam    09/22/2022  Diabetic Monofilament LE Exam    11/10/2021  Diabetic Monofilament Lower Extremity Exam    01/30/2020  Diabetic Monofilament LE Exam      Only the first 5 history entries have been loaded, but more history exists.              Fasting Lipid Profile (Yearly) Tentatively due on 12/18/2025 12/24/2024  Order placed for Lipid Profile by Dayana Valerio M.D.    12/18/2024  Lipid Profile    10/08/2024  Lipid Profile    12/14/2023  Lipid Profile    05/01/2023  Lipid Profile      Only the first 5 history entries have been loaded, but more history exists.                      Upcoming       Diabetes: Retinopathy Screening (Every 6 Months) Next due on 4/18/2025      10/18/2024  RETINAL SCREENING RESULTS    05/13/2024  RETINAL SCREENING RESULTS    11/13/2023  AMB EXTERNAL RETINAL SCREENING RESULTS    05/08/2023  AMB EXTERNAL RETINAL SCREENING RESULTS    11/22/2022  AMB EXTERNAL RETINAL SCREENING RESULTS      Only the first 5 history entries have been loaded, but more history exists.              A1c Screening (Every 6 Months) Next due on 4/22/2025 12/24/2024  Order placed for HEMOGLOBIN A1C by Dayana Valerio M.D.    10/22/2024  POCT Hemoglobin A1C    07/02/2024  Hemoglobin  A1c    03/29/2024  HEMOGLOBIN A1C    12/14/2023  HEMOGLOBIN A1C      Only the first 5 history entries have been loaded, but more history exists.              Lung Cancer Screening (Yearly) Next due on 8/12/2025 08/12/2024  CT-LUNG CANCER-SCREENING    08/30/2017  CT-CTA CHEST PULMONARY ARTERY W/ RECONS    01/17/2008  CT-CHEST (THORAX) WITH              SERUM CREATININE (Yearly) Next due on 12/18/2025 12/24/2024  Order placed for Comp Metabolic Panel by Dayana Valerio M.D.    12/18/2024  Comp Metabolic Panel    10/31/2024  COMP METABOLIC PANEL    07/02/2024  Comp Metabolic Panel    03/29/2024  Comp Metabolic Panel      Only the first 5 history entries have been loaded, but more history exists.              Annual Pulmonary Function Test / Spirometry (Yearly) Next due on 1/31/2026 01/31/2025  PULMONARY FUNCTION TESTS -Test requested: Complete Pulmonary Function Test; Include MIPS/MEPS? Yes    01/19/2024  PULMONARY FUNCTION TESTS -Test requested: Complete Pulmonary Function Test; Include MIPS/MEPS? Yes    10/10/2022  PFT DICTATED RESULTS    12/01/2011  PFT DICTATED RESULTS              Annual Wellness Visit (Yearly) Next due on 3/5/2026      03/05/2025  Visit Dx: Medicare annual wellness visit, subsequent    02/27/2025  Level of Service: [place] ANNUAL WELLNESS VISIT-INCLUDES PPPS SUBSEQUE*    01/16/2024  Level of Service: [place] ANNUAL WELLNESS VISIT-INCLUDES PPPS SUBSEQUE*    02/08/2023  Level of Service: CA ANNUAL WELLNESS VISIT-INCLUDES PPPS SUBSEQUE*    03/09/2022  Level of Service: CA ANNUAL WELLNESS VISIT-INCLUDES PPPS SUBSEQUE*      Only the first 5 history entries have been loaded, but more history exists.              IMM DTaP/Tdap/Td Vaccine (2 - Td or Tdap) Next due on 11/10/2031      11/10/2021  Imm Admin: Tdap Vaccine              Colorectal Cancer Screening (Colonoscopy - Preferred) Next due on 12/30/2032 12/30/2022  AMB EXTERNAL COLONOSCOPY RESULTS    12/13/2022  AMB EXTERNAL COLONOSCOPY  RESULTS    10/09/2017  REFERRAL TO GI FOR COLONOSCOPY    03/19/2017  OCCULT BLOOD FECES IMMUNOASSAY    02/28/2016  OCCULT BLOOD FECES IMMUNOASSAY      Only the first 5 history entries have been loaded, but more history exists.                      Completed or No Longer Recommended       Influenza Vaccine (Series Information) Completed      09/11/2024  Imm Admin: Influenza high-dose trivalent (PF)    09/25/2023  Imm Admin: Influenza Vaccine Adult HD    09/22/2022  Imm Admin: Influenza Vaccine Adult HD    09/24/2021  Imm Admin: Influenza Vaccine Adult HD    09/11/2020  Imm Admin: Influenza Vaccine Adult HD      Only the first 5 history entries have been loaded, but more history exists.              COVID-19 Vaccine (Series Information) Completed      09/11/2024  Imm Admin: Comirnaty (Covid-19 Vaccine, Mrna, [id number] Formula)    09/20/2023  Imm Admin: Comirnaty (Covid-19 Vaccine, Mrna, [id number] Formula)    05/09/2023  Imm Admin: PFIZER BIVALENT SARS-COV-2 VACCINE (12+)    09/26/2022  Imm Admin: PFIZER BIVALENT SARS-COV-2 VACCINE (12+)    04/20/2022  Imm Admin: PFIZER THOMPSON CAP SARS-COV-2 VACCINATION (12+)      Only the first 5 history entries have been loaded, but more history exists.              Lung Cancer Screening Shared Decision Making  Completed      07/22/2024  Registry Metric: Lung Cancer Shared Decision Making Conversation Date    07/08/2024  SmartData: FINDINGS - TESTS - IMAGING - CT SCAN - LOW DOSE CT LUNG SCREENING - LOW-DOSE CT SHARED DECISION MAKING OUTCOME - PATIENT ELECTS SCREENING              Pneumococcal Vaccine: 50+ Years (Series Information) Completed      02/04/2021  Imm Admin: Pneumococcal polysaccharide vaccine (PPSV-23)    01/30/2020  Imm Admin: Pneumococcal Conjugate Vaccine (Prevnar/PCV-13)    12/11/2011  Imm Admin: Pneumococcal polysaccharide vaccine (PPSV-23)    08/05/2011  Imm Admin: Pneumococcal polysaccharide vaccine (PPSV-23)              Hepatitis A Vaccine (Hep A) (Series  Information) Aged Out      2013  Imm Admin: Hep A/HEP B Combined Vaccine (TwinRix)    10/31/2013  Imm Admin: Hep A/HEP B Combined Vaccine (TwinRix)    10/24/2013  Imm Admin: Hep A/HEP B Combined Vaccine (TwinRix)              HPV Vaccines (Series Information) Aged Out      No completion history exists for this topic.              Polio Vaccine (Inactivated Polio) (Series Information) Aged Out     No completion history exists for this topic.              Meningococcal Immunization (Series Information) Aged Out     No completion history exists for this topic.              Zoster (Shingles) Vaccines  Discontinued     No completion history exists for this topic.              Hepatitis C Screening  Discontinued        Frequency changed to Never automatically (Topic No Longer Applies)    2017  HEP C VIRUS ANTIBODY    2014  Hepatitis C Genotyping component of HCV RNA PCR-GENOTYPE REFLEX    2014  Hepatitis C Antibody component of HEP C VIRUS ANTIBODY                            Patient Care Team:  Dayana Valerio M.D. as PCP - General (Family Medicine)  Jes Trent O.D. as Consulting Physician (Optometry)  Stephen López Ass't (Inactive) as [facility]   DAIANA PerezPLEIGH (Podiatry)  Federico Malik M.D. (Family Medicine)  Ryan Calvert M.D. (Gastroenterology)  Kristine Manuel O.D. (Ophthalmology)  Suleman Field M.D. (Cardiovascular Disease (Cardiology))        Social History     Tobacco Use    Smoking status: Former     Current packs/day: 0.00     Average packs/day: 0.5 packs/day for 41.2 years (20.6 ttl pk-yrs)     Types: Cigarettes     Start date: 1972     Quit date: 3/6/2013     Years since quittin.0    Smokeless tobacco: Never   Vaping Use    Vaping status: Never Used   Substance Use Topics    Alcohol use: Not Currently     Comment: sober since HCV diagnosis ()    Drug use: Yes     Types: Inhaled, Oral     Comment: medical  marijuana     Family History   Problem Relation Age of Onset    Hyperlipidemia Mother         under control    Cancer Father         bladder    Heart Disease Paternal Grandmother     Cancer Sister         breast    Psychiatric Illness Sister         Bi-Polar Depressive stage    Diabetes Sister         diabetes type II    Heart Disease Paternal Uncle     Clotting Disorder Neg Hx      He  has a past medical history of Abdominal wall mass of epigastric region (02/07/2020), Anginal syndrome (HCC), Arthritis, Blood clotting disorder (HCC), BMI 23.0-23.9, adult (02/08/2023), Breath shortness, Bronchitis, Cataract, Chronic pain of left knee (02/02/2022), CKD (chronic kidney disease) stage 3, GFR 30-59 ml/min, COPD, Diabetes, Disorder of thyroid, GERD (gastroesophageal reflux disease), Glaucoma, Heart burn, Hepatitis A, Hepatitis C, High cholesterol, Hyperlipidemia, Hypertension, Indigestion, Jaundice, Pneumonia, Psychiatric disorder, Resistant hypertension (07/18/2023), Schizoaffective disorder (HCC) (10/01/2016), and Sleep apnea.    He has no past medical history of Acute nasopharyngitis, Anesthesia, Arrhythmia, Asthma, Bowel habit changes, Cancer (HCC), Carcinoma in situ of respiratory system, Congestive heart failure (HCC), Continuous ambulatory peritoneal dialysis status (HCC), Coughing blood, Dental disorder, Gynecological disorder, Heart murmur, Heart valve disease, Hepatitis B, Hiatus hernia syndrome, Myocardial infarct (HCC), Pacemaker, Pain, Pregnant, Rheumatic fever, Seizure (HCC), Snoring, Stroke (HCC), Tuberculosis, Urinary bladder disorder, or Urinary incontinence.   Past Surgical History:   Procedure Laterality Date    PB KNEE SCOPE,DIAGNOSTIC Left 7/10/2024    Procedure: LEFT KNEE ARTHROSCOPY AND PARTIAL MEDIAL MENISCECTOMY;  Surgeon: Elliot Pineda D.O.;  Location: SURGERY [facility];  Service: Orthopedics    MENISCECTOMY, KNEE, MEDIAL Left 7/10/2024    Procedure: MENISCECTOMY, KNEE, MEDIAL;  Surgeon:  "Elliot Pineda D.O.;  Location: SURGERY [facility];  Service: Orthopedics    OTHER      lower eyelid ripped    OTHER ABDOMINAL SURGERY      exploratory- \"not sure why\"    OTHER ORTHOPEDIC SURGERY Right     right foot, beam fell on it. repair       Exam:   /72   Pulse 79   Temp 36.6 °C (97.9 °F) (Temporal)   Ht 1.651 m (5' 5\")   Wt 60.9 kg (134 lb 3.2 oz)   SpO2 97%  Body mass index is 22.33 kg/m².    Hearing fair.  - hasn't had a hearing evaluation yet.  Dentition poor- doesn't have any teeth, no dentures, currently not f/u with anyone for this.  Alert, oriented in no acute distress.  Eye contact is good, speech goal directed, affect calm  Skin: perianal erythematous plaques, pruritic, - using nizoral cream which helps. D/w him that steroid cream would be helpful for psoriasis      A chaperone was offered to the patient during today's exam. Patient declined chaperone.   Assessment and Plan. The following treatment and monitoring plan is recommended:      1. Medicare annual wellness visit, subsequent  Normal minicog  POLST form given to patient to complete    2. Tachycardia  Chronic medical diagnosis.  Stable.  Had his consultation with cardiology January.  Recommendations to follow-up as needed.    3. Hypertension associated with type 2 diabetes mellitus (HCC)  Chronic medical diagnosis.  Stable.  Currently on 4 different medications for blood pressure control.  Blood pressure was 120/72 today.    4. Type 2 diabetes mellitus with retinopathy, with long-term current use of insulin, macular edema presence unspecified, unspecified laterality, unspecified retinopathy severity ([facility])  5. Microalbuminuria due to type 2 diabetes mellitus (HCC)  Medical diagnosis.  Stable.  His last A1c was 5.9. He uses insulin as maintenance. He tests his blood sugar before breakfast, lunch, and dinner. If it is over 150, he will give himself 3 cc of insulin. It might be up to 190 and it will bring it down to 120 or 118. He " uses very little insulin. Sometimes he does not use anything at all. Sometimes he may use 1 or 2. He feels comfortable with the insulin. He was on insulin when he needed it. He was doing 3 shots a day. He would rather have this insulin just so that he can use it. He will continue his current regimen and will hold off on checking A1c until the next lab appointment. If the next A1c result is stable, consideration will be given to discontinuing metformin.  Will be completing labs on April and.    6. Dyslipidemia due to type 2 diabetes mellitus (HCC)  Chronic medical diagnosis.  Stable continue with Zetia 10 mg and Lipitor 80 mg daily.    7. Simple chronic bronchitis (HCC)  Chronic medical diagnosis.  Stable.  Quit smoking in 2013.  Continues to use albuterol inhaler as needed.    8. Hypothyroidism, unspecified type  Chronic medical diagnosis stable.  Continue with levothyroxine 25 mcg daily.    9. Other insomnia  Chronic medical diagnosis.  Stable with trazodone 100 mg daily.    10. Degenerative arthritis of thumb, left  Chronic medical diagnosis.  Not well-controlled.  Continues to complain of arthritis.    11. Psoriasis  Chronic medical diagnosis.  Well-controlled.  He has a history of psoriasis, currently managed with ketoconazole cream.  Rash on buttocks appears to be tinea as it has been responding to ketoconazole.  A referral to dermatology will be made for further evaluation and management.  - Referral to Dermatology    12. Thyroid nodule  Chronic medical diagnosis.  Stable.  He has a history of a thyroid nodule, last evaluated with an ultrasound in April 2022, showing a 1.4 cm nodule that was benign on biopsy. He is advised to report any new symptoms such as difficulty swallowing, voice changes, or swelling. A repeat ultrasound will be considered later this year.    13. Presence of IVC filter  Chronic medical diagnosis.  Stable.  He has an IVC filter placed in 2011 at [facility]. Dr. Field recommended  getting the IVC filter removed. A message will be sent to Dr. Mixon to discuss the possibility of removing the IVC filter.    14.  Schizoaffective disorder (HCC)  Chronic medical diagnosis.  Stable.  He is not currently following up with a therapist or psychiatrist and is not on any medications for this condition. If he needs to see a therapist or psychiatrist, he will inform us.  Assessment & Plan    PROCEDURE  The patient had a left knee surgery.       Services suggested: No services needed at this time  Health Care Screening: Age-appropriate preventive services recommended by USPTF and ACIP covered by Medicare were discussed today. Services ordered if indicated and agreed upon by the patient.  Referrals offered: Community-based lifestyle interventions to reduce health risks and promote self-management and wellness, fall prevention, nutrition, physical activity, tobacco-use cessation, weight loss, and mental health services as per orders if indicated.    Discussion today about general wellness and lifestyle habits:    Prevent falls and reduce trip hazards; Cautioned about securing or removing rugs.  Have a working fire alarm and carbon monoxide detector;   Engage in regular physical activity and social activities     Follow-up: No follow-ups on file.

## 2025-03-09 ENCOUNTER — PHARMACY VISIT (OUTPATIENT)
Dept: PHARMACY | Facility: MEDICAL CENTER | Age: 70
End: 2025-03-09
Payer: COMMERCIAL

## 2025-03-10 DIAGNOSIS — Z95.828 PRESENCE OF IVC FILTER: ICD-10-CM

## 2025-03-13 ENCOUNTER — OFFICE VISIT (OUTPATIENT)
Dept: DERMATOLOGY | Facility: IMAGING CENTER | Age: 70
End: 2025-03-13
Payer: MEDICARE

## 2025-03-13 DIAGNOSIS — L30.9 DERMATITIS: ICD-10-CM

## 2025-03-13 PROCEDURE — 99203 OFFICE O/P NEW LOW 30 MIN: CPT | Performed by: NURSE PRACTITIONER

## 2025-03-13 RX ORDER — FLUOCINONIDE 0.5 MG/G
CREAM TOPICAL
Qty: 60 G | Refills: 3 | Status: SHIPPED | OUTPATIENT
Start: 2025-03-13

## 2025-03-13 NOTE — PROGRESS NOTES
DERMATOLOGY NOTE  NEW VISIT       Chief complaint: New Patient and Psoriasis       HPI:Hx of Psoriasis  Onset: 2014  Symptoms: Buttock  Aggravating factors: No  Alleviating factors: Water  Treatments: ketoconazole cream helps with the flaking      No Known Allergies     MEDICATIONS:  Medications relevant to specialty reviewed.     REVIEW OF SYSTEMS:   Positive for skin (see HPI)  Negative for fevers and chills       EXAM:  There were no vitals taken for this visit.  Constitutional: Well-developed, well-nourished, and in no distress.     A focused skin exam was performed including the affected areas of the groin/buttocks. Notable findings on exam today listed below and/or in assessment/plan.     Well-demarcated faint irregularly shaped plaque with minimal silvery/white scale to gluteal cleft    IMPRESSION / PLAN:    1. Dermatitis--PSO vs AD with lichenification vs ACD  Pt has been using ketoconazole that he had on hand for fungal infection on foot, it helps with the flaking  Discussed course/nature of the above  Will trial Rx below  Follow up 8 weeks  - fluocinonide (LIDEX) 0.05 % Cream; AAA twice a day for 2-3 weeks at a time with 1-2 week break in between  Dispense: 60 g; Refill: 3        Patient verbalized understanding and agrees with plan regarding the above        Please note that this dictation was created using voice recognition software. I have made every reasonable attempt to correct obvious errors, but I expect that there are errors of grammar and possibly content that I did not discover before finalizing the note.      Return to clinic in: Return in about 2 months (around 5/13/2025) for Rash follow up. and as needed for any new or changing skin lesions.

## 2025-03-14 PROCEDURE — RXMED WILLOW AMBULATORY MEDICATION CHARGE: Performed by: NURSE PRACTITIONER

## 2025-03-18 ENCOUNTER — PHARMACY VISIT (OUTPATIENT)
Dept: PHARMACY | Facility: MEDICAL CENTER | Age: 70
End: 2025-03-18
Payer: COMMERCIAL

## 2025-03-18 ENCOUNTER — OFFICE VISIT (OUTPATIENT)
Facility: MEDICAL CENTER | Age: 70
End: 2025-03-18
Payer: MEDICARE

## 2025-03-18 VITALS
HEART RATE: 83 BPM | DIASTOLIC BLOOD PRESSURE: 58 MMHG | OXYGEN SATURATION: 95 % | BODY MASS INDEX: 22.68 KG/M2 | SYSTOLIC BLOOD PRESSURE: 134 MMHG | HEIGHT: 65 IN | WEIGHT: 136.13 LBS | TEMPERATURE: 97.3 F

## 2025-03-18 DIAGNOSIS — E78.5 DYSLIPIDEMIA: ICD-10-CM

## 2025-03-18 DIAGNOSIS — J44.9 CHRONIC OBSTRUCTIVE PULMONARY DISEASE, UNSPECIFIED COPD TYPE (HCC): ICD-10-CM

## 2025-03-18 DIAGNOSIS — Z95.828 PRESENCE OF IVC FILTER: ICD-10-CM

## 2025-03-18 PROCEDURE — 3078F DIAST BP <80 MM HG: CPT | Performed by: SURGERY

## 2025-03-18 PROCEDURE — 99204 OFFICE O/P NEW MOD 45 MIN: CPT | Performed by: SURGERY

## 2025-03-18 PROCEDURE — 3075F SYST BP GE 130 - 139MM HG: CPT | Performed by: SURGERY

## 2025-03-18 ASSESSMENT — FIBROSIS 4 INDEX: FIB4 SCORE: 1.24

## 2025-03-18 NOTE — PROGRESS NOTES
VASCULAR SURGERY SERVICE  CONSULT NOTE      Date: 3/18/2025    Referring Provider: Dayana Valerio MD    Consulting Physician: Eula Ambrose MD - [facility]     -------------------------------------------------------------------------------------------------    Reason for consultation:  IVC filter removal.    HPI:  This is a 69 y.o. male who developed pulmonary embolism in 2011.  Per patient, he was offered a choice of IVC filter or Coumadin and chose to have an IVC filter placed.  Per patient, the IVC filter was permanent [place] filter.  Patient is now referred for filter removal.  He denies abdominal or back pain.  He denies active tobacco use.      Past Medical History:   Diagnosis Date    Abdominal wall mass of epigastric region 02/07/2020    Anginal syndrome (HCC)     Arthritis     Blood clotting disorder (HCC)     PE, has IVC filter    BMI 23.0-23.9, adult 02/08/2023    Breath shortness     comes and goes    Bronchitis     Cataract     repair, bilateral    Chronic pain of left knee 02/02/2022    Mar 2025- improved.      CKD (chronic kidney disease) stage 3, GFR 30-59 ml/min     COPD     Diabetes     Disorder of thyroid     nodule    GERD (gastroesophageal reflux disease)     Glaucoma     Heart burn     Hepatitis A     Hepatitis C     High cholesterol     Hyperlipidemia     Hypertension     Indigestion     Jaundice     hx of    Pneumonia     Psychiatric disorder     bipolar    Resistant hypertension 07/18/2023    Schizoaffective disorder (HCC) 10/01/2016    Sleep apnea        Past Surgical History:   Procedure Laterality Date    PB KNEE SCOPE,DIAGNOSTIC Left 7/10/2024    Procedure: LEFT KNEE ARTHROSCOPY AND PARTIAL MEDIAL MENISCECTOMY;  Surgeon: Elliot Pineda D.O.;  Location: SURGERY [facility];  Service: Orthopedics    MENISCECTOMY, KNEE, MEDIAL Left 7/10/2024    Procedure: MENISCECTOMY, KNEE, MEDIAL;  Surgeon: Elliot Pineda D.O.;  Location: SURGERY [facility];  Service: Orthopedics     "OTHER      lower eyelid ripped    OTHER ABDOMINAL SURGERY      exploratory- \"not sure why\"    OTHER ORTHOPEDIC SURGERY Right     right foot, beam fell on it. repair       Current Outpatient Medications   Medication Sig Dispense Refill    fluocinonide (LIDEX) 0.05 % Cream Apply to the affected area(s) twice a day for 2-3 weeks at a time with 1-2 week break in between 60 g 3    metFORMIN (GLUCOPHAGE) 500 MG Tab Take 1 Tablet by mouth every day. 100 Tablet 3    lisinopril (PRINIVIL) 40 MG tablet Take 1 Tablet by mouth every day. 100 Tablet 3    cloNIDine (CATAPRES) 0.1 MG Tab Take 1 Tablet by mouth 2 times a day. 200 Tablet 3    metoprolol SR (TOPROL XL) 25 MG TABLET SR 24 HR Take 1 Tablet by mouth every day. 100 Tablet 3    amLODIPine (NORVASC) 10 MG Tab Take 1 Tablet by mouth every day. 100 Tablet 3    levothyroxine (SYNTHROID) 25 MCG Tab Take 1 Tablet by mouth every morning on an empty stomach. 100 Tablet 3    ezetimibe (ZETIA) 10 MG Tab Take 1 Tablet by mouth every day. 100 Tablet 3    traZODone (DESYREL) 100 MG Tab Take 1 tablet by mouth every evening. 100 Tablet 3    aspirin 81 MG EC tablet Take 81 mg by mouth every day.      atorvastatin (LIPITOR) 80 MG tablet Take 1 Tablet by mouth every evening. 100 Tablet 3    cycloSPORINE (RESTASIS) 0.05 % ophthalmic emulsion Instill 1 drop into both eyes twice daily 180 Each 3    insulin regular (NOVOLIN R) 100 Unit/mL Solution AS PER SLIDING SCALE 10 mL 3    ketoconazole (NIZORAL) 2 % Cream Apply twice a day to affected toenails. 30 g 2    latanoprost (XALATAN) 0.005 % Solution Instill one drop at night to BOTH EYES daily (Patient taking differently: MEDICATION INSTRUCTIONS FOR SURGERY/PROCEDURE SCHEDULED FOR 7/10   CONTINUE TAKING MED PRIOR TO SURGERY) 7.5 mL 3    Blood Glucose Monitoring Suppl (BLOOD GLUCOSE MONITOR SYSTEM) w/Device Kit Meter: Dispense Device of Insurance Preference. Sig. Use as directed for blood sugar monitoring. #1. NR. 1 Kit 0    glucose blood " "(CONTOUR NEXT TEST) strip Check fingersticks three times a day. E11.9 300 Strip 3    albuterol 108 (90 Base) MCG/ACT Aero Soln inhalation aerosol Inhale 2 Puffs every four hours as needed for Shortness of Breath. (Patient taking differently: Inhale 2 Puffs every four hours as needed for Shortness of Breath. MEDICATION INSTRUCTIONS FOR SURGERY/PROCEDURE SCHEDULED FOR 7/10   OK TO CONTINUE TAKING PRIOR TO SURGERY AND DAY OF SURGERY) 18 g 1    INSULIN SYRINGE .5CC/29G (ULTICARE INSULIN SYRINGE) 29G X 1/2\" 0.5 ML Misc USE AS DIRECTED BY PHYSICIAN 100 Each 3     No current facility-administered medications for this visit.       Social History     Socioeconomic History    Marital status: Single     Spouse name: Not on file    Number of children: Not on file    Years of education: Not on file    Highest education level: Not on file   Occupational History    Not on file   Tobacco Use    Smoking status: Former     Current packs/day: 0.00     Average packs/day: 0.5 packs/day for 41.2 years (20.6 ttl pk-yrs)     Types: Cigarettes     Start date: 1972     Quit date: 3/6/2013     Years since quittin.0    Smokeless tobacco: Never   Vaping Use    Vaping status: Never Used   Substance and Sexual Activity    Alcohol use: Not Currently     Comment: sober since HCV diagnosis (2018)    Drug use: Yes     Types: Inhaled, Oral     Comment: medical marijuana    Sexual activity: Not Currently     Partners: Female     Comment: retired, no kids.   Other Topics Concern    Not on file   Social History Narrative    Not on file     Social Drivers of Health     Financial Resource Strain: Low Risk  (2025)    Overall Financial Resource Strain (CARDIA)     Difficulty of Paying Living Expenses: Not hard at all   Food Insecurity: No Food Insecurity (2025)    Hunger Vital Sign     Worried About Running Out of Food in the Last Year: Never true     Ran Out of Food in the Last Year: Never true   Transportation Needs: No Transportation " Needs (2/27/2025)    PRAPARE - Transportation     Lack of Transportation (Medical): No     Lack of Transportation (Non-Medical): No   Physical Activity: Sufficiently Active (1/30/2021)    Exercise Vital Sign     Days of Exercise per Week: 7 days     Minutes of Exercise per Session: 140 min   Stress: Stress Concern Present (1/30/2021)    [employer] - Occupational Stress Questionnaire     Feeling of Stress : To some extent   Social Connections: Socially Isolated (1/30/2021)    Social Connection and Isolation Panel [NHANES]     Frequency of Communication with Friends and Family: More than three times a week     Frequency of Social Gatherings with Friends and Family: Never     Attends Muslim Services: Never     Active Member of Clubs or Organizations: No     Attends Club or Organization Meetings: Never     Marital Status: Never    Intimate Partner Violence: Not on file   Housing Stability: Low Risk  (2/27/2025)    Housing Stability Vital Sign     Unable to Pay for Housing in the Last Year: No     Number of Times Moved in the Last Year: 1     Homeless in the Last Year: No       Family History   Problem Relation Age of Onset    Hyperlipidemia Mother         under control    Cancer Father         bladder    Heart Disease Paternal Grandmother     Cancer Sister         breast    Psychiatric Illness Sister         Bi-Polar Depressive stage    Diabetes Sister         diabetes type II    Heart Disease Paternal Uncle     Clotting Disorder Neg Hx        Allergies:  Patient has no known allergies.    Review of Systems:    Constitutional: Negative for fever, chills, weight loss,   HENT:   Negative for hearing loss or tinnitus    Eyes:    Negative for blurred vision, double vision, or loss of vision  Respiratory:  Negative for cough, hemoptysis, or wheezing    Cardiac:  Negative for chest pain or palpitations or orthopnea  Vascular:  Negative for claudication or rest pain  "  Gastrointestinal: Negative for nausea, vomiting, or abdominal pain     Negative for hematochezia or melena   Genitourinary: Negative for dysuria, frequency, or hematuria   Musculoskeletal: Negative for myalgias, back pain, or joint pain  Skin:   Negative for itching or rash  Neurological:  Negative for dizziness, headaches, or tremors     Negative for speech disturbance     Negative for extremity weakness or paresthesias  Endo/Heme:  Negative for easy bruising or bleeding  Psychiatric:  Negative for depression, suicidal ideas, or hallucinations    Physical Exam:  /58 (BP Location: Left arm, Patient Position: Sitting, BP Cuff Size: Adult)   Pulse 83   Temp 36.3 °C (97.3 °F) (Temporal)   Ht 1.651 m (5' 5\")   Wt 61.7 kg (136 lb 2.1 oz)   SpO2 95%     Constitutional: Alert, oriented, no acute distress  HEENT:  Normocephalic and atraumatic, EOMI  Neck:   Supple, no JVD, no bruits.  Cardiovascular: Regular rate and rhythm  Pulmonary:  Good air entry bilaterally  Abdominal:  Soft, non-tender, non-distended     Aortic impulse not widened  Musculoskeletal: No edema, no tenderness  Neurological:  CN II-XII grossly intact, no focal deficits  Skin:   Skin is warm and dry. No rash noted.  Psychiatric:  Normal mood and affect.  Lower extremities: No edema.  Feet are warm, well-perfused.      Assessment:  -History of permanent IVC filter placement.  -COPD.  -Dyslipidemia.  -Type 2 diabetes mellitus.    Plan:  I had a long discussion with patient.  Unfortunately, the filter has been in for too long to be safely removed without doing major surgery; furthermore, the filter is a permanent, not removable type.  Patient is also asymptomatic.  Looking at the overall picture, I recommended to leave the filter alone.  Patient indicated understanding and agreed with plan.  I answered all of his questions.  I will see patient back as needed.      Eula Ambrose MD  [facility]   Voalte preferred or call my office " [phone]  __________________________________________________________________  Patient:Nikolas Hogan Jacky   MRN:[id number]   [facility]:[id number]

## 2025-03-19 ENCOUNTER — PHARMACY VISIT (OUTPATIENT)
Dept: PHARMACY | Facility: MEDICAL CENTER | Age: 70
End: 2025-03-19
Payer: COMMERCIAL

## 2025-03-19 PROCEDURE — RXMED WILLOW AMBULATORY MEDICATION CHARGE: Performed by: FAMILY MEDICINE

## 2025-03-30 PROCEDURE — RXMED WILLOW AMBULATORY MEDICATION CHARGE: Performed by: FAMILY MEDICINE

## 2025-04-01 ENCOUNTER — PHARMACY VISIT (OUTPATIENT)
Dept: PHARMACY | Facility: MEDICAL CENTER | Age: 70
End: 2025-04-01
Payer: COMMERCIAL

## 2025-04-02 ENCOUNTER — HOSPITAL ENCOUNTER (OUTPATIENT)
Facility: MEDICAL CENTER | Age: 70
End: 2025-04-02
Attending: FAMILY MEDICINE
Payer: MEDICARE

## 2025-04-02 DIAGNOSIS — E11.319 TYPE 2 DIABETES MELLITUS WITH RETINOPATHY, WITH LONG-TERM CURRENT USE OF INSULIN, MACULAR EDEMA PRESENCE UNSPECIFIED, UNSPECIFIED LATERALITY, UNSPECIFIED RETINOPATHY SEVERITY (HCC): ICD-10-CM

## 2025-04-02 DIAGNOSIS — Z79.4 TYPE 2 DIABETES MELLITUS WITH RETINOPATHY, WITH LONG-TERM CURRENT USE OF INSULIN, MACULAR EDEMA PRESENCE UNSPECIFIED, UNSPECIFIED LATERALITY, UNSPECIFIED RETINOPATHY SEVERITY (HCC): ICD-10-CM

## 2025-04-02 LAB
ALBUMIN SERPL BCP-MCNC: 4.6 G/DL (ref 3.2–4.9)
ALBUMIN/GLOB SERPL: 1.8 G/DL
ALP SERPL-CCNC: 69 U/L (ref 30–99)
ALT SERPL-CCNC: 28 U/L (ref 2–50)
ANION GAP SERPL CALC-SCNC: 13 MMOL/L (ref 7–16)
AST SERPL-CCNC: 29 U/L (ref 12–45)
BILIRUB SERPL-MCNC: 0.4 MG/DL (ref 0.1–1.5)
BUN SERPL-MCNC: 18 MG/DL (ref 8–22)
CALCIUM ALBUM COR SERPL-MCNC: 8.9 MG/DL (ref 8.5–10.5)
CALCIUM SERPL-MCNC: 9.4 MG/DL (ref 8.5–10.5)
CHLORIDE SERPL-SCNC: 107 MMOL/L (ref 96–112)
CHOLEST SERPL-MCNC: 114 MG/DL (ref 100–199)
CO2 SERPL-SCNC: 21 MMOL/L (ref 20–33)
CREAT SERPL-MCNC: 0.92 MG/DL (ref 0.5–1.4)
CREAT UR-MCNC: 99.8 MG/DL
EST. AVERAGE GLUCOSE BLD GHB EST-MCNC: 137 MG/DL
GFR SERPLBLD CREATININE-BSD FMLA CKD-EPI: 89 ML/MIN/1.73 M 2
GLOBULIN SER CALC-MCNC: 2.5 G/DL (ref 1.9–3.5)
GLUCOSE SERPL-MCNC: 123 MG/DL (ref 65–99)
HBA1C MFR BLD: 6.4 % (ref 4–5.6)
HDLC SERPL-MCNC: 46 MG/DL
LDLC SERPL CALC-MCNC: 54 MG/DL
MICROALBUMIN UR-MCNC: 3.5 MG/DL
MICROALBUMIN/CREAT UR: 35 MG/G (ref 0–30)
POTASSIUM SERPL-SCNC: 4.4 MMOL/L (ref 3.6–5.5)
PROT SERPL-MCNC: 7.1 G/DL (ref 6–8.2)
SODIUM SERPL-SCNC: 141 MMOL/L (ref 135–145)
TRIGL SERPL-MCNC: 72 MG/DL (ref 0–149)

## 2025-04-02 PROCEDURE — 83036 HEMOGLOBIN GLYCOSYLATED A1C: CPT

## 2025-04-02 PROCEDURE — 82570 ASSAY OF URINE CREATININE: CPT

## 2025-04-02 PROCEDURE — 36415 COLL VENOUS BLD VENIPUNCTURE: CPT

## 2025-04-02 PROCEDURE — 82043 UR ALBUMIN QUANTITATIVE: CPT

## 2025-04-02 PROCEDURE — 80061 LIPID PANEL: CPT

## 2025-04-02 PROCEDURE — 80053 COMPREHEN METABOLIC PANEL: CPT

## 2025-04-03 ENCOUNTER — RESULTS FOLLOW-UP (OUTPATIENT)
Dept: MEDICAL GROUP | Facility: PHYSICIAN GROUP | Age: 70
End: 2025-04-03

## 2025-04-14 PROCEDURE — RXMED WILLOW AMBULATORY MEDICATION CHARGE: Performed by: FAMILY MEDICINE

## 2025-04-14 PROCEDURE — RXMED WILLOW AMBULATORY MEDICATION CHARGE: Performed by: NURSE PRACTITIONER

## 2025-04-17 ENCOUNTER — PHARMACY VISIT (OUTPATIENT)
Dept: PHARMACY | Facility: MEDICAL CENTER | Age: 70
End: 2025-04-17
Payer: COMMERCIAL

## 2025-04-18 ENCOUNTER — OFFICE VISIT (OUTPATIENT)
Dept: MEDICAL GROUP | Facility: PHYSICIAN GROUP | Age: 70
End: 2025-04-18
Payer: MEDICARE

## 2025-04-18 VITALS
WEIGHT: 136.6 LBS | TEMPERATURE: 97.5 F | HEART RATE: 61 BPM | OXYGEN SATURATION: 97 % | BODY MASS INDEX: 22.76 KG/M2 | HEIGHT: 65 IN | SYSTOLIC BLOOD PRESSURE: 122 MMHG | RESPIRATION RATE: 16 BRPM | DIASTOLIC BLOOD PRESSURE: 78 MMHG

## 2025-04-18 DIAGNOSIS — Z79.4 TYPE 2 DIABETES MELLITUS WITH RETINOPATHY, WITH LONG-TERM CURRENT USE OF INSULIN, MACULAR EDEMA PRESENCE UNSPECIFIED, UNSPECIFIED LATERALITY, UNSPECIFIED RETINOPATHY SEVERITY (HCC): ICD-10-CM

## 2025-04-18 DIAGNOSIS — E11.319 TYPE 2 DIABETES MELLITUS WITH RETINOPATHY, WITH LONG-TERM CURRENT USE OF INSULIN, MACULAR EDEMA PRESENCE UNSPECIFIED, UNSPECIFIED LATERALITY, UNSPECIFIED RETINOPATHY SEVERITY (HCC): ICD-10-CM

## 2025-04-18 DIAGNOSIS — L40.9 PSORIASIS: ICD-10-CM

## 2025-04-18 DIAGNOSIS — H91.93 HEARING DIFFICULTY OF BOTH EARS: ICD-10-CM

## 2025-04-18 DIAGNOSIS — Z95.828 PRESENCE OF IVC FILTER: ICD-10-CM

## 2025-04-18 DIAGNOSIS — L30.9 DERMATITIS: ICD-10-CM

## 2025-04-18 PROCEDURE — 3074F SYST BP LT 130 MM HG: CPT | Performed by: FAMILY MEDICINE

## 2025-04-18 PROCEDURE — 99214 OFFICE O/P EST MOD 30 MIN: CPT | Performed by: FAMILY MEDICINE

## 2025-04-18 PROCEDURE — 3078F DIAST BP <80 MM HG: CPT | Performed by: FAMILY MEDICINE

## 2025-04-18 ASSESSMENT — FIBROSIS 4 INDEX: FIB4 SCORE: 1.31

## 2025-04-18 NOTE — PROGRESS NOTES
"Verbal consent was acquired by the patient to use FELICIA copilot ambient listening note generation during this visit         History of Present Illness      Unable to load felicia      Review of Systems   Genitourinary:  Negative for dysuria and hematuria.   Skin:  Positive for itching and rash.        Chronic right buttock rash       No outpatient medications have been marked as taking for the 4/18/25 encounter (Office Visit) with Dayana Valerio M.D..       /78 (BP Location: Left arm, Patient Position: Sitting)   Pulse 61   Temp 36.4 °C (97.5 °F) (Temporal)   Resp 16   Ht 1.651 m (5' 5\")   Wt 62 kg (136 lb 9.6 oz)   SpO2 97% , Body mass index is 22.73 kg/m².        Physical Exam  Constitutional:       Appearance: Normal appearance.   Eyes:      Extraocular Movements: Extraocular movements intact.   Cardiovascular:      Rate and Rhythm: Normal rate and regular rhythm.   Pulmonary:      Effort: Pulmonary effort is normal.      Breath sounds: Normal breath sounds.   Abdominal:      Tenderness: There is no right CVA tenderness or left CVA tenderness.   Skin:     Findings: Rash present.      Comments: Right upper buttock rash- slightly erythematous crusted lesions. No drainage   Neurological:      Mental Status: He is alert.   Psychiatric:         Mood and Affect: Mood normal.         Behavior: Behavior normal.     A chaperone was offered to the patient during today's exam. Patient declined chaperone.     Results         Assessment & Plan  1. Dermatitis  2. Psoriasis  Chronic medical problem. Not improving. States that he has had this rash on his buttocks intermittently since approximately 2011.  Has found relief with ketoconazole in the past.  Mentions that he has also been told in the past that he has a history of psoriasis.  He had his consultation with dermatology on March 13 and was prescribed Lidex cream.  Mentions that he did not notice any improvement and has discontinued.  New referral to [facility] " dermatology placed today.  - Referral to Dermatology    3. Presence of IVC filter  Chronic medical diagnoses.  Stable.  He had his consultation with vascular surgery last month for discussion of possible removal of his IVC filter. Dr Ambrose's consultation notes were received and reviewed.  He would most likely need major surgery for removal.  Recommended to leave the filter alone.  Discussed with patient that he should continue with aspirin 81 mg daily.    4. Type 2 diabetes mellitus with retinopathy, with long-term current use of insulin, macular edema presence unspecified, unspecified laterality, unspecified retinopathy severity (HCC)  Chronic medical diagnoses.  Recent labs with A1c increased to 6.4%.  Currently taking metformin 500 mg daily.  States that he has been using short acting insulin as needed.  We discussed that his urinalysis did show urine microalbumin creatinine ratio at 35.  Blood pressure today in office 122/78.  Will have patient follow-up with me in 3 months.  Will plan on checking his A1c in the office in July.    5. Hearing difficulty of both ears  Chronic medical diagnoses.  Not well-controlled.  States that he has had hearing aids since 2019.  Unsure if these are working properly.  Would like a referral to audiology.  - Referral to Audiology     Orders Placed This Encounter    Referral to Dermatology    Referral to Audiology           This note was created using voice recognition software (Dragon) and [employer]. The accuracy of the dictation is limited by the abilities of the software.  . Occasional transcription errors may have escaped proof reading. I have made every reasonable attempt to correct obvious errors, but I expect that there are errors of grammar and possibly content that I did not discover before finalizing the note. ~

## 2025-04-18 NOTE — Clinical Note
[facility]  Dayana Valerio M.D.  [address]  [place] [address]  Fax: [phone]   Authorization for Release/Disclosure of   Protected Health Information   Name: AKUA RICO : 1955 SSN: [id number]   Address: [address]  [place] [address] Phone:    There are no phone numbers on file.   I authorize the entity listed below to release/disclose the PHI below to:   [facility]/Dayana Valerio M.D. and Dayana Valerio M.D.   Provider or Entity Name:     Address   City, State, Zip   Phone:      Fax:     Reason for request: continuity of care   Information to be released:    [  ] LAST COLONOSCOPY,  including any PATH REPORT and follow-up  [  ] LAST FIT/COLOGUARD RESULT [  ] LAST DEXA  [  ] LAST MAMMOGRAM  [  ] LAST PAP  [  ] LAST LABS [  ] RETINA EXAM REPORT  [  ] IMMUNIZATION RECORDS  [  ] Release all info      [  ] Check here and initial the line next to each item to release ALL health information INCLUDING  _____ Care and treatment for drug and / or alcohol abuse  _____ HIV testing, infection status, or AIDS  _____ Genetic Testing    DATES OF SERVICE OR TIME PERIOD TO BE DISCLOSED: _____________  I understand and acknowledge that:  * This Authorization may be revoked at any time by you in writing, except if your health information has already been used or disclosed.  * Your health information that will be used or disclosed as a result of you signing this authorization could be re-disclosed by the recipient. If this occurs, your re-disclosed health information may no longer be protected by State or Federal laws.  * You may refuse to sign this Authorization. Your refusal will not affect your ability to obtain treatment.  * This Authorization becomes effective upon signing and will  on (date) __________.      If no date is indicated, this Authorization will  one (1) year from the signature date.    Name: Akua Rico  Signature: Date:   2025      PLEASE FAX REQUESTED RECORDS BACK TO: [phone]

## 2025-04-24 NOTE — Clinical Note
REFERRAL APPROVAL NOTICE         Sent on April 24, 2025                   Nikolas Rico  [address]   [address]  [place] [address]                   Dear Mr. Rico,    After a careful review of the medical information and benefit coverage, [facility] has processed your referral. See below for additional details.    If applicable, you must be actively enrolled with your insurance for coverage of the authorized service. If you have any questions regarding your coverage, please contact your insurance directly.    REFERRAL INFORMATION   Referral #:  [id number]  Referred-To Provider    Referred-By Provider:  Audiologist    Dayana Valerio M.D.   [facility]      [address]  [place] [address]  [phone] [address] [place] [address]  [phone]    Referral Start Date:  04/18/2025  Referral End Date:   04/18/2026             SCHEDULING  If you do not already have an appointment, please call [phone] to make an appointment.     MORE INFORMATION  If you do not already have a [employer] account, sign up at: [email or web address]  You can access your medical information, make appointments, see lab results, billing information, and more.  If you have questions regarding this referral, please contact  the [facility] Referrals department at:             [phone]. Monday - Friday 8:00AM - 5:00PM.     Sincerely,    [facility]

## 2025-05-04 DIAGNOSIS — E11.9 CONTROLLED TYPE 2 DIABETES MELLITUS WITHOUT COMPLICATION, WITH LONG-TERM CURRENT USE OF INSULIN (HCC): ICD-10-CM

## 2025-05-04 DIAGNOSIS — Z79.4 CONTROLLED TYPE 2 DIABETES MELLITUS WITHOUT COMPLICATION, WITH LONG-TERM CURRENT USE OF INSULIN (HCC): ICD-10-CM

## 2025-05-04 PROCEDURE — RXMED WILLOW AMBULATORY MEDICATION CHARGE: Performed by: OPTOMETRIST

## 2025-05-04 PROCEDURE — RXMED WILLOW AMBULATORY MEDICATION CHARGE: Performed by: FAMILY MEDICINE

## 2025-05-05 PROCEDURE — RXMED WILLOW AMBULATORY MEDICATION CHARGE: Performed by: FAMILY MEDICINE

## 2025-05-05 RX ORDER — BLOOD SUGAR DIAGNOSTIC
STRIP MISCELLANEOUS
Qty: 300 STRIP | Refills: 3 | Status: SHIPPED | OUTPATIENT
Start: 2025-05-05

## 2025-05-06 PROCEDURE — RXMED WILLOW AMBULATORY MEDICATION CHARGE: Performed by: OPTOMETRIST

## 2025-05-07 ENCOUNTER — PHARMACY VISIT (OUTPATIENT)
Dept: PHARMACY | Facility: MEDICAL CENTER | Age: 70
End: 2025-05-07
Payer: COMMERCIAL

## 2025-05-27 ENCOUNTER — PHARMACY VISIT (OUTPATIENT)
Dept: PHARMACY | Facility: MEDICAL CENTER | Age: 70
End: 2025-05-27
Payer: COMMERCIAL

## 2025-05-27 PROCEDURE — RXMED WILLOW AMBULATORY MEDICATION CHARGE: Performed by: FAMILY MEDICINE

## 2025-07-03 ENCOUNTER — TELEPHONE (OUTPATIENT)
Dept: HEALTH INFORMATION MANAGEMENT | Facility: OTHER | Age: 70
End: 2025-07-03
Payer: MEDICARE

## 2025-07-03 DIAGNOSIS — Z87.891 FORMER CIGARETTE SMOKER: Primary | ICD-10-CM

## 2025-07-03 NOTE — TELEPHONE ENCOUNTER
Do you have any new problems with your breathing since your last exam?   (Like a cough that does not go away, chest pain when you breathe or cough, coughing up blood or rust colored phlegm or spit, shortness of breath that is new to you) no     Have you been diagnosed with lung cancer since your last exam?  no    Are you a current smoker or former smoker? If former smoker, did you quit smoking within the last 15 years? Former quit 2013    Have you had a CT scan of your chest within the past 12 mos.? 08-12-24

## 2025-07-07 PROCEDURE — RXMED WILLOW AMBULATORY MEDICATION CHARGE: Performed by: FAMILY MEDICINE

## 2025-07-09 ENCOUNTER — OFFICE VISIT (OUTPATIENT)
Dept: MEDICAL GROUP | Facility: PHYSICIAN GROUP | Age: 70
End: 2025-07-09
Payer: MEDICARE

## 2025-07-09 ENCOUNTER — PHARMACY VISIT (OUTPATIENT)
Dept: PHARMACY | Facility: MEDICAL CENTER | Age: 70
End: 2025-07-09
Payer: COMMERCIAL

## 2025-07-09 VITALS
SYSTOLIC BLOOD PRESSURE: 130 MMHG | DIASTOLIC BLOOD PRESSURE: 80 MMHG | HEART RATE: 72 BPM | BODY MASS INDEX: 22.74 KG/M2 | OXYGEN SATURATION: 96 % | HEIGHT: 65 IN | TEMPERATURE: 98.3 F | WEIGHT: 136.5 LBS

## 2025-07-09 DIAGNOSIS — E11.29 TYPE 2 DIABETES MELLITUS WITH DIABETIC MICROALBUMINURIA, WITH LONG-TERM CURRENT USE OF INSULIN (HCC): Primary | ICD-10-CM

## 2025-07-09 DIAGNOSIS — J41.0 SIMPLE CHRONIC BRONCHITIS (HCC): ICD-10-CM

## 2025-07-09 DIAGNOSIS — E11.69 DYSLIPIDEMIA DUE TO TYPE 2 DIABETES MELLITUS (HCC): ICD-10-CM

## 2025-07-09 DIAGNOSIS — R07.89 OTHER CHEST PAIN: ICD-10-CM

## 2025-07-09 DIAGNOSIS — Z79.4 TYPE 2 DIABETES MELLITUS WITH DIABETIC MICROALBUMINURIA, WITH LONG-TERM CURRENT USE OF INSULIN (HCC): Primary | ICD-10-CM

## 2025-07-09 DIAGNOSIS — I15.2 HYPERTENSION ASSOCIATED WITH TYPE 2 DIABETES MELLITUS (HCC): ICD-10-CM

## 2025-07-09 DIAGNOSIS — E11.59 HYPERTENSION ASSOCIATED WITH TYPE 2 DIABETES MELLITUS (HCC): ICD-10-CM

## 2025-07-09 DIAGNOSIS — R80.9 TYPE 2 DIABETES MELLITUS WITH DIABETIC MICROALBUMINURIA, WITH LONG-TERM CURRENT USE OF INSULIN (HCC): Primary | ICD-10-CM

## 2025-07-09 DIAGNOSIS — E78.5 DYSLIPIDEMIA DUE TO TYPE 2 DIABETES MELLITUS (HCC): ICD-10-CM

## 2025-07-09 LAB
HBA1C MFR BLD: 6 % (ref ?–5.8)
POCT INT CON NEG: NEGATIVE
POCT INT CON POS: POSITIVE

## 2025-07-09 PROCEDURE — 83036 HEMOGLOBIN GLYCOSYLATED A1C: CPT | Performed by: FAMILY MEDICINE

## 2025-07-09 PROCEDURE — 3079F DIAST BP 80-89 MM HG: CPT | Performed by: FAMILY MEDICINE

## 2025-07-09 PROCEDURE — 3075F SYST BP GE 130 - 139MM HG: CPT | Performed by: FAMILY MEDICINE

## 2025-07-09 PROCEDURE — 93000 ELECTROCARDIOGRAM COMPLETE: CPT | Performed by: FAMILY MEDICINE

## 2025-07-09 PROCEDURE — G2211 COMPLEX E/M VISIT ADD ON: HCPCS | Performed by: FAMILY MEDICINE

## 2025-07-09 PROCEDURE — 99214 OFFICE O/P EST MOD 30 MIN: CPT | Performed by: FAMILY MEDICINE

## 2025-07-09 ASSESSMENT — ENCOUNTER SYMPTOMS
COUGH: 1
WHEEZING: 0
SHORTNESS OF BREATH: 0

## 2025-07-09 ASSESSMENT — FIBROSIS 4 INDEX: FIB4 SCORE: 1.31

## 2025-07-09 NOTE — PROGRESS NOTES
"Verbal consent was acquired by the patient to use [employer] ambient listening note generation during this visit         History of Present Illness  The patient is here for a follow-up visit; last seen on 04/18/2025.    Chest Pain  - Experiencing sharp, gripping left-sided chest pain, suspected heartburn.  - Pain localized, non-radiating, lasts seconds, occurs during exertion and rest, subsided today.  - No dyspnea.  - No medication taken.  - Unsure if exacerbated by deep breathing, coughing, or sneezing.  - Cardiologist visit and mammogram in 01/2025.  - Last EKG last year.  -reproducible on exam    COPD  - History of COPD.  - Uses albuterol PRN.  - Previously used Spiriva daily.  -hx smoking    Diabetes  - Taking metformin 500 mg daily.  - His last A1c was at 6.4%.  Will plan on repeating it today in the office.    LDCT scan scheduled for 07/13/2025.    SOCIAL HISTORY  - Non-smoker      Review of Systems   Respiratory:  Positive for cough (chronic-). Negative for shortness of breath and wheezing.    Cardiovascular:  Positive for chest pain (left sided). Negative for leg swelling.       Active Medications[1]    /80   Pulse 72   Temp 36.8 °C (98.3 °F) (Temporal)   Ht 1.651 m (5' 5\")   Wt 61.9 kg (136 lb 8 oz)   SpO2 96% , Body mass index is 22.71 kg/m².        Physical Exam  Constitutional:       Appearance: Normal appearance.   Eyes:      Extraocular Movements: Extraocular movements intact.   Cardiovascular:      Rate and Rhythm: Normal rate and regular rhythm.   Pulmonary:      Effort: Pulmonary effort is normal.      Breath sounds: Normal breath sounds.   Musculoskeletal:      Comments: Left chest  (below nipple )tenderness to palpation   Neurological:      Mental Status: He is alert.   Psychiatric:         Mood and Affect: Mood normal.         Behavior: Behavior normal.     EKG Interpretation   EKG ordered, reviewed, and interpreted by Dayana Valerio MD  Rhythm: normal sinus 61  Rate: normal   Axis: " normal   Ectopy: none   Conduction: normal   ST Segments: no acute change   T Waves: no acute change   Q Waves: none   Clinical Impression: no acute changes and similar to EKG completed July 18, 2023  These results were discussed with the patient     Results  - Labs:    - A1c: 6.4% (04/2025)    - Imaging:    - CAT scan: Lung changes consistent with emphysema       Assessment & Plan  1. Left-sided chest pain:   New medical diagnosis.  Stable.    - Precautions discussed with patient.  Likely musculoskeletal, reproducible by palpation.  - Further evaluation warranted due to risk factors: hypertension, hyperlipidemia, diabetes.  - EKG today for comparison with last year's.  - No leg swelling; no dyspnea during episodes.    2. COPD:   Chronic medical diagnoses.  Stable.    - Scheduled for LDCT in August.    history of COPD, emphysema noted on previous CT scan.  - Chronic cough possibly related to COPD; no current maintenance inhalers.  - CAT scan on 07/13/2025 to evaluate lung condition.  - Consider maintenance inhaler based on CAT scan results.    3. Hypertension:   Chronic medical diagnoses.  Well-controlled.  - Managed with lisinopril 40 mg, clonidine 0.1 mg BID, amlodipine 10 mg.  - BP today 130/80 mmHg.  - Regular monitoring; no medication changes needed.    4. Hyperlipidemia:  Chronic medical diagnoses.  Stable.  Will managed.  - Zetia 10 mg, atorvastatin 80 mg, baby aspirin.  - Effective lipid management; regular monitoring.  - No medication changes needed.    5. Diabetes mellitus:   Chronic medical diagnosis.  Stable.  A1c improved to 6% today.    A1c 6.4% in 04/2025.  - Taking metformin 500 mg daily.  - A1c test today to assess control; patient believes improvement.    Follow-up  - A1c test today  - EKG today  - CAT scan on 07/13/2025    Orders Placed This Encounter    POCT Hemoglobin A1C    EKG - Clinic Performed           () Today's E/M visit is associated with medical care services that serve as the  continuing focal point for all needed health care services and/or with medical care services that are part of ongoing care related to a patient's single, serious condition, or a complex condition: This includes furnishing services to patients on an ongoing basis that result in care that is personalized to the patient. The services result in a comprehensive, longitudinal, and continuous relationship with the patient and involve delivery of team-based care that is accessible, coordinated with other practitioners and providers, and integrated with the broader health care landscape.      This note was created using voice recognition software (Dragon) and [employer]. The accuracy of the dictation is limited by the abilities of the software.  . Occasional transcription errors may have escaped proof reading. I have made every reasonable attempt to correct obvious errors, but I expect that there are errors of grammar and possibly content that I did not discover before finalizing the note. ~           [1]   Outpatient Medications Marked as Taking for the 7/9/25 encounter (Office Visit) with Dayana Valerio M.D.   Medication Sig Dispense Refill    glucose blood (ONETOUCH VERIO) strip Check fingersticks three times a day. E11.9 300 Strip 3    latanoprost (XALATAN) 0.005 % Solution Instill one drop at night to BOTH EYES daily 7.5 mL 3    fluocinonide (LIDEX) 0.05 % Cream Apply to the affected area(s) twice a day for 2-3 weeks at a time with 1-2 week break in between 60 g 3    metFORMIN (GLUCOPHAGE) 500 MG Tab Take 1 Tablet by mouth every day. 100 Tablet 3    lisinopril (PRINIVIL) 40 MG tablet Take 1 Tablet by mouth every day. 100 Tablet 3    cloNIDine (CATAPRES) 0.1 MG Tab Take 1 Tablet by mouth 2 times a day. 200 Tablet 3    metoprolol SR (TOPROL XL) 25 MG TABLET SR 24 HR Take 1 Tablet by mouth every day. 100 Tablet 3    amLODIPine (NORVASC) 10 MG Tab Take 1 Tablet by mouth every day. 100 Tablet 3    levothyroxine (SYNTHROID) 25 MCG  "Tab Take 1 Tablet by mouth every morning on an empty stomach. 100 Tablet 3    ezetimibe (ZETIA) 10 MG Tab Take 1 Tablet by mouth every day. 100 Tablet 3    traZODone (DESYREL) 100 MG Tab Take 1 tablet by mouth every evening. 100 Tablet 3    aspirin 81 MG EC tablet Take 81 mg by mouth every day.      atorvastatin (LIPITOR) 80 MG tablet Take 1 Tablet by mouth every evening. 100 Tablet 3    cycloSPORINE (RESTASIS) 0.05 % ophthalmic emulsion Instill 1 drop into both eyes twice daily 180 Each 3    insulin regular (NOVOLIN R) 100 Unit/mL Solution AS PER SLIDING SCALE 10 mL 3    ketoconazole (NIZORAL) 2 % Cream Apply twice a day to affected toenails. 30 g 2    Blood Glucose Monitoring Suppl (BLOOD GLUCOSE MONITOR SYSTEM) w/Device Kit Meter: Dispense Device of Insurance Preference. Sig. Use as directed for blood sugar monitoring. #1. NR. 1 Kit 0    albuterol 108 (90 Base) MCG/ACT Aero Soln inhalation aerosol Inhale 2 Puffs every four hours as needed for Shortness of Breath. (Patient taking differently: Inhale 2 Puffs every four hours as needed for Shortness of Breath. MEDICATION INSTRUCTIONS FOR SURGERY/PROCEDURE SCHEDULED FOR 7/10   OK TO CONTINUE TAKING PRIOR TO SURGERY AND DAY OF SURGERY) 18 g 1    INSULIN SYRINGE .5CC/29G ([employer]ICARE INSULIN SYRINGE) 29G X 1/2\" 0.5 ML Misc USE AS DIRECTED BY PHYSICIAN 100 Each 3     "

## 2025-07-17 PROCEDURE — RXMED WILLOW AMBULATORY MEDICATION CHARGE: Performed by: OPTOMETRIST

## 2025-07-18 ENCOUNTER — PHARMACY VISIT (OUTPATIENT)
Dept: PHARMACY | Facility: MEDICAL CENTER | Age: 70
End: 2025-07-18
Payer: COMMERCIAL

## 2025-07-29 PROCEDURE — RXMED WILLOW AMBULATORY MEDICATION CHARGE: Performed by: OPTOMETRIST

## 2025-08-01 ENCOUNTER — PHARMACY VISIT (OUTPATIENT)
Dept: PHARMACY | Facility: MEDICAL CENTER | Age: 70
End: 2025-08-01
Payer: COMMERCIAL

## 2025-08-08 PROCEDURE — RXMED WILLOW AMBULATORY MEDICATION CHARGE: Performed by: FAMILY MEDICINE

## 2025-08-12 ENCOUNTER — PHARMACY VISIT (OUTPATIENT)
Dept: PHARMACY | Facility: MEDICAL CENTER | Age: 70
End: 2025-08-12
Payer: COMMERCIAL

## 2025-08-13 ENCOUNTER — HOSPITAL ENCOUNTER (OUTPATIENT)
Dept: RADIOLOGY | Facility: MEDICAL CENTER | Age: 70
End: 2025-08-13
Attending: FAMILY MEDICINE
Payer: MEDICARE

## 2025-08-13 DIAGNOSIS — Z87.891 FORMER CIGARETTE SMOKER: ICD-10-CM

## 2025-08-13 PROCEDURE — 71271 CT THORAX LUNG CANCER SCR C-: CPT

## 2025-08-19 PROCEDURE — RXMED WILLOW AMBULATORY MEDICATION CHARGE: Performed by: FAMILY MEDICINE

## 2025-08-21 ENCOUNTER — PHARMACY VISIT (OUTPATIENT)
Dept: PHARMACY | Facility: MEDICAL CENTER | Age: 70
End: 2025-08-21
Payer: COMMERCIAL

## (undated) DEVICE — TOURNIQUET CUFF 34 X 4 ONE PORT DISP - STERILE (10/BX)

## (undated) DEVICE — TOWEL STOP TIMEOUT SAFETY FLAG (40EA/CA)

## (undated) DEVICE — COVER LIGHT HANDLE FLEXIBLE - SOFT (2EA/PK 80PK/CA)

## (undated) DEVICE — ABLATOR WAND SERFAS 90-S CRUISE

## (undated) DEVICE — GOWN WARMING STANDARD FLEX - (30/CA)

## (undated) DEVICE — ELECTRODE DUAL RETURN W/ CORD - (50/PK)

## (undated) DEVICE — SENSOR OXIMETER ADULT SPO2 RD SET (20EA/BX)

## (undated) DEVICE — GLOVE BIOGEL SZ 8 SURGICAL PF LTX - (50PR/BX 4BX/CA)

## (undated) DEVICE — SODIUM CHL. IRRIGATION 0.9% 3000ML (4EA/CA 65CA/PF)

## (undated) DEVICE — DRAPE LARGE 3 QUARTER - (20/CA)

## (undated) DEVICE — SUTURE 3-0 ETHILON FS-1 - (36/BX) 30 INCH